# Patient Record
Sex: MALE | Race: WHITE | NOT HISPANIC OR LATINO | Employment: OTHER | URBAN - METROPOLITAN AREA
[De-identification: names, ages, dates, MRNs, and addresses within clinical notes are randomized per-mention and may not be internally consistent; named-entity substitution may affect disease eponyms.]

---

## 2017-03-12 ENCOUNTER — TELEPHONE (OUTPATIENT)
Dept: NURSING | Facility: CLINIC | Age: 47
End: 2017-03-12

## 2017-03-12 ENCOUNTER — OFFICE VISIT (OUTPATIENT)
Dept: URGENT CARE | Facility: URGENT CARE | Age: 47
End: 2017-03-12
Payer: COMMERCIAL

## 2017-03-12 VITALS
HEART RATE: 85 BPM | OXYGEN SATURATION: 95 % | WEIGHT: 230 LBS | SYSTOLIC BLOOD PRESSURE: 130 MMHG | RESPIRATION RATE: 16 BRPM | TEMPERATURE: 96.8 F | DIASTOLIC BLOOD PRESSURE: 80 MMHG

## 2017-03-12 DIAGNOSIS — R03.0 ELEVATED BLOOD PRESSURE READING WITHOUT DIAGNOSIS OF HYPERTENSION: ICD-10-CM

## 2017-03-12 DIAGNOSIS — R09.82 POSTNASAL DRIP: Primary | ICD-10-CM

## 2017-03-12 LAB
DEPRECATED S PYO AG THROAT QL EIA: NORMAL
MICRO REPORT STATUS: NORMAL
SPECIMEN SOURCE: NORMAL

## 2017-03-12 PROCEDURE — 99203 OFFICE O/P NEW LOW 30 MIN: CPT | Performed by: PHYSICIAN ASSISTANT

## 2017-03-12 PROCEDURE — 87081 CULTURE SCREEN ONLY: CPT | Performed by: PHYSICIAN ASSISTANT

## 2017-03-12 PROCEDURE — 87880 STREP A ASSAY W/OPTIC: CPT | Performed by: PHYSICIAN ASSISTANT

## 2017-03-12 RX ORDER — FLUTICASONE PROPIONATE 50 MCG
1-2 SPRAY, SUSPENSION (ML) NASAL DAILY
Qty: 1 BOTTLE | Refills: 0 | Status: SHIPPED | OUTPATIENT
Start: 2017-03-12 | End: 2018-12-06

## 2017-03-12 ASSESSMENT — ENCOUNTER SYMPTOMS
CHILLS: 0
FEVER: 0
HEADACHES: 0
DIARRHEA: 0
FOCAL WEAKNESS: 0
SHORTNESS OF BREATH: 0
ABDOMINAL PAIN: 0
COUGH: 0
SORE THROAT: 1
NAUSEA: 0
VOMITING: 0

## 2017-03-12 NOTE — PATIENT INSTRUCTIONS
Follow up with primary care in 3-4 days if symptoms have not improved. Return to clinic or go to ER if symptoms worsen.

## 2017-03-12 NOTE — PROGRESS NOTES
HPI    March 12, 2017    HPI: Andrew Atkinson is a 46 year old male who complains of moderate sore throat, rhinorrhea, sneezing, & congestion onset 1 month ago. Symptoms are constant in duration. He has tried throat lozenges & hot tea. Denies fever/chills, cough, dysphagia, HA, CP, SOB, abd pain, N/V/D, rash, or any other symptoms. Patient denies sick contacts.    No past medical history on file.  No past surgical history on file.  Social History   Substance Use Topics     Smoking status: Never Smoker     Smokeless tobacco: Not on file     Alcohol use Not on file       Problem list, Medication list, Allergies, and Medical/Social/Surgical histories reviewed in [facility] and updated as appropriate.    Review of Systems   Constitutional: Negative for chills and fever.   HENT: Positive for congestion and sore throat.         Rhinorrhea, sneezing   Respiratory: Negative for cough and shortness of breath.    Cardiovascular: Negative for chest pain.   Gastrointestinal: Negative for abdominal pain, diarrhea, nausea and vomiting.   Skin: Negative for rash.   Neurological: Negative for focal weakness and headaches.   All other systems reviewed and are negative.        Physical Exam   Constitutional: He is oriented to person, place, and time and well-developed, well-nourished, and in no distress.   HENT:   Head: Normocephalic and atraumatic.   Right Ear: Tympanic membrane, external ear and ear canal normal.   Left Ear: Tympanic membrane, external ear and ear canal normal.   Nose: Nose normal.   Mouth/Throat: Uvula is midline and mucous membranes are normal. Posterior oropharyngeal erythema present. No oropharyngeal exudate, posterior oropharyngeal edema or tonsillar abscesses.   Postnasal drip   Cardiovascular: Normal rate, regular rhythm and normal heart sounds.    Pulmonary/Chest: Effort normal and breath sounds normal.   Musculoskeletal: Normal range of motion.   Neurological: He is alert and oriented to person, place, and time.  Gait normal.   Skin: Skin is warm and dry.   Nursing note and vitals reviewed.      Vital Signs  /80 (BP Location: Right arm, Patient Position: Chair, Cuff Size: Adult Large)  Pulse 85  Temp 96.8  F (36  C) (Oral)  Resp 16  Wt 230 lb (104.3 kg)  SpO2 95%     Diagnostic Test Results:  Results for orders placed or performed in visit on 03/12/17 (from the past 24 hour(s))   Strep, Rapid Screen   Result Value Ref Range    Specimen Description Throat     Rapid Strep A Screen       NEGATIVE: No Group A streptococcal antigen detected by immunoassay, await   culture report.      Micro Report Status FINAL 03/12/2017        ASSESSMENT/PLAN      ICD-10-CM    1. Postnasal drip R09.82 Strep, Rapid Screen     Beta strep group A culture     fluticasone (FLONASE) 50 MCG/ACT spray   2. Elevated blood pressure reading without diagnosis of hypertension R03.0       Strep negative, pt afebrile. Postnasal drip likely secondary to allergies. Rx Flonase.    /95 initially, 130/80 on recheck. Advised pt keep BP log for a couple weeks and f/u with PCP.      I have discussed any lab or imaging results, the patient's diagnosis, and my plan of treatment with the patient and/or family. Patient is aware to come back in if with worsening symptoms or if no relief despite treatment plan.  Patient voiced understanding and had no further questions.       Follow Up: Return if symptoms worsen or fail to improve.    TEODORO Connor, PACarrieC  [facility]

## 2017-03-12 NOTE — TELEPHONE ENCOUNTER
"Call Type: Triage Call    Presenting Problem: Pharmacy calling regarding RX not at pharmacy\"  fluticasone (FLONASE) 50 MCG/ACT spray 1 Bottle 0 3/12/2017  --  Sig: Spray 1-2 sprays into both nostrils daily    Gave verbal per  epic.  Triage Note:  Guideline Title: Medication Questions - Adult  Recommended Disposition: Call Provider Immediately  Original Inclination: Wanted to speak with a nurse  Override Disposition:  Intended Action: Follow advice given  Physician Contacted: No  Pharmacy calling to clarify prescription order. ?  YES  Sign(s) or symptom(s) associated with a diagnosed condition or with a new illness  ? NO  Prescription ordered today and not available at pharmacy putting patient at  clinical risk ? NO  Unable to obtain prescribed medication related to available resources AND  situation poses immediate clinical risk ? NO  Physician Instructions:  Care Advice:  "

## 2017-03-12 NOTE — NURSING NOTE
There is no height or weight on file to calculate BMI.  BP Readings from Last 1 Encounters:   03/12/17 (!) 139/95   ]  BP cuff size:  large  Do you feel safe in your environment?  Yes  Does the patient need any medication refills today? No  Antonia Ahumada CMA

## 2017-03-12 NOTE — MR AVS SNAPSHOT
"              After Visit Summary   3/12/2017    Andrew Atkinson    MRN: [id number]           Patient Information     Date Of Birth          1970        Visit Information        Provider Department      3/12/2017 9:55 AM Mindy Bowie PA-C [facility]        Today's Diagnoses     Postnasal drip    -  1    Elevated blood pressure reading without diagnosis of hypertension          Care Instructions    Follow up with primary care in 3-4 days if symptoms have not improved. Return to clinic or go to ER if symptoms worsen.            Follow-ups after your visit        Follow-up notes from your care team     Return if symptoms worsen or fail to improve.      Who to contact     If you have questions or need follow up information about today's clinic visit or your schedule please contact [facility] directly at [phone].  Normal or non-critical lab and imaging results will be communicated to you by MyChart, letter or phone within 4 business days after the clinic has received the results. If you do not hear from us within 7 days, please contact the clinic through MyChart or phone. If you have a critical or abnormal lab result, we will notify you by phone as soon as possible.  Submit refill requests through iFit or call your pharmacy and they will forward the refill request to us. Please allow 3 business days for your refill to be completed.          Additional Information About Your Visit        MyChart Information     iFit lets you send messages to your doctor, view your test results, renew your prescriptions, schedule appointments and more. To sign up, go to [email or web address] . Click on \"Log in\" on the left side of the screen, which will take you to the Welcome page. Then click on \"Sign up Now\" on the right side of the page.     You will be asked to enter the access code listed below, as well as some personal information. Please follow the directions to create your " username and password.     Your access code is: [email or web address]  Expires: 6/10/2017 11:26 AM     Your access code will  in 90 days. If you need help or a new code, please call your [facility] clinic or [phone].        Care EveryWhere ID     This is your Care EveryWhere ID. This could be used by other organizations to access your [facility] medical records  [id number]        Your Vitals Were     Pulse Temperature Respirations Pulse Oximetry          85 96.8  F (36  C) (Oral) 16 95%         Blood Pressure from Last 3 Encounters:   17 130/80    Weight from Last 3 Encounters:   17 230 lb (104.3 kg)              We Performed the Following     Beta strep group A culture     Strep, Rapid Screen          Today's Medication Changes          These changes are accurate as of: 3/12/17 11:26 AM.  If you have any questions, ask your nurse or doctor.               Start taking these medicines.        Dose/Directions    fluticasone 50 MCG/ACT spray   Commonly known as:  FLONASE   Used for:  Postnasal drip   Started by:  Mindy Bowie PA-C        Dose:  1-2 spray   Spray 1-2 sprays into both nostrils daily   Quantity:  1 Bottle   Refills:  0            Where to get your medicines      These medications were sent to [id number] [place] - [address]  [address] [place] [address]     Phone:  [phone]     fluticasone 50 MCG/ACT spray                Primary Care Provider    None Specified       No primary provider on file.        Thank you!     [facility] you for [facility]  [facility]. Our goal is always to provide you with excellent care. Hearing back from our patients is one way we can continue to improve our services. Please take a few minutes to complete the written survey that you may receive in the mail after your visit with us. Thank you!             Your Updated Medication List - Protect others around you: Learn how to safely use, store and throw away  your medicines at www.disposemymeds.org.          This list is accurate as of: 3/12/17 11:26 AM.  Always use your most recent med list.                   Brand Name Dispense Instructions for use    fluticasone 50 MCG/ACT spray    FLONASE    1 Bottle    Spray 1-2 sprays into both nostrils daily

## 2017-03-14 LAB
BACTERIA SPEC CULT: NORMAL
MICRO REPORT STATUS: NORMAL
SPECIMEN SOURCE: NORMAL

## 2017-04-17 ENCOUNTER — OFFICE VISIT (OUTPATIENT)
Dept: FAMILY MEDICINE | Facility: CLINIC | Age: 47
End: 2017-04-17
Payer: COMMERCIAL

## 2017-04-17 VITALS
TEMPERATURE: 96.6 F | HEART RATE: 80 BPM | HEIGHT: 71 IN | SYSTOLIC BLOOD PRESSURE: 110 MMHG | BODY MASS INDEX: 33.49 KG/M2 | OXYGEN SATURATION: 96 % | DIASTOLIC BLOOD PRESSURE: 70 MMHG | WEIGHT: 239.2 LBS

## 2017-04-17 DIAGNOSIS — M25.662 STIFFNESS OF LEFT KNEE: ICD-10-CM

## 2017-04-17 DIAGNOSIS — M25.562 ACUTE PAIN OF LEFT KNEE: Primary | ICD-10-CM

## 2017-04-17 DIAGNOSIS — S83.242A TEAR OF MEDIAL MENISCUS OF LEFT KNEE, CURRENT, UNSPECIFIED TEAR TYPE, INITIAL ENCOUNTER: ICD-10-CM

## 2017-04-17 PROCEDURE — 99213 OFFICE O/P EST LOW 20 MIN: CPT | Performed by: FAMILY MEDICINE

## 2017-04-17 RX ORDER — LEVOTHYROXINE SODIUM 125 UG/1
125 TABLET ORAL DAILY
COMMUNITY
End: 2017-10-19 | Stop reason: DRUGHIGH

## 2017-04-17 RX ORDER — MULTIPLE VITAMINS W/ MINERALS TAB 9MG-400MCG
1 TAB ORAL EVERY MORNING
COMMUNITY

## 2017-04-17 ASSESSMENT — PAIN SCALES - GENERAL: PAINLEVEL: WORST PAIN (10)

## 2017-04-17 NOTE — PROGRESS NOTES
"  SUBJECTIVE:                                                    Andrew Atkinson is a 46 year old male who presents to clinic today for the following health issues:    Musculoskeletal problem/pain      Duration: 1 week    Description  Location: Left knee     Intensity:  //10    Accompanying signs and symptoms: Stiffness and swelling     History  Previous similar problem: no   Previous evaluation:  none    Precipitating or alleviating factors:  Trauma or overuse: no   Aggravating factors include: walking    Therapies tried and outcome: ice, immobilization and NSAID - ibuprofen        HCM:    Problem list and histories reviewed & adjusted, as indicated.  Additional history: as documented    There is no problem list on file for this patient.    History reviewed. No pertinent surgical history.    Social History   Substance Use Topics     Smoking status: Never Smoker     Smokeless tobacco: Not on file     Alcohol use Yes      Comment: Occ      Family History   Problem Relation Age of Onset     Unknown/Adopted No family hx of            Reviewed and updated as needed this visit by clinical staff  Tobacco  Allergies  Meds  Med Hx  Surg Hx  Fam Hx  Soc Hx      ROS:  Constitutional, HEENT, cardiovascular, pulmonary, gi and gu systems are negative, except as otherwise noted.    OBJECTIVE:                                                    /70  Pulse 80  Temp 96.6  F (35.9  C) (Oral)  Ht 5' 11\" (1.803 m)  Wt 239 lb 3.2 oz (108.5 kg)  SpO2 96%  BMI 33.36 kg/m2  Body mass index is 33.36 kg/(m^2).  GENERAL: walking with a limp, alert and no distress  NECK: no adenopathy and thyroid normal to palpation  RESP: lungs clear to auscultation - no rales, rhonchi or wheezes  CV: regular rate and rhythm, no murmur, click or rub, no peripheral edema   MS: no gross musculoskeletal defects noted, no edema  Lt KNEE:   Tenderness along medial joint line     ASSESSMENT/PLAN:                                                  "   (M25.562) Acute pain of left knee  (primary encounter diagnosis)  Comment: Differentials: Meniscal tear, MCL tear, OA medial compartment. Given severity of symptoms and disabling nature will evaluate with an MRI  Plan: MR Knee Left w/o Contrast    (M25.662) Stiffness of left knee  Plan: MR Knee Left w/o Contrast    (S83.242A) ? Tear of medial meniscus of left knee, Active  Plan: MR Knee Left w/o Contrast, CANCELED: MR Ankle         Left w/o Contrast    Follow up with MRI.    Odell Urbina MD  [facility]

## 2017-04-17 NOTE — NURSING NOTE
"Chief Complaint   Patient presents with     Knee Pain     Left knee pain x 1 week       Initial /70  Pulse 80  Temp 96.6  F (35.9  C) (Oral)  Ht 5' 11\" (1.803 m)  Wt 239 lb 3.2 oz (108.5 kg)  SpO2 96%  BMI 33.36 kg/m2 Estimated body mass index is 33.36 kg/(m^2) as calculated from the following:    Height as of this encounter: 5' 11\" (1.803 m).    Weight as of this encounter: 239 lb 3.2 oz (108.5 kg).  Medication Reconciliation: complete     An ROSA Pro    "

## 2017-04-17 NOTE — MR AVS SNAPSHOT
After Visit Summary   4/17/2017    Andrew Atkinson    MRN: [id number]           Patient Information     Date Of Birth          1970        Visit Information        Provider Department      4/17/2017 7:40 AM Odell Urbina MD [facility]        Today's Diagnoses     Acute pain of left knee    -  1    Stiffness of left knee        ? Tear of medial meniscus of left knee          Care Instructions    [facility]    If you have any questions regarding to your visit please contact your care team:       Team Purple:   Clinic Hours Telephone Number   [address]   7am-7pm  Monday - Thursday   7am-5pm  Fridays  [phone]  (Appointment scheduling available 24/7)    Questions about your Visit?   Team Line:  [phone]   Urgent Care - [place] and [facility] - 11am-9pm Monday-Friday Saturday-Sunday- 9am-5pm   [place] - 5pm-9pm Monday-Friday Saturday-Sunday- 9am-5pm  [phone] - [facility]  [phone] - [place]       What options do I have for visits at the clinic other than the traditional office visit?  To expand how we care for you, many of our providers are utilizing electronic visits (e-visits) and telephone visits, when medically appropriate, for interactions with their patients rather than a visit in the clinic.   We also offer nurse visits for many medical concerns. Just like any other service, we will bill your insurance company for this type of visit based on time spent on the phone with your provider. Not all insurance companies cover these visits. Please check with your medical insurance if this type of visit is covered. You will be responsible for any charges that are not paid by your insurance.      E-visits via [employer]:  generally incur a $35.00 fee.  Telephone visits:  Time spent on the phone: *charged based on time that is spent on the phone in increments of 10  "minutes. Estimated cost:   5-10 mins $30.00   11-20 mins. $59.00   21-30 mins. $85.00     Use [employer] Interactive[employer] (secure email communication and access to your chart) to send your primary care provider a message or make an appointment. Ask someone on your Team how to sign up for [employer]at.  For a Price Quote for your services, please call our [place] Line at [phone].  As always, Thank you for trusting us with your health care needs!    Discharged By: An          Follow-ups after your visit        Future tests that were ordered for you today     Open Future Orders        Priority Expected Expires Ordered    MR Ankle Left w/o Contrast Routine  4/17/2018 4/17/2017            Who to contact     If you have questions or need follow up information about today's clinic visit or your schedule please contact [facility] directly at [phone].  Normal or non-critical lab and imaging results will be communicated to you by Makel[employer] Interactivehart, letter or phone within 4 business days after the clinic has received the results. If you do not hear from us within 7 days, please contact the clinic through [employer] or phone. If you have a critical or abnormal lab result, we will notify you by phone as soon as possible.  Submit refill requests through [employer] or call your pharmacy and they will forward the refill request to us. Please allow 3 business days for your refill to be completed.          Additional Information About Your Visit        Natural Dentist Information     [employer]ist lets you send messages to your doctor, view your test results, renew your prescriptions, schedule appointments and more. To sign up, go to [email or web address] . Click on \"Log in\" on the left side of the screen, which will take you to the Welcome page. Then click on \"Sign up Now\" on the right side of the page.     You will be asked to enter the access code listed below, as well as some personal information. Please follow the directions to create your username " "and password.     Your access code is: [email or web address]  Expires: 6/10/2017 11:26 AM     Your access code will  in 90 days. If you need help or a new code, please call your [place] clinic or [phone].        Care EveryWhere ID     This is your Care EveryWhere ID. This could be used by other organizations to access your [place] medical records  [id number]        Your Vitals Were     Pulse Temperature Height Pulse Oximetry BMI (Body Mass Index)       80 96.6  F (35.9  C) (Oral) 5' 11\" (1.803 m) 96% 33.36 kg/m2        Blood Pressure from Last 3 Encounters:   17 110/70   17 130/80    Weight from Last 3 Encounters:   17 239 lb 3.2 oz (108.5 kg)   17 230 lb (104.3 kg)               Primary Care Provider Office Phone #    [facility] [phone]       No address on file        Thank you!     Thank you for choosing [facility]  for your care. Our goal is always to provide you with excellent care. Hearing back from our patients is one way we can continue to improve our services. Please take a few minutes to complete the written survey that you may receive in the mail after your visit with us. Thank you!             Your Updated Medication List - Protect others around you: Learn how to safely use, store and throw away your medicines at www.disposemymeds.org.          This list is accurate as of: 17  8:11 AM.  Always use your most recent med list.                   Brand Name Dispense Instructions for use    fluticasone 50 MCG/ACT spray    FLONASE    1 Bottle    Spray 1-2 sprays into both nostrils daily       levothyroxine 125 MCG tablet    SYNTHROID/LEVOTHROID     Take 125 mcg by mouth daily       Multi-vitamin Tabs tablet      Take 1 tablet by mouth daily       TURMERIC PO      Take 1,000 mg by mouth daily         "

## 2017-04-17 NOTE — PATIENT INSTRUCTIONS
[facility]    If you have any questions regarding to your visit please contact your care team:       Team Purple:   Clinic Hours Telephone Number   [address] Dr[address]   7am-7pm  Monday - Thursday   7am-5pm  Fridays  [phone]  (Appointment scheduling available 24/7)    Questions about your Visit?   Team Line:  [phone]   Urgent Care - [place] and [facility] - 11am-9pm Monday-Friday Saturday-Sunday- 9am-5pm   [place] - 5pm-9pm Monday-Friday Saturday-Sunday- 9am-5pm  [phone] - [facility]  [phone] - [place]       What options do I have for visits at the clinic other than the traditional office visit?  To expand how we care for you, many of our providers are utilizing electronic visits (e-visits) and telephone visits, when medically appropriate, for interactions with their patients rather than a visit in the clinic.   We also offer nurse visits for many medical concerns. Just like any other service, we will bill your insurance company for this type of visit based on time spent on the phone with your provider. Not all insurance companies cover these visits. Please check with your medical insurance if this type of visit is covered. You will be responsible for any charges that are not paid by your insurance.      E-visits via [employer]:  generally incur a $35.00 fee.  Telephone visits:  Time spent on the phone: *charged based on time that is spent on the phone in increments of 10 minutes. Estimated cost:   5-10 mins $30.00   11-20 mins. $59.00   21-30 mins. $85.00     Use [employer] (secure email communication and access to your chart) to send your primary care provider a message or make an appointment. Ask someone on your Team how to sign up for [employer].  For a Price Quote for your services, please call our Consumer Price Line at [phone].  As always, Thank you for trusting us with your health care  needs!    Discharged By: An

## 2017-04-18 ENCOUNTER — RADIANT APPOINTMENT (OUTPATIENT)
Dept: MRI IMAGING | Facility: CLINIC | Age: 47
End: 2017-04-18
Attending: FAMILY MEDICINE
Payer: COMMERCIAL

## 2017-04-18 DIAGNOSIS — M25.562 ACUTE PAIN OF LEFT KNEE: ICD-10-CM

## 2017-04-18 DIAGNOSIS — S83.242A TEAR OF MEDIAL MENISCUS OF LEFT KNEE, CURRENT, UNSPECIFIED TEAR TYPE, INITIAL ENCOUNTER: ICD-10-CM

## 2017-04-18 DIAGNOSIS — M25.662 STIFFNESS OF LEFT KNEE: ICD-10-CM

## 2017-04-18 PROBLEM — E03.9 ACQUIRED HYPOTHYROIDISM: Status: ACTIVE | Noted: 2017-04-18

## 2017-04-18 PROCEDURE — 73721 MRI JNT OF LWR EXTRE W/O DYE: CPT | Mod: TC

## 2017-04-19 ENCOUNTER — TELEPHONE (OUTPATIENT)
Dept: FAMILY MEDICINE | Facility: CLINIC | Age: 47
End: 2017-04-19

## 2017-04-19 DIAGNOSIS — S83.242A ACUTE TEAR MEDIAL MENISCUS, LEFT, INITIAL ENCOUNTER: Primary | ICD-10-CM

## 2017-04-19 DIAGNOSIS — E03.9 ACQUIRED HYPOTHYROIDISM: ICD-10-CM

## 2017-04-19 NOTE — TELEPHONE ENCOUNTER
Patient called team line. He has MRI of knee completed yesterday 4/18/2017. He is wondering if Dr. Urbina had time to review the results and if he had any recommendations regarding treatment. He states he would like to know soon because he needs to know about getting back to work. Please advise.  Lois Dominguez CMA

## 2017-04-19 NOTE — TELEPHONE ENCOUNTER
Left vm:   MRI showed a meniscal tear. Refer to orthopedics for arthroscopy.  Referral order placed      Your provider has referred you to: [facility]: [facility] [place] [phone]   [email or web address]    Please be aware that coverage of these services is subject to the terms and limitations of your health insurance plan.  Call member services at your health plan with any benefit or coverage questions.      Please bring the following to your appointment:    >>   Any x-rays, CTs or MRIs which have been performed.  Contact the facility where they were done to arrange for  prior to your scheduled appointment.    >>   List of current medications   >>   This referral request   >>   Any documents/labs given to you for this referral

## 2017-04-21 NOTE — PROGRESS NOTES
"HISTORY OF PRESENT ILLNESS    Andrew Atkinson is a 46 year old male seen at the request of Odell Urbina MD for evaluation of a left knee pain. He felt a pop when getting up from a chair approximately 3 weeks ago.    Patient presents to the clinic with a cane.     Symptoms have been worsening since that time.  Present symptoms: shooting pain. No catching, locking, or giving way. Symptoms occur walking, pivoting, lifting the leg up and flexing (such as taking off his shoe) and at night. The symptoms occur frequently. Pain location: medial     Treatments: Ice    Orthopedic PMH: none    PAST MEDICAL HISTORY: No past medical history on file.    PAST SURGICAL HISTORY: No past surgical history on file.    FAMILY HISTORY:   Family History   Problem Relation Age of Onset     Unknown/Adopted No family hx of      SOCIAL HISTORY:   Social History   Substance Use Topics     Smoking status: Never Smoker     Smokeless tobacco: Not on file     Alcohol use Yes      Comment: Occ      REVIEW OF SYSTEMS:    CONSTITUTIONAL:  NEGATIVE for fever, chills, change in weight  INTEGUMENTARY/SKIN:  NEGATIVE for worrisome rashes, moles or lesions  EYES:  NEGATIVE for vision changes or irritation  ENT/MOUTH:  NEGATIVE for ear, mouth and throat problems  RESP:  NEGATIVE for significant cough or SOB  BREAST:  NEGATIVE for masses, tenderness or discharge  CV:  NEGATIVE for chest pain, palpitations or peripheral edema  GI:  NEGATIVE for nausea, abdominal pain, heartburn, or change in bowel habits  :  Negative   MUSCULOSKELETAL:  See HPI above  NEURO:  NEGATIVE for weakness, dizziness or paresthesias  ENDOCRINE:  NEGATIVE for temperature intolerance, skin/hair changes  HEME/ALLERGY/IMMUNE:  NEGATIVE for bleeding problems  PSYCHIATRIC:  NEGATIVE for changes in mood or affect    PHYSICAL EXAM:  Resp 10  Ht 1.829 m (6' 0.01\")  Wt 106.8 kg (235 lb 6.4 oz)  BMI 31.92 kg/m2  GENERAL APPEARANCE: healthy, alert and no distress   SKIN: no suspicious " lesions or rashes  NEURO: Normal strength and tone, mentation intact and speech normal  PSYCH:  mentation appears normal and affect normal/bright    KNEE EXAM:   Gait: walks with significant antalgic gait   Alignment: mild varus   Minimal crepitations in the patellofemoral joint.  ROM: 0*-full flexion   Effusion: mild to moderate   McMurrays: pain with Valgus McMurrays   Ligaments:    Lachman's Stable, Anterior and posterior drawer stable, stable to varus and valgus stress. Pain with Valgus stress testing.      MRI: From 4/18/17 of the left knee was reviewed personally by myself:   1. Medial meniscal tearing with possible displaced flap. Minimal reactive edema within the adjacent medial tibial plateau.  2. Patellofemoral chondromalacia.  3. Mild-moderate effusion.    Impression:   1. Medial meniscus tear, left knee     Plan:   Knee Arthroscopy: Discussed findings with patient. We talked about treatment options.  I feel the best treatment would be knee arthroscopy. He has mechanical symptoms, in that he has pain with pivoting and flexing the knee.  I have explained the nature of the procedure, the risks and recovery time with the patient. All questions were answered. The patient understands and has elected to proceed. He says that if the tear is repairable, which is unlikely, he would like it to be repaired. He understands the period of non-weightbearing required in that scenario..     We will likely use Percocet for post op pain control, due to his history of narcotic medication use in the past for his back (not presently on narcotics).     DWAIN Muñoz MD  Dept. Orthopedic Surgery  [facility]    This document serves as a record of the services and decisions personally performed and made by Dr. DWAIN Muñoz MD. It was created on his behalf by Radha Leblanc, a trained medical scribe. The creation of this record is based on the provider's personal observations and the statements of the patient. This  document has been checked and approved by the attending provider.   Radha Leblanc April 25, 2017 9:30 AM

## 2017-04-25 ENCOUNTER — OFFICE VISIT (OUTPATIENT)
Dept: ORTHOPEDICS | Facility: CLINIC | Age: 47
End: 2017-04-25
Payer: COMMERCIAL

## 2017-04-25 VITALS — BODY MASS INDEX: 31.89 KG/M2 | HEIGHT: 72 IN | RESPIRATION RATE: 10 BRPM | WEIGHT: 235.4 LBS

## 2017-04-25 DIAGNOSIS — S83.242A TEAR OF MEDIAL MENISCUS OF LEFT KNEE, CURRENT, UNSPECIFIED TEAR TYPE, INITIAL ENCOUNTER: Primary | ICD-10-CM

## 2017-04-25 PROCEDURE — 99203 OFFICE O/P NEW LOW 30 MIN: CPT | Performed by: ORTHOPAEDIC SURGERY

## 2017-04-25 ASSESSMENT — PAIN SCALES - GENERAL: PAINLEVEL: MODERATE PAIN (5)

## 2017-04-25 NOTE — MR AVS SNAPSHOT
"              After Visit Summary   2017    Andrew Atkinson    MRN: [id number]           Patient Information     Date Of Birth          1970        Visit Information        Provider Department      2017 8:45 AM Grant Muñoz MD [facility]         Follow-ups after your visit        Who to contact     If you have questions or need follow up information about today's clinic visit or your schedule please contact [facility] directly at [phone].  Normal or non-critical lab and imaging results will be communicated to you by MyChart, letter or phone within 4 business days after the clinic has received the results. If you do not hear from us within 7 days, please contact the clinic through TabSyshart or phone. If you have a critical or abnormal lab result, we will notify you by phone as soon as possible.  Submit refill requests through Billetto or call your pharmacy and they will forward the refill request to us. Please allow 3 business days for your refill to be completed.          Additional Information About Your Visit        [facility] Information     Billetto lets you send messages to your doctor, view your test results, renew your prescriptions, schedule appointments and more. To sign up, go to [email or web address] . Click on \"Log in\" on the left side of the screen, which will take you to the Welcome page. Then click on \"Sign up Now\" on the right side of the page.     You will be asked to enter the access code listed below, as well as some personal information. Please follow the directions to create your username and password.     Your access code is: [email or web address]  Expires: 6/10/2017 11:26 AM     Your access code will  in 90 days. If you need help or a new code, please call your [facility] or [phone].        Care EveryWhere ID     This is your Care EveryWhere ID. This could be used by other organizations to access your [facility] medical " "records  [id number]        Your Vitals Were     Respirations Height BMI (Body Mass Index)             10 1.829 m (6' 0.01\") 31.92 kg/m2          Blood Pressure from Last 3 Encounters:   04/17/17 110/70   03/12/17 130/80    Weight from Last 3 Encounters:   04/25/17 106.8 kg (235 lb 6.4 oz)   04/17/17 108.5 kg (239 lb 3.2 oz)   03/12/17 104.3 kg (230 lb)              Today, you had the following     No orders found for display       [facility] Provider Office Phone #    [facility] [phone]       No address on file        Thank you!     Thank you for choosing [facility]  for your care. Our goal is always to provide you with excellent care. Hearing back from our patients is one way we can continue to improve our services. Please take a few minutes to complete the written survey that you may receive in the mail after your visit with us. Thank you!             Your Updated Medication List - Protect others around you: Learn how to safely use, store and throw away your medicines at www.disposemymeds.org.          This list is accurate as of: 4/25/17  8:59 AM.  Always use your most recent med list.                   Brand Name Dispense Instructions for use    fluticasone 50 MCG/ACT spray    FLONASE    1 Bottle    Spray 1-2 sprays into both nostrils daily       levothyroxine 125 MCG tablet    SYNTHROID/LEVOTHROID     Take 125 mcg by mouth daily       Multi-vitamin Tabs tablet      Take 1 tablet by mouth daily       TURMERIC PO      Take 1,000 mg by mouth daily         "

## 2017-04-25 NOTE — LETTER
4/25/2017       RE: Andrew Atkinson  [address] [address]           Dear Colleague,    Thank you for referring your patient, Andrew Atkinsno, to the [facility]. Please see a copy of my visit note below.    HISTORY OF PRESENT ILLNESS    Andrew Atkinson is a 46 year old male seen at the request of Odell Urbina MD for evaluation of a left knee pain. He felt a pop when getting up from a chair approximately 3 weeks ago.    Patient presents to the clinic with a cane.     Symptoms have been worsening since that time.  Present symptoms: shooting pain. No catching, locking, or giving way. Symptoms occur walking, pivoting, lifting the leg up and flexing (such as taking off his shoe) and at night. The symptoms occur frequently. Pain location: medial     Treatments: Ice    Orthopedic PMH: none    PAST MEDICAL HISTORY: No past medical history on file.    PAST SURGICAL HISTORY: No past surgical history on file.    FAMILY HISTORY:   Family History   Problem Relation Age of Onset     Unknown/Adopted No family hx of      SOCIAL HISTORY:   Social History   Substance Use Topics     Smoking status: Never Smoker     Smokeless tobacco: Not on file     Alcohol use Yes      Comment: Occ      REVIEW OF SYSTEMS:    CONSTITUTIONAL:  NEGATIVE for fever, chills, change in weight  INTEGUMENTARY/SKIN:  NEGATIVE for worrisome rashes, moles or lesions  EYES:  NEGATIVE for vision changes or irritation  ENT/MOUTH:  NEGATIVE for ear, mouth and throat problems  RESP:  NEGATIVE for significant cough or SOB  BREAST:  NEGATIVE for masses, tenderness or discharge  CV:  NEGATIVE for chest pain, palpitations or peripheral edema  GI:  NEGATIVE for nausea, abdominal pain, heartburn, or change in bowel habits  :  Negative   MUSCULOSKELETAL:  See HPI above  NEURO:  NEGATIVE for weakness, dizziness or paresthesias  ENDOCRINE:  NEGATIVE for temperature intolerance, skin/hair changes  HEME/ALLERGY/IMMUNE:  NEGATIVE for bleeding  "problems  PSYCHIATRIC:  NEGATIVE for changes in mood or affect    PHYSICAL EXAM:  Resp 10  Ht 1.829 m (6' 0.01\")  Wt 106.8 kg (235 lb 6.4 oz)  BMI 31.92 kg/m2  GENERAL APPEARANCE: healthy, alert and no distress   SKIN: no suspicious lesions or rashes  NEURO: Normal strength and tone, mentation intact and speech normal  PSYCH:  mentation appears normal and affect normal/bright    KNEE EXAM:   Gait: walks with significant antalgic gait   Alignment: mild varus   Minimal crepitations in the patellofemoral joint.  ROM: 0*-full flexion   Effusion: mild to moderate   McMurrays: pain with Valgus McMurrays   Ligaments:    Lachman's Stable, Anterior and posterior drawer stable, stable to varus and valgus stress. Pain with Valgus stress testing.      MRI: From 4/18/17 of the left knee was reviewed personally by myself:   1. Medial meniscal tearing with possible displaced flap. Minimal reactive edema within the adjacent medial tibial plateau.  2. Patellofemoral chondromalacia.  3. Mild-moderate effusion.    Impression:   1. Medial meniscus tear, left knee     Plan:   Knee Arthroscopy: Discussed findings with patient. We talked about treatment options.  I feel the best treatment would be knee arthroscopy. He has mechanical symptoms, in that he has pain with pivoting and flexing the knee.  I have explained the nature of the procedure, the risks and recovery time with the patient. All questions were answered. The patient understands and has elected to proceed. He says that if the tear is repairable, which is unlikely, he would like it to be repaired. He understands the period of non-weightbearing required in that scenario..     We will likely use Percocet for post op pain control, due to his history of narcotic medication use in the past for his back (not presently on narcotics).     DWAIN Muñoz MD  Dept. Orthopedic Surgery  [facility]    This document serves as a record of the services and decisions " personally performed and made by Dr. DWAIN Muñoz MD. It was created on his behalf by Radha Leblanc, a trained medical scribe. The creation of this record is based on the provider's personal observations and the statements of the patient. This document has been checked and approved by the attending provider.   Radha Leblanc April 25, 2017 9:30 AM    Again, thank you for allowing me to participate in the care of your patient.        Sincerely,              Grant Muñoz MD

## 2017-04-27 NOTE — PATIENT INSTRUCTIONS
Before Your Surgery      Call your surgeon if there is any change in your health. This includes signs of a cold or flu (such as a sore throat, runny nose, cough, rash or fever).    Do not smoke, drink alcohol or take over the counter medicine (unless your surgeon or primary care doctor tells you to) for the 24 hours before and after surgery.    If you take prescribed drugs: Follow your doctor s orders about which medicines to take and which to stop until after surgery.    Eating and drinking prior to surgery: follow the instructions from your surgeon    Take a shower or bath the night before surgery. Use the soap your surgeon gave you to gently clean your skin. If you do not have soap from your surgeon, use your regular soap. Do not shave or scrub the surgery site.  Wear clean pajamas and have clean sheets on your bed.   [facility]    If you have any questions regarding to your visit please contact your care team:       Team Purple:   Clinic Hours Telephone Number   [address]   7am-7pm  Monday - Thursday   7am-5pm  Fridays  [phone]  (Appointment scheduling available 24/7)    Questions about your Visit?   Team Line:  [phone]   Urgent [facility] - [place] [facility] - 11am-9pm Monday-Friday Saturday-Sunday- 9am-5pm   [place] - 5pm-9pm Monday-Friday Saturday-Sunday- 9am-5pm  [phone] - [place]   [phone] - [place]       What options do I have for visits at the clinic other than the traditional office visit?  To expand how we care for you, many of our providers are utilizing electronic visits (e-visits) and telephone visits, when medically appropriate, for interactions with their patients rather than a visit in the clinic.   We also offer nurse visits for many medical concerns. Just like any other service, we will bill your insurance company for this type of visit based on time spent on the phone  with your provider. Not all insurance companies cover these visits. Please check with your medical insurance if this type of visit is covered. You will be responsible for any charges that are not paid by your insurance.      E-visits via [employer]:  generally incur a $35.00 fee.  Telephone visits:  Time spent on the phone: *charged based on time that is spent on the phone in increments of 10 minutes. Estimated cost:   5-10 mins $30.00   11-20 mins. $59.00   21-30 mins. $85.00     Use Animated Speech (secure email communication and access to your chart) to send your primary care provider a message or make an appointment. Ask someone on your Team how to sign up for Animated Speech.  For a Price Quote for your services, please call our Consumer Price Line at [phone].  As always, Thank you for trusting us with your health care needs!

## 2017-04-27 NOTE — PROGRESS NOTES
[facility]  [address] [place] [id number]  [phone]  Dept: [phone]    PRE-OP EVALUATION:  Today's date: 2017    Andrew Atkinson (: 1970) presents for pre-operative evaluation assessment as requested by Dr. Grant Muñoz.  He requires evaluation and anesthesia risk assessment prior to undergoing surgery/procedure for treatment of the left Knee  .  Proposed procedure: Arthroscopic Medical Menisectomy Left Knee    Date of Surgery/ Procedure: 2017  Time of Surgery/ Procedure: 10:00 AM  Hospital/Surgical Facility: [facility]  Fax number for surgical facility:   Primary Physician: Odell Urbina  Type of Anesthesia Anticipated: General    Patient has a Health Care Directive or Living Will:  YES -Living Will    1. NO - Do you have a history of heart attack, stroke, stent, bypass or surgery on an artery in the head, neck, heart or legs?  2. NO - Do you ever have any pain or discomfort in your chest?  3. NO - Do you have a history of  Heart Failure?  4. NO - Are you troubled by shortness of breath when: walking on the level, up a slight hill or at night?  5. NO - Do you currently have a cold, bronchitis or other respiratory infection?  6. NO - Do you have a cough, shortness of breath or wheezing?  7. NO - Do you sometimes get pains in the calves of your legs when you walk?  8. NO - Do you or anyone in your family have previous history of blood clots?  9. NO - Do you or does anyone in your family have a serious bleeding problem such as prolonged bleeding following surgeries or cuts?  10. NO - Have you ever had problems with anemia or been told to take iron pills?  11. YES - HAVE YOU HAD ANY ABNORMAL BLOOD LOSS SUCH AS BLACK, TARRY OR BLOODY STOOLS, OR ABNORMAL VAGINAL BLEEDING? Periodically some bleeding with BM (Hemorrhoids)  12. NO - Have you ever had a blood transfusion?  13. NO - Have you or any of your relatives ever had problems  with anesthesia?  14. YES - DO YOU HAVE SLEEP APNEA, EXCESSIVE SNORING OR DAYTIME DROWSINESS? He snores  15. NO - Do you have any prosthetic heart valves?  16. NO - Do you have prosthetic joints?  17. NO - Is there any chance that you may be pregnant?      HPI:                                                      Brief HPI related to upcoming procedure: Arthroscopy      HYPOTHYROIDISM - Patient has a longstanding history of chronic Hypothyroidism. Patient has been doing well, noting no tremor, insomnia, hair loss or changes in skin texture. Last TSH value of None. Continues to take medications as directed, without adverse reactions or side effects.                                                                                                                                                                                                                        .    MEDICAL HISTORY:                                                      Patient Active Problem List    Diagnosis Date Noted     Acquired hypothyroidism 04/18/2017     Priority: Medium      Past Medical History:   Diagnosis Date     Medial meniscus tear      History reviewed. No pertinent surgical history.  Current Outpatient Prescriptions   Medication Sig Dispense Refill     levothyroxine (SYNTHROID/LEVOTHROID) 125 MCG tablet Take 125 mcg by mouth daily       multivitamin, therapeutic with minerals (MULTI-VITAMIN) TABS tablet Take 1 tablet by mouth daily       TURMERIC PO Take 1,000 mg by mouth daily       fluticasone (FLONASE) 50 MCG/ACT spray Spray 1-2 sprays into both nostrils daily (Patient not taking: Reported on 5/1/2017) 1 Bottle 0     OTC products: None, except as noted above    Allergies   Allergen Reactions     Morphine Hives      Latex Allergy: NO    Social History   Substance Use Topics     Smoking status: Never Smoker     Smokeless tobacco: Not on file     Alcohol use Yes      Comment: Occ      History   Drug Use No       REVIEW OF SYSTEMS:       "                                              Constitutional, HEENT, cardiovascular, pulmonary, gi and gu systems are negative, except as otherwise noted.    EXAM:                                                    /80  Pulse 56  Temp 97.1  F (36.2  C) (Oral)  Ht 6' 0.01\" (1.829 m)  Wt 236 lb 8 oz (107.3 kg)  SpO2 95%  BMI 32.07 kg/m2    GENERAL APPEARANCE: healthy, alert and no distress     EYES: EOMI,  PERRL     HENT: ear canals and TM's normal and nose and mouth without ulcers or lesions     NECK: no adenopathy, no asymmetry, masses, or scars and thyroid normal to palpation     RESP: lungs clear to auscultation - no rales, rhonchi or wheezes     CV: regular rates and rhythm, normal S1 S2, no S3 or S4 and no murmur, click or rub     ABDOMEN:  soft, nontender, no HSM or masses and bowel sounds normal     SKIN: no suspicious lesions or rashes     NEURO: Normal strength and tone, sensory exam grossly normal, mentation intact and speech normal     PSYCH: mentation appears normal. and affect normal/bright     LYMPHATICS: No axillary, cervical, or supraclavicular nodes    DIAGNOSTICS:                                                      EKG: Not indicated due to non-vascular surgery and low risk of event (age <65 and without cardiac risk factors)  Hemoglobin (indicated for history of anemia or procedure with significant blood loss such as tonsillectomy, major intraperitoneal surgery, vascular surgery, major spine surgery, total joint replacement)  Labs Resulted Today:   Results for orders placed or performed in visit on 04/18/17   MR Knee Left w/o Contrast    Narrative    MR KNEE LEFT WITHOUT CONTRAST April 18, 2017 12:46 PM    HISTORY: Tear of medial meniscus, current injury, left knee, initial  encounter. Stiffness of left knee, not elsewhere classified. Pain in  left knee.     TECHNIQUE: Axial and coronal T2 with fat suppression. Coronal T1.  Sagittal dual echo T2.    FINDINGS:   Medial Meniscus: " Horizontal tear of the posterior horn and body  segment, with inferior extension. There is blunting at the free edge  of the body segment, with a possible subtle displaced flap of meniscal  tissue inferomedially. If arthroscopy is performed, close attention  should be directed to this region.    Lateral Meniscus: No tear, displaced fragment, or extrusion.        Anterior Cruciate Ligament: Unremarkable. No sprain or tear  identified.     Posterior Cruciate Ligament: Unremarkable. No sprain or tear  identified.     Medial Collateral Ligament: No sprain or tear identified.    Lateral Collateral Ligament Complex, Popliteus Tendon: The iliotibial  band, fibular collateral ligament, biceps femoris tendon, and  popliteus tendon are intact.    Osseous and Cartilaginous Structures: Mild/moderate degenerative  change at the proximal tibiofibular joint. There is minimal reactive  subchondral edema of the medial tibial plateau. Grade 1 patellar  chondromalacia. Grade 2 trochlear chondromalacia.    Extensor Mechanism: The quadriceps and patellar tendons are intact.  The medial and lateral patellar retinacula appear unremarkable.    Joint Space: Mild/moderate joint effusion. No definite loose bodies  appreciated.    Additional Findings: No Baker's cyst. No semimembranosus-tibial  collateral ligament or pes anserine bursitis. There is nonspecific  soft tissue edema (greatest medially).         Impression    IMPRESSION:  1. Medial meniscal tearing with possible displaced flap. Minimal  reactive edema within the adjacent medial tibial plateau.  2. Patellofemoral chondromalacia.  3. Mild-moderate effusion.    JEROME TINOCO MD       No results for input(s): HGB, PLT, INR, NA, POTASSIUM, CR, A1C in the last 75972 hours.     IMPRESSION:                                                    Reason for surgery/procedure: Meniscal Tear Lt/Arthroscopy  Diagnosis/reason for consult: Knee Pain/Pre OP    The proposed surgical procedure is  considered LOW risk.    REVISED CARDIAC RISK INDEX  The patient has the following serious cardiovascular risks for perioperative complications such as (MI, PE, VFib and 3  AV Block):  No serious cardiac risks  INTERPRETATION: 0 risks: Class I (very low risk - 0.4% complication rate)    The patient has the following additional risks for perioperative complications:  No identified additional risks      ICD-10-CM    1. Preop general physical exam Z01.818    2. Other tear of meniscus of left knee as current injury, unspecified meniscus, initial encounter S83.204A    3. Acquired hypothyroidism E03.9        RECOMMENDATIONS:                                                        --Patient is to take all scheduled medications on the day of surgery EXCEPT for modifications listed below.    APPROVAL GIVEN to proceed with proposed procedure, without further diagnostic evaluation       Signed Electronically by: Odell Urbina MD    Copy of this evaluation report is provided to requesting physician.    [facility] Preop Guidelines

## 2017-05-01 ENCOUNTER — OFFICE VISIT (OUTPATIENT)
Dept: FAMILY MEDICINE | Facility: CLINIC | Age: 47
End: 2017-05-01
Payer: COMMERCIAL

## 2017-05-01 VITALS
OXYGEN SATURATION: 95 % | WEIGHT: 236.5 LBS | DIASTOLIC BLOOD PRESSURE: 80 MMHG | HEART RATE: 56 BPM | BODY MASS INDEX: 32.03 KG/M2 | SYSTOLIC BLOOD PRESSURE: 132 MMHG | TEMPERATURE: 97.1 F | HEIGHT: 72 IN

## 2017-05-01 DIAGNOSIS — Z01.818 PREOP GENERAL PHYSICAL EXAM: Primary | ICD-10-CM

## 2017-05-01 DIAGNOSIS — S83.204A OTHER TEAR OF MENISCUS OF LEFT KNEE AS CURRENT INJURY, UNSPECIFIED MENISCUS, INITIAL ENCOUNTER: ICD-10-CM

## 2017-05-01 DIAGNOSIS — E03.9 ACQUIRED HYPOTHYROIDISM: ICD-10-CM

## 2017-05-01 PROCEDURE — 99214 OFFICE O/P EST MOD 30 MIN: CPT | Performed by: FAMILY MEDICINE

## 2017-05-01 NOTE — NURSING NOTE
"Chief Complaint   Patient presents with     Pre-Op Exam     DOS: 05/12/2017 with Dr. Burns. ASHWIN Wilson @ [facility]       Initial /80  Pulse 56  Temp 97.1  F (36.2  C) (Oral)  Ht 6' 0.01\" (1.829 m)  Wt 236 lb 8 oz (107.3 kg)  SpO2 95%  BMI 32.07 kg/m2 Estimated body mass index is 32.07 kg/(m^2) as calculated from the following:    Height as of this encounter: 6' 0.01\" (1.829 m).    Weight as of this encounter: 236 lb 8 oz (107.3 kg).  Medication Reconciliation: complete     An ROSA Pro    "

## 2017-05-01 NOTE — MR AVS SNAPSHOT
After Visit Summary   5/1/2017    Andrew Atkinson    MRN: [id number]           Patient Information     Date Of Birth          1970        Visit Information        Provider Department      5/1/2017 7:20 AM Odell Urbina MD [facility]        Today's Diagnoses     Preop general physical exam    -  1    Other tear of meniscus of left knee as current injury, unspecified meniscus, initial encounter        Acquired hypothyroidism          Care Instructions      Before Your Surgery      Call your surgeon if there is any change in your health. This includes signs of a cold or flu (such as a sore throat, runny nose, cough, rash or fever).    Do not smoke, drink alcohol or take over the counter medicine (unless your surgeon or primary care doctor tells you to) for the 24 hours before and after surgery.    If you take prescribed drugs: Follow your doctor s orders about which medicines to take and which to stop until after surgery.    Eating and drinking prior to surgery: follow the instructions from your surgeon    Take a shower or bath the night before surgery. Use the soap your surgeon gave you to gently clean your skin. If you do not have soap from your surgeon, use your regular soap. Do not shave or scrub the surgery site.  Wear clean pajamas and have clean sheets on your bed.   [facility]    If you have any questions regarding to your visit please contact your care team:       Team Purple:   Clinic Hours Telephone Number   [address] Dr[address]   7am-7pm  Monday - Thursday   7am-5pm  Fridays  [phone]  (Appointment scheduling available 24/7)    Questions about your Visit?   Team Line:  [phone]   Urgent Care - Alicia Bean and Rosario Bean - 11am-9pm Monday-Friday Saturday-Sunday- 9am-5pm   [place] - 5pm-9pm Monday-Friday Saturday-Sunday- 9am-5pm  [phone] - Alicia Almazan  [phone] -  [place]       What options do I have for visits at the clinic other than the traditional office visit?  To expand how we care for you, many of our providers are utilizing electronic visits (e-visits) and telephone visits, when medically appropriate, for interactions with their patients rather than a visit in the clinic.   We also offer nurse visits for many medical concerns. Just like any other service, we will bill your insurance company for this type of visit based on time spent on the phone with your provider. Not all insurance companies cover these visits. Please check with your medical insurance if this type of visit is covered. You will be responsible for any charges that are not paid by your insurance.      E-visits via [employer]:  generally incur a $35.00 fee.  Telephone visits:  Time spent on the phone: *charged based on time that is spent on the phone in increments of 10 minutes. Estimated cost:   5-10 mins $30.00   11-20 mins. $59.00   21-30 mins. $85.00     Use Polar (secure email communication and access to your chart) to send your primary care provider a message or make an appointment. Ask someone on your Team how to sign up for Polar.  For a Price Quote for your services, please call our [employer] Price Line at [phone].  As always, Thank you for trusting us with your health care needs!                Follow-ups after your visit        Your next 10 appointments already scheduled     May 12, 2017   Procedure with MD Edel [facility] [place] (--)    [address] [address]   [phone]            May 24, 2017  3:45 PM CDT   Return Visit with MD Edel [facility] ([facility])    [address] [facility]  [place] [address] [phone]              Who to contact     If you have questions or need follow up information about today's clinic visit or your schedule please contact JUSTIN NEGRO  "directly at [phone].  Normal or non-critical lab and imaging results will be communicated to you by Flypeepshart, letter or phone within 4 business days after the clinic has received the results. If you do not hear from us within 7 days, please contact the clinic through Flypeepshart or phone. If you have a critical or abnormal lab result, we will notify you by phone as soon as possible.  Submit refill requests through [email or web address] or call your pharmacy and they will forward the refill request to us. Please allow 3 business days for your refill to be completed.          Additional Information About Your Visit        [employer] Information     [email or web address] lets you send messages to your doctor, view your test results, renew your prescriptions, schedule appointments and more. To sign up, go to [email or web address] . Click on \"Log in\" on the left side of the screen, which will take you to the Welcome page. Then click on \"Sign up Now\" on the right side of the page.     You will be asked to enter the access code listed below, as well as some personal information. Please follow the directions to create your username and password.     Your access code is: [email or web address]  Expires: 6/10/2017 11:26 AM     Your access code will  in 90 days. If you need help or a new code, please call your [place] clinic or [phone].        Care EveryWhere ID     This is your Care EveryWhere ID. This could be used by other organizations to access your [place] medical records  [id number]        Your Vitals Were     Pulse Temperature Height Pulse Oximetry BMI (Body Mass Index)       56 97.1  F (36.2  C) (Oral) 6' 0.01\" (1.829 m) 95% 32.07 kg/m2        Blood Pressure from Last 3 Encounters:   17 132/80   17 110/70   17 130/80    Weight from Last 3 Encounters:   17 236 lb 8 oz (107.3 kg)   17 235 lb 6.4 oz (106.8 kg)   17 239 lb 3.2 oz (108.5 kg)              Today, you had the following     No orders found for " display       Primary Care Provider Office Phone # Fax #    Odell Urbina [phone]       [facility] [address] [place] [address]        Thank you!     Thank you for choosing [facility]  for your care. Our goal is always to provide you with excellent care. Hearing back from our patients is one way we can continue to improve our services. Please take a few minutes to complete the written survey that you may receive in the mail after your visit with us. Thank you!             Your Updated Medication List - Protect others around you: Learn how to safely use, store and throw away your medicines at www.disposemymeds.org.          This list is accurate as of: 5/1/17  7:45 AM.  Always use your most recent med list.                   Brand Name Dispense Instructions for use    fluticasone 50 MCG/ACT spray    FLONASE    1 Bottle    Spray 1-2 sprays into both nostrils daily       levothyroxine 125 MCG tablet    SYNTHROID/LEVOTHROID     Take 125 mcg by mouth daily       Multi-vitamin Tabs tablet      Take 1 tablet by mouth daily       TURMERIC PO      Take 1,000 mg by mouth daily

## 2017-05-11 ENCOUNTER — ANESTHESIA EVENT (OUTPATIENT)
Dept: SURGERY | Facility: AMBULATORY SURGERY CENTER | Age: 47
End: 2017-05-11

## 2017-05-12 ENCOUNTER — ANESTHESIA (OUTPATIENT)
Dept: SURGERY | Facility: AMBULATORY SURGERY CENTER | Age: 47
End: 2017-05-12

## 2017-05-12 ENCOUNTER — HOSPITAL ENCOUNTER (OUTPATIENT)
Facility: AMBULATORY SURGERY CENTER | Age: 47
Discharge: HOME | End: 2017-05-12
Attending: ORTHOPAEDIC SURGERY | Admitting: ORTHOPAEDIC SURGERY
Payer: COMMERCIAL

## 2017-05-12 VITALS
SYSTOLIC BLOOD PRESSURE: 113 MMHG | RESPIRATION RATE: 16 BRPM | DIASTOLIC BLOOD PRESSURE: 68 MMHG | OXYGEN SATURATION: 98 % | TEMPERATURE: 98.6 F

## 2017-05-12 DIAGNOSIS — S83.242D TEAR OF MEDIAL MENISCUS OF LEFT KNEE, CURRENT, UNSPECIFIED TEAR TYPE, SUBSEQUENT ENCOUNTER: Primary | ICD-10-CM

## 2017-05-12 DIAGNOSIS — Z98.890 S/P ARTHROSCOPIC SURGERY OF LEFT KNEE: ICD-10-CM

## 2017-05-12 PROCEDURE — G8907 PT DOC NO EVENTS ON DISCHARG: HCPCS

## 2017-05-12 PROCEDURE — G8916 PT W IV AB GIVEN ON TIME: HCPCS

## 2017-05-12 PROCEDURE — 29881 ARTHRS KNE SRG MNISECTMY M/L: CPT | Mod: LT | Performed by: ORTHOPAEDIC SURGERY

## 2017-05-12 PROCEDURE — 29881 ARTHRS KNE SRG MNISECTMY M/L: CPT | Mod: LT

## 2017-05-12 RX ORDER — ALBUTEROL SULFATE 90 UG/1
AEROSOL, METERED RESPIRATORY (INHALATION) PRN
Status: DISCONTINUED | OUTPATIENT
Start: 2017-05-12 | End: 2017-05-12

## 2017-05-12 RX ORDER — ACETAMINOPHEN 325 MG/1
975 TABLET ORAL ONCE
Status: COMPLETED | OUTPATIENT
Start: 2017-05-12 | End: 2017-05-12

## 2017-05-12 RX ORDER — OXYCODONE AND ACETAMINOPHEN 5; 325 MG/1; MG/1
1-2 TABLET ORAL
Status: DISCONTINUED | OUTPATIENT
Start: 2017-05-12 | End: 2017-05-13 | Stop reason: HOSPADM

## 2017-05-12 RX ORDER — FENTANYL CITRATE 50 UG/ML
25-50 INJECTION, SOLUTION INTRAMUSCULAR; INTRAVENOUS EVERY 5 MIN PRN
Status: DISCONTINUED | OUTPATIENT
Start: 2017-05-12 | End: 2017-05-13 | Stop reason: HOSPADM

## 2017-05-12 RX ORDER — FENTANYL CITRATE 50 UG/ML
INJECTION, SOLUTION INTRAMUSCULAR; INTRAVENOUS PRN
Status: DISCONTINUED | OUTPATIENT
Start: 2017-05-12 | End: 2017-05-12

## 2017-05-12 RX ORDER — GABAPENTIN 300 MG/1
300 CAPSULE ORAL ONCE
Status: COMPLETED | OUTPATIENT
Start: 2017-05-12 | End: 2017-05-12

## 2017-05-12 RX ORDER — CEFAZOLIN SODIUM 2 G/100ML
2 INJECTION, SOLUTION INTRAVENOUS
Status: COMPLETED | OUTPATIENT
Start: 2017-05-12 | End: 2017-05-12

## 2017-05-12 RX ORDER — DEXAMETHASONE SODIUM PHOSPHATE 4 MG/ML
INJECTION, SOLUTION INTRA-ARTICULAR; INTRALESIONAL; INTRAMUSCULAR; INTRAVENOUS; SOFT TISSUE PRN
Status: DISCONTINUED | OUTPATIENT
Start: 2017-05-12 | End: 2017-05-12

## 2017-05-12 RX ORDER — SODIUM CHLORIDE, SODIUM LACTATE, POTASSIUM CHLORIDE, CALCIUM CHLORIDE 600; 310; 30; 20 MG/100ML; MG/100ML; MG/100ML; MG/100ML
INJECTION, SOLUTION INTRAVENOUS CONTINUOUS
Status: DISCONTINUED | OUTPATIENT
Start: 2017-05-12 | End: 2017-05-13 | Stop reason: HOSPADM

## 2017-05-12 RX ORDER — NALOXONE HYDROCHLORIDE 0.4 MG/ML
.1-.4 INJECTION, SOLUTION INTRAMUSCULAR; INTRAVENOUS; SUBCUTANEOUS
Status: DISCONTINUED | OUTPATIENT
Start: 2017-05-12 | End: 2017-05-13 | Stop reason: HOSPADM

## 2017-05-12 RX ORDER — SODIUM CHLORIDE, SODIUM LACTATE, POTASSIUM CHLORIDE, CALCIUM CHLORIDE 600; 310; 30; 20 MG/100ML; MG/100ML; MG/100ML; MG/100ML
INJECTION, SOLUTION INTRAVENOUS CONTINUOUS PRN
Status: DISCONTINUED | OUTPATIENT
Start: 2017-05-12 | End: 2017-05-12

## 2017-05-12 RX ORDER — ONDANSETRON 4 MG/1
4 TABLET, ORALLY DISINTEGRATING ORAL EVERY 30 MIN PRN
Status: DISCONTINUED | OUTPATIENT
Start: 2017-05-12 | End: 2017-05-13 | Stop reason: HOSPADM

## 2017-05-12 RX ORDER — LIDOCAINE 40 MG/G
CREAM TOPICAL
Status: DISCONTINUED | OUTPATIENT
Start: 2017-05-12 | End: 2017-05-13 | Stop reason: HOSPADM

## 2017-05-12 RX ORDER — LIDOCAINE HYDROCHLORIDE 20 MG/ML
INJECTION, SOLUTION INFILTRATION; PERINEURAL PRN
Status: DISCONTINUED | OUTPATIENT
Start: 2017-05-12 | End: 2017-05-12

## 2017-05-12 RX ORDER — OXYCODONE HYDROCHLORIDE 5 MG/1
5-10 TABLET ORAL ONCE
Status: COMPLETED | OUTPATIENT
Start: 2017-05-12 | End: 2017-05-12

## 2017-05-12 RX ORDER — BUPIVACAINE HYDROCHLORIDE AND EPINEPHRINE 2.5; 5 MG/ML; UG/ML
INJECTION, SOLUTION INFILTRATION; PERINEURAL PRN
Status: DISCONTINUED | OUTPATIENT
Start: 2017-05-12 | End: 2017-05-12 | Stop reason: HOSPADM

## 2017-05-12 RX ORDER — MEPERIDINE HYDROCHLORIDE 25 MG/ML
12.5 INJECTION INTRAMUSCULAR; INTRAVENOUS; SUBCUTANEOUS
Status: DISCONTINUED | OUTPATIENT
Start: 2017-05-12 | End: 2017-05-13 | Stop reason: HOSPADM

## 2017-05-12 RX ORDER — PROPOFOL 10 MG/ML
INJECTION, EMULSION INTRAVENOUS CONTINUOUS PRN
Status: DISCONTINUED | OUTPATIENT
Start: 2017-05-12 | End: 2017-05-12

## 2017-05-12 RX ORDER — ONDANSETRON 2 MG/ML
4 INJECTION INTRAMUSCULAR; INTRAVENOUS EVERY 30 MIN PRN
Status: DISCONTINUED | OUTPATIENT
Start: 2017-05-12 | End: 2017-05-13 | Stop reason: HOSPADM

## 2017-05-12 RX ORDER — ONDANSETRON 2 MG/ML
INJECTION INTRAMUSCULAR; INTRAVENOUS PRN
Status: DISCONTINUED | OUTPATIENT
Start: 2017-05-12 | End: 2017-05-12

## 2017-05-12 RX ORDER — OXYCODONE AND ACETAMINOPHEN 5; 325 MG/1; MG/1
1-2 TABLET ORAL EVERY 4 HOURS PRN
Qty: 40 TABLET | Refills: 0 | Status: SHIPPED | OUTPATIENT
Start: 2017-05-12 | End: 2017-05-24

## 2017-05-12 RX ORDER — PROPOFOL 10 MG/ML
INJECTION, EMULSION INTRAVENOUS PRN
Status: DISCONTINUED | OUTPATIENT
Start: 2017-05-12 | End: 2017-05-12

## 2017-05-12 RX ORDER — HYDROMORPHONE HYDROCHLORIDE 1 MG/ML
.3-.5 INJECTION, SOLUTION INTRAMUSCULAR; INTRAVENOUS; SUBCUTANEOUS EVERY 10 MIN PRN
Status: DISCONTINUED | OUTPATIENT
Start: 2017-05-12 | End: 2017-05-13 | Stop reason: HOSPADM

## 2017-05-12 RX ADMIN — DEXAMETHASONE SODIUM PHOSPHATE 4 MG: 4 INJECTION, SOLUTION INTRA-ARTICULAR; INTRALESIONAL; INTRAMUSCULAR; INTRAVENOUS; SOFT TISSUE at 09:10

## 2017-05-12 RX ADMIN — PROPOFOL 200 MG: 10 INJECTION, EMULSION INTRAVENOUS at 08:52

## 2017-05-12 RX ADMIN — ONDANSETRON 4 MG: 2 INJECTION INTRAMUSCULAR; INTRAVENOUS at 09:15

## 2017-05-12 RX ADMIN — GABAPENTIN 300 MG: 300 CAPSULE ORAL at 08:00

## 2017-05-12 RX ADMIN — PROPOFOL 100 MG: 10 INJECTION, EMULSION INTRAVENOUS at 08:59

## 2017-05-12 RX ADMIN — SODIUM CHLORIDE, SODIUM LACTATE, POTASSIUM CHLORIDE, CALCIUM CHLORIDE: 600; 310; 30; 20 INJECTION, SOLUTION INTRAVENOUS at 08:00

## 2017-05-12 RX ADMIN — OXYCODONE HYDROCHLORIDE 5 MG: 5 TABLET ORAL at 09:59

## 2017-05-12 RX ADMIN — SODIUM CHLORIDE, SODIUM LACTATE, POTASSIUM CHLORIDE, CALCIUM CHLORIDE: 600; 310; 30; 20 INJECTION, SOLUTION INTRAVENOUS at 08:15

## 2017-05-12 RX ADMIN — FENTANYL CITRATE 50 MCG: 50 INJECTION, SOLUTION INTRAMUSCULAR; INTRAVENOUS at 10:02

## 2017-05-12 RX ADMIN — ALBUTEROL SULFATE 6 PUFF: 90 INHALANT RESPIRATORY (INHALATION) at 09:00

## 2017-05-12 RX ADMIN — FENTANYL CITRATE 50 MCG: 50 INJECTION, SOLUTION INTRAMUSCULAR; INTRAVENOUS at 08:40

## 2017-05-12 RX ADMIN — PROPOFOL 200 MCG/KG/MIN: 10 INJECTION, EMULSION INTRAVENOUS at 08:59

## 2017-05-12 RX ADMIN — FENTANYL CITRATE 50 MCG: 50 INJECTION, SOLUTION INTRAMUSCULAR; INTRAVENOUS at 09:09

## 2017-05-12 RX ADMIN — SODIUM CHLORIDE, SODIUM LACTATE, POTASSIUM CHLORIDE, CALCIUM CHLORIDE: 600; 310; 30; 20 INJECTION, SOLUTION INTRAVENOUS at 09:25

## 2017-05-12 RX ADMIN — ACETAMINOPHEN 975 MG: 325 TABLET ORAL at 08:00

## 2017-05-12 RX ADMIN — ALBUTEROL SULFATE 6 PUFF: 90 INHALANT RESPIRATORY (INHALATION) at 08:57

## 2017-05-12 RX ADMIN — LIDOCAINE HYDROCHLORIDE 100 MG: 20 INJECTION, SOLUTION INFILTRATION; PERINEURAL at 08:50

## 2017-05-12 RX ADMIN — CEFAZOLIN SODIUM 2 G: 2 INJECTION, SOLUTION INTRAVENOUS at 08:49

## 2017-05-12 RX ADMIN — FENTANYL CITRATE 50 MCG: 50 INJECTION, SOLUTION INTRAMUSCULAR; INTRAVENOUS at 09:57

## 2017-05-12 ASSESSMENT — COPD QUESTIONNAIRES: COPD: 0

## 2017-05-12 ASSESSMENT — LIFESTYLE VARIABLES: TOBACCO_USE: 0

## 2017-05-12 NOTE — ANESTHESIA CARE TRANSFER NOTE
Patient: Andrew Atkinson    Procedure(s):  Arthroscopic partial medial menisectomy,left knee - Wound Class: I-Clean    Diagnosis: left knee medial meniscal tear  Diagnosis Additional Information: No value filed.    Anesthesia Type:   General, LMA     Note:  Airway :Nasal Cannula  Patient transferred to:PACU  Comments: Pt alert and vss.      Vitals: (Last set prior to Anesthesia Care Transfer)    CRNA VITALS  5/12/2017 0908 - 5/12/2017 0945      5/12/2017             NIBP: 117/83    Pulse: 88    NIBP Mean: 100    SpO2: 93 %                Electronically Signed By: CHUY Alcantar CRNA  May 12, 2017  9:45 AM

## 2017-05-12 NOTE — ANESTHESIA POSTPROCEDURE EVALUATION
Patient: Andrew Atkinson    Procedure(s):  Arthroscopic partial medial menisectomy,left knee - Wound Class: I-Clean    Diagnosis:left knee medial meniscal tear  Diagnosis Additional Information: No value filed.    Anesthesia Type:  General, ETT    Note:  Anesthesia Post Evaluation    Patient location during evaluation: PACU  Patient participation: Able to fully participate in evaluation  Level of consciousness: awake and alert  Pain management: adequate  Airway patency: patent  Cardiovascular status: acceptable  Respiratory status: acceptable  Hydration status: acceptable  PONV: none     Anesthetic complications: None          Last vitals:  Vitals:    05/12/17 1000 05/12/17 1015 05/12/17 1030   BP: 98/70 104/71 113/68   Resp: 14 12 16   Temp: 97.1  F (36.2  C)  98.6  F (37  C)   SpO2:   98%         Electronically Signed By: Eros Moser MD  May 12, 2017  12:34 PM

## 2017-05-12 NOTE — IP AVS SNAPSHOT
MRN:[id number]                      After Visit Summary   5/12/2017    Andrew Atkinson    MRN: [id number]           Thank you!     Thank you for choosing [facility] for your care. Our goal is always to provide you with excellent care. Hearing back from our patients is one way we can continue to improve our services. Please take a few minutes to complete the written survey that you may receive in the mail after you visit with us. Thank you!        Patient Information     Date Of Birth          1970        About your hospital stay     You were admitted on:  May 12, 2017 You last received care in the:  [facility]    You were discharged on:  May 12, 2017       Who to Call     For medical emergencies, please call 911.  For non-urgent questions about your medical care, please call your primary care provider or clinic, [phone]  For questions related to your surgery, please call your surgery clinic        Attending Provider     Provider Specialty    Grant Muñoz MD Orthopedics       Primary Care Provider Office Phone # Fax #    Odell Urbina [phone]       [address] [address]        After Care Instructions     Discharge Instructions       Review outpatient procedure discharge instructions with patient as directed by Provider  Post Knee Arthroscopy Discharge Instructions:    -Elevate and Ice affected knee continuously x 48hrs, then 4 times daily.    -Remove dressings on post operative day #2, place light dressings. Don't remove the steri strips. Re-wrap knee with one of the ACE wraps.  No need to wrap all the way up and down the leg.  Place clean dressings on wounds daily until the wounds are completely dry x 24 hours, then they can be left uncovered.    -Keep wounds clean and dry.  May get wounds wet in shower-only starting post operative day #4.  Do not immerse the knee in water for 3 weeks.  Can  immerse the knee sooner if the wounds are completely healed over and sealed.    -Remove steri strips in 10 days, if they haven't fallen-off all ready.    -Avoid unnecessary walking over the next 48hrs    -Exercises to be done 3-4 times/day, as taught by physical therapy, nurse or surgeon pre or postoperatively.     -Weight Bearing as tolerated, unless specifically instructed otherwise.   Crutches are for comfort only, and are optional to use.    -Return to clinic in 10-14 days--call for appt with Dr. Muñoz [phone]                  Your next 10 appointments already scheduled     May 24, 2017  3:45 PM CDT   Return Visit with Grant Muñoz MD   [facility] ([facility]    [address] [facility] [address] [phone]              Further instructions from your care team       [facility]  Same-Day Surgery   Adult Discharge Orders & Instructions   For 24 hours after surgery  1. Get plenty of rest.  A responsible adult must stay with you for at least 24 hours after you leave the hospital.   2. Do not drive or use heavy equipment.  If you have weakness or tingling, don't drive or use heavy equipment until this feeling goes away.  3. Do not drink alcohol.  4. Avoid strenuous or risky activities.  Ask for help when climbing stairs.   5. You may feel lightheaded.  IF so, sit for a few minutes before standing.  Have someone help you get up.   6. If you have nausea (feel sick to your stomach): Drink only clear liquids such as apple juice, ginger ale, broth or 7-Up.  Rest may also help.  Be sure to drink enough fluids.  Move to a regular diet as you feel able.  7. You may have a slight fever. Call the doctor if your fever is over 100 F (37.7 C) (taken under the tongue) or lasts longer than 24 hours.  8. You may have a dry mouth, a sore throat, muscle aches or trouble sleeping.  These should go away after 24 hours.  9. Do not make important or legal  "decisions.   Call your doctor for any of the followin.  Signs of infection (fever, growing tenderness at the surgery site, a large amount of drainage or bleeding, severe pain, foul-smelling drainage, redness, swelling).  2. It has been over 8 to 10 hours since surgery and you are still not able to urinate (pass water).  3.  Headache for over 24 hours.  4.  Numbness, tingling or weakness the day after surgery (if you had spinal anesthesia).  To contact a doctor, call ___________________________       Pending Results     No orders found from 5/10/2017 to 2017.            Admission Information     Date & Time Provider Department Dept. Phone    2017 Grant Muñoz MD [facility] [phone]      Your Vitals Were     Blood Pressure Temperature Respirations Pulse Oximetry          98/70 97.1  F (36.2  C) (Temporal) 14 96%        MyChart Information     M[employer]bat lets you send messages to your doctor, view your test results, renew your prescriptions, schedule appointments and more. To sign up, go to [email or web address] . Click on \"Log in\" on the left side of the screen, which will take you to the Welcome page. Then click on \"Sign up Now\" on the right side of the page.     You will be asked to enter the access code listed below, as well as some personal information. Please follow the directions to create your username and password.     Your access code is: [email or web address]  Expires: 6/10/2017 11:26 AM     Your access code will  in 90 days. If you need help or a new code, please call your [place] clinic or [phone].        Care EveryWhere ID     This is your Care EveryWhere ID. This could be used by other organizations to access your [place] medical records  [id number]           Review of your medicines      START taking        Dose / Directions    oxyCODONE-acetaminophen 5-325 MG per tablet   Commonly known as:  PERCOCET   Used for:  Tear of medial meniscus of left knee, " current, unspecified tear type, subsequent encounter, S/P arthroscopic surgery of left knee        Dose:  1-2 tablet   Take 1-2 tablets by mouth every 4 hours as needed for pain (moderate to severe)   Quantity:  40 tablet   Refills:  0         CONTINUE these medicines which have NOT CHANGED        Dose / Directions    fluticasone 50 MCG/ACT spray   Commonly known as:  FLONASE   Used for:  Postnasal drip        Dose:  1-2 spray   Spray 1-2 sprays into both nostrils daily   Quantity:  1 Bottle   Refills:  0       levothyroxine 125 MCG tablet   Commonly known as:  SYNTHROID/LEVOTHROID        Dose:  125 mcg   Take 125 mcg by mouth daily   Refills:  0       Multi-vitamin Tabs tablet        Dose:  1 tablet   Take 1 tablet by mouth daily   Refills:  0       TURMERIC PO        Dose:  1000 mg   Take 1,000 mg by mouth daily   Refills:  0            Where to get your medicines      Some of these will need a paper prescription and others can be bought over the counter. Ask your nurse if you have questions.     Bring a paper prescription for each of these medications     oxyCODONE-acetaminophen 5-325 MG per tablet                Protect others around you: Learn how to safely use, store and throw away your medicines at www.disposemymeds.org.             Medication List: This is a list of all your medications and when to take them. Check marks below indicate your daily home schedule. Keep this list as a reference.      Medications           Morning Afternoon Evening Bedtime As Needed    fluticasone 50 MCG/ACT spray   Commonly known as:  FLONASE   Spray 1-2 sprays into both nostrils daily                                levothyroxine 125 MCG tablet   Commonly known as:  SYNTHROID/LEVOTHROID   Take 125 mcg by mouth daily                                Multi-vitamin Tabs tablet   Take 1 tablet by mouth daily                                oxyCODONE-acetaminophen 5-325 MG per tablet   Commonly known as:  PERCOCET   Take 1-2 tablets by  mouth every 4 hours as needed for pain (moderate to severe)                                TURMERIC PO   Take 1,000 mg by mouth daily

## 2017-05-12 NOTE — DISCHARGE INSTRUCTIONS
[facility]  Same-Day Surgery   Adult Discharge Orders & Instructions   For 24 hours after surgery  1. Get plenty of rest.  A responsible adult must stay with you for at least 24 hours after you leave the hospital.   2. Do not drive or use heavy equipment.  If you have weakness or tingling, don't drive or use heavy equipment until this feeling goes away.  3. Do not drink alcohol.  4. Avoid strenuous or risky activities.  Ask for help when climbing stairs.   5. You may feel lightheaded.  IF so, sit for a few minutes before standing.  Have someone help you get up.   6. If you have nausea (feel sick to your stomach): Drink only clear liquids such as apple juice, ginger ale, broth or 7-Up.  Rest may also help.  Be sure to drink enough fluids.  Move to a regular diet as you feel able.  7. You may have a slight fever. Call the doctor if your fever is over 100 F (37.7 C) (taken under the tongue) or lasts longer than 24 hours.  8. You may have a dry mouth, a sore throat, muscle aches or trouble sleeping.  These should go away after 24 hours.  9. Do not make important or legal decisions.   Call your doctor for any of the followin.  Signs of infection (fever, growing tenderness at the surgery site, a large amount of drainage or bleeding, severe pain, foul-smelling drainage, redness, swelling).  2. It has been over 8 to 10 hours since surgery and you are still not able to urinate (pass water).  3.  Headache for over 24 hours.  4.  Numbness, tingling or weakness the day after surgery (if you had spinal anesthesia).  To contact a doctor, call ___________________________

## 2017-05-12 NOTE — OP NOTE
OPERATIVE NOTE    Date of Procedure: May 12, 2017    PRE-OP DIAGNOSIS:  Medial meniscus tear left knee     POST-OP DIAGNOSIS:  Same     PROCEDURE: Arthroscopic partial medial meniscectomy, left knee.    SURGEON: Toni    ASSISTANT(S):  ILYA Lozoya    ANESTHESIA:  General    INDICATIONS:  Patient is a 46 year old male with left knee symptoms including catching, locking, and giving-way.   Exam and MRI consistent with a medial meniscus tear.  Has failed conservative treatment.  Informed consent was obtained for the procedure, This procedure has been fully reviewed with the patient and written informed consent has been obtained.  Surgical site was marked.    PROCEDURE IN DETAIL  The patient was taken to the operating room and placed on the  operating table in the supine position. General anesthesia was successfully achieved. Pause for site confirmation was done.  Then, a tourniquet was placed around the proximal thigh, the leg placed in the leg lu, and the limb was prepped and draped in the usual sterile fashion. The anticipated portal sites were injected with 1/4% Marcaine with epinephrine, and was also injected intra-articularly.  Standard superomedial inflow portal was established and inflow started.  Standard inferolateral scope portal was established, and the scope was introduced. Medial work portal was localized using a spinal needle.  The portal was made and probe introduced.  The tourniquet was not inflated during the case.     The following findings were noted at arthroscopy :  Medial compartment: very small area of Gr 2  articular cartilage changes on the medial femoral condyle, otherwise 0-1 changes.   A large flap tear of the medial meniscus at the midbody, near the posterior junction. // Lateral Compartment: Gr 0  articular cartilage changes.   Normal lateral meniscus.  // Patellofemoral joint: Gr 1 articular cartilage changes  on the patella, Gr 0-1  articular cartilage changes on the trochlea.   ACL intact    Using a combination of arthroscopic rongeurs and the motorized shaver, the unstable portions of the medial meniscus were removed, and the remaining meniscal tissue was tapered.  Minor chondroplasty was performed in the medial compartment(s).  The Gutters were checked for any loose bodies.      At this point, the excess fluid was suctioned from the knee, instruments were removed from the knee.  Steri strips were applied to the portal sites.  Additional 1/4% Marcaine was injected into the knee.  Sterile, compressive dressings were applied.  Estimated blood loss was negligible.  Patient was awakened, and transferred to the recovery room in stable condition.    DWAIN Muñoz MD  Dept. Orthopedic Surgery  [facility]

## 2017-05-12 NOTE — ANESTHESIA PREPROCEDURE EVALUATION
Andrew Atkinson is a 46 year old male with a PMH of  left knee medial meniscal tear who is scheduled for Procedure(s):  Arthroscopic medial menisectomy left knee - Wound Class: I-Clean    NPO Status: Adequate.  > 6 hours solids, > 2 hours clear liquids.       History reviewed. No pertinent surgical history.      Anesthesia Evaluation     . Pt has had prior anesthetic. Type: General    No history of anesthetic complications          ROS/MED HX    ENT/Pulmonary:      (-) tobacco use, asthma and COPD   Neurologic:      (-) CVA, TIA and Neuropathy   Cardiovascular:        (-) hypertension, CAD, irregular heartbeat/palpitations and stent   METS/Exercise Tolerance:     Hematologic:        (-) anemia   Musculoskeletal:         GI/Hepatic:     (+) GERD (only symptoms with certain foods) Asymptomatic on medication,      (-) liver disease   Renal/Genitourinary:      (-) renal disease   Endo:     (+) thyroid problem hypothyroidism, .   (-) Type I DM and Type II DM   Psychiatric:         Infectious Disease:  - neg infectious disease ROS       Malignancy:         Other:                     Physical Exam  Normal systems: cardiovascular, pulmonary and dental    Airway   Mallampati: I  TM distance: >3 FB  Neck ROM: full    Dental     Cardiovascular   Rhythm and rate: regular and normal      Pulmonary    breath sounds clear to auscultation                    Anesthesia Plan      History & Physical Review  History and physical reviewed and following examination; no interval change.    ASA Status:  2 .        Plan for General and LMA with Intravenous induction. Maintenance will be Balanced.    PONV prophylaxis:  Ondansetron (or other 5HT-3) and Dexamethasone or Solumedrol       Postoperative Care  Postoperative pain management:  IV analgesics, Multi-modal analgesia and Oral pain medications.      Consents  Anesthetic plan, risks, benefits and alternatives discussed with:  Patient..        Eros Moser MD  8:12 AM May 12, 2017                        .

## 2017-05-12 NOTE — IP AVS SNAPSHOT
[facility]    [address] [place] [address]    Phone:  [phone]                                       After Visit Summary   5/12/2017    Andrew Atkinson    MRN: [id number]           After Visit Summary Signature Page     I have received my discharge instructions, and my questions have been answered. I have discussed any challenges I see with this plan with the nurse or doctor.    ..........................................................................................................................................  Patient/Patient Representative Signature      ..........................................................................................................................................  Patient Representative Print Name and Relationship to Patient    ..................................................               ................................................  Date                                            Time    ..........................................................................................................................................  Reviewed by Signature/Title    ...................................................              ..............................................  Date                                                            Time

## 2017-05-18 ENCOUNTER — TELEPHONE (OUTPATIENT)
Dept: ORTHOPEDICS | Facility: CLINIC | Age: 47
End: 2017-05-18

## 2017-05-18 DIAGNOSIS — Z98.890 S/P ARTHROSCOPIC KNEE SURGERY: Primary | ICD-10-CM

## 2017-05-18 RX ORDER — OXYCODONE AND ACETAMINOPHEN 5; 325 MG/1; MG/1
1 TABLET ORAL EVERY 6 HOURS PRN
Qty: 30 TABLET | Refills: 0 | Status: SHIPPED | OUTPATIENT
Start: 2017-05-18 | End: 2018-12-06

## 2017-05-18 NOTE — TELEPHONE ENCOUNTER
Reason for Call:  Other prescription    Detailed comments: Patient is requesting refill of pain medication percocet. Please call.    Phone Number Patient can be reached at: Home number on file [phone] (home)    Best Time: any    Can we leave a detailed message on this number? YES    Call taken on 5/18/2017 at 10:09 AM by Adele Rivas

## 2017-05-18 NOTE — TELEPHONE ENCOUNTER
Advised patient Dr Muñoz approves refill of narcotic Percocet. PL advised he can  at [facility]  at his convenience. Address provided. [address] [place]

## 2017-05-23 NOTE — PROGRESS NOTES
Follow-up left knee arthroscopic partial medial menisectomy on 5/12/17.   Findings:  Medial compartment: very small area of Gr 2 articular cartilage changes on the medial femoral condyle, otherwise 0-1 changes. A large flap tear of the medial meniscus at the midbody, near the posterior junction. // Lateral Compartment: Gr 0 articular cartilage changes. Normal lateral meniscus. // Patellofemoral joint: Gr 1 articular cartilage changes on the patella, Gr 0-1 articular cartilage changes on the trochlea. ACL intact    He reports that the pain is better compared to preop.      /80 (BP Location: Left arm, Patient Position: Chair, Cuff Size: Adult Regular)  Pulse 106  Wt 104.6 kg (230 lb 9.6 oz)  SpO2 96%  BMI 31.27 kg/m2    Exam:  Wounds are healed. He has no effusion.    ROM: good   No evidence of infection.  Some tenderness over the incision    Impression:  1. Doing well status post left knee arthroscopic partial medial menisectomy     Plan:  I reviewed the operative findings with him.     Will continue range of motion and strengthening. Scar massage.  Advance activity as tolerated.    RTC 3-4 weeks if still painful.    DWAIN Muñoz MD  Dept. Orthopedic Surgery  [facility]    This document serves as a record of the services and decisions personally performed and made by Dr. DWAIN Muñoz MD. It was created on his behalf by Radha Leblanc, a trained medical scribe. The creation of this record is based on the provider's personal observations and the statements of the patient. This document has been checked and approved by the attending provider.   Radha Leblanc May 24, 2017 4:17 PM

## 2017-05-24 ENCOUNTER — OFFICE VISIT (OUTPATIENT)
Dept: ORTHOPEDICS | Facility: CLINIC | Age: 47
End: 2017-05-24
Payer: COMMERCIAL

## 2017-05-24 VITALS
OXYGEN SATURATION: 96 % | SYSTOLIC BLOOD PRESSURE: 128 MMHG | WEIGHT: 230.6 LBS | HEART RATE: 106 BPM | DIASTOLIC BLOOD PRESSURE: 80 MMHG | BODY MASS INDEX: 31.27 KG/M2

## 2017-05-24 DIAGNOSIS — Z98.890 S/P ARTHROSCOPIC KNEE SURGERY: Primary | ICD-10-CM

## 2017-05-24 PROCEDURE — 99024 POSTOP FOLLOW-UP VISIT: CPT | Performed by: ORTHOPAEDIC SURGERY

## 2017-05-24 ASSESSMENT — PAIN SCALES - GENERAL: PAINLEVEL: MODERATE PAIN (5)

## 2017-05-24 NOTE — PATIENT INSTRUCTIONS
Please remember to call and schedule a follow up appointment if one was recommended at your earliest convenience.   Orthopedics CLINIC HOURS TELEPHONE NUMBER   Grant Muñoz M.D.      Ashley Velázquez,  LPN Tuesday 8 am -5 pm    1st & 3rd Wednesday  1-4pm [place] 2nd & 4th Wednesday  8 am -11 pm / [place]  1-4pm / [place] Thursday 8 am -5 pm   Specialty schedulers:   [phone] to make an appointment with any Specialty Provider.   Main Clinic:   [phone] to make an appointment with your primary provider   Urgent Care locations:    [facility] Monday-Friday 5 pm - 9 pm  Saturday-Sunday 9 am -5pm      Monday-Friday 11 am - 9 pm  Saturday 9 am - 5 pm [phone]     If SURGERY has been recommended, please call our Specialty Schedulers at [phone] to schedule your procedure.    If you need a medication refill, please contact your pharmacy. Please allow 3 business days for your refill to be completed.  Use [email or web address] (secure e-mail communication and access to your chart) to send a message or to make an appointment. Please ask how you can sign up for [email or web address].

## 2017-05-24 NOTE — LETTER
5/24/2017       RE: Andrew Atkinson  [address] [place] [address]           Dear Colleague,    Thank you for referring your patient, Andrew Atkinson, to the [facility]. Please see a copy of my visit note below.    Follow-up left knee arthroscopic partial medial menisectomy on 5/12/17.   Findings:  Medial compartment: very small area of Gr 2 articular cartilage changes on the medial femoral condyle, otherwise 0-1 changes. A large flap tear of the medial meniscus at the midbody, near the posterior junction. // Lateral Compartment: Gr 0 articular cartilage changes. Normal lateral meniscus. // Patellofemoral joint: Gr 1 articular cartilage changes on the patella, Gr 0-1 articular cartilage changes on the trochlea. ACL intact    He reports that the pain is better compared to preop.      /80 (BP Location: Left arm, Patient Position: Chair, Cuff Size: Adult Regular)  Pulse 106  Wt 104.6 kg (230 lb 9.6 oz)  SpO2 96%  BMI 31.27 kg/m2    Exam:  Wounds are healed. He has no effusion.    ROM: good   No evidence of infection.  Some tenderness over the incision    Impression:  1. Doing well status post left knee arthroscopic partial medial menisectomy     Plan:  I reviewed the operative findings with him.     Will continue range of motion and strengthening. Scar massage.  Advance activity as tolerated.    RTC 3-4 weeks if still painful.    DWAIN Muñoz MD  Dept. Orthopedic Surgery  [facility]    This document serves as a record of the services and decisions personally performed and made by Dr. DWAIN Muñoz MD. It was created on his behalf by Radha Leblanc, a trained medical scribe. The creation of this record is based on the provider's personal observations and the statements of the patient. This document has been checked and approved by the attending provider.   Radha Leblanc May 24, 2017 4:17 PM    Again, thank you for allowing me to participate in the care of your patient.         Sincerely,              Grant Muñoz MD

## 2017-05-24 NOTE — NURSING NOTE
"Chief Complaint   Patient presents with     Surgical Followup     1st PO. Arthroscopic partial medial meniscectomy, left knee.       Initial /80 (BP Location: Left arm, Patient Position: Chair, Cuff Size: Adult Regular)  Pulse 106  Wt 104.6 kg (230 lb 9.6 oz)  SpO2 96%  BMI 31.27 kg/m2 Estimated body mass index is 31.27 kg/(m^2) as calculated from the following:    Height as of 5/1/17: 1.829 m (6' 0.01\").    Weight as of this encounter: 104.6 kg (230 lb 9.6 oz).  Medication Reconciliation: complete   Ashley Velázquez LPN      "

## 2017-05-24 NOTE — MR AVS SNAPSHOT
After Visit Summary   5/24/2017    Andrew Atkinson    MRN: [id number]           Patient Information     Date Of Birth          1970        Visit Information        Provider Department      5/24/2017 3:45 PM Grant Muñoz MD [facility]        Today's Diagnoses     S/P arthroscopic knee surgery    -  1      Care Instructions    Please remember to call and schedule a follow up appointment if one was recommended at your earliest convenience.   Orthopedics CLINIC HOURS TELEPHONE NUMBER   Grant Muñoz M.D.      Ashley Velázquez,  LPN Tuesday 8 am -5 pm    1st & 3rd Wednesday  1-4pm [place] 2nd & 4th Wednesday  8 am -11 pm / [place]  1-4pm / [place] Thursday 8 am -5 pm   Specialty schedulers:   [phone] to make an appointment with any Specialty Provider.   Main Clinic:   [phone] to make an appointment with your primary provider   Urgent Care locations:    [facility] Monday-Friday 5 pm - 9 pm  Saturday-Sunday 9 am -5pm      Monday-Friday 11 am - 9 pm  Saturday 9 am - 5 pm [phone]     If SURGERY has been recommended, please call our Specialty Schedulers at [phone] to schedule your procedure.    If you need a medication refill, please contact your pharmacy. Please allow 3 business days for your refill to be completed.  Use [employer] (secure e-mail communication and access to your chart) to send a message or to make an appointment. Please ask how you can sign up for [employer].            Follow-ups after your visit        Who to contact     If you have questions or need follow up information about today's clinic visit or your schedule please contact [facility] directly at [phone].  Normal or non-critical lab and imaging results will be communicated to you by MyChart, letter or phone within 4 business days after the clinic has received the results. If you do not hear from us within 7 days, please  "contact the clinic through [email or web address] or phone. If you have a critical or abnormal lab result, we will notify you by phone as soon as possible.  Submit refill requests through [email or web address].nr or call your pharmacy and they will forward the refill request to us. Please allow 3 business days for your refill to be completed.          Additional Information About Your Visit        [email or web address] Information     [email or web address] lets you send messages to your doctor, view your test results, renew your prescriptions, schedule appointments and more. To sign up, go to [email or web address] . Click on \"Log in\" on the left side of the screen, which will take you to the Welcome page. Then click on \"Sign up Now\" on the right side of the page.     You will be asked to enter the access code listed below, as well as some personal information. Please follow the directions to create your username and password.     Your access code is: [email or web address]  Expires: 6/10/2017 11:26 AM     Your access code will  in 90 days. If you need help or a new code, please call your [facility] clinic or [phone].        Care EveryWhere ID     This is your Care EveryWhere ID. This could be used by other organizations to access your [facility] medical records  [id number]        Your Vitals Were     Pulse Pulse Oximetry BMI (Body Mass Index)             106 96% 31.27 kg/m2          Blood Pressure from Last 3 Encounters:   17 128/80   17 113/68   17 132/80    Weight from Last 3 Encounters:   17 104.6 kg (230 lb 9.6 oz)   17 107.3 kg (236 lb 8 oz)   17 106.8 kg (235 lb 6.4 oz)              Today, you had the following     No orders found for display         Today's Medication Changes          These changes are accurate as of: 17  5:24 PM.  If you have any questions, ask your nurse or doctor.               These medicines have changed or have updated prescriptions.        Dose/Directions    oxyCODONE-acetaminophen 5-325 MG per tablet "   Commonly known as:  PERCOCET   This may have changed:  Another medication with the same name was removed. Continue taking this medication, and follow the directions you see here.   Used for:  S/P arthroscopic knee surgery   Changed by:  Grant Muñoz MD        Dose:  1 tablet   Take 1 tablet by mouth every 6 hours as needed for pain maximum 4 tablet(s) per day   Quantity:  30 tablet   Refills:  0                Primary Care Provider Office Phone # Fax #    Odell Urbina [phone]       [address] [address]        Thank you!     Thank you for choosing [facility]  for your care. Our goal is always to provide you with excellent care. Hearing back from our patients is one way we can continue to improve our services. Please take a few minutes to complete the written survey that you may receive in the mail after your visit with us. Thank you!             Your Updated Medication List - Protect others around you: Learn how to safely use, store and throw away your medicines at www.disposemymeds.org.          This list is accurate as of: 5/24/17  5:24 PM.  Always use your most recent med list.                   Brand Name Dispense Instructions for use    fluticasone 50 MCG/ACT spray    FLONASE    1 Bottle    Spray 1-2 sprays into both nostrils daily       levothyroxine 125 MCG tablet    SYNTHROID/LEVOTHROID     Take 125 mcg by mouth daily       Multi-vitamin Tabs tablet      Take 1 tablet by mouth daily       oxyCODONE-acetaminophen 5-325 MG per tablet    PERCOCET    30 tablet    Take 1 tablet by mouth every 6 hours as needed for pain maximum 4 tablet(s) per day       TURMERIC PO      Take 1,000 mg by mouth daily

## 2017-10-12 ENCOUNTER — TELEPHONE (OUTPATIENT)
Dept: FAMILY MEDICINE | Facility: CLINIC | Age: 47
End: 2017-10-12

## 2017-10-12 ENCOUNTER — OFFICE VISIT (OUTPATIENT)
Dept: FAMILY MEDICINE | Facility: CLINIC | Age: 47
End: 2017-10-12
Payer: COMMERCIAL

## 2017-10-12 ENCOUNTER — RADIANT APPOINTMENT (OUTPATIENT)
Dept: GENERAL RADIOLOGY | Facility: CLINIC | Age: 47
End: 2017-10-12
Attending: FAMILY MEDICINE
Payer: COMMERCIAL

## 2017-10-12 ENCOUNTER — DOCUMENTATION ONLY (OUTPATIENT)
Dept: LAB | Facility: CLINIC | Age: 47
End: 2017-10-12

## 2017-10-12 VITALS
TEMPERATURE: 97 F | WEIGHT: 228 LBS | SYSTOLIC BLOOD PRESSURE: 118 MMHG | HEART RATE: 77 BPM | DIASTOLIC BLOOD PRESSURE: 78 MMHG | BODY MASS INDEX: 31.92 KG/M2 | HEIGHT: 71 IN | OXYGEN SATURATION: 97 %

## 2017-10-12 DIAGNOSIS — E89.0 POSTABLATIVE HYPOTHYROIDISM: Primary | ICD-10-CM

## 2017-10-12 DIAGNOSIS — R07.81 RIB PAIN ON RIGHT SIDE: ICD-10-CM

## 2017-10-12 DIAGNOSIS — S29.012A STRAIN OF RHOMBOID MUSCLE, INITIAL ENCOUNTER: Primary | ICD-10-CM

## 2017-10-12 PROCEDURE — 71101 X-RAY EXAM UNILAT RIBS/CHEST: CPT | Mod: RT

## 2017-10-12 PROCEDURE — 99213 OFFICE O/P EST LOW 20 MIN: CPT | Performed by: FAMILY MEDICINE

## 2017-10-12 RX ORDER — LEVOTHYROXINE SODIUM 125 UG/1
125 TABLET ORAL DAILY
Qty: 30 TABLET | Status: CANCELLED | OUTPATIENT
Start: 2017-10-12

## 2017-10-12 ASSESSMENT — PAIN SCALES - GENERAL: PAINLEVEL: EXTREME PAIN (8)

## 2017-10-12 NOTE — PATIENT INSTRUCTIONS
Patient Education    Tizanidine Hydrochloride Oral capsule    Tizanidine Hydrochloride Oral tablet  Tizanidine Hydrochloride Oral capsule  What is this medicine?  TIZANIDINE (ed anderson) helps to relieve muscle spasms. It may be used to help in the treatment of multiple sclerosis and spinal cord injury.  This medicine may be used for other purposes; ask your health care provider or pharmacist if you have questions.  What should I tell my health care provider before I take this medicine?  They need to know if you have any of these conditions:    kidney disease    liver disease    low blood pressure    mental disorder    an unusual or allergic reaction to tizanidine, other medicines, lactose (tablets only), foods, dyes, or preservatives    pregnant or trying to get pregnant    breast-feeding  How should I use this medicine?  Take this medicine by mouth with a full glass of water. Take this medicine on an empty stomach, at least 30 minutes before or 2 hours after food. Do not take with food unless you talk with your doctor. Follow the directions on the prescription label. Take your medicine at regular intervals. Do not take your medicine more often than directed. Do not stop taking except on your doctor's advice. Suddenly stopping the medicine can be very dangerous.  Talk to your pediatrician regarding the use of this medicine in children.  Patients over 65 years old may have a stronger reaction and need a smaller dose.  Overdosage: If you think you have taken too much of this medicine contact a poison control center or emergency room at once.  NOTE: This medicine is only for you. Do not share this medicine with others.  What if I miss a dose?  If you miss a dose, take it as soon as you can. If it is almost time for your next dose, take only that dose. Do not take double or extra doses.  What may interact with this medicine?  Do not take this medicine with any of the following  medications:    ciprofloxacin    clonidine    fluvoxamine    guanabenz    guanfacine    methyldopa  This medicine may also interact with the following medications:    acyclovir    alcohol    antihistamines    baclofen    barbiturates like phenobarbital    benzodiazepines    cimetidine    famotidine    female hormones, like estrogens or progestins and birth control pills    medicines for high blood pressure    medicines for irregular heartbeat    medicines for pain like codeine, morphine, and hydrocodone    medicines for sleep    rofecoxib    some antibiotics like levofloxacin, ofloxacin    ticlopidine    zileuton  This list may not describe all possible interactions. Give your health care provider a list of all the medicines, herbs, non-prescription drugs, or dietary supplements you use. Also tell them if you smoke, drink alcohol, or use illegal drugs. Some items may interact with your medicine.  What should I watch for while using this medicine?  You may get drowsy or dizzy. Do not drive, use machinery, or do anything that needs mental alertness until you know how this medicine affects you. Do not stand or sit up quickly, especially if you are an older patient. This reduces the risk of dizzy or fainting spells. Alcohol may interfere with the effect of this medicine. Avoid alcoholic drinks.  Your mouth may get dry. Chewing sugarless gum or sucking hard candy, and drinking plenty of water may help. Contact your doctor if the problem does not go away or is severe.  What side effects may I notice from receiving this medicine?  Side effects that you should report to your doctor or health care professional as soon as possible:    allergic reactions like skin rash, itching or hives, swelling of the face, lips, or tongue    blurred vision    fainting spells    hallucinations    nausea or vomiting    nervousness    redness, blistering, peeling or loosening of the skin, including inside the mouth    slow or irregular  heartbeat, palpitations, or chest pain    yellowing of the skin or eyes  Side effects that usually do not require medical attention (report to your doctor or health care professional if they continue or are bothersome):    dizziness    drowsiness    dry mouth    tiredness or weakness  This list may not describe all possible side effects. Call your doctor for medical advice about side effects. You may report side effects to FDA at 6-830-TNV-7070.  Where should I keep my medicine?  Keep out of the reach of children.  Store at room temperature between 15 and 30 degrees C (59 and 86 degrees F). Throw away any unused medicine after the expiration date.  NOTE:This sheet is a summary. It may not cover all possible information. If you have questions about this medicine, talk to your doctor, pharmacist, or health care provider. Copyright  2016 Gold Standard      [facility]    If you have any questions regarding to your visit please contact your care team:       Team Purple:   Clinic Hours Telephone Number   Dr. Estee Lopez     7am-7pm  Monday - Thursday   7am-5pm  Fridays  [phone]  (Appointment scheduling available 24/7)    Questions about your Visit?   Team Line:  [phone]   Urgent Care - [place] [facility] and [place] [facility] - 11am-9pm Monday-Friday Saturday-Sunday- 9am-5pm   [place] - 5pm-9pm Monday-Friday Saturday-Sunday- 9am-5pm  [phone] - [place]   [phone] - [place]       What options do I have for visits at the clinic other than the traditional office visit?  To expand how we care for you, many of our providers are utilizing electronic visits (e-visits) and telephone visits, when medically appropriate, for interactions with their patients rather than a visit in the clinic.   We also offer nurse visits for many medical concerns. Just like any other service, we will bill your insurance company for this type of visit based on time spent  on the phone with your provider. Not all insurance companies cover these visits. Please check with your medical insurance if this type of visit is covered. You will be responsible for any charges that are not paid by your insurance.      E-visits via [employer]:  generally incur a $35.00 fee.  Telephone visits:  Time spent on the phone: *charged based on time that is spent on the phone in increments of 10 minutes. Estimated cost:   5-10 mins $30.00   11-20 mins. $59.00   21-30 mins. $85.00     Use [employer] (secure email communication and access to your chart) to send your primary care provider a message or make an appointment. Ask someone on your Team how to sign up for [employer].  For a Price Quote for your services, please call our Consumer Price Line at [phone].  As always, Thank you for trusting us with your health care needs!    Discharge by ROSA SIMEON
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
## 2017-10-12 NOTE — MR AVS SNAPSHOT
After Visit Summary   10/12/2017    Andrew Atkinson    MRN: [id number]           Patient Information     Date Of Birth          1970        Visit Information        Provider Department      10/12/2017 10:00 AM Wilfredo Dela Cruz MD [facility]        Today's Diagnoses     Rib pain on right side    -  1    Strain of rhomboid muscle, initial encounter          Care Instructions      Patient Education    Tizanidine Hydrochloride Oral capsule    Tizanidine Hydrochloride Oral tablet  Tizanidine Hydrochloride Oral capsule  What is this medicine?  TIZANIDINE (ed RENAN i justin) helps to relieve muscle spasms. It may be used to help in the treatment of multiple sclerosis and spinal cord injury.  This medicine may be used for other purposes; ask your health care provider or pharmacist if you have questions.  What should I tell my health care provider before I take this medicine?  They need to know if you have any of these conditions:    kidney disease    liver disease    low blood pressure    mental disorder    an unusual or allergic reaction to tizanidine, other medicines, lactose (tablets only), foods, dyes, or preservatives    pregnant or trying to get pregnant    breast-feeding  How should I use this medicine?  Take this medicine by mouth with a full glass of water. Take this medicine on an empty stomach, at least 30 minutes before or 2 hours after food. Do not take with food unless you talk with your doctor. Follow the directions on the prescription label. Take your medicine at regular intervals. Do not take your medicine more often than directed. Do not stop taking except on your doctor's advice. Suddenly stopping the medicine can be very dangerous.  Talk to your pediatrician regarding the use of this medicine in children.  Patients over 65 years old may have a stronger reaction and need a smaller dose.  Overdosage: If you think you have taken too much of this medicine  contact a poison control center or emergency room at once.  NOTE: This medicine is only for you. Do not share this medicine with others.  What if I miss a dose?  If you miss a dose, take it as soon as you can. If it is almost time for your next dose, take only that dose. Do not take double or extra doses.  What may interact with this medicine?  Do not take this medicine with any of the following medications:    ciprofloxacin    clonidine    fluvoxamine    guanabenz    guanfacine    methyldopa  This medicine may also interact with the following medications:    acyclovir    alcohol    antihistamines    baclofen    barbiturates like phenobarbital    benzodiazepines    cimetidine    famotidine    female hormones, like estrogens or progestins and birth control pills    medicines for high blood pressure    medicines for irregular heartbeat    medicines for pain like codeine, morphine, and hydrocodone    medicines for sleep    rofecoxib    some antibiotics like levofloxacin, ofloxacin    ticlopidine    zileuton  This list may not describe all possible interactions. Give your health care provider a list of all the medicines, herbs, non-prescription drugs, or dietary supplements you use. Also tell them if you smoke, drink alcohol, or use illegal drugs. Some items may interact with your medicine.  What should I watch for while using this medicine?  You may get drowsy or dizzy. Do not drive, use machinery, or do anything that needs mental alertness until you know how this medicine affects you. Do not stand or sit up quickly, especially if you are an older patient. This reduces the risk of dizzy or fainting spells. Alcohol may interfere with the effect of this medicine. Avoid alcoholic drinks.  Your mouth may get dry. Chewing sugarless gum or sucking hard candy, and drinking plenty of water may help. Contact your doctor if the problem does not go away or is severe.  What side effects may I notice from receiving this  medicine?  Side effects that you should report to your doctor or health care professional as soon as possible:    allergic reactions like skin rash, itching or hives, swelling of the face, lips, or tongue    blurred vision    fainting spells    hallucinations    nausea or vomiting    nervousness    redness, blistering, peeling or loosening of the skin, including inside the mouth    slow or irregular heartbeat, palpitations, or chest pain    yellowing of the skin or eyes  Side effects that usually do not require medical attention (report to your doctor or health care professional if they continue or are bothersome):    dizziness    drowsiness    dry mouth    tiredness or weakness  This list may not describe all possible side effects. Call your doctor for medical advice about side effects. You may report side effects to FDA at 7-202-GGJ-8765.  Where should I keep my medicine?  Keep out of the reach of children.  Store at room temperature between 15 and 30 degrees C (59 and 86 degrees F). Throw away any unused medicine after the expiration date.  NOTE:This sheet is a summary. It may not cover all possible information. If you have questions about this medicine, talk to your doctor, pharmacist, or health care provider. Copyright  2016 Gold Standard      [facility]    If you have any questions regarding to your visit please contact your care team:       Team Purple:   Clinic Hours Telephone Number   Dr. Estee Lopez     7am-7pm  Monday - Thursday   7am-5pm  Fridays  [phone]  (Appointment scheduling available 24/7)    Questions about your Visit?   Team Line:  [phone]   Urgent Care - [place] - 11am-9pm Monday-Friday Saturday-Sunday- 9am-5pm   [place] - 5pm-9pm Monday-Friday Saturday-Sunday- 9am-5pm  [phone] - Alicia Almazan  [phone] - [place]       What options do I have for visits at the clinic other than the  traditional office visit?  To expand how we care for you, many of our providers are utilizing electronic visits (e-visits) and telephone visits, when medically appropriate, for interactions with their patients rather than a visit in the clinic.   We also offer nurse visits for many medical concerns. Just like any other service, we will bill your insurance company for this type of visit based on time spent on the phone with your provider. Not all insurance companies cover these visits. Please check with your medical insurance if this type of visit is covered. You will be responsible for any charges that are not paid by your insurance.      E-visits via [employer]:  generally incur a $35.00 fee.  Telephone visits:  Time spent on the phone: *charged based on time that is spent on the phone in increments of 10 minutes. Estimated cost:   5-10 mins $30.00   11-20 mins. $59.00   21-30 mins. $85.00     Use i[employer] (secure email communication and access to your chart) to send your primary care provider a message or make an appointment. Ask someone on your Team how to sign up for iOpen[employer].  For a Price Quote for your services, please call our [employer] website Line at [phone].  As always, Thank you for trusting us with your health care needs!    Discharge by ROSA SIMEON             Follow-ups after your visit        Follow-up notes from your care team     Return in about 1 week (around 10/19/2017) for for TSH blood draw.      Who to contact     If you have questions or need follow up information about today's clinic visit or your schedule please contact [facility] directly at [phone].  Normal or non-critical lab and imaging results will be communicated to you by MyChart, letter or phone within 4 business days after the clinic has received the results. If you do not hear from us within 7 days, please contact the clinic through MyChart or phone. If you have a critical or abnormal lab result, we will notify you by  "phone as soon as possible.  Submit refill requests through [employer] or call your pharmacy and they will forward the refill request to us. Please allow 3 business days for your refill to be completed.          Additional Information About Your Visit        [employer] Information     [employer] lets you send messages to your doctor, view your test results, renew your prescriptions, schedule appointments and more. To sign up, go to [email or web address] [email or web address] . Click on \"Log in\" on the left side of the screen, which will take you to the Welcome page. Then click on \"Sign up Now\" on the right side of the page.     You will be asked to enter the access code listed below, as well as some personal information. Please follow the directions to create your username and password.     Your access code is: [email or web address]  Expires: 1/10/2018 10:55 AM     Your access code will  in 90 days. If you need help or a new code, please call your [place] clinic or [phone].        Care EveryWhere ID     This is your Care EveryWhere ID. This could be used by other organizations to access your [place] medical records  [id number]        Your Vitals Were     Pulse Temperature Height Pulse Oximetry BMI (Body Mass Index)       77 97  F (36.1  C) (Oral) 5' 11\" (1.803 m) 97% 31.8 kg/m2        Blood Pressure from Last 3 Encounters:   10/12/17 118/78   17 128/80   17 113/68    Weight from Last 3 Encounters:   10/12/17 228 lb (103.4 kg)   17 230 lb 9.6 oz (104.6 kg)   17 236 lb 8 oz (107.3 kg)              We Performed the Following     XR Ribs & Chest Right G/E 3 Views          Today's Medication Changes          These changes are accurate as of: 10/12/17 10:55 AM.  If you have any questions, ask your nurse or doctor.               Start taking these medicines.        Dose/Directions    tiZANidine 4 MG tablet   Commonly known as:  ZANAFLEX   Used for:  Strain of rhomboid muscle, initial encounter   Started by:  Jose De Jesus" Wilfredo Nicole MD        Dose:  4 mg   Take 1 tablet (4 mg) by mouth 3 times daily   Quantity:  90 tablet   Refills:  0            Where to get your medicines      These medications were sent to [id number] - [place], [place] - [address]  [address], [place] [address]     Phone:  [phone]     tiZANidine 4 MG tablet                Primary Care Provider Office Phone # Fax #    Odell Urbina [phone] [address]  [place] [address]        Equal Access to Services     [facility]: Hadii aad ku hadasho Soomaali, waaxda luqadaha, qaybta kaalmada adeegyada, lisa manjarrez . So [facility] [phone].    ATENCIÓN: Si habla español, tiene a palumbo disposición servicios gratuitos de asistencia lingüística. [facility] [phone].    We comply with applicable federal civil rights laws and [place] laws. We do not discriminate on the basis of race, color, national origin, age, disability, sex, sexual orientation, or gender identity.            Thank you!     Thank you for choosing [facility]  for your care. Our goal is always to provide you with excellent care. Hearing back from our patients is one way we can continue to improve our services. Please take a few minutes to complete the written survey that you may receive in the mail after your visit with us. Thank you!             Your Updated Medication List - Protect others around you: Learn how to safely use, store and throw away your medicines at www.disposemymeds.org.          This list is accurate as of: 10/12/17 10:55 AM.  Always use your most recent med list.                   Brand Name Dispense Instructions for use Diagnosis    fluticasone 50 MCG/ACT spray    FLONASE    1 Bottle    Spray 1-2 sprays into both nostrils daily    Postnasal drip       levothyroxine 125 MCG tablet    SYNTHROID/LEVOTHROID     Take 125 mcg by mouth daily         Multi-vitamin Tabs tablet      Take 1 tablet by mouth daily        oxyCODONE-acetaminophen 5-325 MG per tablet    PERCOCET    30 tablet    Take 1 tablet by mouth every 6 hours as needed for pain maximum 4 tablet(s) per day    S/P arthroscopic knee surgery       tiZANidine 4 MG tablet    ZANAFLEX    90 tablet    Take 1 tablet (4 mg) by mouth 3 times daily    Strain of rhomboid muscle, initial encounter       TURMERIC PO      Take 1,000 mg by mouth daily
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
## 2017-10-16 DIAGNOSIS — E89.0 POSTABLATIVE HYPOTHYROIDISM: ICD-10-CM

## 2017-10-16 PROCEDURE — 84439 ASSAY OF FREE THYROXINE: CPT | Performed by: FAMILY MEDICINE

## 2017-10-16 PROCEDURE — 36415 COLL VENOUS BLD VENIPUNCTURE: CPT | Performed by: FAMILY MEDICINE

## 2017-10-16 PROCEDURE — 84443 ASSAY THYROID STIM HORMONE: CPT | Performed by: FAMILY MEDICINE

## 2017-10-17 LAB
T4 FREE SERPL-MCNC: 1.09 NG/DL (ref 0.76–1.46)
TSH SERPL DL<=0.005 MIU/L-ACNC: 6.57 MU/L (ref 0.4–4)

## 2017-10-19 RX ORDER — LEVOTHYROXINE SODIUM 150 UG/1
150 TABLET ORAL DAILY
Qty: 30 TABLET | Refills: 2 | Status: SHIPPED | OUTPATIENT
Start: 2017-10-19 | End: 2018-01-24

## 2018-01-23 ENCOUNTER — TELEPHONE (OUTPATIENT)
Dept: FAMILY MEDICINE | Facility: CLINIC | Age: 48
End: 2018-01-23

## 2018-01-23 NOTE — TELEPHONE ENCOUNTER
Reason for Call:  Other prescription    Detailed comments: Patient is requesting refill of levothyroxine. States he is out of medication. Please call soon.    Phone Number Patient can be reached at: Home number on file [phone] (home)    Best Time: any    Can we leave a detailed message on this number? YES    Call taken on 1/23/2018 at 4:06 PM by Adele Rivas

## 2018-01-24 DIAGNOSIS — E89.0 POSTABLATIVE HYPOTHYROIDISM: ICD-10-CM

## 2018-01-24 RX ORDER — LEVOTHYROXINE SODIUM 150 UG/1
150 TABLET ORAL DAILY
Qty: 30 TABLET | Refills: 0 | Status: SHIPPED | OUTPATIENT
Start: 2018-01-24 | End: 2018-02-26

## 2018-01-24 NOTE — TELEPHONE ENCOUNTER
Failed [facility] refill protocol, see below:    levothyroxine (SYNTHROID/LEVOTHROID) 150 MCG tablet  Take 1 tablet (150 mcg) by mouth daily       Disp: 30 tablet Refills: 2    Class: E-Prescribe Start: 1/24/2018   For: Postablative hypothyroidism  Originally ordered: 3 months ago by Wilfredo Dela Cruz MD  Thyroid Protocol Failed1/24 12:04 PM   Normal TSH on file in past 12 months    Patient is 12 years or older    Recent or future visit with authorizing provider's specialty     Medication is being filled for 1 time refill only due to:  Patient needs labs TSH failed protocol .  Patient states he is out of medication.    Routing to PCP for advise on further refills.  Anjali Mcleod RN

## 2018-01-24 NOTE — TELEPHONE ENCOUNTER
Spoke with patient and informed that prescription has been sent to pharmacy.  Anjali Mcleod RN     PCP please advise on further refills? TSH lab?

## 2018-03-12 DIAGNOSIS — E89.0 POSTABLATIVE HYPOTHYROIDISM: ICD-10-CM

## 2018-03-12 LAB — TSH SERPL DL<=0.005 MIU/L-ACNC: 0.76 MU/L (ref 0.4–4)

## 2018-03-12 PROCEDURE — 36415 COLL VENOUS BLD VENIPUNCTURE: CPT | Performed by: FAMILY MEDICINE

## 2018-03-12 PROCEDURE — 84443 ASSAY THYROID STIM HORMONE: CPT | Performed by: FAMILY MEDICINE

## 2018-08-25 ENCOUNTER — OFFICE VISIT (OUTPATIENT)
Dept: URGENT CARE | Facility: URGENT CARE | Age: 48
End: 2018-08-25
Payer: COMMERCIAL

## 2018-08-25 VITALS
SYSTOLIC BLOOD PRESSURE: 124 MMHG | RESPIRATION RATE: 13 BRPM | OXYGEN SATURATION: 94 % | WEIGHT: 228 LBS | TEMPERATURE: 97 F | HEART RATE: 55 BPM | DIASTOLIC BLOOD PRESSURE: 79 MMHG | BODY MASS INDEX: 31.8 KG/M2

## 2018-08-25 DIAGNOSIS — H57.12 EYE DISCOMFORT, LEFT: Primary | ICD-10-CM

## 2018-08-25 PROCEDURE — 99214 OFFICE O/P EST MOD 30 MIN: CPT | Performed by: FAMILY MEDICINE

## 2018-08-25 RX ORDER — POLYMYXIN B SULFATE AND TRIMETHOPRIM 1; 10000 MG/ML; [USP'U]/ML
1 SOLUTION OPHTHALMIC 4 TIMES DAILY
Qty: 1 BOTTLE | Refills: 0 | Status: SHIPPED | OUTPATIENT
Start: 2018-08-25 | End: 2018-09-01
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
## 2018-08-25 NOTE — PROGRESS NOTES
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    ROS:  General: negative for fever  EYE: as above  No fevers or chills chest pain or shortness of breath     OBJECTIVE:  /79  Pulse 55  Temp 97  F (36.1  C)  Resp 13  Wt 228 lb (103.4 kg)  SpO2 94%  BMI 31.8 kg/m2   General : Awake Alert not in any acute cardiorespiratory distress  Head:       Normocephalic Atraumatic  Eyes:    Pupils equally reactive to light and accomodation. Sclera not icteric. Extra occular muscles intact full and equal. No hyphema, no hypopyon, no ciliary flush. No eyelid swelling or periorbital cellulitis. Mild erythema of  left  conjunctiva.   No foreign body seen with eversion of eyelid  No abrasion or corneal lesion on fluorosceine dye staining   Neurologic: No cranial nerve deficits.   Psych: Appropriate mood and affect. Pleasant  Skin: patient undressed to level of his/her comfort. No visible concerning lesions.      ASSESSMENT:    ICD-10-CM    1. Eye discomfort, left H57.12 trimethoprim-polymyxin b (POLYTRIM) ophthalmic solution           PLAN:   Aware that we do not have a slit lamp and given his concern I recommend slit lamp examination either in eye clinic today or ER today- he declines.  We will empirically cover with polytrim - he thinks he might have had metallic foreign body on eye- nothing I could see on direct light examination and magnifying exam  Alarm signs or symptoms discussed, if present recommend go to ER   He plans to follow up  with eye doctor in 2 days (monday)   Advised about symptoms which might herald more serious problems.    adverse reactions of medication discussed  advised to come back in right away if with any worsening symptoms or if with no relief   aware to come in right away especially if with any blurring of vision, photophobia, pain, feeling of foreign body.   despite treatment plan  patient voiced understanding and had no further questions at this time.        Abbie Trujillo MD

## 2018-12-06 ENCOUNTER — RADIANT APPOINTMENT (OUTPATIENT)
Dept: GENERAL RADIOLOGY | Facility: CLINIC | Age: 48
End: 2018-12-06
Attending: INTERNAL MEDICINE
Payer: COMMERCIAL

## 2018-12-06 ENCOUNTER — OFFICE VISIT (OUTPATIENT)
Dept: FAMILY MEDICINE | Facility: CLINIC | Age: 48
End: 2018-12-06
Payer: COMMERCIAL

## 2018-12-06 VITALS
WEIGHT: 232 LBS | BODY MASS INDEX: 32.48 KG/M2 | TEMPERATURE: 98.2 F | RESPIRATION RATE: 16 BRPM | OXYGEN SATURATION: 95 % | HEIGHT: 71 IN | SYSTOLIC BLOOD PRESSURE: 120 MMHG | DIASTOLIC BLOOD PRESSURE: 86 MMHG | HEART RATE: 72 BPM

## 2018-12-06 DIAGNOSIS — S16.1XXA STRAIN OF NECK MUSCLE, INITIAL ENCOUNTER: ICD-10-CM
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
## 2018-12-06 DIAGNOSIS — S69.91XA THUMB INJURY, RIGHT, INITIAL ENCOUNTER: ICD-10-CM

## 2018-12-06 PROCEDURE — 72072 X-RAY EXAM THORAC SPINE 3VWS: CPT | Mod: FY

## 2018-12-06 PROCEDURE — 72040 X-RAY EXAM NECK SPINE 2-3 VW: CPT | Mod: FY

## 2018-12-06 PROCEDURE — 72100 X-RAY EXAM L-S SPINE 2/3 VWS: CPT | Mod: FY

## 2018-12-06 PROCEDURE — 99214 OFFICE O/P EST MOD 30 MIN: CPT | Performed by: INTERNAL MEDICINE

## 2018-12-06 PROCEDURE — 73140 X-RAY EXAM OF FINGER(S): CPT | Mod: RT

## 2018-12-06 RX ORDER — TIZANIDINE 2 MG/1
2 TABLET ORAL 3 TIMES DAILY PRN
Qty: 30 TABLET | Refills: 11 | Status: SHIPPED | OUTPATIENT
Start: 2018-12-06 | End: 2020-02-14

## 2018-12-06 ASSESSMENT — PAIN SCALES - GENERAL: PAINLEVEL: SEVERE PAIN (6)
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
## 2018-12-06 NOTE — PATIENT INSTRUCTIONS
PATIENT INSTRUCTIONS:    1) Take Tizanidine (muscle relaxant) every evening, with supper, along with 1 - 2 tablets of Aleve.  2) If you are not working, you may still use Tizanidine by taking 1/2 tablet, along with Ibuprofen.  3) If your back pain is worse, then consider the following:      -physical therapy      -Orthopedic spine consultation.  4) If your right thumb pain is worse, consider consultation with Orthopedics (hand surgeon).
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
## 2019-02-15 DIAGNOSIS — E89.0 POSTABLATIVE HYPOTHYROIDISM: ICD-10-CM

## 2019-02-15 RX ORDER — LEVOTHYROXINE SODIUM 150 UG/1
150 TABLET ORAL DAILY
Qty: 30 TABLET | Refills: 0 | Status: SHIPPED | OUTPATIENT
Start: 2019-02-15 | End: 2019-03-12

## 2019-03-12 ENCOUNTER — OFFICE VISIT (OUTPATIENT)
Dept: FAMILY MEDICINE | Facility: CLINIC | Age: 49
End: 2019-03-12
Payer: COMMERCIAL

## 2019-03-12 VITALS
HEART RATE: 75 BPM | WEIGHT: 232.4 LBS | OXYGEN SATURATION: 97 % | BODY MASS INDEX: 33.27 KG/M2 | DIASTOLIC BLOOD PRESSURE: 78 MMHG | HEIGHT: 70 IN | RESPIRATION RATE: 22 BRPM | TEMPERATURE: 97.2 F | SYSTOLIC BLOOD PRESSURE: 122 MMHG

## 2019-03-12 DIAGNOSIS — Z13.220 ENCOUNTER FOR LIPID SCREENING FOR CARDIOVASCULAR DISEASE: ICD-10-CM

## 2019-03-12 DIAGNOSIS — Z13.6 ENCOUNTER FOR LIPID SCREENING FOR CARDIOVASCULAR DISEASE: ICD-10-CM

## 2019-03-12 DIAGNOSIS — Z00.00 WELL ADULT EXAM: Primary | ICD-10-CM

## 2019-03-12 DIAGNOSIS — Z12.5 SCREENING FOR PROSTATE CANCER: ICD-10-CM

## 2019-03-12 DIAGNOSIS — E89.0 POSTABLATIVE HYPOTHYROIDISM: ICD-10-CM

## 2019-03-12 LAB
ANION GAP SERPL CALCULATED.3IONS-SCNC: 6 MMOL/L (ref 3–14)
BUN SERPL-MCNC: 20 MG/DL (ref 7–30)
CALCIUM SERPL-MCNC: 9.6 MG/DL (ref 8.5–10.1)
CHLORIDE SERPL-SCNC: 104 MMOL/L (ref 94–109)
CHOLEST SERPL-MCNC: 242 MG/DL
CO2 SERPL-SCNC: 27 MMOL/L (ref 20–32)
CREAT SERPL-MCNC: 1.06 MG/DL (ref 0.66–1.25)
GFR SERPL CREATININE-BSD FRML MDRD: 82 ML/MIN/{1.73_M2}
GLUCOSE SERPL-MCNC: 127 MG/DL (ref 70–99)
HDLC SERPL-MCNC: 33 MG/DL
LDLC SERPL CALC-MCNC: 164 MG/DL
NONHDLC SERPL-MCNC: 209 MG/DL
POTASSIUM SERPL-SCNC: 4.3 MMOL/L (ref 3.4–5.3)
PSA SERPL-ACNC: 1.12 UG/L (ref 0–4)
SODIUM SERPL-SCNC: 137 MMOL/L (ref 133–144)
T4 FREE SERPL-MCNC: 1.24 NG/DL (ref 0.76–1.46)
TRIGL SERPL-MCNC: 225 MG/DL
TSH SERPL DL<=0.005 MIU/L-ACNC: 4.46 MU/L (ref 0.4–4)

## 2019-03-12 PROCEDURE — 36415 COLL VENOUS BLD VENIPUNCTURE: CPT | Performed by: FAMILY MEDICINE

## 2019-03-12 PROCEDURE — 84439 ASSAY OF FREE THYROXINE: CPT | Performed by: FAMILY MEDICINE

## 2019-03-12 PROCEDURE — 84443 ASSAY THYROID STIM HORMONE: CPT | Performed by: FAMILY MEDICINE

## 2019-03-12 PROCEDURE — 99396 PREV VISIT EST AGE 40-64: CPT | Performed by: FAMILY MEDICINE

## 2019-03-12 PROCEDURE — 80061 LIPID PANEL: CPT | Performed by: FAMILY MEDICINE

## 2019-03-12 PROCEDURE — G0103 PSA SCREENING: HCPCS | Performed by: FAMILY MEDICINE

## 2019-03-12 PROCEDURE — 80048 BASIC METABOLIC PNL TOTAL CA: CPT | Performed by: FAMILY MEDICINE

## 2019-03-12 RX ORDER — LEVOTHYROXINE SODIUM 150 UG/1
150 TABLET ORAL DAILY
Qty: 30 TABLET | Refills: 0 | Status: SHIPPED | OUTPATIENT
Start: 2019-03-12 | End: 2019-04-22

## 2019-03-12 ASSESSMENT — MIFFLIN-ST. JEOR: SCORE: 1932.9

## 2019-03-12 NOTE — PROGRESS NOTES
SUBJECTIVE:   CC: Andrew Atkinson is an 48 year old male who presents for preventive health visit.     Healthy Habits:    Do you get at least three servings of calcium containing foods daily (dairy, green leafy vegetables, etc.)? 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 yes    Do you see a dentist twice per year? yes    Do you have sleep apnea, excessive snoring or daytime drowsiness?yes    Andrew presents for yearly physical    Concerns:  None, thyroid  Tobacco use none  Alcohol use some  Exercise - some    Today's PHQ-2 Score:   PHQ-2 ( 1999 [employer]) 3/12/2019 10/12/2017   Q1: Little interest or pleasure in doing things 0 0   Q2: Feeling down, depressed or hopeless 0 0   PHQ-2 Score 0 0   PHQ-2 Score Incomplete -     Abuse: Current or Past(Physical, Sexual or Emotional)- No  Do you feel safe in your environment? Yes    Social History     Tobacco Use     Smoking status: Never Smoker     Smokeless tobacco: Never Used   Substance Use Topics     Alcohol use: Yes     Comment: Occ      If you drink alcohol do you typically have >3 drinks per day or >7 drinks per week? No                      Last PSA:   PSA   Date Value Ref Range Status   03/12/2019 1.12 0 - 4 ug/L Final     Comment:     Assay Method:  Chemiluminescence using Siemens Vista analyzer       Reviewed orders with patient. Reviewed health maintenance and updated orders accordingly - Yes  BP Readings from Last 3 Encounters:   03/12/19 122/78   12/06/18 120/86   08/25/18 124/79    Wt Readings from Last 3 Encounters:   03/12/19 105.4 kg (232 lb 6.4 oz)   12/06/18 105.2 kg (232 lb)   08/25/18 103.4 kg (228 lb)        Reviewed and updated as needed this visit by clinical staff  Tobacco  Allergies  Meds  Problems  Med Hx  Surg Hx  Fam Hx  Soc Hx          Reviewed and updated as needed this  "visit by Provider  Tobacco  Allergies  Meds  Problems  Med Hx  Surg Hx  Fam Hx          ROS:  CONSTITUTIONAL: NEGATIVE for fever, chills, change in weight  INTEGUMENTARY/SKIN: NEGATIVE for worrisome rashes, moles or lesions  EYES: NEGATIVE for vision changes or irritation  ENT: NEGATIVE for ear, mouth and throat problems  RESP: NEGATIVE for significant cough or SOB  CV: NEGATIVE for chest pain, palpitations or peripheral edema  GI: NEGATIVE for nausea, abdominal pain, heartburn, or change in bowel habits   male: negative for dysuria, hematuria, decreased urinary stream, erectile dysfunction, urethral discharge  MUSCULOSKELETAL: NEGATIVE for significant arthralgias or myalgia  NEURO: NEGATIVE for weakness, dizziness or paresthesias  PSYCHIATRIC: NEGATIVE for changes in mood or affect    OBJECTIVE:   /78   Pulse 75   Temp 97.2  F (36.2  C) (Oral)   Resp 22   Ht 1.79 m (5' 10.47\")   Wt 105.4 kg (232 lb 6.4 oz)   SpO2 97%   BMI 32.90 kg/m    EXAM:  GENERAL: healthy, alert and no distress  EYES: Eyes grossly normal to inspection, PERRL and conjunctivae and sclerae normal  HENT: ear canals and TM's normal, nose and mouth without ulcers or lesions  NECK: no adenopathy, no asymmetry, masses, or scars and thyroid normal to palpation  RESP: lungs clear to auscultation - no rales, rhonchi or wheezes  CV: regular rate and rhythm, normal S1 S2, no S3 or S4, no murmur, click or rub, no peripheral edema and peripheral pulses strong  ABDOMEN: soft, nontender, no hepatosplenomegaly, no masses and bowel sounds normal  MS: no gross musculoskeletal defects noted, no edema  SKIN: no suspicious lesions or rashes  NEURO: Normal strength and tone, mentation intact and speech normal  PSYCH: mentation appears normal, affect normal/bright    ASSESSMENT/PLAN:   1. Well adult exam  Health maintenance updated.   - Lipid panel reflex to direct LDL Fasting  - levothyroxine (SYNTHROID/LEVOTHROID) 150 MCG tablet; Take 1 tablet " "(150 mcg) by mouth daily Need to see MD for further refills  Dispense: 30 tablet; Refill: 0  - TSH with free T4 reflex  - Basic metabolic panel  (Ca, Cl, CO2, Creat, Gluc, K, Na, BUN)  - PSA, screen  - T4 free  - T4 free    2. Postablative hypothyroidism  Will check status  - levothyroxine (SYNTHROID/LEVOTHROID) 150 MCG tablet; Take 1 tablet (150 mcg) by mouth daily Need to see MD for further refills  Dispense: 30 tablet; Refill: 0  - TSH with free T4 reflex    3. Encounter for lipid screening for cardiovascular disease      4. Screening for prostate cancer    - PSA, screen    5. BMI 32.0-32.9,adult    COUNSELING:  Reviewed preventive health counseling, as reflected in patient instructions       Regular exercise       Healthy diet/nutrition       Colon cancer screening       Prostate cancer screening    BP Readings from Last 1 Encounters:   03/12/19 122/78     Estimated body mass index is 32.9 kg/m  as calculated from the following:    Height as of this encounter: 1.79 m (5' 10.47\").    Weight as of this encounter: 105.4 kg (232 lb 6.4 oz).      Weight management plan: Discussed healthy diet and exercise guidelines     reports that  has never smoked. he has never used smokeless tobacco.      Counseling Resources:  ATP IV Guidelines  Pooled Cohorts Equation Calculator  FRAX Risk Assessment  ICSI Preventive Guidelines  Dietary Guidelines for Americans, 2010  USDA's MyPlate  ASA Prophylaxis  Lung CA Screening    Wilfredo Hemphill MD  [facility]  "

## 2019-03-12 NOTE — LETTER
[facility]  [address] [place] [address]    March 15, 2019    Andrew Atkinson  [address]  [place] [address]          Dear Andrew,    Your thyroid function, electrolytes, kidney function, liver function, and prostate cancer screen (PSA) are all normal.  Your cholesterol could use some improvement as your HDL (good cholesterol) is low and your LDL (bad cholesterol) is elevated.  Increase HDL by getting more fiber, good fats and cardio exercise.  Decrease your LDL with weight loss.     Enclosed is a copy of your results.     Results for orders placed or performed in visit on 03/12/19   Lipid panel reflex to direct LDL Fasting   Result Value Ref Range    Cholesterol 242 (H) <200 mg/dL    Triglycerides 225 (H) <150 mg/dL    HDL Cholesterol 33 (L) >39 mg/dL    LDL Cholesterol Calculated 164 (H) <100 mg/dL    Non HDL Cholesterol 209 (H) <130 mg/dL   TSH with free T4 reflex   Result Value Ref Range    TSH 4.46 (H) 0.40 - 4.00 mU/L   Basic metabolic panel  (Ca, Cl, CO2, Creat, Gluc, K, Na, BUN)   Result Value Ref Range    Sodium 137 133 - 144 mmol/L    Potassium 4.3 3.4 - 5.3 mmol/L    Chloride 104 94 - 109 mmol/L    Carbon Dioxide 27 20 - 32 mmol/L    Anion Gap 6 3 - 14 mmol/L    Glucose 127 (H) 70 - 99 mg/dL    Urea Nitrogen 20 7 - 30 mg/dL    Creatinine 1.06 0.66 - 1.25 mg/dL    GFR Estimate 82 >60 mL/min/[1.73_m2]    GFR Estimate If Black >90 >60 mL/min/[1.73_m2]    Calcium 9.6 8.5 - 10.1 mg/dL   PSA, screen   Result Value Ref Range    PSA 1.12 0 - 4 ug/L   T4 free   Result Value Ref Range    T4 Free 1.24 0.76 - 1.46 ng/dL       If you have any questions or concerns, please call myself or my nurse at [phone].      Sincerely,        Wilfredo Dela Cruz MD/jennifer

## 2019-04-22 DIAGNOSIS — E89.0 POSTABLATIVE HYPOTHYROIDISM: ICD-10-CM

## 2019-04-22 DIAGNOSIS — Z00.00 WELL ADULT EXAM: ICD-10-CM

## 2019-04-22 RX ORDER — LEVOTHYROXINE SODIUM 150 UG/1
150 TABLET ORAL DAILY
Qty: 90 TABLET | Refills: 3 | Status: SHIPPED | OUTPATIENT
Start: 2019-04-22 | End: 2020-02-14

## 2019-04-22 NOTE — TELEPHONE ENCOUNTER
"Prescription approved per [facility] [place].    Requested Prescriptions   Signed Prescriptions Disp Refills    levothyroxine (SYNTHROID/LEVOTHROID) 150 MCG tablet 90 tablet 3     Sig: Take 1 tablet (150 mcg) by mouth daily       Thyroid Protocol Failed - 4/22/2019  8:00 AM        Failed - Normal TSH on file in past 12 months     Recent Labs   Lab Test 03/12/19  0855   TSH 4.46*              Passed - Patient is 12 years or older        Passed - Recent (12 mo) or future (30 days) visit within the authorizing provider's specialty     Patient had office visit in the last 12 months or has a visit in the next 30 days with authorizing provider or within the authorizing provider's specialty.  See \"Patient Info\" tab in inbasket, or \"Choose Columns\" in Meds & Orders section of the refill encounter.              Passed - Medication is active on med list        Kristie Ellsworth RN  [facility]    "

## 2019-08-01 ENCOUNTER — OFFICE VISIT (OUTPATIENT)
Dept: FAMILY MEDICINE | Facility: CLINIC | Age: 49
End: 2019-08-01
Payer: COMMERCIAL

## 2019-08-01 VITALS
BODY MASS INDEX: 31.85 KG/M2 | DIASTOLIC BLOOD PRESSURE: 76 MMHG | RESPIRATION RATE: 18 BRPM | WEIGHT: 225 LBS | HEART RATE: 67 BPM | OXYGEN SATURATION: 98 % | SYSTOLIC BLOOD PRESSURE: 130 MMHG

## 2019-08-01 DIAGNOSIS — K21.9 GASTROESOPHAGEAL REFLUX DISEASE WITHOUT ESOPHAGITIS: ICD-10-CM

## 2019-08-01 DIAGNOSIS — K64.2 GRADE III HEMORRHOIDS: ICD-10-CM

## 2019-08-01 DIAGNOSIS — K42.9 UMBILICAL HERNIA WITHOUT OBSTRUCTION AND WITHOUT GANGRENE: Primary | ICD-10-CM

## 2019-08-01 DIAGNOSIS — E03.9 HYPOTHYROIDISM, UNSPECIFIED TYPE: ICD-10-CM

## 2019-08-01 DIAGNOSIS — R09.82 POST-NASAL DRIP: ICD-10-CM

## 2019-08-01 LAB
T4 FREE SERPL-MCNC: 1.33 NG/DL (ref 0.76–1.46)
TSH SERPL DL<=0.005 MIU/L-ACNC: 1.54 MU/L (ref 0.4–4)

## 2019-08-01 PROCEDURE — 99214 OFFICE O/P EST MOD 30 MIN: CPT | Performed by: FAMILY MEDICINE

## 2019-08-01 PROCEDURE — 84443 ASSAY THYROID STIM HORMONE: CPT | Performed by: FAMILY MEDICINE

## 2019-08-01 PROCEDURE — 36415 COLL VENOUS BLD VENIPUNCTURE: CPT | Performed by: FAMILY MEDICINE

## 2019-08-01 PROCEDURE — 84439 ASSAY OF FREE THYROXINE: CPT | Performed by: FAMILY MEDICINE

## 2019-08-01 RX ORDER — AZELASTINE 1 MG/ML
1 SPRAY, METERED NASAL 2 TIMES DAILY
Qty: 1 BOTTLE | Refills: 0 | Status: SHIPPED | OUTPATIENT
Start: 2019-08-01 | End: 2020-11-30

## 2019-08-01 NOTE — LETTER
[address] [place] [address]    August 2, 2019    Andrew Atkinson  [address]  [place] [id number]          Dear Andrew,    Your thyroid function is completely normal.    Enclosed is a copy of your results.     Results for orders placed or performed in visit on 08/01/19   TSH   Result Value Ref Range    TSH 1.54 0.40 - 4.00 mU/L   T4, free   Result Value Ref Range    T4 Free 1.33 0.76 - 1.46 ng/dL       If you have any questions or concerns, please call myself or my nurse at [phone].      Sincerely,        Wilfredo Dela Cruz MD/jennifer

## 2019-08-01 NOTE — PATIENT INSTRUCTIONS
Patient Education     Hernia (Adult)    A hernia can happen when there is a weakness or defect in the wall of the abdomen or groin. Intestines or nearby tissues may move from their usual location and push through the weakness in the wall. This can cause a hernia (bulge) you may see or feel.  Causes and risk factors   A hernia may be present at birth. Or it may be caused by the wear and tear of daily living. Certain factors can make a hernia more likely. These can include:    Heavy lifting    Straining, whether from lifting, movement, or constipation    Chronic cough    Injury to the abdominal wall    Excess weight    Pregnancy    Prior surgery    Older age    Family history of hernia  Symptoms  Symptoms of a hernia may come on suddenly. Or they may appear slowly over time. Some common symptoms include:    Bulge in the groin area, around the navel, or in the scrotum (the bulge may get bigger when you stand and go away when you lie down)    Pain or pressure around the bulge    Pain during activities such as lifting, coughing, or sneezing    A feeling of weakness or pressure in the groin    Pain or swelling in the scrotum  Types of hernias  There are different types of hernia. The type you have depends on its location:    Inguinal. This type is in the groin or scrotum. It is more common in men. But, women can get this hernia, too.    Femoral. This type is in the groin, upper thigh (where the leg bends), or labia. It is more common in women.    Ventral. This type is in the abdominal wall.    Umbilical. This type occurs around the navel (belly button).    Incisional. This type occurs at the site of a previous surgery.  The condition of the hernia can help determine how urgently it needs to be treated.    Reducible. It goes back in by itself, or it can be pushed back in.    Irreducible. It can t be pushed back in.    Incarcerated/strangulated. The intestine is trapped (incarcerated). If this happens, you won t be able  to push the bulge back in. If the incarcerated hernia isn t treated, it may become strangulated. This means the area loses blood supply and the tissue may die. This requires emergency surgery. You need treatment right away.  In most cases, a hernia will not heal on its own.You may need surgery to repair the defect in the abdominal wall or groin. You ll be told more about surgery, if needed.  If your symptoms are not severe, treatment may sometimes be delayed. In such cases, you will need regular follow-up visits with the provider. You ll be asked to keep track of your symptoms and to watch for signs of more serious problems. You may also be given guidelines similar to the home care instructions below.  Home care  To help keep a hernia from getting worse, you may be advised to:    Avoid heavy lifting and straining as directed.    Take steps to prevent constipation, such as eating more fiber and drinking more water. This may help reduce straining that can occur when having a bowel movement. Reducing straining may help keep your symptoms from getting worse.    Maintain a healthy weight or lose excess weight. This can help reduce strain on abdominal muscles and tissues.    Stop smoking. This can help prevent coughing that may also strain abdominal muscles and tissues.  Follow-up care  Follow up with your healthcare provider, or as directed. If imaging tests were done, they will be reviewed a doctor. You will be told the results and any new findings that may affect your care.  When to seek medical advice  Call your healthcare provider right away if any of these occur:    Hernia hardens, swells, or grows larger    Hernia can no longer be pushed back in    Pain moves to the lower right abdomen (just below the waistline), or spreads to the back  Call 911  Call 911 if any of these occur:    Severe pain, redness, or tenderness in the area near the hernia    Pain worsens quickly and doesn t get better    Inability to have a  bowel movement or pass gas    Fever of 100.4 F (38 C) or higher, or as directed by your healthcare provider  Date Last Reviewed: 3/1/2018    [id number] [employer]. [address] [place] [address]. All rights reserved. This information is not intended as a substitute for professional medical care. Always follow your healthcare professional's instructions.           Patient Education     Tips to Control Acid Reflux    To control acid reflux, you ll need to make some basic diet and lifestyle changes. The simple steps outlined below may be all you ll need to ease discomfort.  Watch what you eat    Avoid fatty foods and spicy foods.    Eat fewer acidic foods, such as citrus and tomato-based foods. These can increase symptoms.    Limit drinking alcohol, caffeine, and fizzy beverages. All increase acid reflux.    Try limiting chocolate, peppermint, and spearmint. These can worsen acid reflux in some people.  Watch when you eat    Avoid lying down for 3 hours after eating.    Do not snack before going to bed.  Raise your head  Raising your head and upper body by 4 to 6 inches helps limit reflux when you re lying down. Put blocks under the head of your bed frame to raise it.  Other changes    Lose weight, if you need to    Don t exercise near bedtime    Avoid tight-fitting clothes    Limit aspirin and ibuprofen    Stop smoking   Date Last Reviewed: 7/1/2016 2000-2018 [employer]. [address] [place] [address]. All rights reserved. This information is not intended as a substitute for professional medical care. Always follow your healthcare professional's instructions.           Patient Education     How Acid Reflux Affects Your Throat    Do you have to clear your throat or cough often? Are you hoarse? Do you have trouble swallowing? If you have these or other throat symptoms, you may have acid reflux. This occurs when stomach acid flows back up and irritates your  throat.  Why you have throat symptoms  There are muscles (esophageal sphincters) at both ends of the tube that carries food to your stomach (the esophagus). These muscles relax to let food pass. Then they tighten to keep stomach acid down. When the lower esophageal sphincter (LES) doesn t tighten enough, acid can flow back (reflux) from your stomach into your esophagus. This may cause heartburn. In some cases the upper esophageal sphincter (UES) also doesn t work well. Then acid can travel higher and enter your throat (pharynx). In many cases, this causes throat symptoms.  Common throat symptoms    Need to clear your throat often    Feeling like you re choking    Long-term (chronic) cough    Hoarseness    Trouble swallowing    Feel like you have a lump in your throat    Sour or acid taste    Sore throat that keeps coming back   Date Last Reviewed: 7/1/2016 2000-2018 [employer]. [address] [place] [address], [place] [address]. All rights reserved. This information is not intended as a substitute for professional medical care. Always follow your healthcare professional's instructions.

## 2020-02-14 ENCOUNTER — APPOINTMENT (OUTPATIENT)
Dept: GENERAL RADIOLOGY | Facility: CLINIC | Age: 50
DRG: 287 | End: 2020-02-14
Attending: EMERGENCY MEDICINE
Payer: COMMERCIAL

## 2020-02-14 ENCOUNTER — OFFICE VISIT (OUTPATIENT)
Dept: FAMILY MEDICINE | Facility: CLINIC | Age: 50
End: 2020-02-14
Payer: COMMERCIAL

## 2020-02-14 ENCOUNTER — HOSPITAL ENCOUNTER (INPATIENT)
Facility: CLINIC | Age: 50
LOS: 2 days | Discharge: HOME | DRG: 287 | End: 2020-02-16
Attending: INTERNAL MEDICINE | Admitting: INTERNAL MEDICINE
Payer: COMMERCIAL

## 2020-02-14 VITALS
HEIGHT: 70 IN | OXYGEN SATURATION: 96 % | HEART RATE: 85 BPM | BODY MASS INDEX: 33.53 KG/M2 | TEMPERATURE: 97.5 F | DIASTOLIC BLOOD PRESSURE: 80 MMHG | WEIGHT: 234.2 LBS | SYSTOLIC BLOOD PRESSURE: 112 MMHG | RESPIRATION RATE: 16 BRPM

## 2020-02-14 DIAGNOSIS — R94.31 ST SEGMENT DEPRESSION: ICD-10-CM

## 2020-02-14 DIAGNOSIS — E78.5 HYPERLIPIDEMIA LDL GOAL <100: ICD-10-CM

## 2020-02-14 DIAGNOSIS — Z12.5 SCREENING FOR PROSTATE CANCER: ICD-10-CM

## 2020-02-14 DIAGNOSIS — E03.9 HYPOTHYROIDISM, UNSPECIFIED TYPE: ICD-10-CM

## 2020-02-14 DIAGNOSIS — M65.30 TRIGGER FINGER, ACQUIRED: ICD-10-CM

## 2020-02-14 DIAGNOSIS — I21.4 NON-STEMI (NON-ST ELEVATED MYOCARDIAL INFARCTION) (H): ICD-10-CM

## 2020-02-14 DIAGNOSIS — I24.9 ACS (ACUTE CORONARY SYNDROME) (H): Primary | ICD-10-CM

## 2020-02-14 DIAGNOSIS — R07.89 ATYPICAL CHEST PAIN: Primary | ICD-10-CM

## 2020-02-14 LAB
ALBUMIN SERPL-MCNC: 3.9 G/DL (ref 3.4–5)
ALBUMIN SERPL-MCNC: 4.2 G/DL (ref 3.4–5)
ALP SERPL-CCNC: 75 U/L (ref 40–150)
ALP SERPL-CCNC: 76 U/L (ref 40–150)
ALT SERPL W P-5'-P-CCNC: 83 U/L (ref 0–70)
ALT SERPL W P-5'-P-CCNC: 85 U/L (ref 0–70)
ANION GAP SERPL CALCULATED.3IONS-SCNC: 4 MMOL/L (ref 3–14)
ANION GAP SERPL CALCULATED.3IONS-SCNC: 5 MMOL/L (ref 3–14)
AST SERPL W P-5'-P-CCNC: 40 U/L (ref 0–45)
AST SERPL W P-5'-P-CCNC: 42 U/L (ref 0–45)
BASOPHILS # BLD AUTO: 0 10E9/L (ref 0–0.2)
BASOPHILS NFR BLD AUTO: 0.5 %
BILIRUB SERPL-MCNC: 0.4 MG/DL (ref 0.2–1.3)
BILIRUB SERPL-MCNC: 0.5 MG/DL (ref 0.2–1.3)
BUN SERPL-MCNC: 18 MG/DL (ref 7–30)
BUN SERPL-MCNC: 19 MG/DL (ref 7–30)
CALCIUM SERPL-MCNC: 9.1 MG/DL (ref 8.5–10.1)
CALCIUM SERPL-MCNC: 9.3 MG/DL (ref 8.5–10.1)
CHLORIDE SERPL-SCNC: 107 MMOL/L (ref 94–109)
CHLORIDE SERPL-SCNC: 108 MMOL/L (ref 94–109)
CHOLEST SERPL-MCNC: 172 MG/DL
CO2 SERPL-SCNC: 24 MMOL/L (ref 20–32)
CO2 SERPL-SCNC: 27 MMOL/L (ref 20–32)
CREAT SERPL-MCNC: 0.97 MG/DL (ref 0.66–1.25)
CREAT SERPL-MCNC: 1.06 MG/DL (ref 0.66–1.25)
CRP SERPL-MCNC: 3.1 MG/L (ref 0–8)
D DIMER PPP FEU-MCNC: 0.3 UG/ML FEU (ref 0–0.5)
DIFFERENTIAL METHOD BLD: NORMAL
EOSINOPHIL # BLD AUTO: 0.2 10E9/L (ref 0–0.7)
EOSINOPHIL NFR BLD AUTO: 2.3 %
ERYTHROCYTE [DISTWIDTH] IN BLOOD BY AUTOMATED COUNT: 12.8 % (ref 10–15)
GFR SERPL CREATININE-BSD FRML MDRD: 81 ML/MIN/{1.73_M2}
GFR SERPL CREATININE-BSD FRML MDRD: >90 ML/MIN/{1.73_M2}
GLUCOSE SERPL-MCNC: 106 MG/DL (ref 70–99)
GLUCOSE SERPL-MCNC: 111 MG/DL (ref 70–99)
HCT VFR BLD AUTO: 43.1 % (ref 40–53)
HDLC SERPL-MCNC: 26 MG/DL
HGB BLD-MCNC: 14.9 G/DL (ref 13.3–17.7)
IMM GRANULOCYTES # BLD: 0.1 10E9/L (ref 0–0.4)
IMM GRANULOCYTES NFR BLD: 0.6 %
INR PPP: 1.04 (ref 0.86–1.14)
LDLC SERPL CALC-MCNC: 78 MG/DL
LYMPHOCYTES # BLD AUTO: 2.9 10E9/L (ref 0.8–5.3)
LYMPHOCYTES NFR BLD AUTO: 33.2 %
MCH RBC QN AUTO: 28.3 PG (ref 26.5–33)
MCHC RBC AUTO-ENTMCNC: 34.6 G/DL (ref 31.5–36.5)
MCV RBC AUTO: 82 FL (ref 78–100)
MONOCYTES # BLD AUTO: 0.8 10E9/L (ref 0–1.3)
MONOCYTES NFR BLD AUTO: 9.4 %
NEUTROPHILS # BLD AUTO: 4.7 10E9/L (ref 1.6–8.3)
NEUTROPHILS NFR BLD AUTO: 54 %
NONHDLC SERPL-MCNC: 146 MG/DL
NRBC # BLD AUTO: 0 10*3/UL
NRBC BLD AUTO-RTO: 0 /100
NT-PROBNP SERPL-MCNC: 154 PG/ML (ref 0–900)
PLATELET # BLD AUTO: 339 10E9/L (ref 150–450)
POTASSIUM SERPL-SCNC: 3.6 MMOL/L (ref 3.4–5.3)
POTASSIUM SERPL-SCNC: 3.7 MMOL/L (ref 3.4–5.3)
PROT SERPL-MCNC: 7.7 G/DL (ref 6.8–8.8)
PROT SERPL-MCNC: 7.8 G/DL (ref 6.8–8.8)
PSA SERPL-ACNC: 1.17 UG/L (ref 0–4)
RBC # BLD AUTO: 5.27 10E12/L (ref 4.4–5.9)
SODIUM SERPL-SCNC: 137 MMOL/L (ref 133–144)
SODIUM SERPL-SCNC: 138 MMOL/L (ref 133–144)
T4 FREE SERPL-MCNC: 1.65 NG/DL (ref 0.76–1.46)
TRIGL SERPL-MCNC: 339 MG/DL
TROPONIN I SERPL-MCNC: 0.21 UG/L (ref 0–0.04)
TROPONIN I SERPL-MCNC: 0.23 UG/L (ref 0–0.04)
TSH SERPL DL<=0.005 MIU/L-ACNC: 3.06 MU/L (ref 0.4–4)
WBC # BLD AUTO: 8.6 10E9/L (ref 4–11)

## 2020-02-14 PROCEDURE — 80061 LIPID PANEL: CPT | Performed by: FAMILY MEDICINE

## 2020-02-14 PROCEDURE — 36415 COLL VENOUS BLD VENIPUNCTURE: CPT | Performed by: STUDENT IN AN ORGANIZED HEALTH CARE EDUCATION/TRAINING PROGRAM

## 2020-02-14 PROCEDURE — 99000 SPECIMEN HANDLING OFFICE-LAB: CPT | Performed by: FAMILY MEDICINE

## 2020-02-14 PROCEDURE — 93000 ELECTROCARDIOGRAM COMPLETE: CPT | Performed by: FAMILY MEDICINE

## 2020-02-14 PROCEDURE — 85025 COMPLETE CBC W/AUTO DIFF WBC: CPT | Performed by: EMERGENCY MEDICINE

## 2020-02-14 PROCEDURE — 80050 GENERAL HEALTH PANEL: CPT | Performed by: FAMILY MEDICINE

## 2020-02-14 PROCEDURE — 85379 FIBRIN DEGRADATION QUANT: CPT | Performed by: EMERGENCY MEDICINE

## 2020-02-14 PROCEDURE — 93010 ELECTROCARDIOGRAM REPORT: CPT | Mod: Z6 | Performed by: INTERNAL MEDICINE

## 2020-02-14 PROCEDURE — 25000128 H RX IP 250 OP 636: Performed by: INTERNAL MEDICINE

## 2020-02-14 PROCEDURE — 93005 ELECTROCARDIOGRAM TRACING: CPT | Performed by: INTERNAL MEDICINE

## 2020-02-14 PROCEDURE — 84484 ASSAY OF TROPONIN QUANT: CPT | Performed by: FAMILY MEDICINE

## 2020-02-14 PROCEDURE — 86140 C-REACTIVE PROTEIN: CPT | Performed by: EMERGENCY MEDICINE

## 2020-02-14 PROCEDURE — 96366 THER/PROPH/DIAG IV INF ADDON: CPT | Performed by: INTERNAL MEDICINE

## 2020-02-14 PROCEDURE — 99285 EMERGENCY DEPT VISIT HI MDM: CPT | Mod: 25 | Performed by: INTERNAL MEDICINE

## 2020-02-14 PROCEDURE — 36415 COLL VENOUS BLD VENIPUNCTURE: CPT | Performed by: FAMILY MEDICINE

## 2020-02-14 PROCEDURE — 71046 X-RAY EXAM CHEST 2 VIEWS: CPT

## 2020-02-14 PROCEDURE — 84484 ASSAY OF TROPONIN QUANT: CPT | Performed by: STUDENT IN AN ORGANIZED HEALTH CARE EDUCATION/TRAINING PROGRAM

## 2020-02-14 PROCEDURE — 80053 COMPREHEN METABOLIC PANEL: CPT | Performed by: EMERGENCY MEDICINE

## 2020-02-14 PROCEDURE — 83880 ASSAY OF NATRIURETIC PEPTIDE: CPT | Performed by: EMERGENCY MEDICINE

## 2020-02-14 PROCEDURE — 84439 ASSAY OF FREE THYROXINE: CPT | Performed by: FAMILY MEDICINE

## 2020-02-14 PROCEDURE — 25000132 ZZH RX MED GY IP 250 OP 250 PS 637: Performed by: INTERNAL MEDICINE

## 2020-02-14 PROCEDURE — 85027 COMPLETE CBC AUTOMATED: CPT | Performed by: STUDENT IN AN ORGANIZED HEALTH CARE EDUCATION/TRAINING PROGRAM

## 2020-02-14 PROCEDURE — 84484 ASSAY OF TROPONIN QUANT: CPT | Performed by: EMERGENCY MEDICINE

## 2020-02-14 PROCEDURE — 83036 HEMOGLOBIN GLYCOSYLATED A1C: CPT | Performed by: STUDENT IN AN ORGANIZED HEALTH CARE EDUCATION/TRAINING PROGRAM

## 2020-02-14 PROCEDURE — 82552 ASSAY OF CPK IN BLOOD: CPT | Mod: 90 | Performed by: FAMILY MEDICINE

## 2020-02-14 PROCEDURE — 82550 ASSAY OF CK (CPK): CPT | Mod: 90 | Performed by: FAMILY MEDICINE

## 2020-02-14 PROCEDURE — 99207 ZZC OFFICE-HOSPITAL ADMIT: CPT | Performed by: FAMILY MEDICINE

## 2020-02-14 PROCEDURE — 96365 THER/PROPH/DIAG IV INF INIT: CPT | Performed by: INTERNAL MEDICINE

## 2020-02-14 PROCEDURE — 21400000 ZZH R&B CCU UMMC

## 2020-02-14 PROCEDURE — 85610 PROTHROMBIN TIME: CPT | Performed by: EMERGENCY MEDICINE

## 2020-02-14 PROCEDURE — 96368 THER/DIAG CONCURRENT INF: CPT | Performed by: INTERNAL MEDICINE

## 2020-02-14 PROCEDURE — G0103 PSA SCREENING: HCPCS | Performed by: FAMILY MEDICINE

## 2020-02-14 RX ORDER — MULTIPLE VITAMINS W/ MINERALS TAB 9MG-400MCG
1 TAB ORAL DAILY
Status: DISCONTINUED | OUTPATIENT
Start: 2020-02-15 | End: 2020-02-16 | Stop reason: HOSPADM

## 2020-02-14 RX ORDER — ACETAMINOPHEN 325 MG/1
650 TABLET ORAL EVERY 4 HOURS PRN
Status: DISCONTINUED | OUTPATIENT
Start: 2020-02-14 | End: 2020-02-16 | Stop reason: HOSPADM

## 2020-02-14 RX ORDER — LIDOCAINE 40 MG/G
CREAM TOPICAL
Status: DISCONTINUED | OUTPATIENT
Start: 2020-02-14 | End: 2020-02-16 | Stop reason: HOSPADM

## 2020-02-14 RX ORDER — ACETAMINOPHEN 650 MG/1
650 SUPPOSITORY RECTAL EVERY 4 HOURS PRN
Status: DISCONTINUED | OUTPATIENT
Start: 2020-02-14 | End: 2020-02-16 | Stop reason: HOSPADM

## 2020-02-14 RX ORDER — AZELASTINE 1 MG/ML
1 SPRAY, METERED NASAL 2 TIMES DAILY
Status: DISCONTINUED | OUTPATIENT
Start: 2020-02-14 | End: 2020-02-16 | Stop reason: HOSPADM

## 2020-02-14 RX ORDER — BISACODYL 5 MG
5 TABLET, DELAYED RELEASE (ENTERIC COATED) ORAL DAILY PRN
Status: DISCONTINUED | OUTPATIENT
Start: 2020-02-14 | End: 2020-02-16 | Stop reason: HOSPADM

## 2020-02-14 RX ORDER — NITROGLYCERIN 0.4 MG/1
0.4 TABLET SUBLINGUAL EVERY 5 MIN PRN
Status: DISCONTINUED | OUTPATIENT
Start: 2020-02-14 | End: 2020-02-16 | Stop reason: HOSPADM

## 2020-02-14 RX ORDER — HEPARIN SODIUM 10000 [USP'U]/100ML
0-3500 INJECTION, SOLUTION INTRAVENOUS CONTINUOUS
Status: DISCONTINUED | OUTPATIENT
Start: 2020-02-14 | End: 2020-02-15

## 2020-02-14 RX ORDER — POLYETHYLENE GLYCOL 3350 17 G/17G
17 POWDER, FOR SOLUTION ORAL DAILY PRN
Status: DISCONTINUED | OUTPATIENT
Start: 2020-02-14 | End: 2020-02-16 | Stop reason: HOSPADM

## 2020-02-14 RX ORDER — BISACODYL 5 MG
15 TABLET, DELAYED RELEASE (ENTERIC COATED) ORAL DAILY PRN
Status: DISCONTINUED | OUTPATIENT
Start: 2020-02-14 | End: 2020-02-16 | Stop reason: HOSPADM

## 2020-02-14 RX ORDER — ASPIRIN 81 MG/1
162 TABLET, CHEWABLE ORAL ONCE
Status: COMPLETED | OUTPATIENT
Start: 2020-02-14 | End: 2020-02-14

## 2020-02-14 RX ORDER — NALOXONE HYDROCHLORIDE 0.4 MG/ML
.1-.4 INJECTION, SOLUTION INTRAMUSCULAR; INTRAVENOUS; SUBCUTANEOUS
Status: DISCONTINUED | OUTPATIENT
Start: 2020-02-14 | End: 2020-02-16 | Stop reason: HOSPADM

## 2020-02-14 RX ORDER — LEVOTHYROXINE SODIUM 150 UG/1
150 TABLET ORAL DAILY
Qty: 90 TABLET | Refills: 3 | Status: SHIPPED | OUTPATIENT
Start: 2020-02-14 | End: 2021-05-05

## 2020-02-14 RX ORDER — PRAVASTATIN SODIUM 40 MG
40 TABLET ORAL EVERY EVENING
Status: DISCONTINUED | OUTPATIENT
Start: 2020-02-14 | End: 2020-02-16 | Stop reason: HOSPADM

## 2020-02-14 RX ORDER — NITROGLYCERIN 20 MG/100ML
.07-1.89 INJECTION INTRAVENOUS CONTINUOUS
Status: DISCONTINUED | OUTPATIENT
Start: 2020-02-14 | End: 2020-02-15

## 2020-02-14 RX ORDER — ASPIRIN 325 MG
325 TABLET ORAL DAILY
Status: DISCONTINUED | OUTPATIENT
Start: 2020-02-15 | End: 2020-02-15

## 2020-02-14 RX ORDER — BISACODYL 5 MG
10 TABLET, DELAYED RELEASE (ENTERIC COATED) ORAL DAILY PRN
Status: DISCONTINUED | OUTPATIENT
Start: 2020-02-14 | End: 2020-02-16 | Stop reason: HOSPADM

## 2020-02-14 RX ORDER — CALCIUM CARBONATE 500 MG/1
1000 TABLET, CHEWABLE ORAL 4 TIMES DAILY PRN
Status: DISCONTINUED | OUTPATIENT
Start: 2020-02-14 | End: 2020-02-16 | Stop reason: HOSPADM

## 2020-02-14 RX ORDER — HEPARIN SODIUM 10000 [USP'U]/100ML
0-3500 INJECTION, SOLUTION INTRAVENOUS CONTINUOUS
Status: DISCONTINUED | OUTPATIENT
Start: 2020-02-14 | End: 2020-02-14

## 2020-02-14 RX ORDER — LEVOTHYROXINE SODIUM 150 UG/1
150 TABLET ORAL DAILY
Status: DISCONTINUED | OUTPATIENT
Start: 2020-02-15 | End: 2020-02-16 | Stop reason: HOSPADM

## 2020-02-14 RX ADMIN — HEPARIN SODIUM 1200 UNITS/HR: 10000 INJECTION, SOLUTION INTRAVENOUS at 21:14

## 2020-02-14 RX ADMIN — ASPIRIN 81 MG CHEWABLE TABLET 162 MG: 81 TABLET CHEWABLE at 20:08

## 2020-02-14 RX ADMIN — NITROGLYCERIN 0.07 MCG/KG/MIN: 20 INJECTION INTRAVENOUS at 22:28

## 2020-02-14 ASSESSMENT — ENCOUNTER SYMPTOMS
CONFUSION: 0
PALPITATIONS: 0
ADENOPATHY: 0
CHILLS: 0
NAUSEA: 0
VOMITING: 0
CHEST TIGHTNESS: 1
HEADACHES: 0
LIGHT-HEADEDNESS: 0
BACK PAIN: 1
DIFFICULTY URINATING: 0
NECK PAIN: 0
COUGH: 0
FEVER: 0
WEAKNESS: 0
SHORTNESS OF BREATH: 1
WHEEZING: 0
NUMBNESS: 0
ABDOMINAL PAIN: 0

## 2020-02-14 ASSESSMENT — MIFFLIN-ST. JEOR
SCORE: 1914.52
SCORE: 1943.55
SCORE: 1936.06

## 2020-02-14 ASSESSMENT — PAIN DESCRIPTION - DESCRIPTORS: DESCRIPTORS: DISCOMFORT

## 2020-02-14 NOTE — PROGRESS NOTES
"Subjective     Andrew Atkinson is a 49 year old male who presents to clinic today for the following health issues:    HPI     Right arm pain - patient states that may be associated with chest discomfort/sob as well especially with light activity.  Started after URI a few weeks ago.  No nausea/vomiting/light headedness/arm or neck injury to speak of.  Associated with early onset fatigue/sob.    Left thumb - issue with extension of the thumb, feels a catching sensation sometimes, often works with his hands, has become more frequent, left thumb, patient is left handed.        BP Readings from Last 3 Encounters:   02/25/20 122/74   02/16/20 120/74   02/14/20 112/80    Wt Readings from Last 3 Encounters:   02/25/20 102.5 kg (226 lb)   02/16/20 102.8 kg (226 lb 11.2 oz)   02/14/20 106.2 kg (234 lb 3.2 oz)        Reviewed and updated as needed this visit by Provider  Tobacco  Allergies  Meds  Problems  Med Hx  Surg Hx  Fam Hx         Review of Systems    ROS: 10 point ROS neg other than the symptoms noted above in the HPI.        Objective    /80   Pulse 85   Temp 97.5  F (36.4  C) (Oral)   Resp 16   Ht 1.79 m (5' 10.47\")   Wt 106.2 kg (234 lb 3.2 oz)   SpO2 96%   BMI 33.16 kg/m    Body mass index is 33.16 kg/m .  Physical Exam   GENERAL: healthy, alert and no distress  EYES: Eyes grossly normal to inspection, PERRL and conjunctivae and sclerae normal  NECK: no adenopathy, no asymmetry, masses, or scars and thyroid normal to palpation  RESP: lungs clear to auscultation - no rales, rhonchi or wheezes  CV: regular rate and rhythm, normal S1 S2, no S3 or S4, no murmur, click or rub, no peripheral edema and peripheral pulses strong  MS: left thumb trigger finger  SKIN: no suspicious lesions or rashes  PSYCH: mentation appears normal, affect normal/bright          Assessment & Plan       ICD-10-CM    1. Atypical chest pain R07.89 EKG 12-lead complete w/read - Clinics     Comprehensive metabolic panel     " Echo Stress Test with Definity     Troponin I     Creatine Kinase Isoenzymes     CANCELED: CT Coronary Calcium Scan   2. ST segment depression R94.31    3. Trigger finger, acquired M65.30 Orthopedic & Spine  Referral   4. Hypothyroidism, unspecified type E03.9 TSH     T4, free     Comprehensive metabolic panel   5. Hyperlipidemia LDL goal <100 E78.5 Lipid panel reflex to direct LDL Non-fasting   6. Screening for prostate cancer Z12.5 PSA, screen   EKG shows ST depression in inferior leads concerning for NSTEMI, will obtain troponin, recommend patient go to the ED for potential cardiac cath assessment.       Follow-up in ED today  Wilfredo Hemphill MD  [facility]

## 2020-02-14 NOTE — LETTER
[facility]  [address] [place] [address]    February 17, 2020    Andrew Atkinson  [address] [place] [address]          Dear Andrew,    Called patient this afternoon, needs to go to ED for cardiology assessment.  Recommended primo in [facility].     Enclosed is a copy of your results.     Results for orders placed or performed during the hospital encounter of 02/14/20   Chest XR,  PA & LAT     Status: None    Narrative    EXAMINATION: XR chest 2 view, 2/14/2020 7:06 PM    INDICATION: Shortness of breath    COMPARISON: Chest radiograph 2/14/2020    FINDINGS: PA and lateral upright views of the chest.     Trachea is mid line. Cardiomediastinal borders are clear. Cardiac  silhouette is not enlarged. No focal airspace opacity. No pleural  effusion. Likely mild right middle lobe atelectasis. No pleural  effusion. No pneumothorax. Soft tissues are grossly unremarkable. No  acute osseous abnormalities.      Impression    IMPRESSION: No acute cardiac or pulmonary abnormalities.    I have personally reviewed the examination and initial interpretation  and I agree with the findings.    AN FERNANDEZ MD   CBC with platelets differential     Status: None   Result Value Ref Range    WBC 8.6 4.0 - 11.0 10e9/L    RBC Count 5.27 4.4 - 5.9 10e12/L    Hemoglobin 14.9 13.3 - 17.7 g/dL    Hematocrit 43.1 40.0 - 53.0 %    MCV 82 78 - 100 fl    MCH 28.3 26.5 - 33.0 pg    MCHC 34.6 31.5 - 36.5 g/dL    RDW 12.8 10.0 - 15.0 %    Platelet Count 339 150 - 450 10e9/L    Diff Method Automated Method     % Neutrophils 54.0 %    % Lymphocytes 33.2 %    % Monocytes 9.4 %    % Eosinophils 2.3 %    % Basophils 0.5 %    % Immature Granulocytes 0.6 %    Nucleated RBCs 0 0 /100    Absolute Neutrophil 4.7 1.6 - 8.3 10e9/L    Absolute Lymphocytes 2.9 0.8 - 5.3 10e9/L    Absolute Monocytes 0.8 0.0 - 1.3 10e9/L    Absolute Eosinophils 0.2 0.0 - 0.7 10e9/L    Absolute  Basophils 0.0 0.0 - 0.2 10e9/L    Abs Immature Granulocytes 0.1 0 - 0.4 10e9/L    Absolute Nucleated RBC 0.0    Comprehensive metabolic panel     Status: Abnormal   Result Value Ref Range    Sodium 137 133 - 144 mmol/L    Potassium 3.6 3.4 - 5.3 mmol/L    Chloride 108 94 - 109 mmol/L    Carbon Dioxide 24 20 - 32 mmol/L    Anion Gap 5 3 - 14 mmol/L    Glucose 111 (H) 70 - 99 mg/dL    Urea Nitrogen 18 7 - 30 mg/dL    Creatinine 1.06 0.66 - 1.25 mg/dL    GFR Estimate 81 >60 mL/min/[1.73_m2]    GFR Estimate If Black >90 >60 mL/min/[1.73_m2]    Calcium 9.3 8.5 - 10.1 mg/dL    Bilirubin Total 0.4 0.2 - 1.3 mg/dL    Albumin 4.2 3.4 - 5.0 g/dL    Protein Total 7.8 6.8 - 8.8 g/dL    Alkaline Phosphatase 75 40 - 150 U/L    ALT 83 (H) 0 - 70 U/L    AST 42 0 - 45 U/L   INR     Status: None   Result Value Ref Range    INR 1.04 0.86 - 1.14   Troponin I (second draw)     Status: Abnormal   Result Value Ref Range    Troponin I ES 0.211 (HH) 0.000 - 0.045 ug/L   CRP inflammation     Status: None   Result Value Ref Range    CRP Inflammation 3.1 0.0 - 8.0 mg/L   D dimer quantitative     Status: None   Result Value Ref Range    D Dimer 0.3 0.0 - 0.50 ug/ml FEU   Nt probnp inpatient     Status: None   Result Value Ref Range    N-Terminal Pro BNP Inpatient 154 0 - 900 pg/mL   Basic metabolic panel     Status: Abnormal   Result Value Ref Range    Sodium 139 133 - 144 mmol/L    Potassium 3.7 3.4 - 5.3 mmol/L    Chloride 108 94 - 109 mmol/L    Carbon Dioxide 23 20 - 32 mmol/L    Anion Gap 7 3 - 14 mmol/L    Glucose 122 (H) 70 - 99 mg/dL    Urea Nitrogen 16 7 - 30 mg/dL    Creatinine 0.94 0.66 - 1.25 mg/dL    GFR Estimate >90 >60 mL/min/[1.73_m2]    GFR Estimate If Black >90 >60 mL/min/[1.73_m2]    Calcium 8.8 8.5 - 10.1 mg/dL   INR     Status: None   Result Value Ref Range    INR 1.09 0.86 - 1.14   CBC with platelets     Status: None   Result Value Ref Range    WBC 8.8 4.0 - 11.0 10e9/L    RBC Count 5.00 4.4 - 5.9 10e12/L    Hemoglobin 14.0  13.3 - 17.7 g/dL    Hematocrit 41.9 40.0 - 53.0 %    MCV 84 78 - 100 fl    MCH 28.0 26.5 - 33.0 pg    MCHC 33.4 31.5 - 36.5 g/dL    RDW 12.9 10.0 - 15.0 %    Platelet Count 335 150 - 450 10e9/L   Heparin Xa (10a) Level     Status: None   Result Value Ref Range    Heparin 10A Level 0.24 IU/mL   Hemoglobin A1c     Status: Abnormal   Result Value Ref Range    Hemoglobin A1C 6.4 (H) 0 - 5.6 %   Troponin I     Status: Abnormal   Result Value Ref Range    Troponin I ES 0.202 (HH) 0.000 - 0.045 ug/L   Heparin 10a Level     Status: None   Result Value Ref Range    Heparin 10A Level 0.32 IU/mL   Heparin Xa (10a) Level     Status: None   Result Value Ref Range    Heparin 10A Level <0.10 IU/mL   EKG 12 lead     Status: None   Result Value Ref Range    Interpretation ECG Click View Image link to view waveform and result    Echocardiogram Complete     Status: None    Narrative    [id number]  [id number]  [id number]  [id number]^JULIA^JERONIMO^KIERRA           [facility]  Echocardiography Laboratory  [address] [place] [id number]     Name: KIERRA TONG  MRN: [id number]  : 1970  Study Date: 02/15/2020 08:28 AM  Age: 49 yrs  Gender: Male  Patient Location: [facility]  Reason For Study: Chest Pain, Chest Tightness, Chest Pressure  Ordering Physician: JERONIMO DUMONT  Referring Physician: GE CHAN  Performed By: Milena Malin RDCS     BSA: 2.3 m2  Height: 71 in  Weight: 234 lb  HR: 54  BP: 105/63 mmHg  _____________________________________________________________________________  __        Procedure  Complete Portable Echo Adult.  _____________________________________________________________________________  __        Interpretation Summary  No significant valvular abnormalities were noted. Global and regional left  ventricular function is normal with an EF of 60-65%.  Right ventricular function, chamber size, wall motion, and thickness are  normal.  No significant valvular  abnormalities were noted.     Previous study not available for comparison.  _____________________________________________________________________________  __        Left Ventricle  Left ventricular size is normal. Left ventricular wall thickness is normal.  Global and regional left ventricular function is normal with an EF of 60-65%.  Left ventricular diastolic function is normal.     Right Ventricle  Right ventricular function, chamber size, wall motion, and thickness are  normal.     Atria  Both atria appear normal.     Mitral Valve  The mitral valve is normal. Trace mitral insufficiency is present.        Aortic Valve  Aortic valve is normal in structure and function. The aortic valve is  tricuspid. Trace aortic insufficiency is present.     Tricuspid Valve  The tricuspid valve is normal. Trace tricuspid insufficiency is present. The  right ventricular systolic pressure is approximated at 20.8 mmHg plus the  right atrial pressure.     Pulmonic Valve  The pulmonic valve is normal.     Vessels  The aorta root is normal. The thoracic aorta is normal. The inferior vena cava  was normal in size with preserved respiratory variability. IVC diameter <2.1  cm collapsing >50% with sniff suggests a normal RA pressure of 3 mmHg.     Pericardium  No pericardial effusion is present.        Compared to Previous Study  Previous study not available for comparison.  _____________________________________________________________________________  __  MMode/2D Measurements & Calculations  RVDd: 4.5 cm  IVSd: 1.00 cm     LVIDd: 5.4 cm  LVIDs: 3.4 cm  LVPWd: 0.99 cm  FS: 38.3 %  LV mass(C)d: 207.7 grams  LV mass(C)dI: 92.1 grams/m2  Ao root diam: 4.1 cm  asc Aorta Diam: 3.5 cm  LVOT diam: 2.4 cm  LVOT area: 4.5 cm2  LA Volume (BP): 70.9 ml  LA Volume Index (BP): 31.5 ml/m2  RWT: 0.36           Doppler Measurements & Calculations  MV E max venus: 81.0 cm/sec  MV A max venus: 48.4 cm/sec  MV E/A: 1.7  MV dec slope: 469.0 cm/sec2  PA V2 max:  103.0 cm/sec  PA max P.2 mmHg  PA acc time: 0.13 sec  PI end-d venus: 85.7 cm/sec  TR max venus: 228.0 cm/sec  TR max P.8 mmHg  E/E' av.1  Lateral E/e': 10.2  Medial E/e': 16.1     _____________________________________________________________________________  __           Report approved by: Jorge BAEZA 02/15/2020 10:21 AM      Cardiac Catheterization     Status: None    Narrative    Myocarditis post viral infection.   Normal coronaries.   Results for orders placed or performed in visit on 20   TSH     Status: None   Result Value Ref Range    TSH 3.06 0.40 - 4.00 mU/L   T4, free     Status: Abnormal   Result Value Ref Range    T4 Free 1.65 (H) 0.76 - 1.46 ng/dL   Lipid panel reflex to direct LDL Non-fasting     Status: Abnormal   Result Value Ref Range    Cholesterol 172 <200 mg/dL    Triglycerides 339 (H) <150 mg/dL    HDL Cholesterol 26 (L) >39 mg/dL    LDL Cholesterol Calculated 78 <100 mg/dL    Non HDL Cholesterol 146 (H) <130 mg/dL   Comprehensive metabolic panel     Status: Abnormal   Result Value Ref Range    Sodium 138 133 - 144 mmol/L    Potassium 3.7 3.4 - 5.3 mmol/L    Chloride 107 94 - 109 mmol/L    Carbon Dioxide 27 20 - 32 mmol/L    Anion Gap 4 3 - 14 mmol/L    Glucose 106 (H) 70 - 99 mg/dL    Urea Nitrogen 19 7 - 30 mg/dL    Creatinine 0.97 0.66 - 1.25 mg/dL    GFR Estimate >90 >60 mL/min/[1.73_m2]    GFR Estimate If Black >90 >60 mL/min/[1.73_m2]    Calcium 9.1 8.5 - 10.1 mg/dL    Bilirubin Total 0.5 0.2 - 1.3 mg/dL    Albumin 3.9 3.4 - 5.0 g/dL    Protein Total 7.7 6.8 - 8.8 g/dL    Alkaline Phosphatase 76 40 - 150 U/L    ALT 85 (H) 0 - 70 U/L    AST 40 0 - 45 U/L   PSA, screen     Status: None   Result Value Ref Range    PSA 1.17 0 - 4 ug/L   Troponin I     Status: Abnormal   Result Value Ref Range    Troponin I ES 0.227 (HH) 0.000 - 0.045 ug/L   Creatine Kinase Isoenzymes     Status: Abnormal   Result Value Ref Range    CK Total 374 (H) 20 - 200 U/L    CK- 96 - 100 %     CK-MB 0 0 - 4 %    CK-BB 0 0 - 0 %    CK Macro Type I 0 0 - 0 %    CK Macro Type II 0 0 - 0 %       If you have any questions or concerns, please call myself or my nurse at [phone].      Sincerely,        Wilfredo Dela Cruz MD/jennifer

## 2020-02-14 NOTE — PATIENT INSTRUCTIONS
Patient Education     Treating Trigger Finger    Trigger finger occurs when the tissue inside your finger or thumb becomes inflamed. Mild cases can be treated without surgery. If the problem is severe, surgery may be needed. Your healthcare provider will talk with you about your options.  Nonsurgical treatment  For mild symptoms, your healthcare provider may have you rest the finger or thumb. You may also be told to take anti-inflammatory medicines. These include ibuprofen or aspirin. You may be given an injection of medicine in the base of the finger or thumb. This typically is a steroid, such as cortisone.  Surgery  If nonsurgical treatments don t ease your symptoms, you may need surgery. A tendon is a cordlike fiber that attaches muscle to bone and allows joints to bend. The tendon is surrounded by a protective cover called a sheath. During surgery, the sheath in your finger or thumb is opened to enlarge the space and release the swollen tendon. This allows the finger or thumb to bend and straighten normally. Surgery takes about 20 minutes. It can often be done using a local anesthetic. You may also be sedated. You will likely be able to go home the same day. Your hand will be wrapped in a soft bandage. You may need to wear a plaster splint for a short time to keep the finger or thumb still as it heals. The stitches will be removed in about 2 weeks. Your healthcare provider will talk with you about the risks and benefits of surgery.  Date Last Reviewed: 1/1/2018 2000-2019 [employer]. [address] [place] [address]. All rights reserved. This information is not intended as a substitute for professional medical care. Always follow your healthcare professional's instructions.           Patient Education     What is Trigger Finger?  Trigger finger is an inflammation of tissue inside your finger or thumb. It is also called tenosynovitis (ten-oh-sin-oh-VY-tis). Tendons (cordlike fibers  that attach muscle to bone and allow you to bend the joints) become swollen. So does the synovium (a slick membrane that allows the tendons to move easily). This makes it hard to straighten the finger or thumb.    Causes  Repeated use of a tool with strong gripping, such as a drill or wrench, can irritate and inflame the tendons and the synovium. It is also more common in certain medical conditions, such as rheumatoid arthritis, gout, and diabetes. But often the cause of trigger finger is unknown.  Inside your finger  Tendons connect muscles in your forearm to the bones in your fingers. The tendons in each finger are surrounded by a protective tendon sheath. This sheath is lined with synovium, which produces a fluid that allows the tendons to slide easily when you bend and straighten the finger. If a tendon is irritated, it becomes inflamed.  When a tendon is inflamed  When a tendon is inflamed, it causes the lining of the tendon sheath to swell and thicken. Or the tendon itself may thicken. Then the sheath pinches the tendon. The tendon can then no longer slide easily inside the sheath. When you straighten your finger, the tendon sticks or  locks  as it tries to squeeze back through the sheath.    Symptoms  The first sign of trigger finger may be pain where the finger or thumb joins the palm. You may also notice some swelling. As the tendon becomes more inflamed, the finger may start to catch when you try to straighten or bend it. When the locked tendon releases, the finger jumps, as if you were releasing the trigger of a gun. This further irritates the tendon. It may set up a cycle of catching and swelling.   Date Last Reviewed: 1/1/2018 2000-2019 [employer]. [address] [place] [address]. All rights reserved. This information is not intended as a substitute for professional medical care. Always follow your healthcare professional's instructions.

## 2020-02-15 ENCOUNTER — APPOINTMENT (OUTPATIENT)
Dept: CARDIOLOGY | Facility: CLINIC | Age: 50
DRG: 287 | End: 2020-02-15
Payer: COMMERCIAL

## 2020-02-15 PROBLEM — I21.4 NON-STEMI (NON-ST ELEVATED MYOCARDIAL INFARCTION) (H): Status: ACTIVE | Noted: 2020-02-14

## 2020-02-15 LAB
ANION GAP SERPL CALCULATED.3IONS-SCNC: 7 MMOL/L (ref 3–14)
BUN SERPL-MCNC: 16 MG/DL (ref 7–30)
CALCIUM SERPL-MCNC: 8.8 MG/DL (ref 8.5–10.1)
CHLORIDE SERPL-SCNC: 108 MMOL/L (ref 94–109)
CO2 SERPL-SCNC: 23 MMOL/L (ref 20–32)
CREAT SERPL-MCNC: 0.94 MG/DL (ref 0.66–1.25)
ERYTHROCYTE [DISTWIDTH] IN BLOOD BY AUTOMATED COUNT: 12.9 % (ref 10–15)
GFR SERPL CREATININE-BSD FRML MDRD: >90 ML/MIN/{1.73_M2}
GLUCOSE SERPL-MCNC: 122 MG/DL (ref 70–99)
HBA1C MFR BLD: 6.4 % (ref 0–5.6)
HCT VFR BLD AUTO: 41.9 % (ref 40–53)
HGB BLD-MCNC: 14 G/DL (ref 13.3–17.7)
INR PPP: 1.09 (ref 0.86–1.14)
INTERPRETATION ECG - MUSE: NORMAL
LMWH PPP CHRO-ACNC: 0.24 IU/ML
LMWH PPP CHRO-ACNC: 0.32 IU/ML
MCH RBC QN AUTO: 28 PG (ref 26.5–33)
MCHC RBC AUTO-ENTMCNC: 33.4 G/DL (ref 31.5–36.5)
MCV RBC AUTO: 84 FL (ref 78–100)
PLATELET # BLD AUTO: 335 10E9/L (ref 150–450)
POTASSIUM SERPL-SCNC: 3.7 MMOL/L (ref 3.4–5.3)
RBC # BLD AUTO: 5 10E12/L (ref 4.4–5.9)
SODIUM SERPL-SCNC: 139 MMOL/L (ref 133–144)
TROPONIN I SERPL-MCNC: 0.2 UG/L (ref 0–0.04)
WBC # BLD AUTO: 8.8 10E9/L (ref 4–11)

## 2020-02-15 PROCEDURE — 99223 1ST HOSP IP/OBS HIGH 75: CPT | Mod: 25 | Performed by: INTERNAL MEDICINE

## 2020-02-15 PROCEDURE — B2111ZZ FLUOROSCOPY OF MULTIPLE CORONARY ARTERIES USING LOW OSMOLAR CONTRAST: ICD-10-PCS | Performed by: INTERNAL MEDICINE

## 2020-02-15 PROCEDURE — 93454 CORONARY ARTERY ANGIO S&I: CPT | Mod: 26 | Performed by: INTERNAL MEDICINE

## 2020-02-15 PROCEDURE — 25000132 ZZH RX MED GY IP 250 OP 250 PS 637: Performed by: STUDENT IN AN ORGANIZED HEALTH CARE EDUCATION/TRAINING PROGRAM

## 2020-02-15 PROCEDURE — 25000125 ZZHC RX 250: Performed by: INTERNAL MEDICINE

## 2020-02-15 PROCEDURE — C1887 CATHETER, GUIDING: HCPCS | Performed by: INTERNAL MEDICINE

## 2020-02-15 PROCEDURE — 25800030 ZZH RX IP 258 OP 636: Performed by: PHYSICIAN ASSISTANT

## 2020-02-15 PROCEDURE — 93454 CORONARY ARTERY ANGIO S&I: CPT | Performed by: INTERNAL MEDICINE

## 2020-02-15 PROCEDURE — 85520 HEPARIN ASSAY: CPT | Performed by: INTERNAL MEDICINE

## 2020-02-15 PROCEDURE — 93306 TTE W/DOPPLER COMPLETE: CPT

## 2020-02-15 PROCEDURE — 99152 MOD SED SAME PHYS/QHP 5/>YRS: CPT | Performed by: INTERNAL MEDICINE

## 2020-02-15 PROCEDURE — 36415 COLL VENOUS BLD VENIPUNCTURE: CPT | Performed by: INTERNAL MEDICINE

## 2020-02-15 PROCEDURE — 27210794 ZZH OR GENERAL SUPPLY STERILE: Performed by: INTERNAL MEDICINE

## 2020-02-15 PROCEDURE — 25000132 ZZH RX MED GY IP 250 OP 250 PS 637: Performed by: PHYSICIAN ASSISTANT

## 2020-02-15 PROCEDURE — 21400000 ZZH R&B CCU UMMC

## 2020-02-15 PROCEDURE — 85610 PROTHROMBIN TIME: CPT | Performed by: STUDENT IN AN ORGANIZED HEALTH CARE EDUCATION/TRAINING PROGRAM

## 2020-02-15 PROCEDURE — 36415 COLL VENOUS BLD VENIPUNCTURE: CPT | Performed by: STUDENT IN AN ORGANIZED HEALTH CARE EDUCATION/TRAINING PROGRAM

## 2020-02-15 PROCEDURE — 25000128 H RX IP 250 OP 636: Performed by: INTERNAL MEDICINE

## 2020-02-15 PROCEDURE — 80048 BASIC METABOLIC PNL TOTAL CA: CPT | Performed by: STUDENT IN AN ORGANIZED HEALTH CARE EDUCATION/TRAINING PROGRAM

## 2020-02-15 PROCEDURE — C1769 GUIDE WIRE: HCPCS | Performed by: INTERNAL MEDICINE

## 2020-02-15 PROCEDURE — 93306 TTE W/DOPPLER COMPLETE: CPT | Mod: 26 | Performed by: INTERNAL MEDICINE

## 2020-02-15 PROCEDURE — 85520 HEPARIN ASSAY: CPT | Performed by: STUDENT IN AN ORGANIZED HEALTH CARE EDUCATION/TRAINING PROGRAM

## 2020-02-15 PROCEDURE — C1894 INTRO/SHEATH, NON-LASER: HCPCS | Performed by: INTERNAL MEDICINE

## 2020-02-15 RX ORDER — ATROPINE SULFATE 0.1 MG/ML
0.5 INJECTION INTRAVENOUS EVERY 5 MIN PRN
Status: ACTIVE | OUTPATIENT
Start: 2020-02-15 | End: 2020-02-16

## 2020-02-15 RX ORDER — DOBUTAMINE HYDROCHLORIDE 200 MG/100ML
2-20 INJECTION INTRAVENOUS CONTINUOUS PRN
Status: DISCONTINUED | OUTPATIENT
Start: 2020-02-15 | End: 2020-02-15 | Stop reason: HOSPADM

## 2020-02-15 RX ORDER — NITROGLYCERIN 20 MG/100ML
.07-2 INJECTION INTRAVENOUS CONTINUOUS PRN
Status: DISCONTINUED | OUTPATIENT
Start: 2020-02-15 | End: 2020-02-15 | Stop reason: HOSPADM

## 2020-02-15 RX ORDER — ARGATROBAN 1 MG/ML
150 INJECTION, SOLUTION INTRAVENOUS
Status: DISCONTINUED | OUTPATIENT
Start: 2020-02-15 | End: 2020-02-15 | Stop reason: HOSPADM

## 2020-02-15 RX ORDER — FLUMAZENIL 0.1 MG/ML
0.2 INJECTION, SOLUTION INTRAVENOUS
Status: ACTIVE | OUTPATIENT
Start: 2020-02-15 | End: 2020-02-16

## 2020-02-15 RX ORDER — ARGATROBAN 1 MG/ML
350 INJECTION, SOLUTION INTRAVENOUS
Status: DISCONTINUED | OUTPATIENT
Start: 2020-02-15 | End: 2020-02-15 | Stop reason: HOSPADM

## 2020-02-15 RX ORDER — NITROGLYCERIN 5 MG/ML
VIAL (ML) INTRAVENOUS
Status: DISCONTINUED | OUTPATIENT
Start: 2020-02-15 | End: 2020-02-15 | Stop reason: HOSPADM

## 2020-02-15 RX ORDER — SODIUM CHLORIDE 9 MG/ML
INJECTION, SOLUTION INTRAVENOUS CONTINUOUS
Status: DISCONTINUED | OUTPATIENT
Start: 2020-02-15 | End: 2020-02-16

## 2020-02-15 RX ORDER — FUROSEMIDE 20 MG
20 TABLET ORAL ONCE
Status: COMPLETED | OUTPATIENT
Start: 2020-02-15 | End: 2020-02-15

## 2020-02-15 RX ORDER — EPTIFIBATIDE 2 MG/ML
2 INJECTION, SOLUTION INTRAVENOUS CONTINUOUS PRN
Status: DISCONTINUED | OUTPATIENT
Start: 2020-02-15 | End: 2020-02-15 | Stop reason: HOSPADM

## 2020-02-15 RX ORDER — ASPIRIN 325 MG
325 TABLET ORAL ONCE
Status: COMPLETED | OUTPATIENT
Start: 2020-02-15 | End: 2020-02-15

## 2020-02-15 RX ORDER — FENTANYL CITRATE 50 UG/ML
25-50 INJECTION, SOLUTION INTRAMUSCULAR; INTRAVENOUS
Status: ACTIVE | OUTPATIENT
Start: 2020-02-15 | End: 2020-02-16

## 2020-02-15 RX ORDER — EPTIFIBATIDE 2 MG/ML
180 INJECTION, SOLUTION INTRAVENOUS EVERY 10 MIN PRN
Status: DISCONTINUED | OUTPATIENT
Start: 2020-02-15 | End: 2020-02-15 | Stop reason: HOSPADM

## 2020-02-15 RX ORDER — NALOXONE HYDROCHLORIDE 0.4 MG/ML
.2-.4 INJECTION, SOLUTION INTRAMUSCULAR; INTRAVENOUS; SUBCUTANEOUS
Status: DISCONTINUED | OUTPATIENT
Start: 2020-02-15 | End: 2020-02-15

## 2020-02-15 RX ORDER — FENTANYL CITRATE 50 UG/ML
INJECTION, SOLUTION INTRAMUSCULAR; INTRAVENOUS
Status: DISCONTINUED | OUTPATIENT
Start: 2020-02-15 | End: 2020-02-15 | Stop reason: HOSPADM

## 2020-02-15 RX ORDER — IOPAMIDOL 755 MG/ML
INJECTION, SOLUTION INTRAVASCULAR
Status: DISCONTINUED | OUTPATIENT
Start: 2020-02-15 | End: 2020-02-15 | Stop reason: HOSPADM

## 2020-02-15 RX ORDER — LIDOCAINE 4 G/G
1 PATCH TOPICAL
Status: DISCONTINUED | OUTPATIENT
Start: 2020-02-15 | End: 2020-02-16 | Stop reason: HOSPADM

## 2020-02-15 RX ORDER — HEPARIN SODIUM 1000 [USP'U]/ML
INJECTION, SOLUTION INTRAVENOUS; SUBCUTANEOUS
Status: DISCONTINUED | OUTPATIENT
Start: 2020-02-15 | End: 2020-02-15 | Stop reason: HOSPADM

## 2020-02-15 RX ORDER — DOPAMINE HYDROCHLORIDE 160 MG/100ML
2-20 INJECTION, SOLUTION INTRAVENOUS CONTINUOUS PRN
Status: DISCONTINUED | OUTPATIENT
Start: 2020-02-15 | End: 2020-02-15 | Stop reason: HOSPADM

## 2020-02-15 RX ORDER — NICARDIPINE HYDROCHLORIDE 2.5 MG/ML
INJECTION INTRAVENOUS
Status: DISCONTINUED | OUTPATIENT
Start: 2020-02-15 | End: 2020-02-15 | Stop reason: HOSPADM

## 2020-02-15 RX ORDER — HEPARIN SODIUM 10000 [USP'U]/100ML
100-1000 INJECTION, SOLUTION INTRAVENOUS CONTINUOUS PRN
Status: DISCONTINUED | OUTPATIENT
Start: 2020-02-15 | End: 2020-02-15 | Stop reason: HOSPADM

## 2020-02-15 RX ORDER — NALOXONE HYDROCHLORIDE 0.4 MG/ML
.1-.4 INJECTION, SOLUTION INTRAMUSCULAR; INTRAVENOUS; SUBCUTANEOUS
Status: DISCONTINUED | OUTPATIENT
Start: 2020-02-15 | End: 2020-02-15

## 2020-02-15 RX ORDER — ASPIRIN 81 MG/1
81 TABLET, CHEWABLE ORAL DAILY
Status: DISCONTINUED | OUTPATIENT
Start: 2020-02-16 | End: 2020-02-16 | Stop reason: HOSPADM

## 2020-02-15 RX ADMIN — MULTIPLE VITAMINS W/ MINERALS TAB 1 TABLET: TAB at 08:14

## 2020-02-15 RX ADMIN — SODIUM CHLORIDE, PRESERVATIVE FREE: 5 INJECTION INTRAVENOUS at 13:31

## 2020-02-15 RX ADMIN — Medication 12.5 MG: at 00:08

## 2020-02-15 RX ADMIN — LIDOCAINE 1 PATCH: 560 PATCH PERCUTANEOUS; TOPICAL; TRANSDERMAL at 13:29

## 2020-02-15 RX ADMIN — ASPIRIN 325 MG ORAL TABLET 325 MG: 325 PILL ORAL at 08:14

## 2020-02-15 RX ADMIN — LEVOTHYROXINE SODIUM 150 MCG: 150 TABLET ORAL at 08:14

## 2020-02-15 RX ADMIN — CALCIUM CARBONATE (ANTACID) CHEW TAB 500 MG 1000 MG: 500 CHEW TAB at 00:07

## 2020-02-15 RX ADMIN — Medication 12.5 MG: at 08:14

## 2020-02-15 RX ADMIN — FUROSEMIDE 20 MG: 20 TABLET ORAL at 17:27

## 2020-02-15 RX ADMIN — Medication 12.5 MG: at 20:26

## 2020-02-15 RX ADMIN — PRAVASTATIN SODIUM 40 MG: 40 TABLET ORAL at 00:08

## 2020-02-15 RX ADMIN — PRAVASTATIN SODIUM 40 MG: 40 TABLET ORAL at 20:26

## 2020-02-15 ASSESSMENT — ACTIVITIES OF DAILY LIVING (ADL)
ADLS_ACUITY_SCORE: 11

## 2020-02-15 ASSESSMENT — MIFFLIN-ST. JEOR: SCORE: 1902.27

## 2020-02-15 NOTE — PROGRESS NOTES
Brief cardiology progress note. See H&P from earlier today for more information.    -Coronary arteries clean. No appreciable CAD  - Possible troponin and pain secondary to myocarditis. Will consider cMRI as outpatient.  - discontinue heparin, nitro drips  - plan to monitor on tele overnight        Simon Ahumada PA-C  [facility] Cardiology Team

## 2020-02-15 NOTE — ED PROVIDER NOTES
History     Chief Complaint   Patient presents with     Shortness of Breath     Abnormal Labs     HPI  Andrew Atkinson is a 49 year old male who presents on referral from clinic for evaluation of chest and left arm pain and elevated troponin. He states he has been experiencing precordial chest tightness for about 6 months. This is associated with dyspnea on exertion. He also notes pain radiating down the inner aspect of his left arm. He went in to his primary doctor today for evaluation of left thumb pain with movement. When he mentioned the chest and arm symptoms, his primary did an EKG, scheduled him for a stress test and marques labs. The troponin came back elevated at 0.2 after he left clinic. He was called back by his primary and referred in to the ED. He has no fever, chills, URI symptoms, cough, sputum. He has dyspnea with exertion. He has no nausea, vomiting or abdominal pain. He has no leg pain or swelling. He has a history of hypothyroidism. He does not smoke, has no history of diabetes.    I have reviewed the Medications, Allergies, Past Medical and Surgical History, and Social History in the Epic system.    Review of Systems   Constitutional: Negative for chills and fever.   HENT: Negative for congestion.    Eyes: Negative for visual disturbance.   Respiratory: Positive for chest tightness and shortness of breath. Negative for cough and wheezing.    Cardiovascular: Positive for chest pain. Negative for palpitations and leg swelling.   Gastrointestinal: Negative for abdominal pain, nausea and vomiting.   Genitourinary: Negative for difficulty urinating.   Musculoskeletal: Positive for back pain. Negative for neck pain.   Skin: Negative for rash.   Neurological: Negative for weakness, light-headedness, numbness and headaches.   Hematological: Negative for adenopathy.   Psychiatric/Behavioral: Negative for confusion.       Physical Exam   BP: (!) 141/86  Pulse: 73  Heart Rate: 80  Temp: 97.9  F (36.6  " C)  Resp: 16  Height: 180.3 cm (5' 11\")  Weight: 106.1 kg (234 lb)  SpO2: 94 %      Physical Exam  Vitals signs and nursing note reviewed.   Constitutional:       Appearance: He is well-developed.   HENT:      Head: Normocephalic and atraumatic.      Right Ear: External ear normal.      Left Ear: External ear normal.      Nose: Nose normal.      Mouth/Throat:      Mouth: Mucous membranes are moist.   Eyes:      Extraocular Movements: Extraocular movements intact.      Pupils: Pupils are equal, round, and reactive to light.   Cardiovascular:      Rate and Rhythm: Normal rate and regular rhythm.      Heart sounds: No murmur.   Pulmonary:      Effort: Pulmonary effort is normal.      Breath sounds: Normal breath sounds. No wheezing or rales.   Abdominal:      General: Abdomen is flat.      Palpations: Abdomen is soft.      Tenderness: There is no abdominal tenderness.   Musculoskeletal:      Right lower leg: No edema.      Left lower leg: No edema.   Skin:     General: Skin is warm and dry.   Neurological:      General: No focal deficit present.      Mental Status: He is alert and oriented to person, place, and time.   Psychiatric:         Mood and Affect: Mood is anxious.         Behavior: Behavior normal.         Thought Content: Thought content normal.         ED Course        Procedures             EKG Interpretation:      Interpreted by MURRAY RIVAS MD  Time reviewed: 1905  Symptoms at time of EKG: chest pain   Rhythm: normal sinus   Rate: Normal  Axis: Normal  Ectopy: none  Conduction: right bundle branch block (incomplete)  ST Segments/ T Waves: ST Segment Depression II, III, aVF, V5 and V6 and T wave inversion II, aVF, V5 and V6  Q Waves: I and aVL insignificant  Comparison to prior: No old EKG available    Clinical Impression: myocardial infarction  inferior and lateral    Results for orders placed or performed during the hospital encounter of 02/14/20 (from the past 48 hour(s))   EKG 12 lead   Result " Value Ref Range    Interpretation ECG Click View Image link to view waveform and result    CBC with platelets differential   Result Value Ref Range    WBC 8.6 4.0 - 11.0 10e9/L    RBC Count 5.27 4.4 - 5.9 10e12/L    Hemoglobin 14.9 13.3 - 17.7 g/dL    Hematocrit 43.1 40.0 - 53.0 %    MCV 82 78 - 100 fl    MCH 28.3 26.5 - 33.0 pg    MCHC 34.6 31.5 - 36.5 g/dL    RDW 12.8 10.0 - 15.0 %    Platelet Count 339 150 - 450 10e9/L    Diff Method Automated Method     % Neutrophils 54.0 %    % Lymphocytes 33.2 %    % Monocytes 9.4 %    % Eosinophils 2.3 %    % Basophils 0.5 %    % Immature Granulocytes 0.6 %    Nucleated RBCs 0 0 /100    Absolute Neutrophil 4.7 1.6 - 8.3 10e9/L    Absolute Lymphocytes 2.9 0.8 - 5.3 10e9/L    Absolute Monocytes 0.8 0.0 - 1.3 10e9/L    Absolute Eosinophils 0.2 0.0 - 0.7 10e9/L    Absolute Basophils 0.0 0.0 - 0.2 10e9/L    Abs Immature Granulocytes 0.1 0 - 0.4 10e9/L    Absolute Nucleated RBC 0.0    Comprehensive metabolic panel   Result Value Ref Range    Sodium 137 133 - 144 mmol/L    Potassium 3.6 3.4 - 5.3 mmol/L    Chloride 108 94 - 109 mmol/L    Carbon Dioxide 24 20 - 32 mmol/L    Anion Gap 5 3 - 14 mmol/L    Glucose 111 (H) 70 - 99 mg/dL    Urea Nitrogen 18 7 - 30 mg/dL    Creatinine 1.06 0.66 - 1.25 mg/dL    GFR Estimate 81 >60 mL/min/[1.73_m2]    GFR Estimate If Black >90 >60 mL/min/[1.73_m2]    Calcium 9.3 8.5 - 10.1 mg/dL    Bilirubin Total 0.4 0.2 - 1.3 mg/dL    Albumin 4.2 3.4 - 5.0 g/dL    Protein Total 7.8 6.8 - 8.8 g/dL    Alkaline Phosphatase 75 40 - 150 U/L    ALT 83 (H) 0 - 70 U/L    AST 42 0 - 45 U/L   INR   Result Value Ref Range    INR 1.04 0.86 - 1.14   Troponin I (second draw)   Result Value Ref Range    Troponin I ES 0.211 () 0.000 - 0.045 ug/L   CRP inflammation   Result Value Ref Range    CRP Inflammation 3.1 0.0 - 8.0 mg/L   D dimer quantitative   Result Value Ref Range    D Dimer 0.3 0.0 - 0.50 ug/ml FEU   Nt probnp inpatient   Result Value Ref Range    N-Terminal  Pro BNP Inpatient 154 0 - 900 pg/mL   Chest XR,  PA & LAT    Narrative    EXAMINATION: XR chest 2 view, 2/14/2020 7:06 PM    INDICATION: Shortness of breath    COMPARISON: Chest radiograph 2/14/2020    FINDINGS: PA and lateral upright views of the chest.     Trachea is mid line. Cardiomediastinal borders are clear. Cardiac  silhouette is not enlarged. No focal airspace opacity. No pleural  effusion. Likely mild right middle lobe atelectasis. No pleural  effusion. No pneumothorax. Soft tissues are grossly unremarkable. No  acute osseous abnormalities.      Impression    IMPRESSION: No acute cardiac or pulmonary abnormalities.    I have personally reviewed the examination and initial interpretation  and I agree with the findings.    AN FERNANDEZ MD       Assessments & Plan (with Medical Decision Making)   Impression:  Middle aged male presents with several months of typical angina with increasing frequency and dyspnea on exertion. He is currently normotensive, though he was mildly hypertensive on arrival associated with anxiety. He has inferolateral ST depressions and t wave inversions on EKG and mild elevation of troponin. CXR is clear. BNP is normal. He has no past history of cardiac disease. His angina symptoms are fairly classic. He will be admitted to the cardiology service. He was given aspirin in the ED and started on low dose heparin infusion. He developed some recurrent pain in the ED and will be placed on IV NTG.    I have reviewed the nursing notes.    I have reviewed the findings, diagnosis, plan and need for follow up with the patient.    New Prescriptions    No medications on file       Final diagnoses:   Non-STEMI (non-ST elevated myocardial infarction) (H)       2/14/2020   [facility], EMERGENCY DEPARTMENT     Washington Carpio MD  02/14/20 9527

## 2020-02-15 NOTE — H&P
[facility] Cardiology History and Physical    Andrew Atkinson MRN# [id number]   Age: 49 year old YOB: 1970     Date of Admission:  2/14/2020    Primary care provider: Wilfredo Dela Cruz          Assessment and Plan:   Andrew Atkinson is a 49 year old male with PMHx significant for hypothyroidism, chronic back pain, GERD who presented with typical exertional chest pain, subtle EKG changes, and troponinemia c/f NSTEMI.    #. NSTEMI (ERVIN 2, DEVORAH 93)  Admission EKG with subtle inferiorlateral TWIs, STDs. Typical chest pain w/ radiation into left arm, exertional dyspnea, relieved with NTG. Trop in PCP clinic 0.277, downtrended to 0.211 in ED. Chest pain fully extinguished by NTG gtt.    -- trend troponin  -- low intensity heparin  -- NTG gtt as needed for CP  -- ASA, metop tartrate 12.5 mg BID, pravastatin 40 mg  -- TTE, consider stress modality in AM  -- risk factor modification  -- NPO at MN    #. Hypertriglyceridemia  Suspect secondary to EtOH.   --  decreased use    #. Mild ALT elevation (85)  Unclear significance.   -- continue to monitor    ================  Chronic Problems:  # Hypothyroidism: TSH 3.06, FT4 1.65 on day of admission, continue PTA levothyroxine (150 mcg)  # GERD: TUMS PRN, pt no longer taking PPI  # Multivitamin: continue PTA  # Allergic rhinitis: PTA azelastine nasal available    FEN: lytes fine, NPO at MN   Prophylaxis: on heparin ggt  Code Status: FULL (confirmed on admission)  Disposition: 6C, CSI per Dr. Howard    Patient will be formally staffed in the AM    Timbo Arroyo MD  Internal Medicine PGY 2  Cardiology [facility]          Chief Complaint:   Exertional dyspnea, chest pain         History of Present Illness:   Andrew Atkinson is a 49 year old male with PMHx significant for hypothyroidism, chronic back pain, GERD who presented with typical exertional chest pain, subtle EKG changes, and troponinemia c/f NSTEMI.    Briefly, patient presents with 3-4 weeks of  intermittent exertional dyspnea and chest pain with radiation to his left arm. Today, he was seen by his PCP for an unrelated MSK issue (thumb) and happened to mention that he had been having the above symptoms.  His PCP began the work-up for ACS and recommended stress testing which was being arranged for early next week. The patient left clinic, though when his troponin resulted his PCP called him and recommended immediate evaluation in the [facility] ED. Patient had been out driving to [place], [place] at the time of this notification and was without chest pain at the time.     His chest pressure has been intermittent, substernal, and 7/10 in severity. It is not always associated with exertion. Often, it would involve radiation to his left axilla and down into his left arm. It had been so mild for so long that he did not really track it until this last week. His dyspnea has been universally associated with exertion. He has found it difficult to simply navigate his home and yard without needing to rest secondary to this shortness of breath.     No previous personal hx of chest pain or dyspnea of this nature.    He has not smoked cigarettes in approximately 15 years, though occasionally smokes marijuana. Approximately 20 alcoholic drinks/month. Denies other illicits, specifically MDMA, methamphetamine, cocaine.     He is not well versed in his family history, unknown amount/severity of CAD/MI/CVA.     All questions answered, patient comfortable with care plan.          Review of Systems:   10 point ROS negative unless noted above         Past Medical History:     Last Echocardiogram: N/A  Last Catheterization: N/A  Last Stress test: N/A    Medical History reviewed.   Past Medical History:   Diagnosis Date     Medial meniscus tear              Past Surgical History:   Surgical History reviewed.   Past Surgical History:   Procedure Laterality Date     ARTHROSCOPY KNEE WITH MEDIAL MENISCECTOMY Left 5/12/2017    Procedure:  ARTHROSCOPY KNEE WITH MEDIAL MENISCECTOMY;  Arthroscopic partial medial menisectomy,left knee;  Surgeon: Grant Muñoz MD;  Location: [facility] OR             Social History:   Social History reviewed.   Social History     Tobacco Use     Smoking status: Never Smoker     Smokeless tobacco: Never Used   Substance Use Topics     Alcohol use: Yes     Comment: Occ              Family History:   Family History reviewed.  Family History   Problem Relation Age of Onset     Unknown/Adopted No family hx of              Allergies:     Allergies   Allergen Reactions     Morphine Hives             Medications:   Medications Reviewed.   Current Facility-Administered Medications   Medication     acetaminophen (TYLENOL) Suppository 650 mg     acetaminophen (TYLENOL) tablet 650 mg     aspirin (ASA) tablet 325 mg     azelastine (ASTELIN) nasal spray 1 spray     bisacodyl (DULCOLAX) EC tablet 5 mg    Or     bisacodyl (DULCOLAX) EC tablet 10 mg    Or     bisacodyl (DULCOLAX) EC tablet 15 mg     calcium carbonate (TUMS) chewable tablet 1,000 mg     heparin  drip 25,000 units in 0.45% NaCl 250 mL  ANTICOAGULANT  (see additional administration details for dose)     heparin bolus from infusion pump     HOLD: nitroGLYcerin IF     levothyroxine (SYNTHROID/LEVOTHROID) tablet 150 mcg     lidocaine (LMX4) cream     lidocaine 1 % 0.1-1 mL     medication instruction     metoprolol tartrate (LOPRESSOR) half-tab 12.5 mg     multivitamin w/minerals (THERA-VIT-M) tablet 1 tablet     naloxone (NARCAN) injection 0.1-0.4 mg     nitroGLYcerin (NITROSTAT) sublingual tablet 0.4 mg     nitroGLYcerin 50 mg in D5W 250 mL (adult std) infusion     omeprazole (priLOSEC) CR capsule 20 mg     Patient is already receiving anticoagulation with heparin, enoxaparin (LOVENOX), warfarin (COUMADIN)  or other anticoagulant medication     phenylephrine-shark liver oil-mineral oil-petrolatum (PREPARATION H) 0.25-14-74.9 % rectal ointment     polyethylene glycol (MIRALAX)  "Packet 17 g     pravastatin (PRAVACHOL) tablet 40 mg     Reason ACE/ARB/ARNI order not selected     sodium chloride (PF) 0.9% PF flush 3 mL     sodium chloride (PF) 0.9% PF flush 3 mL             Physical Exam:   Vitals were reviewed.  Blood pressure 115/71, pulse 76, temperature 97.6  F (36.4  C), temperature source Oral, resp. rate 16, height 1.803 m (5' 11\"), weight 103.2 kg (227 lb 9.6 oz), SpO2 94 %.    General: NAD  HEENT: MMM, PERRLA, EOM intact  CV: RRR, normal S1S2, no murmur, clicks, rubs  Resp: Clear to auscultation bilaterally, no wheezes, rhonchi  Abd: Soft, non-tender, BS+, no masses appreciated  Skin: Not jaundiced, no rash, no ecchymoses  Extremities: warm and well perfused, palpable pulses, no edema  Neuro: A&Ox3, No lateralizing symptoms or focal neurologic deficits         Data:   No intake/output data recorded.    ROUTINE LABS (Last four results)  CMP  Recent Labs   Lab 02/14/20  1824 02/14/20  1111    138   POTASSIUM 3.6 3.7   CHLORIDE 108 107   CO2 24 27   ANIONGAP 5 4   * 106*   BUN 18 19   CR 1.06 0.97   GFRESTIMATED 81 >90   GFRESTBLACK >90 >90   NAPOLEON 9.3 9.1   PROTTOTAL 7.8 7.7   ALBUMIN 4.2 3.9   BILITOTAL 0.4 0.5   ALKPHOS 75 76   AST 42 40   ALT 83* 85*     CBC  Recent Labs   Lab 02/14/20  2346 02/14/20  1824   WBC 8.8 8.6   RBC 5.00 5.27   HGB 14.0 14.9   HCT 41.9 43.1   MCV 84 82   MCH 28.0 28.3   MCHC 33.4 34.6   RDW 12.9 12.8    339     INR  Recent Labs   Lab 02/14/20  1824   INR 1.04     Arterial Blood GasNo lab results found in last 7 days.    Imaging:  Admission CXR:  EXAMINATION: XR chest 2 view, 2/14/2020 7:06 PM     INDICATION: Shortness of breath     COMPARISON: Chest radiograph 2/14/2020     FINDINGS: PA and lateral upright views of the chest.      Trachea is mid line. Cardiomediastinal borders are clear. Cardiac  silhouette is not enlarged. No focal airspace opacity. No pleural  effusion. Likely mild right middle lobe atelectasis. No pleural  effusion. No " pneumothorax. Soft tissues are grossly unremarkable. No  acute osseous abnormalities.                                                                      IMPRESSION: No acute cardiac or pulmonary abnormalities.     I have personally reviewed the examination and initial interpretation  and I agree with the findings.     AN FERNANDEZ MD      I have seen and examined the patient with the [facility] team. I agree with the assessment and plan of the note above.I have reviewed pertinent labs.     Rinku Boyce MD  Interventional Cardiology  Pager: [phone]

## 2020-02-15 NOTE — ED TRIAGE NOTES
Keven comes to the ED because he was referred by his PCP after reporting some SOB.  Elevated troponin was reported on labs earlier today.  He denies CP however experiences exertional SOB.

## 2020-02-15 NOTE — PRE-PROCEDURE
GENERAL PRE-PROCEDURE:   Procedure:  Coronary angiogram  Date/Time:  2/15/2020 11:44 AM    Verbal consent obtained?: Yes    Written consent obtained?: Yes    Risks and benefits: Risks, benefits and alternatives were discussed    Consent given by:  Patient  Patient states understanding of procedure being performed: Yes    Patient's understanding of procedure matches consent: Yes    Procedure consent matches procedure scheduled: Yes    Expected level of sedation:  Moderate  Appropriately NPO:  Yes  ASA Class:  Class 2- mild systemic disease, no acute problems, no functional limitations  Mallampati  :  Grade 2- soft palate, base of uvula, tonsillar pillars, and portion of posterior pharyngeal wall visible  Lungs:  Lungs clear with good breath sounds bilaterally  Heart:  Normal heart sounds and rate  History & Physical reviewed:  History and physical reviewed and no updates needed  Statement of review:  I have reviewed the lab findings, diagnostic data, medications, and the plan for sedation        Simon Ahumada PA-C  [facility] Cardiology Team

## 2020-02-16 ENCOUNTER — DOCUMENTATION ONLY (OUTPATIENT)
Dept: CARE COORDINATION | Facility: CLINIC | Age: 50
End: 2020-02-16

## 2020-02-16 VITALS
SYSTOLIC BLOOD PRESSURE: 120 MMHG | HEART RATE: 66 BPM | WEIGHT: 226.7 LBS | DIASTOLIC BLOOD PRESSURE: 74 MMHG | HEIGHT: 71 IN | OXYGEN SATURATION: 97 % | RESPIRATION RATE: 18 BRPM | BODY MASS INDEX: 31.74 KG/M2 | TEMPERATURE: 97.8 F

## 2020-02-16 LAB
CK BB CFR SERPL ELPH: 0 % (ref 0–0)
CK MACRO1 CFR SERPL: 0 % (ref 0–0)
CK MACRO2 CFR SERPL: 0 % (ref 0–0)
CK MB CFR SERPL ELPH: 0 % (ref 0–4)
CK MM CFR SERPL ELPH: 100 % (ref 96–100)
CK SERPL-CCNC: 374 U/L (ref 20–200)
LMWH PPP CHRO-ACNC: <0.1 IU/ML

## 2020-02-16 PROCEDURE — 25000132 ZZH RX MED GY IP 250 OP 250 PS 637: Performed by: STUDENT IN AN ORGANIZED HEALTH CARE EDUCATION/TRAINING PROGRAM

## 2020-02-16 PROCEDURE — 25000132 ZZH RX MED GY IP 250 OP 250 PS 637: Performed by: PHYSICIAN ASSISTANT

## 2020-02-16 PROCEDURE — 99238 HOSP IP/OBS DSCHRG MGMT 30/<: CPT | Performed by: PHYSICIAN ASSISTANT

## 2020-02-16 PROCEDURE — 36415 COLL VENOUS BLD VENIPUNCTURE: CPT | Performed by: INTERNAL MEDICINE

## 2020-02-16 PROCEDURE — 85520 HEPARIN ASSAY: CPT | Performed by: INTERNAL MEDICINE

## 2020-02-16 RX ORDER — ASPIRIN 81 MG/1
81 TABLET, CHEWABLE ORAL DAILY
Qty: 30 TABLET | Refills: 11 | Status: SHIPPED | OUTPATIENT
Start: 2020-02-16 | End: 2020-11-30

## 2020-02-16 RX ORDER — PRAVASTATIN SODIUM 20 MG
20 TABLET ORAL EVERY EVENING
Qty: 30 TABLET | Refills: 11 | Status: SHIPPED | OUTPATIENT
Start: 2020-02-16 | End: 2020-11-30

## 2020-02-16 RX ADMIN — ASPIRIN 81 MG CHEWABLE TABLET 81 MG: 81 TABLET CHEWABLE at 09:26

## 2020-02-16 RX ADMIN — MULTIPLE VITAMINS W/ MINERALS TAB 1 TABLET: TAB at 09:27

## 2020-02-16 RX ADMIN — LEVOTHYROXINE SODIUM 150 MCG: 150 TABLET ORAL at 09:26

## 2020-02-16 RX ADMIN — LIDOCAINE 1 PATCH: 560 PATCH PERCUTANEOUS; TOPICAL; TRANSDERMAL at 09:25

## 2020-02-16 ASSESSMENT — MIFFLIN-ST. JEOR: SCORE: 1910.43

## 2020-02-16 ASSESSMENT — ACTIVITIES OF DAILY LIVING (ADL)
ADLS_ACUITY_SCORE: 13
ADLS_ACUITY_SCORE: 11

## 2020-02-16 NOTE — PLAN OF CARE
AVSS. SR.  HR down to 40's at times while asleep.  On scheduled Metoprolol. Pt denies discomfort/SOB.  R radial puncture site CDI.  CMS +.  Ambulating independently.  Not saving UOP.  Sleeping in NAD.  Continue to monitor s/p angiogram.  Anticipate discharge home today.

## 2020-02-16 NOTE — DISCHARGE SUMMARY
[address] [address]  p: [phone]    Discharge Summary: Cardiology Service    Andrew Atkinson MRN# [id number]   YOB: 1970 Age: 49 year old       Admission Date: 2/14/2020  Discharge Date: 02/16/20      Discharge Diagnoses:  1. Myocarditis   2. Pre-diabetes  3. Hypothyroidism  4. GERD    Pertinent Procedures:  1. Coronary angiogram    Consults:  NA    Imaging with results:  Echocardiogram 2/15/2020:  Interpretation Summary  No significant valvular abnormalities were noted. Global and regional left  ventricular function is normal with an EF of 60-65%.  Right ventricular function, chamber size, wall motion, and thickness are  normal.  No significant valvular abnormalities were noted.     Previous study not available for comparison.  ___________________________________________________________________  Left Ventricle  Left ventricular size is normal. Left ventricular wall thickness is normal.  Global and regional left ventricular function is normal with an EF of 60-65%.  Left ventricular diastolic function is normal.     Right Ventricle  Right ventricular function, chamber size, wall motion, and thickness are  normal.     Atria  Both atria appear normal.     Mitral Valve  The mitral valve is normal. Trace mitral insufficiency is present.        Aortic Valve  Aortic valve is normal in structure and function. The aortic valve is  tricuspid. Trace aortic insufficiency is present.     Tricuspid Valve  The tricuspid valve is normal. Trace tricuspid insufficiency is present. The  right ventricular systolic pressure is approximated at 20.8 mmHg plus the  right atrial pressure.     Pulmonic Valve  The pulmonic valve is normal.     Vessels  The aorta root is normal. The thoracic aorta is normal. The inferior vena cava  was normal in size with preserved respiratory variability. IVC diameter <2.1  cm collapsing >50% with sniff suggests a  normal RA pressure of 3 mmHg.     Pericardium  No pericardial effusion is present.        Compared to Previous Study  Previous study not available for comparison    Coronary Angiogram 2/15/2020:  Diagnostic   Dominance: Right   Left Main   The vessel was visualized by selective angiography and is moderate in size. There was 0% vessel disease.   Left Anterior Descending   The vessel was visualized by selective angiography and is moderate in size. There was 0% vessel disease.   Left Circumflex   The vessel was visualized by selective angiography and is moderate in size. There was 0% vessel disease.   Right Coronary Artery   The vessel was visualized by selective angiography and is moderate in size. There was 0% vessel disease.   Intervention     No interventions have been documented.       Brief HPI:  Andrew Atkinson is a 49 year old male with PMHx significant for hypothyroidism, chronic back pain, GERD who presented with typical exertional chest pain, subtle EKG changes, and troponinemia c/f NSTEMI.    Hospital Course by Diagnosis:  #Myocarditis  Admission EKG with 1mm ST-depressions and TWI in II, III, V5-V6. Typical chest pain w/ radiation into left arm, exertional dyspnea, relieved with NTG. Trop in PCP clinic 0.277, downtrended to 0.211 in ED. Chest pain fully extinguished by NTG gtt. Patient started on IV heparin and ntg. Echocardiogram with normal LVEF, no WMA. Patient taken to the cath lab on HOD 1, demonstrating normal coronary arteries without appreciable CAD. Patient afterwards did endorse having a viral illness about 2 weeks prior to admission. As such, symptoms and troponin felt secondary to myocarditis. Will have him follow up in clinic in about 4-6 weeks. He is instructed to avoid strenuous activity, avoid NSAIDs, and alcohol until he is seen in clinic. cMRI not ordered at this time, but can consider if ongoing symptoms.  - start aspirin 81mg daily for primary prevention  - start pravastatin 20mg daily  for primary prevention     #. Hypertriglyceridemia  Suspect secondary to EtOH.   --  decreased use    #Pre-diabetes  A1C this visit 6.4. Discussed this diagnosis with patient. Encouraged him to pursue more exercise and activity (after recovery from myocarditis), weight loss, and reducing etoh as above. He will follow up with his PCP in about a week. Could consider starting metformin at that visit.     ================  Chronic Problems:  # Hypothyroidism: TSH 3.06, FT4 1.65 on day of admission, continue PTA levothyroxine (150 mcg)  # GERD: TUMS PRN, pt no longer taking PPI  # Multivitamin: continue PTA  # Allergic rhinitis: PTA azelastine nasal available    Condition on discharge  Temp:  [97.3  F (36.3  C)-98.5  F (36.9  C)] 97.8  F (36.6  C)  Pulse:  [55-56] 56  Heart Rate:  [50-65] 63  Resp:  [16-20] 18  BP: (112-135)/() 129/91  SpO2:  [93 %-96 %] 96 %  General: Alert, interactive, NAD  Eyes: sclera anicteric, EOMI  Neck: JVP not appreciably elevated  Cardiovascular: regular rate and rhythm, normal S1 and S2, no murmurs, gallops, or rubs  Resp: clear to auscultation bilaterally, no rales, wheezes, or rhonchi  GI: Soft, nontender, nondistended. +BS.  No HSM or masses, no rebound or guarding.  Extremities: no edema, no cyanosis or clubbing, dorsalis pedis and posterior tibialis pulses 2+ bilaterally  Skin: Warm and dry, no jaundice or rash  Neuro: CN 2-12 intact, moves all extremities equally  Psych: Alert & oriented x 3      Medication Changes:  START aspirin 81mg daily  START pravastatin 20mg daily    Discharge medications:   Discharge Medication List as of 2/16/2020 11:44 AM      START taking these medications    Details   aspirin (ASA) 81 MG chewable tablet Take 1 tablet (81 mg) by mouth daily, Disp-30 tablet, R-11, E-Prescribe      pravastatin (PRAVACHOL) 20 MG tablet Take 1 tablet (20 mg) by mouth every evening, Disp-30 tablet, R-11, E-Prescribe         CONTINUE these medications which have NOT  CHANGED    Details   azelastine (ASTELIN) 0.1 % nasal spray Spray 1 spray into both nostrils 2 times daily, Disp-1 Bottle, R-0, E-Prescribe      levothyroxine (SYNTHROID/LEVOTHROID) 150 MCG tablet Take 1 tablet (150 mcg) by mouth daily, Disp-90 tablet, R-3, E-Prescribe      Lido-PE-Glycerin-Petrolatum (PREPARATION H RAPID RELIEF) 5-0.25-14.4-15 % CREA Place 1 Application rectally 2 times daily as needed (bleeding hemorrhoids), Disp-1 Tube, R-3, E-Prescribe      multivitamin, therapeutic with minerals (MULTI-VITAMIN) TABS tablet Take 1 tablet by mouth daily, Historical      omeprazole (PRILOSEC) 20 MG DR capsule Take 2x per day for 14 days then once nightly thereafter.  Wean off after 1 month., Disp-45 capsule, R-1, E-Prescribe             Labs or imaging requiring follow-up after discharge:  CMP with PCP visit in one week      Follow-up:  PCP in one week  Cardiology clinic in 4-6 weeks    Code status:  FULL    Simon Ahumada PA-C  [facility] Cardiology Team      I have seen and examined the patient with the [facility] team. I agree with the assessment and plan of the discharge summary note above.I have reviewed pertinent labs. More than 30 minutes were spent organizing the patient's discharge.    Rinku Boyce MD  Interventional Cardiology  Pager: [phone]

## 2020-02-16 NOTE — PROGRESS NOTES
-Pt received orders that were carried out to discharge to home.  -Pt up ad-chente with safe judgement.  -Pt had an agiogram through radial right sided access which showed clear coronary arteries.  -Pt and his wife reviewed and understood his discharge instructions, orders, follow-up appointments and prescriptions.  -Pt still has back pain treated with lidoderm patch.

## 2020-02-16 NOTE — SUMMARY OF CARE
-Pt had an angiogram through his right radial insertion site. The test reportedly showed no blockages of the coronary arteries.  -Pt had the radial TR band successfully removed per policies, protocols and proceedures.  -Pt tolerating meals.  -Pt slept throughout the afternoon.

## 2020-02-17 ENCOUNTER — TELEPHONE (OUTPATIENT)
Dept: CARDIOLOGY | Facility: CLINIC | Age: 50
End: 2020-02-17

## 2020-02-17 ENCOUNTER — TELEPHONE (OUTPATIENT)
Dept: FAMILY MEDICINE | Facility: CLINIC | Age: 50
End: 2020-02-17

## 2020-02-17 NOTE — PROGRESS NOTES
Patient has clinic visit within 24-48 hours of Discharge so no post DC follow up call is needed    Name: Andrew Atknison MRN: [id number]   Date: 2/17/2020 Status: [facility]   Time: 10:00 AM Length: 60   Visit Type: ECHO DOBUTAMINE STRESS TEST [[id number]] NALDO: [id number]   Provider: LIAM Department: [facility]     Lelia Brar CMA   Post Hospital Discharge Team

## 2020-02-17 NOTE — TELEPHONE ENCOUNTER
S-(situation): follow up coronary angiogram on February 15. Myocarditis post viral infection.   Normal coronaries. RT Radial Arterial used for access. Reviewed activity restrictions  ECHO - Interpretation Summary  No significant valvular abnormalities were noted. Global and regional left  ventricular function is normal with an EF of 60-65%.  Right ventricular function, chamber size, wall motion, and thickness are  normal.  No significant valvular abnormalities were noted.    B-(background): 2/14, Admission EKG with 1mm ST-depressions and TWI in II, III, V5-V6. Typical chest pain w/ radiation into left arm, exertional dyspnea, relieved with NTG. Trop in PCP clinic 0.277, downtrended to 0.211 in ED. Chest pain fully extinguished by NTG gtt. Patient started on IV heparin and ntg. Echocardiogram with normal LVEF, no WMA. Patient taken to the cath lab on HOD 1, demonstrating normal coronary arteries without appreciable CAD. Patient afterwards did endorse having a viral illness about 2 weeks prior to admission. As such, symptoms and troponin felt secondary to myocarditis. Will have him follow up in clinic in about 4-6 weeks. He is instructed to avoid strenuous activity, avoid NSAIDs, and alcohol until he is seen in clinic. cMRI not ordered at this time, but can consider if ongoing symptoms.    A-(assessment): doing well post angiography    R-(recommendations): follow up with cardiology in 4 - 6 weeks

## 2020-02-17 NOTE — TELEPHONE ENCOUNTER
Reason for call:  Other   Patient called regarding (reason for call): call back  Additional comments:  The patient was admitted to [facility] and had a angiogram completed. There would not be a reason to have the stress test today. Please cancel.     Phone number to reach patient:  Home number on file [phone] (home)    Best Time:   Any     Can we leave a detailed message on this number?  YES    Travel screening: Not Applicable

## 2020-02-19 NOTE — TELEPHONE ENCOUNTER
RECORDS RECEIVED FROM: Internal   DATE RECEIVED: 3-23-20   NOTES STATUS DETAILS   OFFICE NOTE from referring provider    Internal Hospital f/u 2-14-20   OFFICE NOTE from other cardiologist    N/A    DISCHARGE SUMMARY from hospital    Internal 2-14-20   DISCHARGE REPORT from the ER   N/A    OPERATIVE REPORT    Internal Cath 2-15-20   MEDICATION LIST   Internal    LABS     BMP   Internal 2-15-20   CBC   Internal 2-14-20   CMP   Internal 2-14-20   Lipids   Internal 2-14-20   TSH   Internal 2-14-20   DIAGNOSTIC PROCEDURES     EKG   Internal 2-14-20   Monitor Reports   N/A    IMAGING (DISC & REPORT)      Echo   Internal 2-15-20   Stress Tests   N/A    Cath   Internal 2-15-20   MRI/MRA   N/A    CT/CTA   N/A

## 2020-02-24 ENCOUNTER — DOCUMENTATION ONLY (OUTPATIENT)
Dept: CARE COORDINATION | Facility: CLINIC | Age: 50
End: 2020-02-24

## 2020-02-25 ENCOUNTER — OFFICE VISIT (OUTPATIENT)
Dept: ORTHOPEDICS | Facility: CLINIC | Age: 50
End: 2020-02-25
Payer: COMMERCIAL

## 2020-02-25 ENCOUNTER — ANCILLARY PROCEDURE (OUTPATIENT)
Dept: GENERAL RADIOLOGY | Facility: CLINIC | Age: 50
End: 2020-02-25
Attending: PEDIATRICS
Payer: COMMERCIAL

## 2020-02-25 VITALS
WEIGHT: 226 LBS | BODY MASS INDEX: 31.64 KG/M2 | DIASTOLIC BLOOD PRESSURE: 74 MMHG | SYSTOLIC BLOOD PRESSURE: 122 MMHG | HEIGHT: 71 IN

## 2020-02-25 DIAGNOSIS — M65.312 TRIGGER THUMB OF LEFT HAND: ICD-10-CM

## 2020-02-25 DIAGNOSIS — M79.645 PAIN OF LEFT THUMB: ICD-10-CM

## 2020-02-25 DIAGNOSIS — M79.645 PAIN OF LEFT THUMB: Primary | ICD-10-CM

## 2020-02-25 PROCEDURE — 99243 OFF/OP CNSLTJ NEW/EST LOW 30: CPT | Mod: 25 | Performed by: PEDIATRICS

## 2020-02-25 PROCEDURE — 20550 NJX 1 TENDON SHEATH/LIGAMENT: CPT | Mod: LT | Performed by: PEDIATRICS

## 2020-02-25 PROCEDURE — 73140 X-RAY EXAM OF FINGER(S): CPT | Mod: LT

## 2020-02-25 RX ORDER — LIDOCAINE HYDROCHLORIDE 10 MG/ML
0.5 INJECTION, SOLUTION INFILTRATION; PERINEURAL
Status: DISCONTINUED | OUTPATIENT
Start: 2020-02-25 | End: 2022-09-12

## 2020-02-25 RX ORDER — TRIAMCINOLONE ACETONIDE 40 MG/ML
20 INJECTION, SUSPENSION INTRA-ARTICULAR; INTRAMUSCULAR
Status: DISCONTINUED | OUTPATIENT
Start: 2020-02-25 | End: 2022-09-12

## 2020-02-25 RX ADMIN — TRIAMCINOLONE ACETONIDE 20 MG: 40 INJECTION, SUSPENSION INTRA-ARTICULAR; INTRAMUSCULAR at 10:30

## 2020-02-25 RX ADMIN — LIDOCAINE HYDROCHLORIDE 0.5 ML: 10 INJECTION, SOLUTION INFILTRATION; PERINEURAL at 10:30

## 2020-02-25 ASSESSMENT — MIFFLIN-ST. JEOR: SCORE: 1907.26

## 2020-02-25 NOTE — PATIENT INSTRUCTIONS
Injection Discharge Instructions      You may shower, however avoid swimming, tub baths or hot tubs for 24 hours following your procedure    You may have a mild to moderate increase in pain for several days following the injection.    It may take up to 14 days for the steroid medication to start working although you may feel the effect as early as a few days after the procedure.    You may use ice packs for 10-15 minutes, 3 to 4 times a day at the injection site for comfort    You may use anti-inflammatory medications (such as Ibuprofen or Aleve or Advil) if you are able to take safely, or acetaminophen (Tylenol) for pain control if necessary    If you were fasting, you may resume your normal diet and medications after the procedure    If you have diabetes, check your blood sugar more frequently than usual as your blood sugar may be higher than normal for 10-14 days following a steroid injection. Contact your doctor who manages your diabetes if your blood sugar is higher than usual      If you experience any of the following, call the clinic @ [phone]  - Fever over 100 degree F  - Swelling, bleeding, redness, drainage, warmth at the injection site  - New or worsening pain

## 2020-02-25 NOTE — PROGRESS NOTES
Sports Medicine Clinic Visit    PCP: Wilfredo Dela Cruz    Andrew Atkinson is a 49 year old male who is seen  in consultation at the request of  Wilfredo Valencia M.D. presenting with left thumb pain.  Did have triggering last week, but the triggering has lessened, but he continues to have pain.  Pain along the entire thumb.    He is left hand dominant.      Injury: gradual onset   **      Location of Pain: left thumb   Duration of Pain: 6 month(s)  Rating of Pain at worst: 10/10  Rating of Pain Currently: 5/10  Symptoms are better with: Rest  Symptoms are worse with: use, flexion   Additional Features:   Positive: swelling, popping, catching and locking   Negative: bruising, instability, paresthesias, numbness, weakness, pain in other joints and systemic symptoms  Other evaluation and/or treatments so far consists of:   Prior History of related problems: denies     Social History: construction/water proofing.      Review of Systems  Musculoskeletal: as above  Remainder of review of systems is negative including constitutional, CV, pulmonary, GI, Skin and Neurologic except as noted in HPI or medical history.    Past Medical History:   Diagnosis Date     Medial meniscus tear      Past Surgical History:   Procedure Laterality Date     ARTHROSCOPY KNEE WITH MEDIAL MENISCECTOMY Left 5/12/2017    Procedure: ARTHROSCOPY KNEE WITH MEDIAL MENISCECTOMY;  Arthroscopic partial medial menisectomy,left knee;  Surgeon: Grant Muñoz MD;  Location:  OR     CV CORONARY ANGIOGRAM N/A 2/15/2020    Procedure: Coronary Angiogram;  Surgeon: Rinku Boyce MD;  Location:  [facility]     Family History   Problem Relation Age of Onset     Unknown/Adopted No family hx of      Social History     Socioeconomic History     Marital status: Single     Spouse name: Not on file     Number of children: Not on file     Years of education: Not on file     Highest education level: Not on  "file   Occupational History     Not on file   Social Needs     Financial resource strain: Not on file     Food insecurity:     Worry: Not on file     Inability: Not on file     Transportation needs:     Medical: Not on file     Non-medical: Not on file   Tobacco Use     Smoking status: Never Smoker     Smokeless tobacco: Never Used   Substance and Sexual Activity     Alcohol use: Yes     Comment: Occ      Drug use: No     Sexual activity: Yes     Partners: Female   Lifestyle     Physical activity:     Days per week: Not on file     Minutes per session: Not on file     Stress: Not on file   Relationships     Social connections:     Talks on phone: Not on file     Gets together: Not on file     Attends Confucianist service: Not on file     Active member of club or organization: Not on file     Attends meetings of clubs or organizations: Not on file     Relationship status: Not on file     Intimate partner violence:     Fear of current or ex partner: Not on file     Emotionally abused: Not on file     Physically abused: Not on file     Forced sexual activity: Not on file   Other Topics Concern     Parent/sibling w/ CABG, MI or angioplasty before 65F 55M? Not Asked   Social History Narrative     Not on file       Objective  /74   Ht 1.803 m (5' 11\")   Wt 102.5 kg (226 lb)   BMI 31.52 kg/m      GENERAL APPEARANCE: healthy, alert and no distress   GAIT: NORMAL  SKIN: no suspicious lesions or rashes  NEURO: Normal strength and tone, mentation intact and speech normal  PSYCH:  mentation appears normal and affect normal/bright  HEENT: no scleral icterus  CV: distal perfusion intact  RESP: nonlabored breathing    Exam:    Hand/wrist (left):    Inspection:  No deformity noted.  No swelling. No ecchymosis.    Motion:  Forearm, wrist motion without symptoms  Digit motion mild limitation thumb flexion with stiffness, some pain  No clear triggering noted at thumb    Strength:  Able to resist fully at " thumb    Sensation:  Grossly intact light touch .    Radial pulses normal, +2/4, capillary refill brisk.    Palpation:  Tender palmar base of thumb, into thenar eminence; over area of A1 pulley  Mild at level of thumb MCP joint dorsally  Nontender remainder      Skin:       well perfused       capillary refill brisk        Radiology:  Visualized radiographs of left thumb obtained today, and reviewed the images with the patient.  Impression: mild first CMC arthrosis. No acute abnormality.    XR Finger LT G/E 2 vw    Narrative    LEFT FINGER TWO OR MORE VIEWS  2/25/2020 9:53 AM     HISTORY: Pain of left thumb.    COMPARISON: None.      Impression    IMPRESSION: No acute bony or soft tissue abnormality. Minimal  osteoarthritis at the first carpometacarpal joint.           Assessment:  1. Pain of left thumb    2. Trigger thumb of left hand         Plan:  Discussed the assessment with the patient.  Discussed with the patient the possible cause(s) of this condition, including direct trauma and repetitive trauma or activity such as gripping/grasping, also use of machinery/equipment.  Treatment includes avoiding the offending activity.  Consider use of padded gloves for gripping/grasping activity.  Discussed benefits of splinting finger to prevent triggering and overuse.  We discussed splinting the finger at night to start.  Also discussed potential benefit of steroid injection to relieve pain and symptoms.  Oval 8 splint provided.  We discussed potential injection for the pain; he was interested, injection done today.  Steroid injection of the left trigger thumb A1 pulley was performed today in clinic  Icing for the next 1-2 days may be helpful for pain. Injection may take 10-14 days to see the full effect.  Follow up: will leave open ended.  Future considerations may include therapy, and discussed potential for repeat injection in the future, vs referral to orthopedic surgeon.  Questions answered. The patient indicates  understanding of these issues and agrees with the plan.    Robert Briggs DO, CAQ    CC: Wilfredo Dela Cruz      Hand / Upper Extremity Injection/Arthrocentesis: L thumb A1  Date/Time: 2/25/2020 10:30 AM  Performed by: Robert Briggs DO  Authorized by: Robert Briggs DO     Indications:  Tendon swelling and pain  Needle Size:  25 G  Guidance: landmark    Approach:  Volar  Condition: trigger finger    Location:  Thumb    Site:  L thumb A1  Medications:  0.5 mL lidocaine 1 %; 20 mg triamcinolone 40 MG/ML  Outcome:  Tolerated well, no immediate complications  Procedure discussed: discussed risks, benefits, and alternatives    Timeout: timeout called immediately prior to procedure    Prep: patient was prepped and draped in usual sterile fashion     Half of volume injected            Patient Instructions       Injection Discharge Instructions      You may shower, however avoid swimming, tub baths or hot tubs for 24 hours following your procedure    You may have a mild to moderate increase in pain for several days following the injection.    It may take up to 14 days for the steroid medication to start working although you may feel the effect as early as a few days after the procedure.    You may use ice packs for 10-15 minutes, 3 to 4 times a day at the injection site for comfort    You may use anti-inflammatory medications (such as Ibuprofen or Aleve or Advil) if you are able to take safely, or acetaminophen (Tylenol) for pain control if necessary    If you were fasting, you may resume your normal diet and medications after the procedure    If you have diabetes, check your blood sugar more frequently than usual as your blood sugar may be higher than normal for 10-14 days following a steroid injection. Contact your doctor who manages your diabetes if your blood sugar is higher than usual      If you experience any of the following, call the clinic @ [phone]  - Fever over 100 degree  F  - Swelling, bleeding, redness, drainage, warmth at the injection site  - New or worsening pain            Disclaimer: This note consists of symbols derived from keyboarding, dictation and/or voice recognition software. As a result, there may be errors in the script that have gone undetected. Please consider this when interpreting information found in this chart.

## 2020-02-25 NOTE — LETTER
2/25/2020         RE: Andrew Atkinson  [address] [facility] [address]        Dear Colleague,    Thank you for referring your patient, Andrew Atkinson, to the [facility]. Please see a copy of my visit note below.    Sports Medicine Clinic Visit    PCP: Wilfredo Dela Cruz    Andrew Atkinson is a 49 year old male who is seen  in consultation at the request of  Wilfredo Valencia M.D. presenting with left thumb pain.  Did have triggering last week, but the triggering has lessened, but he continues to have pain.  Pain along the entire thumb.    He is left hand dominant.      Injury: gradual onset   **      Location of Pain: left thumb   Duration of Pain: 6 month(s)  Rating of Pain at worst: 10/10  Rating of Pain Currently: 5/10  Symptoms are better with: Rest  Symptoms are worse with: use, flexion   Additional Features:   Positive: swelling, popping, catching and locking   Negative: bruising, instability, paresthesias, numbness, weakness, pain in other joints and systemic symptoms  Other evaluation and/or treatments so far consists of:   Prior History of related problems: denies     Social History: construction/water proofing.      Review of Systems  Musculoskeletal: as above  Remainder of review of systems is negative including constitutional, CV, pulmonary, GI, Skin and Neurologic except as noted in HPI or medical history.    Past Medical History:   Diagnosis Date     Medial meniscus tear      Past Surgical History:   Procedure Laterality Date     ARTHROSCOPY KNEE WITH MEDIAL MENISCECTOMY Left 5/12/2017    Procedure: ARTHROSCOPY KNEE WITH MEDIAL MENISCECTOMY;  Arthroscopic partial medial menisectomy,left knee;  Surgeon: Grant Muñoz MD;  Location: [facility]     CV CORONARY ANGIOGRAM N/A 2/15/2020    Procedure: Coronary Angiogram;  Surgeon: Rinku Boyce MD;  Location:  [facility]     Family History   Problem Relation Age  "of Onset     Unknown/Adopted No family hx of      Social History     Socioeconomic History     Marital status: Single     Spouse name: Not on file     Number of children: Not on file     Years of education: Not on file     Highest education level: Not on file   Occupational History     Not on file   Social Needs     Financial resource strain: Not on file     Food insecurity:     Worry: Not on file     Inability: Not on file     Transportation needs:     Medical: Not on file     Non-medical: Not on file   Tobacco Use     Smoking status: Never Smoker     Smokeless tobacco: Never Used   Substance and Sexual Activity     Alcohol use: Yes     Comment: Occ      Drug use: No     Sexual activity: Yes     Partners: Female   Lifestyle     Physical activity:     Days per week: Not on file     Minutes per session: Not on file     Stress: Not on file   Relationships     Social connections:     Talks on phone: Not on file     Gets together: Not on file     Attends Mosque service: Not on file     Active member of club or organization: Not on file     Attends meetings of clubs or organizations: Not on file     Relationship status: Not on file     Intimate partner violence:     Fear of current or ex partner: Not on file     Emotionally abused: Not on file     Physically abused: Not on file     Forced sexual activity: Not on file   Other Topics Concern     Parent/sibling w/ CABG, MI or angioplasty before 65F 55M? Not Asked   Social History Narrative     Not on file       Objective  /74   Ht 1.803 m (5' 11\")   Wt 102.5 kg (226 lb)   BMI 31.52 kg/m       GENERAL APPEARANCE: healthy, alert and no distress   GAIT: NORMAL  SKIN: no suspicious lesions or rashes  NEURO: Normal strength and tone, mentation intact and speech normal  PSYCH:  mentation appears normal and affect normal/bright  HEENT: no scleral icterus  CV: distal perfusion intact  RESP: nonlabored breathing    Exam:    Hand/wrist (left):    Inspection:  No deformity " noted.  No swelling. No ecchymosis.    Motion:  Forearm, wrist motion without symptoms  Digit motion mild limitation thumb flexion with stiffness, some pain  No clear triggering noted at thumb    Strength:  Able to resist fully at thumb    Sensation:  Grossly intact light touch .    Radial pulses normal, +2/4, capillary refill brisk.    Palpation:  Tender palmar base of thumb, into thenar eminence; over area of A1 pulley  Mild at level of thumb MCP joint dorsally  Nontender remainder      Skin:       well perfused       capillary refill brisk        Radiology:  Visualized radiographs of left thumb obtained today, and reviewed the images with the patient.  Impression: mild first CMC arthrosis. No acute abnormality.    XR Finger LT G/E 2 vw    Narrative    LEFT FINGER TWO OR MORE VIEWS  2/25/2020 9:53 AM     HISTORY: Pain of left thumb.    COMPARISON: None.      Impression    IMPRESSION: No acute bony or soft tissue abnormality. Minimal  osteoarthritis at the first carpometacarpal joint.           Assessment:  1. Pain of left thumb    2. Trigger thumb of left hand         Plan:  Discussed the assessment with the patient.  Discussed with the patient the possible cause(s) of this condition, including direct trauma and repetitive trauma or activity such as gripping/grasping, also use of machinery/equipment.  Treatment includes avoiding the offending activity.  Consider use of padded gloves for gripping/grasping activity.  Discussed benefits of splinting finger to prevent triggering and overuse.  We discussed splinting the finger at night to start.  Also discussed potential benefit of steroid injection to relieve pain and symptoms.  Oval 8 splint provided.  We discussed potential injection for the pain; he was interested, injection done today.  Steroid injection of the left trigger thumb A1 pulley was performed today in clinic  Icing for the next 1-2 days may be helpful for pain. Injection may take 10-14 days to see the  full effect.  Follow up: will leave open ended.  Future considerations may include therapy, and discussed potential for repeat injection in the future, vs referral to orthopedic surgeon.  Questions answered. The patient indicates understanding of these issues and agrees with the plan.    Robert Briggs DO, NICOLAS    CC: Wilfredo Dela Cruz      Hand / Upper Extremity Injection/Arthrocentesis: L thumb A1  Date/Time: 2/25/2020 10:30 AM  Performed by: Robert Briggs DO  Authorized by: Robert Briggs DO     Indications:  Tendon swelling and pain  Needle Size:  25 G  Guidance: landmark    Approach:  Volar  Condition: trigger finger    Location:  Thumb    Site:  L thumb A1  Medications:  0.5 mL lidocaine 1 %; 20 mg triamcinolone 40 MG/ML  Outcome:  Tolerated well, no immediate complications  Procedure discussed: discussed risks, benefits, and alternatives    Timeout: timeout called immediately prior to procedure    Prep: patient was prepped and draped in usual sterile fashion     Half of volume injected            Patient Instructions       Injection Discharge Instructions      You may shower, however avoid swimming, tub baths or hot tubs for 24 hours following your procedure    You may have a mild to moderate increase in pain for several days following the injection.    It may take up to 14 days for the steroid medication to start working although you may feel the effect as early as a few days after the procedure.    You may use ice packs for 10-15 minutes, 3 to 4 times a day at the injection site for comfort    You may use anti-inflammatory medications (such as Ibuprofen or Aleve or Advil) if you are able to take safely, or acetaminophen (Tylenol) for pain control if necessary    If you were fasting, you may resume your normal diet and medications after the procedure    If you have diabetes, check your blood sugar more frequently than usual as your blood sugar may be higher than normal for  10-14 days following a steroid injection. Contact your doctor who manages your diabetes if your blood sugar is higher than usual      If you experience any of the following, call the clinic @ [phone]  - Fever over 100 degree F  - Swelling, bleeding, redness, drainage, warmth at the injection site  - New or worsening pain            Disclaimer: This note consists of symbols derived from keyboarding, dictation and/or voice recognition software. As a result, there may be errors in the script that have gone undetected. Please consider this when interpreting information found in this chart.          Again, thank you for allowing me to participate in the care of your patient.        Sincerely,        Robert Briggs, DO

## 2020-03-10 ENCOUNTER — OFFICE VISIT (OUTPATIENT)
Dept: FAMILY MEDICINE | Facility: CLINIC | Age: 50
End: 2020-03-10
Payer: COMMERCIAL

## 2020-03-10 VITALS
WEIGHT: 234.4 LBS | BODY MASS INDEX: 32.81 KG/M2 | RESPIRATION RATE: 18 BRPM | SYSTOLIC BLOOD PRESSURE: 130 MMHG | HEIGHT: 71 IN | DIASTOLIC BLOOD PRESSURE: 82 MMHG | OXYGEN SATURATION: 98 % | HEART RATE: 87 BPM

## 2020-03-10 DIAGNOSIS — I40.0 ACUTE VIRAL MYOCARDITIS: Primary | ICD-10-CM

## 2020-03-10 DIAGNOSIS — E03.9 HYPOTHYROIDISM, UNSPECIFIED TYPE: ICD-10-CM

## 2020-03-10 DIAGNOSIS — R73.9 HYPERGLYCEMIA: ICD-10-CM

## 2020-03-10 PROBLEM — B33.22 ACUTE VIRAL MYOCARDITIS: Status: ACTIVE | Noted: 2020-02-14

## 2020-03-10 LAB
HBA1C MFR BLD: 5.9 % (ref 0–5.6)
TSH SERPL DL<=0.005 MIU/L-ACNC: 2.91 MU/L (ref 0.4–4)

## 2020-03-10 PROCEDURE — 83036 HEMOGLOBIN GLYCOSYLATED A1C: CPT | Performed by: FAMILY MEDICINE

## 2020-03-10 PROCEDURE — 36415 COLL VENOUS BLD VENIPUNCTURE: CPT | Performed by: FAMILY MEDICINE

## 2020-03-10 PROCEDURE — 84443 ASSAY THYROID STIM HORMONE: CPT | Performed by: FAMILY MEDICINE

## 2020-03-10 PROCEDURE — 99214 OFFICE O/P EST MOD 30 MIN: CPT | Performed by: FAMILY MEDICINE

## 2020-03-10 ASSESSMENT — MIFFLIN-ST. JEOR: SCORE: 1945.36

## 2020-03-10 NOTE — PROGRESS NOTES
Subjective     Andrew Atkinson is a 49 year old male who presents to clinic today for the following health issues:    HPI       Hospital Follow-up Visit:    Hospital/Nursing Home/IP Rehab Facility: [facility]  Date of Admission: 2/14/2020  Date of Discharge: 2/16/2020  Reason(s) for Admission: Sore chest, Troponin level            Problems taking medications regularly:  None       Medication changes since discharge: aspirin 81mg, Levothyroxine, omeprazole, pravastatin       Problems adhering to non-medication therapy:  None    Summary of hospitalization:  [facility] discharge summary reviewed  Diagnostic Tests/Treatments reviewed.  Follow up needed: none  Other Healthcare Providers Involved in Patient s Care:         None  Update since discharge: improved.     Post Discharge Medication Reconciliation: discharge medications reconciled, continue medications without change.  Plan of care communicated with patient     Coding guidelines for this visit:  Type of Medical   Decision Making Face-to-Face Visit       within 7 Days of discharge Face-to-Face Visit        within 14 days of discharge   Moderate Complexity 15972 [id number]   High Complexity [id number]        Andrew presents for hospital follow-up visit.    Per chart review, discharge diagnoses include:  Acute viral myocarditis  Medication changes at discharge include:  ASA/statin  Status since discharge:  Improved, slightly tired  Hospital findings that need to be monitored today:  none      Myocarditis  Cardiac cath was normal  troponin decreased during hospital visit  Taking asa and statin        BP Readings from Last 3 Encounters:   03/10/20 130/82   02/25/20 122/74   02/16/20 120/74    Wt Readings from Last 3 Encounters:   03/10/20 106.3 kg (234 lb 6.4 oz)   02/25/20 102.5 kg (226 lb)   02/16/20 102.8 kg (226 lb 11.2 oz)                      Reviewed and updated as needed this visit by Provider  Tobacco  Allergies  Meds  Problems   "Med Hx  Surg Hx  Fam Hx         Review of Systems   ROS COMP: Constitutional, HEENT, cardiovascular, pulmonary, gi and gu systems are negative, except as otherwise noted.      Objective    /82   Pulse 87   Resp 18   Ht 1.803 m (5' 11\")   Wt 106.3 kg (234 lb 6.4 oz)   SpO2 98%   BMI 32.69 kg/m    Body mass index is 32.69 kg/m .  Physical Exam   GENERAL: healthy, alert and no distress  EYES: Eyes grossly normal to inspection, PERRL and conjunctivae and sclerae normal  NECK: no adenopathy, no asymmetry, masses, or scars and thyroid normal to palpation  RESP: lungs clear to auscultation - no rales, rhonchi or wheezes  CV: regular rate and rhythm, normal S1 S2, no S3 or S4, no murmur, click or rub, no peripheral edema and peripheral pulses strong  SKIN: no suspicious lesions or rashes  PSYCH: mentation appears normal, affect normal/bright          Assessment & Plan       ICD-10-CM    1. Acute viral myocarditis  I40.0    2. Hypothyroidism, unspecified type  E03.9 TSH with free T4 reflex   3. Hyperglycemia  R73.9 Hemoglobin A1c   follow-up with cardiology in the next 2 weeks  Check thyroid function  Check A1C          Follow up if symptoms worsen or fail to improve.   Wilfredo Hemphill MD  [facility]      "

## 2020-03-10 NOTE — LETTER
March 12, 2020      Andrew BARTLETT Atkinson  [address]  [place] [address]          Dear ,    We are writing to inform you of your test results.  Your results are overall not concerning.       Resulted Orders   TSH with free T4 reflex   Result Value Ref Range    TSH 2.91 0.40 - 4.00 mU/L   Hemoglobin A1c   Result Value Ref Range    Hemoglobin A1C 5.9 (H) 0 - 5.6 %      Comment:      Normal <5.7% Prediabetes 5.7-6.4%  Diabetes 6.5% or higher - adopted from ADA   consensus guidelines.         If you have any questions or concerns, please call the clinic at the number listed above.       Sincerely,        Wilfredo Hemphill MD

## 2020-03-10 NOTE — PATIENT INSTRUCTIONS
Healthy Lifestyle   Nutrition and healthy eating: The Mediterranean Diet  Ready to switch to a more heart-healthy diet? Here's how to get started with the Mediterranean diet.  By [facility] Staff   If you're looking for a heart-healthy eating plan, the Mediterranean diet might be right for you.  The Mediterranean diet blends the basics of healthy eating with the traditional flavors and cooking methods of the Mediterranean.  Interest in the Mediterranean diet began in the 1960s with the observation that coronary heart disease caused fewer deaths in Mediterranean countries, such as [place] and [place], than in the [place] and northern [place]. Subsequent studies found that the Mediterranean diet is associated with reduced risk factors for cardiovascular disease.  The Mediterranean diet is one of the healthy eating plans recommended by the Dietary Guidelines for Americans to promote health and prevent chronic disease.  It is also recognized by the World Health Organization as a healthy and sustainable dietary pattern and as an intangible cultural asset by the United National Educational, Scientific and Cultural Organization.  The Mediterranean diet is a way of eating based on the traditional cuisine of countries bordering the [place] Sea. While there is no single definition of the Mediterranean diet, it is typically high in vegetables, fruits, whole grains, beans, nut and seeds, and olive oil.  The main components of Mediterranean diet include:    Daily consumption of vegetables, fruits, whole grains and healthy fats     Weekly intake of fish, poultry, beans and eggs     Moderate portions of dairy products     Limited intake of red meat  Other important elements of the Mediterranean diet are sharing meals with family and friends, enjoying a glass of red wine and being physically active.  The foundation of the Mediterranean diet is vegetables, fruits, herbs, nuts, beans and whole grains. Meals are built around these  "plant-based foods. Moderate amounts of dairy, poultry and eggs are also central to the Mediterranean Diet, as is seafood. In contrast, red meat is eaten only occasionally.  Healthy fats are a mainstay of the Mediterranean diet. They're eaten instead of less healthy fats, such as saturated and trans fats, which contribute to heart disease.  Olive oil is the primary source of added fat in the Mediterranean diet. Olive oil provides monounsaturated fat, which has been found to lower total cholesterol and low-density lipoprotein (LDL or \"bad\") cholesterol levels. Nuts and seeds also contain monounsaturated fat.  Fish are also important in the Mediterranean diet. Fatty fish -- such as mackerel, herring, sardines, albacore tuna, salmon and lake trout -- are rich in omega-3 fatty acids, a type of polyunsaturated fat that may reduce inflammation in the body. Omega-3 fatty acids also help decrease triglycerides, reduce blood clotting, and decrease the risk of stroke and heart failure.  The Mediterranean diet typically allows red wine in moderation. Although alcohol has been associated with a reduced risk of heart disease in some studies, it's by no means risk free. The Dietary Guidelines for Americans caution against beginning to drink or drinking more often on the basis of potential health benefits.  Interested in trying the Mediterranean diet? These tips will help you get started:    Eat more fruits and vegetables. Aim for 7 to 10 servings a day of fruit and vegetables.     Opt for whole grains. Switch to whole-grain bread, cereal and pasta. Carmichael with other whole grains, such as bulgur and farro.  Flax, Mk seed    Use healthy fats. Try olive oil as a replacement for butter when cooking. Instead of putting butter or margarine on bread, try dipping it in flavored olive oil.     Eat more seafood. Eat fish twice a week. Fresh or water-packed tuna, salmon, trout, mackerel and herring are healthy choices. Grilled fish " tastes good and requires little cleanup. Avoid deep-fried fish.     Reduce red meat. Substitute fish, poultry or beans for meat. If you eat meat, make sure it's lean and keep portions small.     Enjoy some dairy. Eat low-fat Greek or plain yogurt and small amounts of a variety of cheeses.     Spice it up. Herbs and spices boost flavor and lessen the need for salt.  The Mediterranean diet is a delicious and healthy way to eat. Many people who switch to this style of eating say they'll never eat any other way.

## 2020-03-18 ENCOUNTER — TRANSFERRED RECORDS (OUTPATIENT)
Dept: HEALTH INFORMATION MANAGEMENT | Facility: CLINIC | Age: 50
End: 2020-03-18

## 2020-03-18 LAB — COLOGUARD-ABSTRACT: NEGATIVE

## 2020-03-20 ENCOUNTER — TELEPHONE (OUTPATIENT)
Dept: FAMILY MEDICINE | Facility: CLINIC | Age: 50
End: 2020-03-20

## 2020-03-20 NOTE — TELEPHONE ENCOUNTER
Panel Management Review      Patient has the following on his problem list: None      Composite cancer screening  Chart review shows that this patient is due/due soon for the following None  Summary:    Patient is due/failing the following:   COLONOSCOPY and PHYSICAL    Action needed:   Patient needs office visit for Physical and needs colonoscopy.    Type of outreach:    none, deferring physicals to July    Questions for provider review:    None                                                                                                                                    Oksana Barajas MA       Chart routed to none .

## 2020-03-23 ENCOUNTER — TELEPHONE (OUTPATIENT)
Dept: FAMILY MEDICINE | Facility: CLINIC | Age: 50
End: 2020-03-23

## 2020-03-23 ENCOUNTER — PRE VISIT (OUTPATIENT)
Dept: CARDIOLOGY | Facility: CLINIC | Age: 50
End: 2020-03-23

## 2020-03-23 NOTE — TELEPHONE ENCOUNTER
Cologuard results have been received.     The results are: Negative    Result Date: 03/19/2020    Results have been placed in provider basket- provider to review and sign off on results. Please send back to team with OK to send result letter and any additional follow-up needed.      will  Sed a copy of results to scanning.

## 2020-04-27 NOTE — PROGRESS NOTES
"Andrew Atkinson is a 49 year old male who is being evaluated via a billable telephone visit.      The patient has been notified of following:     \"This telephone visit will be conducted via a call between you and your physician/provider. We have found that certain health care needs can be provided without the need for a physical exam.  This service lets us provide the care you need with a short phone conversation.  If a prescription is necessary we can send it directly to your pharmacy.  If lab work is needed we can place an order for that and you can then stop by our lab to have the test done at a later time.    Telephone visits are billed at different rates depending on your insurance coverage. During this emergency period, for some insurers they may be billed the same as an in-person visit.  Please reach out to your insurance provider with any questions.    If during the course of the call the physician/provider feels a telephone visit is not appropriate, you will not be charged for this service.\"    Patient has given verbal consent for Telephone visit?  Yes    How would you like to obtain your AVS? Mail a copy    Subjective     Andrew Atkinson is a 49 year old male who presents to clinic today for the following health issues:    HPI  Hemorrhoids       Duration: Ongoing    Description:   Pain: YES  Itching: YES    Accompanying signs and symptoms:   Blood in stool: YES  Changes in stool pattern: no     History (similar episodes/previous evaluation): Has happened in the past    Precipitating or alleviating factors: None    Therapies tried and outcome: increased fiber in diet and increased fluids    Patient presents to discuss hemorrhoids   Has had frequent bleeding with bowel movements  No significant pain however does have frequent anal irritation  Has tried topical treatments  Has not done sitz baths  Diet low in fiber, high in animal protein, stool is often large/infrequent  Sits on toilet for extended periods " of time    BP Readings from Last 3 Encounters:   03/10/20 130/82   02/25/20 122/74   02/16/20 120/74    Wt Readings from Last 3 Encounters:   03/10/20 106.3 kg (234 lb 6.4 oz)   02/25/20 102.5 kg (226 lb)   02/16/20 102.8 kg (226 lb 11.2 oz)        Reviewed and updated as needed this visit by Provider  Tobacco  Allergies  Meds  Problems  Med Hx  Surg Hx  Fam Hx         Review of Systems   ROS COMP: Constitutional, HEENT, cardiovascular, pulmonary, gi and gu systems are negative, except as otherwise noted.       Objective   Reported vitals:  There were no vitals taken for this visit.   healthy, alert and no distress  PSYCH: Alert and oriented times 3; coherent speech, normal   rate and volume, able to articulate logical thoughts, able   to abstract reason, no tangential thoughts, no hallucinations   or delusions  His affect is normal  RESP: No cough, no audible wheezing, able to talk in full sentences  Remainder of exam unable to be completed due to telephone visits    Diagnostic Test Results:  Labs reviewed in Epic        Assessment/Plan:    ICD-10-CM    1. Grade III hemorrhoids  K64.2 Phenylephrine-Mineral Oil-Pet 0.25-14-71.9 % OINT     Lidocaine-Hydrocortisone Ace 3-2.5 % KIT   Discussed necessary diet changes, increase natural fiber intake, decrease intake of animal protein, soften stool  Sitz baths frequently to promote healing  Decreased need for forceful pushing with BM's  Decrease length of time on toilet  Will send in topical prescriptions    Follow-up in 3 months    Phone call duration:  21 minutes    Wilfredo Hemphill MD

## 2020-04-28 ENCOUNTER — VIRTUAL VISIT (OUTPATIENT)
Dept: FAMILY MEDICINE | Facility: CLINIC | Age: 50
End: 2020-04-28
Payer: COMMERCIAL

## 2020-04-28 DIAGNOSIS — K64.2 GRADE III HEMORRHOIDS: Primary | ICD-10-CM

## 2020-04-28 PROCEDURE — 99213 OFFICE O/P EST LOW 20 MIN: CPT | Mod: 95 | Performed by: FAMILY MEDICINE

## 2020-04-29 ENCOUNTER — VIRTUAL VISIT (OUTPATIENT)
Dept: CARDIOLOGY | Facility: CLINIC | Age: 50
End: 2020-04-29
Attending: PHYSICIAN ASSISTANT
Payer: COMMERCIAL

## 2020-04-29 DIAGNOSIS — I40.0 ACUTE VIRAL MYOCARDITIS: Primary | ICD-10-CM

## 2020-04-29 PROCEDURE — 99213 OFFICE O/P EST LOW 20 MIN: CPT | Mod: 95 | Performed by: INTERNAL MEDICINE

## 2020-04-29 RX ORDER — LIDOCAINE HYDROCHLORIDE AND HYDROCORTISONE ACETATE 30; 25 MG/G; MG/G
1 GEL RECTAL 2 TIMES DAILY PRN
Qty: 1 KIT | Refills: 11 | Status: SHIPPED | OUTPATIENT
Start: 2020-04-29 | End: 2020-11-30

## 2020-04-29 ASSESSMENT — PAIN SCALES - GENERAL: PAINLEVEL: NO PAIN (0)

## 2020-04-29 NOTE — PROGRESS NOTES
"Andrew Atkinson is a 49 year old male who is being evaluated via a billable telephone visit.      The patient has been notified of following:     \"This telephone visit will be conducted via a call between you and your physician/provider. We have found that certain health care needs can be provided without the need for a physical exam.  This service lets us provide the care you need with a short phone conversation.  If a prescription is necessary we can send it directly to your pharmacy.  If lab work is needed we can place an order for that and you can then stop by our lab to have the test done at a later time.    Telephone visits are billed at different rates depending on your insurance coverage. During this emergency period, for some insurers they may be billed the same as an in-person visit.  Please reach out to your insurance provider with any questions.    If during the course of the call the physician/provider feels a telephone visit is not appropriate, you will not be charged for this service.\"    Patient has given verbal consent for Telephone visit?  Yes    How would you like to obtain your AVS? Mail a copy     Medications were reconciled.     Sunita Cavanaugh CMA    7:11 AM    Mr. Atkinson is a 49 year old gentleman with a history of hypothyroidism, pre-diabetes, and hypertriglyceridemia who presented in February to the ED with concern for NSTEMI, an elevated troponin and chest pain. He subsequently underwent an echocardiogram which was normal and then an angiogram which showed for the most part normal coronary arteries. He did at that time recall a viral URI 1 -2 weeks prior to the onset of his symptoms, and it was presumed that he developed myocarditis.     He was started on aspirin and statin at that time and it was recommended that he abstain from intense exercise for 3-6 months, NSAIDs, and alcohol consumption.    Today, he reports that he has been doing quite well, he has returned to his normal daily " activities, and has not had any symptoms. He denies any chest pain shortness of breath, orthopnea, PND, lightheadedness, palpitations, or syncope.    Past Medical, social, family histories, medications, and allergies reviewed and updated    ROS: 10 point ROS neg other than the symptoms noted above in the HPI.    TTE 2/15/20     Interpretation Summary  No significant valvular abnormalities were noted. Global and regional left  ventricular function is normal with an EF of 60-65%.  Right ventricular function, chamber size, wall motion, and thickness are  normal.  No significant valvular abnormalities were noted.     Previous study not available for comparison.    Coronary angiogram 2/15/20  Conclusion     Myocarditis post viral infection.   Normal coronaries.       Plan       Follow bedrest per protocol    Continued medical management and lifestyle modifications for cardiovascular risk factor optimizations.    The patient will be observed in telemetry overnight and if stable, the patient will be allowed to return home tomorrow.      Assessment/Plan:    1. Myocarditis - mild, no cardiomyopathy, heart failure or arrhythmic signs or symptoms  -- continue exercise restriction for 3-6 months    2. Hypertriglyceridemia   -- continue aspirin/statin for secondary prevention    Follow up with PCP in the future unless new symptoms develop.    Cruz Larson MD    Duration of call 7 minutes.

## 2020-04-29 NOTE — PATIENT INSTRUCTIONS
Patient Instructions:  It was a pleasure to visit you in the cardiology clinic today.      If you have any questions, call  Denise Cosme RN, at [phone].  Press Option #1 for the [facility], and then press Option #4  We are encouraging the use of [employer] to communicate with your HealthCare Provider    Note the new medications: none  Stop the following medications: none    The results from today include: none  Please follow up with your primary care physician      If you have an urgent need after hours (8:00 am to 4:30 pm) please call [phone] and ask for the cardiology fellow on call.

## 2020-05-05 ENCOUNTER — TELEPHONE (OUTPATIENT)
Dept: FAMILY MEDICINE | Facility: CLINIC | Age: 50
End: 2020-05-05

## 2020-05-05 DIAGNOSIS — I40.0 ACUTE VIRAL MYOCARDITIS: ICD-10-CM

## 2020-05-05 DIAGNOSIS — Z20.822 SUSPECTED COVID-19 VIRUS INFECTION: Primary | ICD-10-CM

## 2020-05-05 NOTE — TELEPHONE ENCOUNTER
Wilfredo Hemphill MD  [facility] Rn Triage Pool               Please call patient to notify him that I have ordered a COVID-19 antibody test given his recent history of viral myocarditis.  He will be called by a  in the near future to set up this test however it would be nice if this call wasn't a surprise for him.     Wilfredo Hemphill MD     Patient notified of providers message as written. Patient verbalized understanding, no further questions or concerns.    Tracey Dobbs RN

## 2020-05-08 DIAGNOSIS — I40.0 ACUTE VIRAL MYOCARDITIS: ICD-10-CM

## 2020-05-08 DIAGNOSIS — Z20.822 SUSPECTED COVID-19 VIRUS INFECTION: ICD-10-CM

## 2020-05-08 PROCEDURE — 86769 SARS-COV-2 COVID-19 ANTIBODY: CPT | Mod: 90 | Performed by: FAMILY MEDICINE

## 2020-05-08 PROCEDURE — 99000 SPECIMEN HANDLING OFFICE-LAB: CPT | Performed by: FAMILY MEDICINE

## 2020-05-08 PROCEDURE — 36415 COLL VENOUS BLD VENIPUNCTURE: CPT | Performed by: FAMILY MEDICINE

## 2020-05-12 LAB
COVID-19 SPIKE RBD ABY TITER: NORMAL
COVID-19 SPIKE RBD ABY: NEGATIVE

## 2020-05-15 ENCOUNTER — TELEPHONE (OUTPATIENT)
Dept: FAMILY MEDICINE | Facility: CLINIC | Age: 50
End: 2020-05-15

## 2020-05-15 NOTE — TELEPHONE ENCOUNTER
Reason for call:  Results   Name of test or procedure: covid antibody test  Date of test or procedure:  5-8-2020  Location of test or procedure:  [facility]    Additional comments:  Na     Phone number to reach patient:  Home number on file [phone] (home)    Best Time:   Any     Can we leave a detailed message on this number?  YES    Travel screening: Not Applicable

## 2020-10-06 ENCOUNTER — VIRTUAL VISIT (OUTPATIENT)
Dept: FAMILY MEDICINE | Facility: CLINIC | Age: 50
End: 2020-10-06
Payer: COMMERCIAL

## 2020-10-06 DIAGNOSIS — K42.9 UMBILICAL HERNIA WITHOUT OBSTRUCTION AND WITHOUT GANGRENE: Primary | ICD-10-CM

## 2020-10-06 PROCEDURE — 99213 OFFICE O/P EST LOW 20 MIN: CPT | Mod: 95 | Performed by: FAMILY MEDICINE

## 2020-10-06 NOTE — PROGRESS NOTES
"Andrew Atkinson is a 49 year old male who is being evaluated via a billable telephone visit.      The patient has been notified of following:     \"This telephone visit will be conducted via a call between you and your physician/provider. We have found that certain health care needs can be provided without the need for a physical exam.  This service lets us provide the care you need with a short phone conversation.  If a prescription is necessary we can send it directly to your pharmacy.  If lab work is needed we can place an order for that and you can then stop by our lab to have the test done at a later time.    Telephone visits are billed at different rates depending on your insurance coverage. During this emergency period, for some insurers they may be billed the same as an in-person visit.  Please reach out to your insurance provider with any questions.    If during the course of the call the physician/provider feels a telephone visit is not appropriate, you will not be charged for this service.\"    Patient has given verbal consent for Telephone visit?  Yes    What phone number would you like to be contacted at? [phone]    How would you like to obtain your AVS? Mail a copy    Subjective     Andrew Atkinson is a 49 year old male who presents via phone visit today for the following health issues:    HPI     Chief Complaint   Patient presents with     Hernia     Was previously discussed 8/1/2019 Umbilical hernia  Has been getting more sensitive recently  Would like to get it taken care of    Umbilical hernia  Getting larger          Review of Systems   Constitutional, HEENT, cardiovascular, pulmonary, gi and gu systems are negative, except as otherwise noted.       Objective          Vitals:  No vitals were obtained today due to virtual visit.    healthy, alert and no distress  PSYCH: Alert and oriented times 3; coherent speech, normal   rate and volume, able to articulate logical thoughts, able   to abstract " reason, no tangential thoughts, no hallucinations   or delusions  His affect is normal  RESP: No cough, no audible wheezing, able to talk in full sentences  Remainder of exam unable to be completed due to telephone visits            Assessment/Plan:    Assessment & Plan       ICD-10-CM    1. Umbilical hernia without obstruction and without gangrene  K42.9 GENERAL SURG ADULT REFERRAL                       Return in about 2 weeks (around 10/20/2020) for With Specialist.    Wilfredo Hemphill MD  [facility]    Phone call duration:  12 minutes

## 2020-10-08 ENCOUNTER — OFFICE VISIT (OUTPATIENT)
Dept: SURGERY | Facility: CLINIC | Age: 50
End: 2020-10-08
Payer: COMMERCIAL

## 2020-10-08 VITALS
HEIGHT: 71 IN | SYSTOLIC BLOOD PRESSURE: 126 MMHG | HEART RATE: 83 BPM | BODY MASS INDEX: 32.34 KG/M2 | DIASTOLIC BLOOD PRESSURE: 88 MMHG | WEIGHT: 231 LBS

## 2020-10-08 DIAGNOSIS — K42.0 UMBILICAL HERNIA WITH OBSTRUCTION: Primary | ICD-10-CM

## 2020-10-08 PROCEDURE — 99203 OFFICE O/P NEW LOW 30 MIN: CPT | Performed by: SURGERY

## 2020-10-08 ASSESSMENT — MIFFLIN-ST. JEOR: SCORE: 1929.94

## 2020-10-08 NOTE — PROGRESS NOTES
"Pt watched the ECT after lunch.    Pt had EKG this afternoon.     Dr. Moffett has been contacted and will see patient.     Pt became very anxious after this and stated \"I don't want to do it\". \"What about my memory\". Pt visibly anxious. Pt was offered/accepted prn vistaril 25 mg. Pt was reassured that we could not make her have ECT with out a court order. Pt was encouraged to continue to think about possibly having treatments tomorrow if medicine clears her. Pt is aware that Dr. Moffett will come to the unit to see her either tonight or tomorrow morning.     Pt denies SI/SIB but feels that her situation is \"hopeless\". Pt admits that she was not doing well at home but feels that she would be able to be safe at home.   " Assessment: Primary, reducible umbilical hernia.    Plan:    We have discussed observation, reduction techniques and importance, incarceration and strangulation signs, symptoms and importance as well as need to seek emergency treatment.      We have discussed laparoscopic umbilical hernia repair with mesh in detail, including benefits, alternatives, complications, incision, scar, mesh, infection, anesthesia, bleeding, blood transfusion, DVT, PE, hernia recurrence, lifting and activity limits after surgery.  All questions have been answered to the best of my ability.    He has been given literature to review.   We will schedule surgery at the patient's convenience.    Number given to decide which surgery was cheaper, open or laparoscopic.     HPI:  Andrew is a 49 year old male who presents for evaluation of abdominal pain.  He first noticed it a few years ago he was recently on a dune riding trip and the hernia started to bother him. He took the day off and was able to reduce it and it improved.  The pain is rare and only happened after riding on the dunes a lot.  It is described as aching. Negative for associated symptoms of nausea, vomiting and bloating.  He has noticed an associated lump.  He has not had a previous surgery in this location. he has not had a previous herniorrhaphy in this location.  His employment does require heavy lifting.    Constipation: No  Colonoscopy: Yes   Cough: No  Diabetes: No  Current Smoker: No    Past Medical History:   has a past medical history of Medial meniscus tear.    Past Surgical History:  Past Surgical History:   Procedure Laterality Date     ARTHROSCOPY KNEE WITH MEDIAL MENISCECTOMY Left 5/12/2017    Procedure: ARTHROSCOPY KNEE WITH MEDIAL MENISCECTOMY;  Arthroscopic partial medial menisectomy,left knee;  Surgeon: Grant Muñoz MD;  Location: [facility]     CV CORONARY ANGIOGRAM N/A 2/15/2020    Procedure: Coronary Angiogram;  Surgeon: Rinku Boyce MD;   "Location:  [facility] CARDI[facility] CATH LAB        Review of Systems  General: negative for fever or chills  Skin: negative except mass noted above  Ears/Nose/Throat: negative for, epistaxis, bleeding gums  Respiratory: No shortness of breath, dyspnea on exertion, cough, or hemoptysis  Cardiovascular: negative for and chest pain  Gastrointestinal: negative for, nausea, vomiting and abdominal pain  Genitourinary: negative for and frequency  Neurologic: negative for and local weakness  Hematologic/Lymphatic/Immunologic: negative for, bleeding disorder and frequent infections  Endocrine: negative for, diabetes, polydipsia and polyuria      PE:    /88   Pulse 83   Ht 1.803 m (5' 11\")   Wt 104.8 kg (231 lb)   BMI 32.22 kg/m    General - Well developed, well nourished male in no apparent distress  HEENT:  Head normocephalic and atraumatic, pupils equal and round, conjunctivae clear, no scleral icterus, mucous membranes moist, external ears and nose normal  Lymphatic: No cervical, or supraclavicular lymphadenopathy  Lungs: Clear to auscultation bilaterally  Heart: Regular rate and rhythm, no murmurs  Abdomen:  abdomen is soft without significant tenderness, masses, organomegaly or guarding  Hernia:  umbilical, 1 cm, reducible, mildly tender  Extremities: Warm without edema  Musculoskeletal:  Normal station and gait  Neurologic: Nonfocal  Psychiatric: Mood and affect appropriate  Skin: Without lesions or rashes, or landon Arnold DO          "

## 2020-10-08 NOTE — LETTER
10/8/2020         RE: Andrew Atkinson  [address] [place] [id number]        Dear Colleague,    Thank you for referring your patient, Andrew Atkinson, to the [facility]. Please see a copy of my visit note below.    Assessment: Primary, reducible umbilical hernia.    Plan:    We have discussed observation, reduction techniques and importance, incarceration and strangulation signs, symptoms and importance as well as need to seek emergency treatment.      We have discussed laparoscopic umbilical hernia repair with mesh in detail, including benefits, alternatives, complications, incision, scar, mesh, infection, anesthesia, bleeding, blood transfusion, DVT, PE, hernia recurrence, lifting and activity limits after surgery.  All questions have been answered to the best of my ability.    He has been given literature to review.   We will schedule surgery at the patient's convenience.    Number given to decide which surgery was cheaper, open or laparoscopic.     HPI:  Andrew is a 49 year old male who presents for evaluation of abdominal pain.  He first noticed it a few years ago he was recently on a dune riding trip and the hernia started to bother him. He took the day off and was able to reduce it and it improved.  The pain is rare and only happened after riding on the dunes a lot.  It is described as aching. Negative for associated symptoms of nausea, vomiting and bloating.  He has noticed an associated lump.  He has not had a previous surgery in this location. he has not had a previous herniorrhaphy in this location.  His employment does require heavy lifting.    Constipation: No  Colonoscopy: Yes   Cough: No  Diabetes: No  Current Smoker: No    Past Medical History:   has a past medical history of Medial meniscus tear.    Past Surgical History:  Past Surgical History:   Procedure Laterality Date     ARTHROSCOPY KNEE WITH MEDIAL MENISCECTOMY Left 5/12/2017    Procedure: ARTHROSCOPY KNEE  "WITH MEDIAL MENISCECTOMY;  Arthroscopic partial medial menisectomy,left knee;  Surgeon: Grant Muñoz MD;  Location: [facility]     CV CORONARY ANGIOGRAM N/A 2/15/2020    Procedure: Coronary Angiogram;  Surgeon: Rinku Boyce MD;  Location:  HEART CARDIAC CATH LAB        Review of Systems  General: negative for fever or chills  Skin: negative except mass noted above  Ears/Nose/Throat: negative for, epistaxis, bleeding gums  Respiratory: No shortness of breath, dyspnea on exertion, cough, or hemoptysis  Cardiovascular: negative for and chest pain  Gastrointestinal: negative for, nausea, vomiting and abdominal pain  Genitourinary: negative for and frequency  Neurologic: negative for and local weakness  Hematologic/Lymphatic/Immunologic: negative for, bleeding disorder and frequent infections  Endocrine: negative for, diabetes, polydipsia and polyuria      PE:    /88   Pulse 83   Ht 1.803 m (5' 11\")   Wt 104.8 kg (231 lb)   BMI 32.22 kg/m    General - Well developed, well nourished male in no apparent distress  HEENT:  Head normocephalic and atraumatic, pupils equal and round, conjunctivae clear, no scleral icterus, mucous membranes moist, external ears and nose normal  Lymphatic: No cervical, or supraclavicular lymphadenopathy  Lungs: Clear to auscultation bilaterally  Heart: Regular rate and rhythm, no murmurs  Abdomen:  abdomen is soft without significant tenderness, masses, organomegaly or guarding  Hernia:  umbilical, 1 cm, reducible, mildly tender  Extremities: Warm without edema  Musculoskeletal:  Normal station and gait  Neurologic: Nonfocal  Psychiatric: Mood and affect appropriate  Skin: Without lesions or rashes, or juandice    Miguel Ángel Arnold DO              Again, thank you for allowing me to participate in the care of your patient.        Sincerely,        Miguel Ángel Arnold DO    "

## 2020-10-09 ENCOUNTER — TELEPHONE (OUTPATIENT)
Dept: SURGERY | Facility: CLINIC | Age: 50
End: 2020-10-09

## 2020-10-09 PROBLEM — K42.0 UMBILICAL HERNIA WITH OBSTRUCTION: Status: ACTIVE | Noted: 2020-10-09

## 2020-10-09 NOTE — TELEPHONE ENCOUNTER
Type of surgery: LAPAROSCOPIC UMBILICAL HERNIA REPAIR   CPT 71942   Umbilical hernia with obstruction  K42.0  Location of surgery: [facility]  Date and time of surgery: 12/08/2020  Surgeon: ALLISON  Pre-Op Appt Date: 11/30/2020  Post-Op Appt Date: 12/31/2020   Packet sent out: Yes  Pre-cert/Authorization completed: No prior auth needed per [facility] online list.  Date: 10/19/20    Sri Arboleda  Prior Authorization Dept  [phone]

## 2020-10-10 DIAGNOSIS — Z11.59 ENCOUNTER FOR SCREENING FOR OTHER VIRAL DISEASES: Primary | ICD-10-CM

## 2020-10-14 ENCOUNTER — PREP FOR PROCEDURE (OUTPATIENT)
Dept: SURGERY | Facility: CLINIC | Age: 50
End: 2020-10-14

## 2020-11-25 NOTE — PATIENT INSTRUCTIONS

## 2020-11-30 ENCOUNTER — OFFICE VISIT (OUTPATIENT)
Dept: FAMILY MEDICINE | Facility: CLINIC | Age: 50
End: 2020-11-30
Payer: COMMERCIAL

## 2020-11-30 VITALS
SYSTOLIC BLOOD PRESSURE: 118 MMHG | HEART RATE: 62 BPM | WEIGHT: 231 LBS | OXYGEN SATURATION: 96 % | BODY MASS INDEX: 32.34 KG/M2 | TEMPERATURE: 97.4 F | HEIGHT: 71 IN | DIASTOLIC BLOOD PRESSURE: 78 MMHG

## 2020-11-30 DIAGNOSIS — Z01.818 PREOP GENERAL PHYSICAL EXAM: Primary | ICD-10-CM

## 2020-11-30 DIAGNOSIS — E03.9 ACQUIRED HYPOTHYROIDISM: ICD-10-CM

## 2020-11-30 DIAGNOSIS — K42.0 UMBILICAL HERNIA WITH OBSTRUCTION: ICD-10-CM

## 2020-11-30 DIAGNOSIS — R94.31 ABNORMAL ELECTROCARDIOGRAM: ICD-10-CM

## 2020-11-30 DIAGNOSIS — Z86.79 HISTORY OF VIRAL MYOCARDITIS: ICD-10-CM

## 2020-11-30 LAB
CREAT SERPL-MCNC: 0.96 MG/DL (ref 0.66–1.25)
ERYTHROCYTE [DISTWIDTH] IN BLOOD BY AUTOMATED COUNT: 13.4 % (ref 10–15)
GFR SERPL CREATININE-BSD FRML MDRD: >90 ML/MIN/{1.73_M2}
HCT VFR BLD AUTO: 46.2 % (ref 40–53)
HGB BLD-MCNC: 15.8 G/DL (ref 13.3–17.7)
IRON STUDY JIC TUBE: NORMAL
MCH RBC QN AUTO: 28.5 PG (ref 26.5–33)
MCHC RBC AUTO-ENTMCNC: 34.2 G/DL (ref 31.5–36.5)
MCV RBC AUTO: 83 FL (ref 78–100)
PLATELET # BLD AUTO: 275 10E9/L (ref 150–450)
POTASSIUM SERPL-SCNC: 4 MMOL/L (ref 3.4–5.3)
RBC # BLD AUTO: 5.55 10E12/L (ref 4.4–5.9)
TSH SERPL DL<=0.005 MIU/L-ACNC: 1.6 MU/L (ref 0.4–4)
WBC # BLD AUTO: 6.7 10E9/L (ref 4–11)

## 2020-11-30 PROCEDURE — 84443 ASSAY THYROID STIM HORMONE: CPT | Performed by: NURSE PRACTITIONER

## 2020-11-30 PROCEDURE — 84132 ASSAY OF SERUM POTASSIUM: CPT | Performed by: NURSE PRACTITIONER

## 2020-11-30 PROCEDURE — 99214 OFFICE O/P EST MOD 30 MIN: CPT | Performed by: NURSE PRACTITIONER

## 2020-11-30 PROCEDURE — 93000 ELECTROCARDIOGRAM COMPLETE: CPT | Performed by: NURSE PRACTITIONER

## 2020-11-30 PROCEDURE — 36415 COLL VENOUS BLD VENIPUNCTURE: CPT | Performed by: NURSE PRACTITIONER

## 2020-11-30 PROCEDURE — 82565 ASSAY OF CREATININE: CPT | Performed by: NURSE PRACTITIONER

## 2020-11-30 PROCEDURE — 85027 COMPLETE CBC AUTOMATED: CPT | Performed by: NURSE PRACTITIONER

## 2020-11-30 ASSESSMENT — MIFFLIN-ST. JEOR: SCORE: 1924.94

## 2020-11-30 NOTE — PROGRESS NOTES
[facility]  [address]  [place] [address]  Phone: [phone]  Primary Provider: Wilfredo Dela Cruz  Pre-op Performing Provider: SELENA PABLO    PREOPERATIVE EVALUATION:  Today's date: 11/30/2020    Andrew Atkinson is a 50 year old male who presents for a preoperative evaluation.    Surgical Information:  Surgery/Procedure: Hernia Repair  Surgery Location: [facility]  Surgeon: Clayton  Surgery Date: 12/08/2020  Time of Surgery: tbd  Where patient plans to recover: At home with family  Fax number for surgical facility: Note does not need to be faxed, will be available electronically in Epic.    Type of Anesthesia Anticipated: to be determined    Subjective     HPI related to upcoming procedure: Patient with a umbilical hernia, worsening for the past couple of years.     Preop Questions 11/30/2020   1. Have you ever had a heart attack or stroke? No   2. Have you ever had surgery on your heart or blood vessels, such as a stent placement, a coronary artery bypass, or surgery on an artery in your head, neck, heart, or legs? No   3. Do you have chest pain with activity? No   4. Do you have a history of  heart failure? No   5. Do you currently have a cold, bronchitis or symptoms of other infection? No   6. Do you have a cough, shortness of breath, or wheezing? No   7. Do you or anyone in your family have previous history of blood clots? No   8. Do you or does anyone in your family have a serious bleeding problem such as prolonged bleeding following surgeries or cuts? No   9. Have you ever had problems with anemia or been told to take iron pills? No   10. Have you had any abnormal blood loss such as black, tarry or bloody stools? YES - bloody stools, states that he has a history of blood with stools that waxes and wanes   11. Have you ever had a blood transfusion? No   12. Are you willing to have a blood transfusion if it is medically needed before,  during, or after your surgery? Yes   13. Have you or any of your relatives ever had problems with anesthesia? No   14. Do you have sleep apnea, excessive snoring or daytime drowsiness? YES - has excessive snoring    14a. Do you have a CPAP machine? No   15. Do you have any artifical heart valves or other implanted medical devices like a pacemaker, defibrillator, or continuous glucose monitor? No   16. Do you have artificial joints? No   17. Are you allergic to latex? No       Health Care Directive:  Patient does not have a Health Care Directive or Living Will: Patient states has Advance Directive and will bring in a copy to clinic.    Preoperative Review of :   reviewed - no record of controlled substances prescribed.    Status of Chronic Conditions:  HYPOTHYROIDISM - Patient has a longstanding history of chronic Hypothyroidism. Patient has been doing well, noting no tremor, insomnia, hair loss or changes in skin texture. Continues to take medications as directed, without adverse reactions or side effects. Last TSH   Lab Results   Component Value Date    TSH 2.91 03/10/2020   .      Review of Systems  Constitutional, neuro, ENT, endocrine, pulmonary, cardiac, gastrointestinal, genitourinary, musculoskeletal, integument and psychiatric systems are negative, except as otherwise noted.    Patient Active Problem List    Diagnosis Date Noted     Umbilical hernia with obstruction 10/09/2020     Priority: Medium     Added automatically from request for surgery [id number]       Acute viral myocarditis 02/14/2020     Priority: Medium     Acquired hypothyroidism 04/18/2017     Priority: Medium      Past Medical History:   Diagnosis Date     Medial meniscus tear      Past Surgical History:   Procedure Laterality Date     ARTHROSCOPY KNEE WITH MEDIAL MENISCECTOMY Left 5/12/2017    Procedure: ARTHROSCOPY KNEE WITH MEDIAL MENISCECTOMY;  Arthroscopic partial medial menisectomy,left knee;  Surgeon: Grant Muñoz MD;   Location: [facility] OR     CV CORONARY ANGIOGRAM N/A 2/15/2020    Procedure: Coronary Angiogram;  Surgeon: Rinku Boyce MD;  Location:  [facility]     Current Outpatient Medications   Medication Sig Dispense Refill     levothyroxine (SYNTHROID/LEVOTHROID) 150 MCG tablet Take 1 tablet (150 mcg) by mouth daily 90 tablet 3     aspirin (ASA) 81 MG chewable tablet Take 1 tablet (81 mg) by mouth daily (Patient not taking: Reported on 11/30/2020) 30 tablet 11     azelastine (ASTELIN) 0.1 % nasal spray Spray 1 spray into both nostrils 2 times daily (Patient not taking: Reported on 4/29/2020) 1 Bottle 0     Lido-PE-Glycerin-Petrolatum (PREPARATION H RAPID RELIEF) 5-0.25-14.4-15 % CREA Place 1 Application rectally 2 times daily as needed (bleeding hemorrhoids) (Patient not taking: Reported on 10/6/2020) 1 Tube 3     Lidocaine-Hydrocortisone Ace 3-2.5 % KIT Place 1 Application rectally 2 times daily as needed (Patient not taking: Reported on 10/6/2020) 1 kit 11     multivitamin, therapeutic with minerals (MULTI-VITAMIN) TABS tablet Take 1 tablet by mouth daily       omeprazole (PRILOSEC) 20 MG DR capsule Take 2x per day for 14 days then once nightly thereafter.  Wean off after 1 month. (Patient not taking: Reported on 3/10/2020) 45 capsule 1     Phenylephrine-Mineral Oil-Pet 0.25-14-71.9 % OINT Place 1 Application rectally 3 times daily as needed (Patient not taking: Reported on 10/6/2020) 1 Tube 11     pravastatin (PRAVACHOL) 20 MG tablet Take 1 tablet (20 mg) by mouth every evening (Patient not taking: Reported on 10/6/2020) 30 tablet 11       Allergies   Allergen Reactions     Morphine Hives        Social History     Tobacco Use     Smoking status: Never Smoker     Smokeless tobacco: Never Used   Substance Use Topics     Alcohol use: Yes     Comment: Occ      History   Drug Use No         Objective     /78 (BP Location: Left arm, Patient Position: Chair, Cuff Size: Adult Large)   Pulse 62   Temp  "97.4  F (36.3  C) (Oral)   Ht 1.803 m (5' 11\")   Wt 104.8 kg (231 lb)   SpO2 96%   BMI 32.22 kg/m      Physical Exam    GENERAL APPEARANCE: healthy, alert and no distress     EYES: EOMI,  PERRL     HENT: ear canals and TM's normal and nose and mouth without ulcers or lesions     NECK: no adenopathy, no asymmetry, masses, or scars and thyroid normal to palpation     RESP: lungs clear to auscultation - no rales, rhonchi or wheezes     CV: regular rates and rhythm, normal S1 S2, no S3 or S4 and no murmur, click or rub     ABDOMEN:  soft, nontender, no HSM or masses and bowel sounds normal     MS: extremities normal- no gross deformities noted, no evidence of inflammation in joints, FROM in all extremities.     SKIN: no suspicious lesions or rashes     NEURO: Normal strength and tone, sensory exam grossly normal, mentation intact and speech normal     PSYCH: mentation appears normal. and affect normal/bright     LYMPHATICS: No cervical adenopathy    Recent Labs   Lab Test 03/10/20  1305 02/15/20  0658 02/14/20  2346 02/14/20  1824   HGB  --   --  14.0 14.9   PLT  --   --  335 339   INR  --  1.09  --  1.04   NA  --  139  --  137   POTASSIUM  --  3.7  --  3.6   CR  --  0.94  --  1.06   A1C 5.9*  --  6.4*  --         Diagnostics:  Labs pending at this time.  Results will be reviewed when available.       Coronary Angiogram 2/2020    Echocardiogram        Revised Cardiac Risk Index (RCRI):  The patient has the following serious cardiovascular risks for perioperative complications:   - No serious cardiac risks = 0 points     RCRI Interpretation: 0 points: Class I (very low risk - 0.4% complication rate)       Assessment & Plan   The proposed surgical procedure is considered INTERMEDIATE risk.    Preop general physical exam  - Potassium  - Creatinine  - CBC w/ Reflex to Iron Studies    Umbilical hernia with obstruction    Acquired hypothyroidism  Well controlled with medications without side effects.  - TSH with free T4 " reflex    History of viral myocarditis  History of myocarditis after viral infection 2/2020. Patient recovered, no symptoms. EKG abnormal.  - EKG 12-lead complete w/read - Clinics    Possible Sleep Apnea: excessive snoring, no recent sleep study. {     Risks and Recommendations:  The patient has the following additional risks and recommendations for perioperative complications:   - No identified additional risk factors other than previously addressed    Medication Instructions:  Patient is to take all scheduled medications on the day of surgery    RECOMMENDATION:  Patient referred to cardiology for evaluation before surgery. Surgery approval pending completion of consultation.    Signed Electronically by: CHUY Ivey CNP    Copy of this evaluation report is provided to requesting physician.    Preop [facility] Preop Guidelines    Revised Cardiac Risk Index

## 2020-11-30 NOTE — LETTER
November 30, 2020      Andrew BARTLETT Demetrio  [address] [place] [id number]        Dear ,    We are writing to inform you of your test results.    normal results      Resulted Orders   Potassium   Result Value Ref Range    Potassium 4.0 3.4 - 5.3 mmol/L   Creatinine   Result Value Ref Range    Creatinine 0.96 0.66 - 1.25 mg/dL    GFR Estimate >90 >60 mL/min/[1.73_m2]      Comment:      Non  GFR Calc  Starting 12/18/2018, serum creatinine based estimated GFR (eGFR) will be   calculated using the Chronic Kidney Disease Epidemiology Collaboration   (CKD-EPI) equation.      GFR Estimate If Black >90 >60 mL/min/[1.73_m2]      Comment:       GFR Calc  Starting 12/18/2018, serum creatinine based estimated GFR (eGFR) will be   calculated using the Chronic Kidney Disease Epidemiology Collaboration   (CKD-EPI) equation.     CBC w/ Reflex to Iron Studies   Result Value Ref Range    WBC 6.7 4.0 - 11.0 10e9/L    RBC Count 5.55 4.4 - 5.9 10e12/L    Hemoglobin 15.8 13.3 - 17.7 g/dL    Hematocrit 46.2 40.0 - 53.0 %    MCV 83 78 - 100 fl    MCH 28.5 26.5 - 33.0 pg    MCHC 34.2 31.5 - 36.5 g/dL    RDW 13.4 10.0 - 15.0 %    Platelet Count 275 150 - 450 10e9/L    Iron Study JIC Tube Done    TSH with free T4 reflex   Result Value Ref Range    TSH 1.60 0.40 - 4.00 mU/L       If you have any questions or concerns, please call the clinic at the number listed above.       Sincerely,      Mirna Lancaster, APRN CNP/rodriguez

## 2020-12-01 ENCOUNTER — VIRTUAL VISIT (OUTPATIENT)
Dept: CARDIOLOGY | Facility: CLINIC | Age: 50
End: 2020-12-01
Attending: INTERNAL MEDICINE
Payer: COMMERCIAL

## 2020-12-01 DIAGNOSIS — E78.5 HYPERLIPIDEMIA LDL GOAL <100: ICD-10-CM

## 2020-12-01 DIAGNOSIS — R94.31 NONSPECIFIC ABNORMAL ELECTROCARDIOGRAM (ECG) (EKG): ICD-10-CM

## 2020-12-01 DIAGNOSIS — I40.0 ACUTE VIRAL MYOCARDITIS: Primary | ICD-10-CM

## 2020-12-01 DIAGNOSIS — I21.4 NSTEMI (NON-ST ELEVATED MYOCARDIAL INFARCTION) (H): ICD-10-CM

## 2020-12-01 PROCEDURE — 99214 OFFICE O/P EST MOD 30 MIN: CPT | Mod: 95 | Performed by: INTERNAL MEDICINE

## 2020-12-01 RX ORDER — PRAVASTATIN SODIUM 20 MG
20 TABLET ORAL DAILY
Qty: 90 TABLET | Refills: 3 | Status: SHIPPED | OUTPATIENT
Start: 2020-12-01 | End: 2022-02-20

## 2020-12-01 NOTE — LETTER
"12/1/2020      RE: Andrew Atkinson  [address] [place] [id number]       Dear Colleague,    Thank you for the opportunity to participate in the care of your patient, Andrew Atkinson, at the [facility] at [facility]. Please see a copy of my visit note below.    Andrew Atkinson is a 50 year old male who is being evaluated via a billable video visit.      The patient has been notified of following:     \"This video visit will be conducted via a call between you and your physician/provider. We have found that certain health care needs can be provided without the need for an in-person physical exam.  This service lets us provide the care you need with a video conversation.  If a prescription is necessary we can send it directly to your pharmacy.  If lab work is needed we can place an order for that and you can then stop by our lab to have the test done at a later time.    Video visits are billed at different rates depending on your insurance coverage.  Please reach out to your insurance provider with any questions.    If during the course of the call the physician/provider feels a video visit is not appropriate, you will not be charged for this service.\"    Patient has given verbal consent for Video visit? Yes  How would you like to obtain your AVS? Mail a copy  If you are dropped from the video visit, the video invite should be resent to: Other e-mail: [employer] [phone]  Will anyone else be joining your video visit? No        Video-Visit Details    Type of service:  Video Visit    Video Start Time: 7.40AM  Video End Time: 8.04AM.    Originating Location (pt. Location): Other Office    Distant Location (provider location):  [facility]     Platform used for Video Visit: [employer]       SUBJECTIVE:  Andrew Atkinson is a 50 year old male who presents for evaluation of abnormal EKG.  Patient with no significant comorbidities.  " Very active asymptomatic prior to February 2020.  Early part of February 2020, patient visited [place] and also an oxygen problem.  When he was returning from [place] had a cold.  This progressed to shortness of breath and chest pain.  Had significant exertional shortness of breath.  Patient visited PCP and checked a troponin and it was elevated.  Patient was referred to the emergency room.  He is peak troponin was 0.2.  Patient was admitted to hospital and evaluated.  Had no significant complications during the hospital stay and it was uneventful.  Patient was discharged home on February 16.  He was admitted on February 14.  Currently patient is active and asymptomatic.  Has no respiratory or cardiac symptoms.    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  During his hospitalization he was diagnosed with hyperlipidemia and was started on Pravachol.  Even though his blood sugar was elevated at hospital there is no diagnosis of diabetes.  No hypertension.  Non-smoker.  No excess alcohol or coffee.  No premature CAD in family.    Patient Active Problem List    Diagnosis Date Noted     Umbilical hernia with obstruction 10/09/2020     Priority: Medium     Added automatically from request for surgery [id number]       Acute viral myocarditis 02/14/2020     Priority: Medium     Acquired hypothyroidism 04/18/2017     Priority: Medium    .  Current Outpatient Medications   Medication Sig     levothyroxine (SYNTHROID/LEVOTHROID) 150 MCG tablet Take 1 tablet (150 mcg) by mouth daily     multivitamin, therapeutic with minerals (MULTI-VITAMIN) TABS tablet Take 1 tablet by mouth daily     Current Facility-Administered Medications   Medication     lidocaine 1 % injection 0.5 mL     triamcinolone (KENALOG-40) injection 20 mg     Past Medical History:   Diagnosis Date     Medial meniscus tear      Past Surgical History:   Procedure Laterality Date     ARTHROSCOPY KNEE WITH MEDIAL  MENISCECTOMY Left 5/12/2017    Procedure: ARTHROSCOPY KNEE WITH MEDIAL MENISCECTOMY;  Arthroscopic partial medial menisectomy,left knee;  Surgeon: Grant Muñoz MD;  Location: [facility] OR     CV CORONARY ANGIOGRAM N/A 2/15/2020    Procedure: Coronary Angiogram;  Surgeon: Rinku Boyce MD;  Location:  [facility]     Allergies   Allergen Reactions     Morphine Hives     Social History     Socioeconomic History     Marital status: Single     Spouse name: Not on file     Number of children: Not on file     Years of education: Not on file     Highest education level: Not on file   Occupational History     Not on file   Social Needs     Financial resource strain: Not on file     Food insecurity     Worry: Not on file     Inability: Not on file     Transportation needs     Medical: Not on file     Non-medical: Not on file   Tobacco Use     Smoking status: Never Smoker     Smokeless tobacco: Never Used   Substance and Sexual Activity     Alcohol use: Yes     Comment: Occ      Drug use: No     Sexual activity: Yes     Partners: Female   Lifestyle     Physical activity     Days per week: Not on file     Minutes per session: Not on file     Stress: Not on file   Relationships     Social connections     Talks on phone: Not on file     Gets together: Not on file     Attends Baptism service: Not on file     Active member of club or organization: Not on file     Attends meetings of clubs or organizations: Not on file     Relationship status: Not on file     Intimate partner violence     Fear of current or ex partner: Not on file     Emotionally abused: Not on file     Physically abused: Not on file     Forced sexual activity: Not on file   Other Topics Concern     Parent/sibling w/ CABG, MI or angioplasty before 65F 55M? Not Asked   Social History Narrative     Not on file     Family History   Problem Relation Age of Onset     Unknown/Adopted No family hx of           REVIEW OF SYSTEMS:  Review of system  was unremarkable.       ASSESSMENT/PLAN:  Patient here for evaluation of abnormal EKG.  This was done preoperatively as he was planning for an umbilical hernia repair.  His cardiac history is significant for viral myocarditis.  Patient was admitted to [facility] from February 14-16, 2020 with myocarditis and mild troponin elevation of 0.2.  He presented with chest pain and shortness of breath.  His hospital course was uneventful and was discharged on 16 February.  Currently patient is active and has no cardiac symptoms.  He denied any respiratory symptoms also.  During the hospitalization he was tested for COVID-19 antibody and this was negative.  So the assumption is that it was not a COVID-19 related respiratory illness.  Patient has no significant cardiac risk factors.  He was diagnosed with a hyperlipidemia during the admission and was started on pravastatin.  His blood sugar was elevated but no diagnosis of diabetes.  No hypertension.  No premature CAD in family.  Patient is a non-smoker and no excess alcohol.  Reviewed EKGs from admission and recent EKG.  Since his viral myocarditis patient has diffuse ST-T changes in the limb leads as well as precordial leads.  This remains unchanged.  Recent EKG is sinus rhythm with inferior and all precordial lead  T wave inversion.  Patient had an echocardiogram.  This showed normal biventricular function.  No regional wall motion abnormality.  No significant valvular abnormalities.  Because of elevated troponin patient had a coronary angiogram this was completely normal.  Overall his EKG findings related to his viral myocarditis.  Patient has no prior EKG in the system or care everywhere.  As his LV function is normal and normal coronary angiogram patient can proceed with the planned umbilical hernia repair.  In the interim we will check a cardiac MRI with contrast and flow to evaluate for myocardial scarring or LVH.  This has no relation to the upcoming surgery and patient can  proceed with the surgery without waiting for the result of his MRI.  Patient's LDL was 161.  This came down to 78 with pravastatin.  Unfortunately patient do not like taking tablets and he is discontinued his pravastatin.  Patient is advised to restart his pravastatin at previous dose of 20 mg daily.  A prescription will be sent to [facility], which is his pharmacy now.  Patient will be contacted with the MRI result.  Per orders.   Return to Clinic as needed.        Please do not hesitate to contact me if you have any questions/concerns.     Sincerely,     XOCHILT Batista MD

## 2020-12-01 NOTE — PROGRESS NOTES
"Andrew Atkinson is a 50 year old male who is being evaluated via a billable video visit.      The patient has been notified of following:     \"This video visit will be conducted via a call between you and your physician/provider. We have found that certain health care needs can be provided without the need for an in-person physical exam.  This service lets us provide the care you need with a video conversation.  If a prescription is necessary we can send it directly to your pharmacy.  If lab work is needed we can place an order for that and you can then stop by our lab to have the test done at a later time.    Video visits are billed at different rates depending on your insurance coverage.  Please reach out to your insurance provider with any questions.    If during the course of the call the physician/provider feels a video visit is not appropriate, you will not be charged for this service.\"    Patient has given verbal consent for Video visit? Yes  How would you like to obtain your AVS? Mail a copy  If you are dropped from the video visit, the video invite should be resent to: Other e-mail: [employer] [phone]  Will anyone else be joining your video visit? No        Video-Visit Details    Type of service:  Video Visit    Video Start Time: 7.40AM  Video End Time: 8.04AM.    Originating Location (pt. Location): Other Office    Distant Location (provider location):  [facility]     Platform used for Video Visit: [employer]       SUBJECTIVE:  Andrew Atkinson is a 50 year old male who presents for evaluation of abnormal EKG.  Patient with no significant comorbidities.  Very active asymptomatic prior to February 2020.  Early part of February 2020, patient visited [place] and also an oxygen problem.  When he was returning from [place] had a cold.  This progressed to shortness of breath and chest pain.  Had significant exertional shortness of breath.  Patient visited PCP and checked a troponin " and it was elevated.  Patient was referred to the emergency room.  He is peak troponin was 0.2.  Patient was admitted to hospital and evaluated.  Had no significant complications during the hospital stay and it was uneventful.  Patient was discharged home on February 16.  He was admitted on February 14.  Currently patient is active and asymptomatic.  Has no respiratory or cardiac symptoms.    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  During his hospitalization he was diagnosed with hyperlipidemia and was started on Pravachol.  Even though his blood sugar was elevated at hospital there is no diagnosis of diabetes.  No hypertension.  Non-smoker.  No excess alcohol or coffee.  No premature CAD in family.    Patient Active Problem List    Diagnosis Date Noted     Umbilical hernia with obstruction 10/09/2020     Priority: Medium     Added automatically from request for surgery [id number]       Acute viral myocarditis 02/14/2020     Priority: Medium     Acquired hypothyroidism 04/18/2017     Priority: Medium    .  Current Outpatient Medications   Medication Sig     levothyroxine (SYNTHROID/LEVOTHROID) 150 MCG tablet Take 1 tablet (150 mcg) by mouth daily     multivitamin, therapeutic with minerals (MULTI-VITAMIN) TABS tablet Take 1 tablet by mouth daily     Current Facility-Administered Medications   Medication     lidocaine 1 % injection 0.5 mL     triamcinolone (KENALOG-40) injection 20 mg     Past Medical History:   Diagnosis Date     Medial meniscus tear      Past Surgical History:   Procedure Laterality Date     ARTHROSCOPY KNEE WITH MEDIAL MENISCECTOMY Left 5/12/2017    Procedure: ARTHROSCOPY KNEE WITH MEDIAL MENISCECTOMY;  Arthroscopic partial medial menisectomy,left knee;  Surgeon: Grant Muñoz MD;  Location: [facility] OR     CV CORONARY ANGIOGRAM N/A 2/15/2020    Procedure: Coronary Angiogram;  Surgeon: Rinku Boyce MD;  Location:  HEART CARDIAC  CATH LAB     Allergies   Allergen Reactions     Morphine Hives     Social History     Socioeconomic History     Marital status: Single     Spouse name: Not on file     Number of children: Not on file     Years of education: Not on file     Highest education level: Not on file   Occupational History     Not on file   Social Needs     Financial resource strain: Not on file     Food insecurity     Worry: Not on file     Inability: Not on file     Transportation needs     Medical: Not on file     Non-medical: Not on file   Tobacco Use     Smoking status: Never Smoker     Smokeless tobacco: Never Used   Substance and Sexual Activity     Alcohol use: Yes     Comment: Occ      Drug use: No     Sexual activity: Yes     Partners: Female   Lifestyle     Physical activity     Days per week: Not on file     Minutes per session: Not on file     Stress: Not on file   Relationships     Social connections     Talks on phone: Not on file     Gets together: Not on file     Attends [employer] service: Not on file     Active member of club or organization: Not on file     Attends meetings of clubs or organizations: Not on file     Relationship status: Not on file     Intimate partner violence     Fear of current or ex partner: Not on file     Emotionally abused: Not on file     Physically abused: Not on file     Forced sexual activity: Not on file   Other Topics Concern     Parent/sibling w/ CABG, MI or angioplasty before 65F 55M? Not Asked   Social History Narrative     Not on file     Family History   Problem Relation Age of Onset     Unknown/Adopted No family hx of           REVIEW OF SYSTEMS:  Review of system was unremarkable.       ASSESSMENT/PLAN:  Patient here for evaluation of abnormal EKG.  This was done preoperatively as he was planning for an umbilical hernia repair.  His cardiac history is significant for viral myocarditis.  Patient was admitted to [facility] from February 14-16, 2020 with myocarditis and mild troponin elevation  of 0.2.  He presented with chest pain and shortness of breath.  His hospital course was uneventful and was discharged on 16 February.  Currently patient is active and has no cardiac symptoms.  He denied any respiratory symptoms also.  During the hospitalization he was tested for COVID-19 antibody and this was negative.  So the assumption is that it was not a COVID-19 related respiratory illness.  Patient has no significant cardiac risk factors.  He was diagnosed with a hyperlipidemia during the admission and was started on pravastatin.  His blood sugar was elevated but no diagnosis of diabetes.  No hypertension.  No premature CAD in family.  Patient is a non-smoker and no excess alcohol.  Reviewed EKGs from admission and recent EKG.  Since his viral myocarditis patient has diffuse ST-T changes in the limb leads as well as precordial leads.  This remains unchanged.  Recent EKG is sinus rhythm with inferior and all precordial lead  T wave inversion.  Patient had an echocardiogram.  This showed normal biventricular function.  No regional wall motion abnormality.  No significant valvular abnormalities.  Because of elevated troponin patient had a coronary angiogram this was completely normal.  Overall his EKG findings related to his viral myocarditis.  Patient has no prior EKG in the system or care everywhere.  As his LV function is normal and normal coronary angiogram patient can proceed with the planned umbilical hernia repair.  In the interim we will check a cardiac MRI with contrast and flow to evaluate for myocardial scarring or LVH.  This has no relation to the upcoming surgery and patient can proceed with the surgery without waiting for the result of his MRI.  Patient's LDL was 161.  This came down to 78 with pravastatin.  Unfortunately patient do not like taking tablets and he is discontinued his pravastatin.  Patient is advised to restart his pravastatin at previous dose of 20 mg daily.  A prescription will be  sent to [facility], which is his pharmacy now.  Patient will be contacted with the MRI result.  Per orders.   Return to Clinic as needed.

## 2020-12-01 NOTE — PATIENT INSTRUCTIONS
Patient Instructions:  From cardiology standpoint, you are cleared for surgery.    Restart pravastatin 20mg daily - RX sent.     Complete a Cardiac MRI. As soon as results are compiled and reviewed you will be notified. Our scheduling team will reach out within the next week or so to help you schedule , or you may call at any time to schedule : [phone]    Cardiac MRI/MRA:     You will be given intravenous contrast for this exam. To prepare:    The day before your exam, drink extra fluids--at least six 8-ounce glasses (unless your doctor tells you to restrict your fluids).    The MRI machine uses a strong magnet. Please wear clothes without metal (snaps, zippers). A sweatsuit works well, or we may give you a hospital gown.    Please remove any body piercings and hair extensions before you arrive. You will also remove watches, jewelry, hairpins, wallets, dentures, partial dental plates and hearing aids. You may wear contact lenses, and you may be able to wear your rings. We have a safe place to keep your personal items, but it is safer to leave them at home.      It was a pleasure to see you in the cardiology clinic today.    We are encouraging the use of [employer] to communicate with your Healthcare Provider.  If you have any questions, call  Chris Alcantara LPN, at [phone].  Press Option #1 for the [facility], and then press Option #4 for nursing.  Cardiology Fax  : [phone]      If you have an urgent need after hours (8:00 am to 4:30 pm) please call [phone] and ask for the cardiology fellow on call.

## 2020-12-04 DIAGNOSIS — Z11.59 ENCOUNTER FOR SCREENING FOR OTHER VIRAL DISEASES: ICD-10-CM

## 2020-12-04 PROCEDURE — U0003 INFECTIOUS AGENT DETECTION BY NUCLEIC ACID (DNA OR RNA); SEVERE ACUTE RESPIRATORY SYNDROME CORONAVIRUS 2 (SARS-COV-2) (CORONAVIRUS DISEASE [COVID-19]), AMPLIFIED PROBE TECHNIQUE, MAKING USE OF HIGH THROUGHPUT TECHNOLOGIES AS DESCRIBED BY CMS-2020-01-R: HCPCS | Performed by: SURGERY

## 2020-12-05 LAB
SARS-COV-2 RNA SPEC QL NAA+PROBE: NOT DETECTED
SPECIMEN SOURCE: NORMAL

## 2020-12-07 ENCOUNTER — ANESTHESIA EVENT (OUTPATIENT)
Dept: SURGERY | Facility: AMBULATORY SURGERY CENTER | Age: 50
End: 2020-12-07

## 2020-12-08 ENCOUNTER — ANESTHESIA (OUTPATIENT)
Dept: SURGERY | Facility: AMBULATORY SURGERY CENTER | Age: 50
End: 2020-12-08

## 2020-12-08 ENCOUNTER — HOSPITAL ENCOUNTER (OUTPATIENT)
Facility: AMBULATORY SURGERY CENTER | Age: 50
Discharge: HOME | End: 2020-12-08
Attending: SURGERY | Admitting: SURGERY
Payer: COMMERCIAL

## 2020-12-08 VITALS
RESPIRATION RATE: 18 BRPM | TEMPERATURE: 97.2 F | OXYGEN SATURATION: 98 % | SYSTOLIC BLOOD PRESSURE: 131 MMHG | DIASTOLIC BLOOD PRESSURE: 87 MMHG | HEART RATE: 57 BPM

## 2020-12-08 DIAGNOSIS — K42.0 UMBILICAL HERNIA WITH OBSTRUCTION: ICD-10-CM

## 2020-12-08 PROCEDURE — G8907 PT DOC NO EVENTS ON DISCHARG: HCPCS

## 2020-12-08 PROCEDURE — 49653: CPT

## 2020-12-08 PROCEDURE — G8916 PT W IV AB GIVEN ON TIME: HCPCS

## 2020-12-08 PROCEDURE — 49653 PR LAP VENT/ABD HERN PROC COMP: CPT | Performed by: SURGERY

## 2020-12-08 DEVICE — MESH SYMBOTEX COMPOSITE STEX ROUND 12CM SYM12: Type: IMPLANTABLE DEVICE | Site: ABDOMEN | Status: FUNCTIONAL

## 2020-12-08 RX ORDER — PROPOFOL 10 MG/ML
INJECTION, EMULSION INTRAVENOUS CONTINUOUS PRN
Status: DISCONTINUED | OUTPATIENT
Start: 2020-12-08 | End: 2020-12-08

## 2020-12-08 RX ORDER — FENTANYL CITRATE 50 UG/ML
25-50 INJECTION, SOLUTION INTRAMUSCULAR; INTRAVENOUS EVERY 5 MIN PRN
Status: DISCONTINUED | OUTPATIENT
Start: 2020-12-08 | End: 2020-12-09 | Stop reason: HOSPADM

## 2020-12-08 RX ORDER — FENTANYL CITRATE 50 UG/ML
INJECTION, SOLUTION INTRAMUSCULAR; INTRAVENOUS PRN
Status: DISCONTINUED | OUTPATIENT
Start: 2020-12-08 | End: 2020-12-08

## 2020-12-08 RX ORDER — MEPERIDINE HYDROCHLORIDE 25 MG/ML
12.5 INJECTION INTRAMUSCULAR; INTRAVENOUS; SUBCUTANEOUS
Status: DISCONTINUED | OUTPATIENT
Start: 2020-12-08 | End: 2020-12-09 | Stop reason: HOSPADM

## 2020-12-08 RX ORDER — BUPIVACAINE HYDROCHLORIDE AND EPINEPHRINE 2.5; 5 MG/ML; UG/ML
INJECTION, SOLUTION INFILTRATION; PERINEURAL PRN
Status: DISCONTINUED | OUTPATIENT
Start: 2020-12-08 | End: 2020-12-08 | Stop reason: HOSPADM

## 2020-12-08 RX ORDER — ALBUTEROL SULFATE 90 UG/1
AEROSOL, METERED RESPIRATORY (INHALATION) PRN
Status: DISCONTINUED | OUTPATIENT
Start: 2020-12-08 | End: 2020-12-08

## 2020-12-08 RX ORDER — CEFAZOLIN SODIUM 2 G/100ML
2 INJECTION, SOLUTION INTRAVENOUS
Status: COMPLETED | OUTPATIENT
Start: 2020-12-08 | End: 2020-12-08

## 2020-12-08 RX ORDER — NALOXONE HYDROCHLORIDE 0.4 MG/ML
0.2 INJECTION, SOLUTION INTRAMUSCULAR; INTRAVENOUS; SUBCUTANEOUS
Status: DISCONTINUED | OUTPATIENT
Start: 2020-12-08 | End: 2020-12-09 | Stop reason: HOSPADM

## 2020-12-08 RX ORDER — AMOXICILLIN 250 MG
1-2 CAPSULE ORAL 2 TIMES DAILY
Qty: 30 TABLET | Refills: 0 | Status: SHIPPED | OUTPATIENT
Start: 2020-12-08 | End: 2022-02-28

## 2020-12-08 RX ORDER — ONDANSETRON 4 MG/1
4-8 TABLET, ORALLY DISINTEGRATING ORAL EVERY 8 HOURS PRN
Qty: 4 TABLET | Refills: 0 | Status: SHIPPED | OUTPATIENT
Start: 2020-12-08 | End: 2022-02-28

## 2020-12-08 RX ORDER — OXYCODONE HCL 5 MG/5 ML
5 SOLUTION, ORAL ORAL EVERY 4 HOURS PRN
Status: DISCONTINUED | OUTPATIENT
Start: 2020-12-08 | End: 2020-12-09 | Stop reason: HOSPADM

## 2020-12-08 RX ORDER — ONDANSETRON 4 MG/1
4 TABLET, ORALLY DISINTEGRATING ORAL EVERY 30 MIN PRN
Status: DISCONTINUED | OUTPATIENT
Start: 2020-12-08 | End: 2020-12-09 | Stop reason: HOSPADM

## 2020-12-08 RX ORDER — METOPROLOL TARTRATE 1 MG/ML
1-2 INJECTION, SOLUTION INTRAVENOUS EVERY 5 MIN PRN
Status: DISCONTINUED | OUTPATIENT
Start: 2020-12-08 | End: 2020-12-09 | Stop reason: HOSPADM

## 2020-12-08 RX ORDER — NALOXONE HYDROCHLORIDE 0.4 MG/ML
0.4 INJECTION, SOLUTION INTRAMUSCULAR; INTRAVENOUS; SUBCUTANEOUS
Status: DISCONTINUED | OUTPATIENT
Start: 2020-12-08 | End: 2020-12-09 | Stop reason: HOSPADM

## 2020-12-08 RX ORDER — LIDOCAINE HYDROCHLORIDE 20 MG/ML
INJECTION, SOLUTION INFILTRATION; PERINEURAL PRN
Status: DISCONTINUED | OUTPATIENT
Start: 2020-12-08 | End: 2020-12-08

## 2020-12-08 RX ORDER — PHYSOSTIGMINE SALICYLATE 1 MG/ML
1.2 INJECTION INTRAVENOUS
Status: DISCONTINUED | OUTPATIENT
Start: 2020-12-08 | End: 2020-12-09 | Stop reason: HOSPADM

## 2020-12-08 RX ORDER — SODIUM CHLORIDE, SODIUM LACTATE, POTASSIUM CHLORIDE, CALCIUM CHLORIDE 600; 310; 30; 20 MG/100ML; MG/100ML; MG/100ML; MG/100ML
INJECTION, SOLUTION INTRAVENOUS CONTINUOUS
Status: DISCONTINUED | OUTPATIENT
Start: 2020-12-08 | End: 2020-12-09 | Stop reason: HOSPADM

## 2020-12-08 RX ORDER — ALBUTEROL SULFATE 0.83 MG/ML
2.5 SOLUTION RESPIRATORY (INHALATION) EVERY 4 HOURS PRN
Status: DISCONTINUED | OUTPATIENT
Start: 2020-12-08 | End: 2020-12-09 | Stop reason: HOSPADM

## 2020-12-08 RX ORDER — PROPOFOL 10 MG/ML
INJECTION, EMULSION INTRAVENOUS PRN
Status: DISCONTINUED | OUTPATIENT
Start: 2020-12-08 | End: 2020-12-08

## 2020-12-08 RX ORDER — KETOROLAC TROMETHAMINE 30 MG/ML
INJECTION, SOLUTION INTRAMUSCULAR; INTRAVENOUS PRN
Status: DISCONTINUED | OUTPATIENT
Start: 2020-12-08 | End: 2020-12-08

## 2020-12-08 RX ORDER — ACETAMINOPHEN 325 MG/1
650 TABLET ORAL EVERY 6 HOURS PRN
Qty: 50 TABLET | Refills: 0 | COMMUNITY
Start: 2020-12-08 | End: 2023-11-14

## 2020-12-08 RX ORDER — ACETAMINOPHEN 325 MG/1
975 TABLET ORAL ONCE
Status: COMPLETED | OUTPATIENT
Start: 2020-12-08 | End: 2020-12-08

## 2020-12-08 RX ORDER — CEFAZOLIN SODIUM 1 G/3ML
1 INJECTION, POWDER, FOR SOLUTION INTRAMUSCULAR; INTRAVENOUS SEE ADMIN INSTRUCTIONS
Status: DISCONTINUED | OUTPATIENT
Start: 2020-12-08 | End: 2020-12-09 | Stop reason: HOSPADM

## 2020-12-08 RX ORDER — ONDANSETRON 2 MG/ML
INJECTION INTRAMUSCULAR; INTRAVENOUS PRN
Status: DISCONTINUED | OUTPATIENT
Start: 2020-12-08 | End: 2020-12-08

## 2020-12-08 RX ORDER — FENTANYL CITRATE 50 UG/ML
25-50 INJECTION, SOLUTION INTRAMUSCULAR; INTRAVENOUS
Status: DISCONTINUED | OUTPATIENT
Start: 2020-12-08 | End: 2020-12-09 | Stop reason: HOSPADM

## 2020-12-08 RX ORDER — ONDANSETRON 2 MG/ML
4 INJECTION INTRAMUSCULAR; INTRAVENOUS EVERY 30 MIN PRN
Status: DISCONTINUED | OUTPATIENT
Start: 2020-12-08 | End: 2020-12-09 | Stop reason: HOSPADM

## 2020-12-08 RX ORDER — LIDOCAINE 40 MG/G
CREAM TOPICAL
Status: DISCONTINUED | OUTPATIENT
Start: 2020-12-08 | End: 2020-12-09 | Stop reason: HOSPADM

## 2020-12-08 RX ORDER — OXYCODONE HYDROCHLORIDE 5 MG/1
5-10 TABLET ORAL EVERY 4 HOURS PRN
Qty: 12 TABLET | Refills: 0 | Status: SHIPPED | OUTPATIENT
Start: 2020-12-08 | End: 2022-02-28

## 2020-12-08 RX ORDER — HYDROMORPHONE HYDROCHLORIDE 1 MG/ML
.3-.5 INJECTION, SOLUTION INTRAMUSCULAR; INTRAVENOUS; SUBCUTANEOUS EVERY 10 MIN PRN
Status: DISCONTINUED | OUTPATIENT
Start: 2020-12-08 | End: 2020-12-09 | Stop reason: HOSPADM

## 2020-12-08 RX ORDER — DEXAMETHASONE SODIUM PHOSPHATE 4 MG/ML
INJECTION, SOLUTION INTRA-ARTICULAR; INTRALESIONAL; INTRAMUSCULAR; INTRAVENOUS; SOFT TISSUE PRN
Status: DISCONTINUED | OUTPATIENT
Start: 2020-12-08 | End: 2020-12-08

## 2020-12-08 RX ORDER — HYDRALAZINE HYDROCHLORIDE 20 MG/ML
2.5-5 INJECTION INTRAMUSCULAR; INTRAVENOUS EVERY 10 MIN PRN
Status: DISCONTINUED | OUTPATIENT
Start: 2020-12-08 | End: 2020-12-09 | Stop reason: HOSPADM

## 2020-12-08 RX ADMIN — ACETAMINOPHEN 975 MG: 325 TABLET ORAL at 06:54

## 2020-12-08 RX ADMIN — FENTANYL CITRATE 50 MCG: 50 INJECTION, SOLUTION INTRAMUSCULAR; INTRAVENOUS at 09:07

## 2020-12-08 RX ADMIN — Medication 50 MG: at 07:38

## 2020-12-08 RX ADMIN — PROPOFOL 75 MCG/KG/MIN: 10 INJECTION, EMULSION INTRAVENOUS at 07:38

## 2020-12-08 RX ADMIN — SODIUM CHLORIDE, SODIUM LACTATE, POTASSIUM CHLORIDE, CALCIUM CHLORIDE: 600; 310; 30; 20 INJECTION, SOLUTION INTRAVENOUS at 06:54

## 2020-12-08 RX ADMIN — Medication 5 MG: at 09:02

## 2020-12-08 RX ADMIN — FENTANYL CITRATE 50 MCG: 50 INJECTION, SOLUTION INTRAMUSCULAR; INTRAVENOUS at 07:38

## 2020-12-08 RX ADMIN — KETOROLAC TROMETHAMINE 30 MG: 30 INJECTION, SOLUTION INTRAMUSCULAR; INTRAVENOUS at 08:30

## 2020-12-08 RX ADMIN — FENTANYL CITRATE 50 MCG: 50 INJECTION, SOLUTION INTRAMUSCULAR; INTRAVENOUS at 07:29

## 2020-12-08 RX ADMIN — ALBUTEROL SULFATE 4 PUFF: 90 INHALANT RESPIRATORY (INHALATION) at 07:47

## 2020-12-08 RX ADMIN — Medication 20 MG: at 08:07

## 2020-12-08 RX ADMIN — DEXAMETHASONE SODIUM PHOSPHATE 4 MG: 4 INJECTION, SOLUTION INTRA-ARTICULAR; INTRALESIONAL; INTRAMUSCULAR; INTRAVENOUS; SOFT TISSUE at 07:52

## 2020-12-08 RX ADMIN — ONDANSETRON 4 MG: 2 INJECTION INTRAMUSCULAR; INTRAVENOUS at 07:52

## 2020-12-08 RX ADMIN — LIDOCAINE HYDROCHLORIDE 5 ML: 20 INJECTION, SOLUTION INFILTRATION; PERINEURAL at 07:38

## 2020-12-08 RX ADMIN — CEFAZOLIN SODIUM 2 G: 2 INJECTION, SOLUTION INTRAVENOUS at 07:48

## 2020-12-08 RX ADMIN — PROPOFOL 200 MG: 10 INJECTION, EMULSION INTRAVENOUS at 07:38

## 2020-12-08 NOTE — ANESTHESIA CARE TRANSFER NOTE
Patient: Andrew Atkinson    Procedure(s):  umbilical hernia repair    Diagnosis: Umbilical hernia with obstruction [K42.0]  Diagnosis Additional Information: No value filed.    Anesthesia Type:   General     Note:  Airway :Nasal Cannula  Patient transferred to:PACU  Handoff Report: Identifed the Patient, Identified the Reponsible Provider, Reviewed the pertinent medical history, Discussed the surgical course, Reviewed Intra-OP anesthesia mangement and issues during anesthesia, Set expectations for post-procedure period and Allowed opportunity for questions and acknowledgement of understanding      Vitals: (Last set prior to Anesthesia Care Transfer)    CRNA VITALS  12/8/2020 0811 - 12/8/2020 0850      12/8/2020             Resp Rate (observed):  (!) 2                Electronically Signed By: CHUY Melendez CRNA  December 8, 2020  8:50 AM

## 2020-12-08 NOTE — OP NOTE
Operative Note    Pre-operative diagnosis: Umbilical hernia with obstruction [K42.0]   Post-operative diagnosis SAme   Procedure: Procedure(s):  Laparoscopic umbilical hernia repair   Surgeon: Miguel Ángel Arnold DO   Assistants(s): None   Anesthesia: General    Estimated blood loss: 5 ml   Total IV fluids: (See anesthesia record)   Blood transfusion: No transfusion was given during surgery   Total urine output: (See anesthesia record)   Drains or packing: None   Specimens: None    Implants: 12 cm coated mesh    Findings: 1 cm umbilical hernia   Complications: None   Condition on discharge: Stable       Indications: This is a 50 year old who was evaluated for an umbilical hernia.  After discussing the risks and benefits and alternatives and laparoscopic approach was chosen.    Procedure: The patient was brought into the operating room and placed on the table in the supine position.  General anesthesia was administered.  Both arms were tucked and any bony prominences were padded.  The abdomen was prepped and draped in the usual fashion.  A timeout was performed prior to incision.    An incision was made in the left upper quadrant.  The 5 mm Optiview trocar was inserted into the abdominal cavity under direct visualization.  The abdomen was insufflated with carbon dioxide to pressure of 15 mmHg.  Patient tolerated insufflation well.  The abdomen was inspected and an umbilical hernia was noted.  A 5 mm trocar was inserted in the left lower quadrant. The abdomen was examined along with the bowel and no enterotomies were noted.  The hernia defect was noted to be 1 cm.  Using the maryland and cautery the peritoneum was incised and the hernia sac was reduced from the hernia and removed from the abdominal cavity.  A 10/12 port was then placed at the umbilicus under direct visualization and in the usual fashion.  The 12 cm mesh was then placed through the port into the abdominal cavity.  The abdominal insufflation pressure was  decreased to 8 to 10 mmHg.  The port was removed.  Using an 0 PDS and a suture passer the hernia defect was closed using one figure-of-eight suture.  Once primary defect was closed with a suture passer was used to grasp each arm the center stitch and a clamp was used to hold the mesh in place.  Using counterpressure and and observe attack device the mesh was stapled in place.   was then done with a second row of tacks circumferentially.  After the mesh was tacked in entirety, the center stitch was tied in place.    The abdominal cavity was inspected and the hernia repair was noted to be satisfactory, including that the mesh covered the 10/12 port site.  The trochars were then removed under direct vision and the abdomen deflated.  The skin was then closed with 4-0 Vicryl.  Glue with an embedded Steri-Strip was then applied.  The port sites were infiltrated with quarter percent Marcaine with epinephrine.    A debriefing checklist was completed.  Patient tolerated the procedure well and was brought to the PACU in stable condition.

## 2020-12-08 NOTE — ANESTHESIA PREPROCEDURE EVALUATION
"Anesthesia Pre-Procedure Evaluation    Patient: Andrew Atkinson   MRN:     [id number] Gender:   male   Age:    50 year old :      1970        Preoperative Diagnosis: Umbilical hernia with obstruction [K42.0]   Procedure(s):  umbilical hernia repair     LABS:  CBC:   Lab Results   Component Value Date    WBC 6.7 2020    WBC 8.8 2020    HGB 15.8 2020    HGB 14.0 2020    HCT 46.2 2020    HCT 41.9 2020     2020     2020     BMP:   Lab Results   Component Value Date     02/15/2020     2020    POTASSIUM 4.0 2020    POTASSIUM 3.7 02/15/2020    CHLORIDE 108 02/15/2020    CHLORIDE 108 2020    CO2 23 02/15/2020    CO2 24 2020    BUN 16 02/15/2020    BUN 18 2020    CR 0.96 2020    CR 0.94 02/15/2020     (H) 02/15/2020     (H) 2020     COAGS:   Lab Results   Component Value Date    INR 1.09 02/15/2020     POC: No results found for: BGM, HCG, HCGS  OTHER:   Lab Results   Component Value Date    A1C 5.9 (H) 03/10/2020    NAPOLEON 8.8 02/15/2020    ALBUMIN 4.2 2020    PROTTOTAL 7.8 2020    ALT 83 (H) 2020    AST 42 2020    ALKPHOS 75 2020    BILITOTAL 0.4 2020    TSH 1.60 2020    T4 1.65 (H) 2020    CRP 3.1 2020        Preop Vitals    BP Readings from Last 3 Encounters:   20 121/71   20 118/78   10/08/20 126/88    Pulse Readings from Last 3 Encounters:   20 57   20 62   10/08/20 83      Resp Readings from Last 3 Encounters:   20 16   03/10/20 18   20 18    SpO2 Readings from Last 3 Encounters:   20 95%   20 96%   03/10/20 98%      Temp Readings from Last 1 Encounters:   20 97.6  F (36.4  C)    Ht Readings from Last 1 Encounters:   20 1.803 m (5' 11\")      Wt Readings from Last 1 Encounters:   20 104.8 kg (231 lb)    Estimated body mass index is 32.22 kg/m  as calculated from the " "following:    Height as of 11/30/20: 1.803 m (5' 11\").    Weight as of 11/30/20: 104.8 kg (231 lb).     LDA:  Peripheral IV 12/08/20 Left Hand (Active)   Site Assessment WDL 12/08/20 0652   Line Status Infusing 12/08/20 0652   Dressing Intervention New dressing  12/08/20 0652   Phlebitis Scale 0-->no symptoms 12/08/20 0652   Infiltration Scale 0 12/08/20 0652   Infiltration Site Treatment Method  None 12/08/20 0652   Number of days: 0        Past Medical History:   Diagnosis Date     Medial meniscus tear       Past Surgical History:   Procedure Laterality Date     ARTHROSCOPY KNEE WITH MEDIAL MENISCECTOMY Left 5/12/2017    Procedure: ARTHROSCOPY KNEE WITH MEDIAL MENISCECTOMY;  Arthroscopic partial medial menisectomy,left knee;  Surgeon: Grant Muñoz MD;  Location: [facility] OR     CV CORONARY ANGIOGRAM N/A 2/15/2020    Procedure: Coronary Angiogram;  Surgeon: Rinku Boyce MD;  Location:  HEART CARDIAC CATH LAB      Allergies   Allergen Reactions     Morphine Hives        Anesthesia Evaluation     . Pt has had prior anesthetic. Type: General    History of anesthetic complications   - PONV        ROS/MED HX    ENT/Pulmonary:  - neg pulmonary ROS     Neurologic:  - neg neurologic ROS     Cardiovascular: Comment: Acute viral myocarditis in February    (+) ----. : . . . :. . Previous cardiac testing Echodate:2020results:Good functiondate: results:ECG reviewed date:2020 results:Myocarditis changes throughout all leadsCath date: 2020 results:Clean coronaries          METS/Exercise Tolerance:     Hematologic:  - neg hematologic  ROS       Musculoskeletal:         GI/Hepatic:     (+) GERD Asymptomatic on medication,       Renal/Genitourinary:  - ROS Renal section negative       Endo:     (+) thyroid problem hypothyroidism, .      Psychiatric:  - neg psychiatric ROS       Infectious Disease:  - neg infectious disease ROS       Malignancy:      - no malignancy   Other:    - neg other ROS                     " PHYSICAL EXAM:   Mental Status/Neuro: A/A/O   Airway: Facies: Feasible  Mallampati: I  Mouth/Opening: Full  TM distance: > 6 cm  Neck ROM: Full   Respiratory: Auscultation: CTAB     Resp. Rate: Normal     Resp. Effort: Normal      CV: Rhythm: Regular  Rate: Age appropriate  Heart: Normal Sounds  Edema: None   Comments: Beaard     Dental: Normal Dentition                Assessment:   ASA SCORE: 3    H&P: History and physical reviewed and following examination; no interval change.   Smoking Status:  Non-Smoker/Unknown   NPO Status: NPO Appropriate     Plan:   Anes. Type:  General   Pre-Medication: None   Induction:  IV (Standard)   Airway: ETT; Oral   Access/Monitoring: PIV   Maintenance: Balanced     Postop Plan:   Postop Pain: Opioids  Postop Sedation/Airway: Not planned  Disposition: Outpatient     PONV Management:   Adult Risk Factors:, Non-Smoker, Postop Opioids   Prevention: Ondansetron, Dexamethasone, Propofol     CONSENT: Direct conversation   Plan and risks discussed with: Patient   Blood Products: Consent Deferred (Minimal Blood Loss)       Comments for Plan/Consent:  Background propofol infusion.                 Sascha Guadalupe MD

## 2020-12-08 NOTE — ANESTHESIA POSTPROCEDURE EVALUATION
Anesthesia POST Procedure Evaluation    Patient: Andrew Atkinson   MRN:     [id number] Gender:   male   Age:    50 year old :      1970        Preoperative Diagnosis: Umbilical hernia with obstruction [K42.0]   Procedure(s):  umbilical hernia repair   Postop Comments: No value filed.     Anesthesia Type: General       Disposition: Outpatient   Postop Pain Control: Uneventful            Sign Out: Well controlled pain   PONV: No   Neuro/Psych: Uneventful            Sign Out: Acceptable/Baseline neuro status   Airway/Respiratory: Uneventful            Sign Out: Acceptable/Baseline resp. status   CV/Hemodynamics: Uneventful            Sign Out: Acceptable CV status   Other NRE: NONE   DID A NON-ROUTINE EVENT OCCUR? No         Last Anesthesia Record Vitals:  CRNA VITALS  2020 0811 - 2020 0911      2020             Resp Rate (observed):  (!) 2          Last PACU Vitals:  Vitals Value Taken Time   /93 20 0915   Temp 97.2  F (36.2  C) 20 0842   Pulse     Resp 16 20 0915   SpO2 96 % 20 0915   Temp src     NIBP     Pulse     SpO2     Resp     Temp     Ht Rate     Temp 2           Electronically Signed By: Sascha Guadalupe MD, 2020, 10:15 AM

## 2020-12-08 NOTE — ANESTHESIA PROCEDURE NOTES
Airway   Date/Time: 12/8/2020 7:43 AM   Patient location during procedure: OR    Staff -   CRNA: Eric Crisostomo APRN CRNA  Performed By: CRNA    Consent for Airway   Urgency: elective    Indications and Patient Condition  Indications for airway management: magalis-procedural  Induction type:intravenousMask difficulty assessment: 2 - vent by mask + OA or adjuvant +/- NMBA    Final Airway Details  Final airway type: endotracheal airway  Successful airway:ETT - single  Endotracheal Airway Details   ETT size (mm): 7.5  Cuffed: yes  Cuff volume (mL): 10  Successful intubation technique: direct laryngoscopy and video laryngoscopy  Grade View of Cords: 1  Adjucts: stylet  Measured from: lips  Secured at (cm): 22  Secured with: plastic tape  Bite block used: Oral Airway    Post intubation assessment   Placement verified by: capnometry, equal breath sounds and chest rise   Number of attempts at approach: 1 (1st attempt blind with DL - MAC 3)  Number of other approaches attempted: 1 (2nd attempt #4 Glidescope)  Secured with:plastic tape  Ease of procedure: easy  Dentition: Intact and Unchanged

## 2020-12-08 NOTE — DISCHARGE INSTRUCTIONS
[facility]  Same-Day Surgery   Adult Discharge Orders & Instructions   For 24 hours after surgery  1. Get plenty of rest.  A responsible adult must stay with you for at least 24 hours after you leave the hospital.   2. Do not drive or use heavy equipment.  If you have weakness or tingling, don't drive or use heavy equipment until this feeling goes away.  3. Do not drink alcohol.  4. Avoid strenuous or risky activities.  Ask for help when climbing stairs.   5. You may feel lightheaded.  IF so, sit for a few minutes before standing.  Have someone help you get up.   6. If you have nausea (feel sick to your stomach): Drink only clear liquids such as apple juice, ginger ale, broth or 7-Up.  Rest may also help.  Be sure to drink enough fluids.  Move to a regular diet as you feel able.  7. You may have a slight fever. Call the doctor if your fever is over 100 F (37.7 C) (taken under the tongue) or lasts longer than 24 hours.  8. You may have a dry mouth, a sore throat, muscle aches or trouble sleeping.  These should go away after 24 hours.  9. Do not make important or legal decisions.   Call your doctor for any of the followin.  Signs of infection (fever, growing tenderness at the surgery site, a large amount of drainage or bleeding, severe pain, foul-smelling drainage, redness, swelling).    2. It has been over 8 to 10 hours since surgery and you are still not able to urinate (pass water).    3.  Headache for over 24 hours.    4.  Numbness, tingling or weakness the day after surgery (if you had spinal anesthesia).  To contact a doctor, call ___________________________

## 2020-12-31 ENCOUNTER — OFFICE VISIT (OUTPATIENT)
Dept: SURGERY | Facility: CLINIC | Age: 50
End: 2020-12-31
Payer: COMMERCIAL

## 2020-12-31 VITALS
DIASTOLIC BLOOD PRESSURE: 79 MMHG | WEIGHT: 231 LBS | SYSTOLIC BLOOD PRESSURE: 131 MMHG | HEART RATE: 80 BPM | BODY MASS INDEX: 32.34 KG/M2 | HEIGHT: 71 IN

## 2020-12-31 DIAGNOSIS — K42.0 UMBILICAL HERNIA WITH OBSTRUCTION: Primary | ICD-10-CM

## 2020-12-31 PROCEDURE — 99024 POSTOP FOLLOW-UP VISIT: CPT | Performed by: SURGERY

## 2020-12-31 ASSESSMENT — MIFFLIN-ST. JEOR: SCORE: 1924.94

## 2020-12-31 NOTE — PROGRESS NOTES
"General Surgery Post Op    Pt returns for follow up visit s/p umbilical hernia repair.    Patient has been doing well, tolerating diet. Bowels moving well. Pain controlled, first week was pretty tender. No issues with wound healing/redness/drainage. No fevers.    Physical exam: Vitals: /79   Pulse 80   Ht 1.803 m (5' 11\")   Wt 104.8 kg (231 lb)   BMI 32.22 kg/m    BMI= Body mass index is 32.22 kg/m .    Exam:  Constitutional: healthy, alert and no distress  Respiratory: Non-labored  Gastrointestinal: Abdomen soft, non-tender. BS normal. No masses, organomegaly  Incisions are healing well with no erythema or exudate          Assessment: Pt is doing well s/p hernia repair    Plan: Pt doing well and can follow up as needed. We reviewed the lifting restrictions and when to return.       Miguel Ángel Arnold, DO      "

## 2020-12-31 NOTE — LETTER
"    12/31/2020         RE: Andrew Atkinson  [address] [place] [id number]        Dear Colleague,    Thank you for referring your patient, Andrew Atkinson, to the [facility]. Please see a copy of my visit note below.    General Surgery Post Op    Pt returns for follow up visit s/p umbilical hernia repair.    Patient has been doing well, tolerating diet. Bowels moving well. Pain controlled, first week was pretty tender. No issues with wound healing/redness/drainage. No fevers.    Physical exam: Vitals: /79   Pulse 80   Ht 1.803 m (5' 11\")   Wt 104.8 kg (231 lb)   BMI 32.22 kg/m    BMI= Body mass index is 32.22 kg/m .    Exam:  Constitutional: healthy, alert and no distress  Respiratory: Non-labored  Gastrointestinal: Abdomen soft, non-tender. BS normal. No masses, organomegaly  Incisions are healing well with no erythema or exudate          Assessment: Pt is doing well s/p hernia repair    Plan: Pt doing well and can follow up as needed. We reviewed the lifting restrictions and when to return.       Miguel Ángel Arnold, DO          Again, thank you for allowing me to participate in the care of your patient.        Sincerely,        Miguel Ángel Arnold, DO    "

## 2021-03-18 ENCOUNTER — VIRTUAL VISIT (OUTPATIENT)
Dept: FAMILY MEDICINE | Facility: CLINIC | Age: 51
End: 2021-03-18
Payer: COMMERCIAL

## 2021-03-18 DIAGNOSIS — H69.90 DYSFUNCTION OF EUSTACHIAN TUBE, UNSPECIFIED LATERALITY: ICD-10-CM

## 2021-03-18 DIAGNOSIS — J01.10 ACUTE NON-RECURRENT FRONTAL SINUSITIS: Primary | ICD-10-CM

## 2021-03-18 PROCEDURE — 99213 OFFICE O/P EST LOW 20 MIN: CPT | Mod: 95 | Performed by: FAMILY MEDICINE

## 2021-03-18 RX ORDER — FLUTICASONE PROPIONATE 50 MCG
1 SPRAY, SUSPENSION (ML) NASAL DAILY
Qty: 16 G | Refills: 2 | Status: SHIPPED | OUTPATIENT
Start: 2021-03-18 | End: 2022-02-28

## 2021-03-18 RX ORDER — GUAIFENESIN, PSEUDOEPHEDRINE HYDROCHLORIDE 600; 60 MG/1; MG/1
1 TABLET, EXTENDED RELEASE ORAL EVERY 12 HOURS
Qty: 10 TABLET | Refills: 0 | Status: SHIPPED | OUTPATIENT
Start: 2021-03-18 | End: 2021-03-23

## 2021-03-18 NOTE — PATIENT INSTRUCTIONS
Patient Education     Acute Bacterial Rhinosinusitis (ABRS)    Acute bacterial rhinosinusitis (ABRS) is an infection of your nasal cavity and sinuses. It s caused by bacteria. Acute means that you ve had symptoms for less than 4 weeks, but possibly up to 12 weeks.  Understanding your sinuses  The nasal cavity is the large air-filled space behind your nose. The sinuses are a group of spaces formed by the bones of your face. They connect with your nasal cavity. ABRS causes the tissue lining these spaces to become inflamed. Mucus may not drain normally. This leads to facial pain and other symptoms.  What causes ABRS?  ABRS most often follows an upper respiratory infection caused by a virus. Bacteria then infect the lining of your nasal cavity and sinuses. But you can also get ABRS if you have:    Nasal allergies    Long-term nasal swelling and congestion not caused by allergies    Blockage in the nose  Symptoms of ABRS  The symptoms of ABRS may be different for each person and include:    Nasal congestion or blockage    Pain or pressure in the face    Thick, colored drainage from the nose  Other symptoms may include:    Runny nose    Fluid draining from the nose down the throat (postnasal drip)    Headache    Cough    Pain    Fever  Diagnosing ABRS  ABRS may be diagnosed if you ve had an upper respiratory infection like a cold and cough for 10 or more days without improvement or with worsening symptoms. Your healthcare provider will ask about your symptoms and your medical history. The provider will check your vital signs, including your temperature. You ll have a physical exam. The healthcare provider will check your ears, nose, and throat. You likely won t need any tests. If ABRS comes back, you may have a culture or other tests.  Treatment for ABRS  Treatment may include:    Antibiotic medicine. This is for symptoms that last for at least 10 to 14 days.    Nasal corticosteroid medicine. Drops or spray used in the  nose can lessen swelling and congestion.    Over-the-counter pain medicine. This is to lessen sinus pain and pressure.    Nasal decongestant medicine. Spray or drops may help to lessen congestion. Do not use them for more than a few days.    Salt wash (saline irrigation). This can help to loosen mucus.  Possible complications of ABRS  ABRS may come back or become long-term (chronic). In rare cases, ABRS may cause complications such as:     Inflamed tissue around the brain and spinal cord (meningitis)    Inflamed tissue around the eyes (orbital cellulitis)    Inflamed bones around the sinuses (osteitis)  These problems may need to be treated in a hospital with intravenous (IV) antibiotic medicine or surgery.  When to call the healthcare provider  Call your healthcare provider if you have any of the following:    Symptoms that don t get better, or get worse    Symptoms that don t get better after 3 to 5 days on antibiotics    Trouble seeing    Swelling around your eyes    Confusion or trouble staying awake   [employer] last reviewed this educational content on 5/1/2017 2000-2020 [employer]. All rights reserved. This information is not intended as a substitute for professional medical care. Always follow your healthcare professional's instructions.

## 2021-03-18 NOTE — PROGRESS NOTES
Andrew is a 50 year old who is being evaluated via a billable telephone visit.      What phone number would you like to be contacted at? [phone]  How would you like to obtain your AVS? Mail a copy    Assessment & Plan       ICD-10-CM    1. Acute non-recurrent frontal sinusitis  J01.10 amoxicillin-clavulanate (AUGMENTIN) 875-125 MG tablet     fluticasone (FLONASE) 50 MCG/ACT nasal spray     pseudoePHEDrine-guaiFENesin (MUCINEX D)  MG 12 hr tablet   2. Dysfunction of Eustachian tube, unspecified laterality  H69.80 fluticasone (FLONASE) 50 MCG/ACT nasal spray         Review of external notes as documented elsewhere in note          Return in about 2 weeks (around 4/1/2021) for If symptoms persist.    Wilfredo Hemphill MD  [facility]   Andrew is a 50 year old who presents for the following health issues     HPI     Acute Illness  Acute illness concerns: Sinus/URI  Onset/Duration: 2 weeks  Symptoms:  Fever: no  Chills/Sweats: no  Headache (location?): YES  Sinus Pressure: YES  Conjunctivitis:  no  Ear Pain: YES: left  Rhinorrhea: no  Congestion: YES  Sore Throat: no  Cough: YES-non-productive  Wheeze: no  Decreased Appetite: no  Nausea: no  Vomiting: no  Diarrhea: no  Dysuria/Freq.: no  Dysuria or Hematuria: no  Fatigue/Achiness: YES  Sick/Strep Exposure: YES- girlfriend was sick  Therapies tried and outcome: OTC decongestant     Sinus pressure  X 2 weeks  Ear is plugged, muffled hearing  Sinus headache        Review of Systems   Constitutional, HEENT, cardiovascular, pulmonary, gi and gu systems are negative, except as otherwise noted.      Objective           Vitals:  No vitals were obtained today due to virtual visit.    Physical Exam   healthy, alert and no distress  PSYCH: Alert and oriented times 3; coherent speech, normal   rate and volume, able to articulate logical thoughts, able   to abstract reason, no tangential thoughts, no hallucinations   or delusions  His  affect is normal  RESP: No cough, no audible wheezing, able to talk in full sentences  Remainder of exam unable to be completed due to telephone visits                Phone call duration: 12 minutes

## 2021-03-29 ENCOUNTER — TELEPHONE (OUTPATIENT)
Dept: FAMILY MEDICINE | Facility: CLINIC | Age: 51
End: 2021-03-29

## 2021-04-05 ENCOUNTER — OFFICE VISIT (OUTPATIENT)
Dept: FAMILY MEDICINE | Facility: CLINIC | Age: 51
End: 2021-04-05
Payer: COMMERCIAL

## 2021-04-05 VITALS
DIASTOLIC BLOOD PRESSURE: 88 MMHG | SYSTOLIC BLOOD PRESSURE: 134 MMHG | OXYGEN SATURATION: 97 % | BODY MASS INDEX: 33.58 KG/M2 | TEMPERATURE: 98.3 F | HEART RATE: 81 BPM | WEIGHT: 240.8 LBS

## 2021-04-05 DIAGNOSIS — H69.92 DYSFUNCTION OF LEFT EUSTACHIAN TUBE: Primary | ICD-10-CM

## 2021-04-05 PROCEDURE — 99214 OFFICE O/P EST MOD 30 MIN: CPT | Performed by: FAMILY MEDICINE

## 2021-04-05 RX ORDER — AZELASTINE 1 MG/ML
1 SPRAY, METERED NASAL 2 TIMES DAILY
Qty: 30 ML | Refills: 0 | Status: SHIPPED | OUTPATIENT
Start: 2021-04-05 | End: 2022-02-28

## 2021-04-05 RX ORDER — CETIRIZINE HYDROCHLORIDE 10 MG/1
10 TABLET ORAL DAILY
Qty: 30 TABLET | Refills: 2 | Status: SHIPPED | OUTPATIENT
Start: 2021-04-05 | End: 2022-02-28

## 2021-04-05 RX ORDER — GUAIFENESIN, PSEUDOEPHEDRINE HYDROCHLORIDE 600; 60 MG/1; MG/1
1 TABLET, EXTENDED RELEASE ORAL EVERY 12 HOURS
Qty: 30 TABLET | Refills: 0 | Status: SHIPPED | OUTPATIENT
Start: 2021-04-05 | End: 2022-02-28

## 2021-04-05 NOTE — PROGRESS NOTES
Assessment & Plan       ICD-10-CM    1. Dysfunction of left eustachian tube  H69.82 cetirizine (ZYRTEC) 10 MG tablet     azelastine (ASTELIN) 0.1 % nasal spray     pseudoePHEDrine-guaiFENesin (MUCINEX D)  MG 12 hr tablet   popping sensation with valsalva  ENT if no improvement in 2 weeks      Review of external notes as documented elsewhere in note               Follow-up in 2 weeks  Wilfredo Hemphill MD  [facility] GABI Morales is a 50 year old who presents for the following health issues     HPI     Concern - Left ear plugged  Onset:   Description: Feels like left ear is plugged and sometimes cause headaches  Intensity: moderate  Progression of Symptoms:  same  Accompanying Signs & Symptoms:   Previous history of similar problem: Unknown if allergy related  Precipitating factors:        Worsened by:   Alleviating factors:        Improved by:   Therapies tried and outcome: Nasal spray and antibiotic that was given previously    Distant history of TM surgery 15-20 years ago  Left ear plugged, feels like one nostril closed  Flonase/augmentin helped somewhat  Ear pops with valsalva          Review of Systems   Constitutional, HEENT, cardiovascular, pulmonary, gi and gu systems are negative, except as otherwise noted.      Objective    /88   Pulse 81   Temp 98.3  F (36.8  C) (Oral)   Wt 109.2 kg (240 lb 12.8 oz)   SpO2 97%   BMI 33.58 kg/m    Body mass index is 33.58 kg/m .  Physical Exam   GENERAL: healthy, alert and no distress  EYES: Eyes grossly normal to inspection, PERRL and conjunctivae and sclerae normal  HENT: bilateral TM scarring, nose and mouth without ulcers or lesions  NECK: no adenopathy, no asymmetry, masses, or scars and thyroid normal to palpation  SKIN: no suspicious lesions or rashes  PSYCH: mentation appears normal, affect normal/bright

## 2021-05-05 DIAGNOSIS — E03.9 ACQUIRED HYPOTHYROIDISM: Primary | ICD-10-CM

## 2021-05-05 RX ORDER — LEVOTHYROXINE SODIUM 150 UG/1
150 TABLET ORAL DAILY
Qty: 90 TABLET | Refills: 1 | Status: SHIPPED | OUTPATIENT
Start: 2021-05-05 | End: 2021-11-10

## 2021-05-05 NOTE — TELEPHONE ENCOUNTER
Last Written Prescription Date:  2/14/20  Last Fill Quantity: 90,  # refills: 3   Last office visit: 4/5/2021 with prescribing provider:  Macintire   Future Office Visit: none    TSH   Date Value Ref Range Status   11/30/2020 1.60 0.40 - 4.00 mU/L Final     Prescription approved per [facility] Refill Protocol.    Afshan Reilly RN, BSN  [facility]

## 2021-05-05 NOTE — TELEPHONE ENCOUNTER
Patient is in need of refill of levothyroxine (SYNTHROID/LEVOTHROID) 150 MCG tablet--    Switched pharmacies and should now go to [facility] ([phone]).      Needs today.  Only one pill left.    Please call with any questions.  OK to LM on VM

## 2021-05-18 ENCOUNTER — TRANSFERRED RECORDS (OUTPATIENT)
Dept: HEALTH INFORMATION MANAGEMENT | Facility: CLINIC | Age: 51
End: 2021-05-18

## 2021-06-01 ENCOUNTER — TRANSFERRED RECORDS (OUTPATIENT)
Dept: HEALTH INFORMATION MANAGEMENT | Facility: CLINIC | Age: 51
End: 2021-06-01

## 2021-08-18 NOTE — TELEPHONE ENCOUNTER
"Patient called stating he recently finished antibiotics for URI and has had no improvement in symptoms. He states that he feels the left side of his head is plugged up and he's got a slight headache. He denies fever, cough, shortness of breath. No other symptoms other than feeling \"plugged up\". Scheduled for OV 04/05/2021.    EDUARDO Melgoza RN  [facility], [place]  " No

## 2021-08-31 ENCOUNTER — TRANSFERRED RECORDS (OUTPATIENT)
Dept: HEALTH INFORMATION MANAGEMENT | Facility: CLINIC | Age: 51
End: 2021-08-31

## 2021-11-08 DIAGNOSIS — E03.9 ACQUIRED HYPOTHYROIDISM: ICD-10-CM

## 2021-11-10 RX ORDER — LEVOTHYROXINE SODIUM 150 UG/1
TABLET ORAL
Qty: 90 TABLET | Refills: 0 | Status: SHIPPED | OUTPATIENT
Start: 2021-11-10 | End: 2022-02-21

## 2021-11-10 NOTE — TELEPHONE ENCOUNTER
"Prescription approved per [facility] Refill Protocol.  Requested Prescriptions   Pending Prescriptions Disp Refills     levothyroxine (SYNTHROID/LEVOTHROID) 150 MCG tablet [Pharmacy Med Name: Levothyroxine Sodium Oral Tablet 150 MCG] 90 tablet 0     Sig: TAKE 1 TABLET BY MOUTH ONCE DAILY       Thyroid Protocol Passed - 11/8/2021 11:42 AM        Passed - Patient is 12 years or older        Passed - Recent (12 mo) or future (30 days) visit within the authorizing provider's specialty     Patient has had an office visit with the authorizing provider or a provider within the authorizing providers department within the previous 12 mos or has a future within next 30 days. See \"Patient Info\" tab in inbasket, or \"Choose Columns\" in Meds & Orders section of the refill encounter.              Passed - Medication is active on med list        Passed - Normal TSH on file in past 12 months     Recent Labs   Lab Test 11/30/20  0752   TSH 1.60                   "

## 2021-12-08 ENCOUNTER — TRANSFERRED RECORDS (OUTPATIENT)
Dept: HEALTH INFORMATION MANAGEMENT | Facility: CLINIC | Age: 51
End: 2021-12-08
Payer: COMMERCIAL

## 2022-01-20 ENCOUNTER — MEDICAL CORRESPONDENCE (OUTPATIENT)
Dept: HEALTH INFORMATION MANAGEMENT | Facility: CLINIC | Age: 52
End: 2022-01-20
Payer: COMMERCIAL

## 2022-01-21 ENCOUNTER — E-CONSULT (OUTPATIENT)
Dept: CARDIOLOGY | Facility: CLINIC | Age: 52
End: 2022-01-21
Payer: COMMERCIAL

## 2022-01-21 ENCOUNTER — OFFICE VISIT (OUTPATIENT)
Dept: FAMILY MEDICINE | Facility: CLINIC | Age: 52
End: 2022-01-21
Payer: COMMERCIAL

## 2022-01-21 ENCOUNTER — ANCILLARY PROCEDURE (OUTPATIENT)
Dept: GENERAL RADIOLOGY | Facility: CLINIC | Age: 52
End: 2022-01-21
Attending: FAMILY MEDICINE
Payer: COMMERCIAL

## 2022-01-21 VITALS
DIASTOLIC BLOOD PRESSURE: 82 MMHG | SYSTOLIC BLOOD PRESSURE: 136 MMHG | HEIGHT: 70 IN | HEART RATE: 98 BPM | OXYGEN SATURATION: 96 % | TEMPERATURE: 97.8 F | BODY MASS INDEX: 34.04 KG/M2 | WEIGHT: 237.8 LBS

## 2022-01-21 DIAGNOSIS — R94.31 ABNORMAL ELECTROCARDIOGRAM: ICD-10-CM

## 2022-01-21 DIAGNOSIS — Z01.818 PREOP GENERAL PHYSICAL EXAM: ICD-10-CM

## 2022-01-21 DIAGNOSIS — Z86.79 HISTORY OF MYOCARDITIS: ICD-10-CM

## 2022-01-21 DIAGNOSIS — R73.03 PREDIABETES: ICD-10-CM

## 2022-01-21 DIAGNOSIS — Z01.818 PREOP GENERAL PHYSICAL EXAM: Primary | ICD-10-CM

## 2022-01-21 DIAGNOSIS — E03.9 ACQUIRED HYPOTHYROIDISM: ICD-10-CM

## 2022-01-21 DIAGNOSIS — R94.31 NONSPECIFIC ABNORMAL ELECTROCARDIOGRAM (ECG) (EKG): ICD-10-CM

## 2022-01-21 LAB
ALBUMIN SERPL-MCNC: 3.9 G/DL (ref 3.4–5)
ALP SERPL-CCNC: 76 U/L (ref 40–150)
ALT SERPL W P-5'-P-CCNC: 94 U/L (ref 0–70)
ANION GAP SERPL CALCULATED.3IONS-SCNC: 5 MMOL/L (ref 3–14)
AST SERPL W P-5'-P-CCNC: 34 U/L (ref 0–45)
BASOPHILS # BLD AUTO: 0 10E3/UL (ref 0–0.2)
BASOPHILS NFR BLD AUTO: 1 %
BILIRUB SERPL-MCNC: 0.3 MG/DL (ref 0.2–1.3)
BUN SERPL-MCNC: 16 MG/DL (ref 7–30)
CALCIUM SERPL-MCNC: 9 MG/DL (ref 8.5–10.1)
CHLORIDE BLD-SCNC: 107 MMOL/L (ref 94–109)
CO2 SERPL-SCNC: 26 MMOL/L (ref 20–32)
CREAT SERPL-MCNC: 0.96 MG/DL (ref 0.66–1.25)
EOSINOPHIL # BLD AUTO: 0.2 10E3/UL (ref 0–0.7)
EOSINOPHIL NFR BLD AUTO: 4 %
ERYTHROCYTE [DISTWIDTH] IN BLOOD BY AUTOMATED COUNT: 13.2 % (ref 10–15)
GFR SERPL CREATININE-BSD FRML MDRD: >90 ML/MIN/1.73M2
GLUCOSE BLD-MCNC: 144 MG/DL (ref 70–99)
HBA1C MFR BLD: 7.3 % (ref 0–5.6)
HCT VFR BLD AUTO: 47.2 % (ref 40–53)
HGB BLD-MCNC: 16.3 G/DL (ref 13.3–17.7)
INR PPP: 0.95 (ref 0.85–1.15)
LYMPHOCYTES # BLD AUTO: 1.5 10E3/UL (ref 0.8–5.3)
LYMPHOCYTES NFR BLD AUTO: 39 %
MCH RBC QN AUTO: 28.7 PG (ref 26.5–33)
MCHC RBC AUTO-ENTMCNC: 34.5 G/DL (ref 31.5–36.5)
MCV RBC AUTO: 83 FL (ref 78–100)
MONOCYTES # BLD AUTO: 0.8 10E3/UL (ref 0–1.3)
MONOCYTES NFR BLD AUTO: 20 %
NEUTROPHILS # BLD AUTO: 1.3 10E3/UL (ref 1.6–8.3)
NEUTROPHILS NFR BLD AUTO: 36 %
PLATELET # BLD AUTO: 214 10E3/UL (ref 150–450)
POTASSIUM BLD-SCNC: 4.1 MMOL/L (ref 3.4–5.3)
PROT SERPL-MCNC: 7.7 G/DL (ref 6.8–8.8)
RBC # BLD AUTO: 5.68 10E6/UL (ref 4.4–5.9)
SARS-COV-2 RNA RESP QL NAA+PROBE: POSITIVE
SODIUM SERPL-SCNC: 138 MMOL/L (ref 133–144)
T4 FREE SERPL-MCNC: 1.29 NG/DL (ref 0.76–1.46)
TSH SERPL DL<=0.005 MIU/L-ACNC: 4.27 MU/L (ref 0.4–4)
WBC # BLD AUTO: 3.8 10E3/UL (ref 4–11)

## 2022-01-21 PROCEDURE — 80050 GENERAL HEALTH PANEL: CPT | Performed by: FAMILY MEDICINE

## 2022-01-21 PROCEDURE — 85610 PROTHROMBIN TIME: CPT | Performed by: FAMILY MEDICINE

## 2022-01-21 PROCEDURE — 83036 HEMOGLOBIN GLYCOSYLATED A1C: CPT | Performed by: FAMILY MEDICINE

## 2022-01-21 PROCEDURE — U0003 INFECTIOUS AGENT DETECTION BY NUCLEIC ACID (DNA OR RNA); SEVERE ACUTE RESPIRATORY SYNDROME CORONAVIRUS 2 (SARS-COV-2) (CORONAVIRUS DISEASE [COVID-19]), AMPLIFIED PROBE TECHNIQUE, MAKING USE OF HIGH THROUGHPUT TECHNOLOGIES AS DESCRIBED BY CMS-2020-01-R: HCPCS | Performed by: FAMILY MEDICINE

## 2022-01-21 PROCEDURE — 84439 ASSAY OF FREE THYROXINE: CPT | Performed by: FAMILY MEDICINE

## 2022-01-21 PROCEDURE — U0005 INFEC AGEN DETEC AMPLI PROBE: HCPCS | Performed by: FAMILY MEDICINE

## 2022-01-21 PROCEDURE — 99207 PR NO BILLABLE SERVICE THIS VISIT: CPT | Performed by: INTERNAL MEDICINE

## 2022-01-21 PROCEDURE — 71046 X-RAY EXAM CHEST 2 VIEWS: CPT | Performed by: RADIOLOGY

## 2022-01-21 PROCEDURE — 36415 COLL VENOUS BLD VENIPUNCTURE: CPT | Performed by: FAMILY MEDICINE

## 2022-01-21 PROCEDURE — 99215 OFFICE O/P EST HI 40 MIN: CPT | Performed by: FAMILY MEDICINE

## 2022-01-21 PROCEDURE — 93000 ELECTROCARDIOGRAM COMPLETE: CPT | Performed by: FAMILY MEDICINE

## 2022-01-21 ASSESSMENT — MIFFLIN-ST. JEOR: SCORE: 1939.9

## 2022-01-21 NOTE — PROGRESS NOTES
[facility]  [address]  [place] [address]  Phone: [phone]  Primary Provider: Wilfredo Dela Cruz        PREOPERATIVE EVALUATION:  Today's date: 1/21/2022    Andrew Atkinson is a 51 year old male who presents for a preoperative evaluation.    Surgical Information:   Surgery/Procedure: Decompression - Laminectomy L3 to: L4, Decompression - Foraminotomy L3 to: L4 Bilateral, Decompression - Discectomy L3 to: L4 Right  Surgery Location: [facility]  Surgeon: Dr. Strange  Surgery Date: 01/24/2022  Time of Surgery: 7:30am  Where patient plans to recover: At home with family  Fax number for surgical facility: [phone]    Type of Anesthesia Anticipated: General    Assessment & Plan     The proposed surgical procedure is considered HIGH risk.      ICD-10-CM    1. Preop general physical exam  Z01.818 EKG 12-lead complete w/read - Clinics     Comprehensive metabolic panel (BMP + Alb, Alk Phos, ALT, AST, Total. Bili, TP)     CBC with platelets and differential     INR     XR Chest 2 Views     Asymptomatic COVID-19 Virus (Coronavirus) by PCR Nose     INR     CBC with platelets and differential     Comprehensive metabolic panel (BMP + Alb, Alk Phos, ALT, AST, Total. Bili, TP)     Adult Cardiology Eval Referral   2. Abnormal electrocardiogram  R94.31 E-CONSULT TO CARDIOLOGY (ADULT OUTPT PROVIDER TO SPECIALIST WRITTEN QUESTION & RESPONSE)   3. History of myocarditis  Z86.79 E-CONSULT TO CARDIOLOGY (ADULT OUTPT PROVIDER TO SPECIALIST WRITTEN QUESTION & RESPONSE)     Adult Cardiology Eval Referral   4. Acquired hypothyroidism  E03.9 TSH with free T4 reflex     TSH with free T4 reflex     T4 free   5. Prediabetes  R73.03 Hemoglobin A1c     Hemoglobin A1c   6. Nonspecific abnormal electrocardiogram (ECG) (EKG)  R94.31 Adult Cardiology Eval Referral          A total of 60 minutes spent face-to-face with greater than 50% of the time spent in counseling and coordinating  cares of the issues above     Risks and Recommendations:  The patient has the following additional risks and recommendations for perioperative complications:  Diabetes:  - Patient is not on insulin therapy: regular NPO guidelines can be followed.     Medication Instructions:  Patient is to take all scheduled medications on the day of surgery    RECOMMENDATION:  Patient referred to cardiology for evaluation before surgery. Surgery approval pending completion of consultation.    Review of external notes as documented above                   Subjective     HPI related to upcoming procedure: Patient presents for pre-op evaluation in anticipation for neurosurgery    Preop Questions 1/21/2022   1. Have you ever had a heart attack or stroke? No   2. Have you ever had surgery on your heart or blood vessels, such as a stent placement, a coronary artery bypass, or surgery on an artery in your head, neck, heart, or legs? No   3. Do you have chest pain with activity? No   4. Do you have a history of  heart failure? No   5. Do you currently have a cold, bronchitis or symptoms of other infection? No   6. Do you have a cough, shortness of breath, or wheezing? No   7. Do you or anyone in your family have previous history of blood clots? No   8. Do you or does anyone in your family have a serious bleeding problem such as prolonged bleeding following surgeries or cuts? No   9. Have you ever had problems with anemia or been told to take iron pills? No   10. Have you had any abnormal blood loss such as black, tarry or bloody stools? YES -    11. Have you ever had a blood transfusion? No   12. Are you willing to have a blood transfusion if it is medically needed before, during, or after your surgery? Yes   13. Have you or any of your relatives ever had problems with anesthesia? No   14. Do you have sleep apnea, excessive snoring or daytime drowsiness? YES -    14a. Do you have a CPAP machine? No   15. Do you have any artifical heart  valves or other implanted medical devices like a pacemaker, defibrillator, or continuous glucose monitor? No   16. Do you have artificial joints? No   17. Are you allergic to latex? No       Health Care Directive:  Patient does not have a Health Care Directive or Living Will: Discussed advance care planning with patient; however, patient declined at this time.    Preoperative Review of :   reviewed - no record of controlled substances prescribed.      Status of Chronic Conditions:  See problem list for active medical problems.  Problems all longstanding and stable, except as noted/documented.  See ROS for pertinent symptoms related to these conditions.      Review of Systems  Constitutional, neuro, ENT, endocrine, pulmonary, cardiac, gastrointestinal, genitourinary, musculoskeletal, integument and psychiatric systems are negative, except as otherwise noted.    Patient Active Problem List    Diagnosis Date Noted     Umbilical hernia with obstruction 10/09/2020     Priority: Medium     Added automatically from request for surgery [id number]       Acute viral myocarditis 02/14/2020     Priority: Medium     Acquired hypothyroidism 04/18/2017     Priority: Medium      Past Medical History:   Diagnosis Date     Medial meniscus tear      Past Surgical History:   Procedure Laterality Date     ARTHROSCOPY KNEE WITH MEDIAL MENISCECTOMY Left 5/12/2017    Procedure: ARTHROSCOPY KNEE WITH MEDIAL MENISCECTOMY;  Arthroscopic partial medial menisectomy,left knee;  Surgeon: Grant Muñoz MD;  Location: [facility] OR     CV CORONARY ANGIOGRAM N/A 2/15/2020    Procedure: Coronary Angiogram;  Surgeon: Rinku Boyce MD;  Location:  [facility]     LAPAROSCOPIC HERNIORRHAPHY UMBILICAL N/A 12/8/2020    Procedure: umbilical hernia repair;  Surgeon: Miguel Ángel Arnold DO;  Location: [facility] OR     Current Outpatient Medications   Medication Sig Dispense Refill     acetaminophen (TYLENOL) 325 MG tablet Take 2 tablets (650  "mg) by mouth every 6 hours as needed for mild pain Alternate with ibuprofen so you are taking one or the other every three hours. For example take tylenol at 5am, then ibuprofen at 8am, then tylenol at 11am, and so on. 50 tablet 0     azelastine (ASTELIN) 0.1 % nasal spray Spray 1 spray into both nostrils 2 times daily 30 mL 0     cetirizine (ZYRTEC) 10 MG tablet Take 1 tablet (10 mg) by mouth daily 30 tablet 2     fluticasone (FLONASE) 50 MCG/ACT nasal spray Spray 1 spray into both nostrils daily 16 g 2     levothyroxine (SYNTHROID/LEVOTHROID) 150 MCG tablet TAKE 1 TABLET BY MOUTH ONCE DAILY 90 tablet 0     multivitamin, therapeutic with minerals (MULTI-VITAMIN) TABS tablet Take 1 tablet by mouth daily       pravastatin (PRAVACHOL) 20 MG tablet Take 1 tablet (20 mg) by mouth daily 90 tablet 3     pseudoePHEDrine-guaiFENesin (MUCINEX D)  MG 12 hr tablet Take 1 tablet by mouth every 12 hours 30 tablet 0     senna-docusate (SENOKOT-S/PERICOLACE) 8.6-50 MG tablet Take 1-2 tablets by mouth 2 times daily 30 tablet 0     ondansetron (ZOFRAN-ODT) 4 MG ODT tab Take 1-2 tablets (4-8 mg) by mouth every 8 hours as needed for nausea (Patient not taking: Reported on 3/18/2021) 4 tablet 0     oxyCODONE (ROXICODONE) 5 MG tablet Take 1-2 tablets (5-10 mg) by mouth every 4 hours as needed for moderate to severe pain (Patient not taking: Reported on 3/18/2021) 12 tablet 0       Allergies   Allergen Reactions     Morphine Hives        Social History     Tobacco Use     Smoking status: Never Smoker     Smokeless tobacco: Never Used   Substance Use Topics     Alcohol use: Yes     Comment: Occ        History   Drug Use No         Objective     /82   Pulse 98   Temp 97.8  F (36.6  C) (Oral)   Ht 1.786 m (5' 10.32\")   Wt 107.9 kg (237 lb 12.8 oz)   SpO2 96%   BMI 33.82 kg/m      Physical Exam    GENERAL APPEARANCE: healthy, alert and no distress     EYES: EOMI,  PERRL     HENT: ear canals and TM's normal and nose and " mouth without ulcers or lesions     NECK: no adenopathy, no asymmetry, masses, or scars and thyroid normal to palpation     RESP: lungs clear to auscultation - no rales, rhonchi or wheezes     CV: regular rates and rhythm, normal S1 S2, no S3 or S4 and no murmur, click or rub     ABDOMEN:  soft, nontender, no HSM or masses and bowel sounds normal     MS: extremities normal- no gross deformities noted, no evidence of inflammation in joints, FROM in all extremities.     SKIN: no suspicious lesions or rashes     NEURO: Normal strength and tone, sensory exam grossly normal, mentation intact and speech normal     PSYCH: mentation appears normal. and affect normal/bright     LYMPHATICS: No cervical adenopathy    Recent Labs   Lab Test 11/30/20  0752 03/10/20  1305 02/15/20  0658 02/14/20  2346 02/14/20  1824   HGB 15.8  --   --  14.0 14.9     --   --  335 339   INR  --   --  1.09  --  1.04   NA  --   --  139  --  137   POTASSIUM 4.0  --  3.7  --  3.6   CR 0.96  --  0.94  --  1.06   A1C  --  5.9*  --  6.4*  --         Diagnostics:  Labs pending at this time.  Results will be reviewed when available.   EKG: ST depression in leads II, III, AVF, nonspecific ST-T changes    Revised Cardiac Risk Index (RCRI):  The patient has the following serious cardiovascular risks for perioperative complications:   - High risk surgery (>5% cardiac complication risk) = 1 point     RCRI Interpretation: 1 point: Class II (low risk - 0.9% complication rate)           Signed Electronically by: Wilfredo Hemphill MD  Copy of this evaluation report is provided to requesting physician.

## 2022-01-21 NOTE — PATIENT INSTRUCTIONS
Andrew    It was a pleasure seeing you in clinic today.  Here's the plan:    1. Preop eval - COVID testing (stat), labs, chest xray as requested.  Questionable EKG requiring cardiac clearance.  E-consult placed for cardio clearance today, which will likely happen within the next 2 days    Let me know if you have questions.    Wilfredo Hemphill MD              Preparing for Your Surgery  Getting started  A nurse will call you to review your health history and instructions. They will give you an arrival time based on your scheduled surgery time. Please be ready to share:    Your doctor's clinic name and phone number    Your medical, surgical and anesthesia history    A list of allergies and sensitivities    A list of medicines, including herbal treatments and over-the-counter drugs    Whether the patient has a legal guardian (ask how to send us the papers in advance)  Please tell us if you're pregnant--or if there's any chance you might be pregnant. Some surgeries may injure a fetus (unborn baby), so they require a pregnancy test. Surgeries that are safe for a fetus don't always need a test, and you can choose whether to have one.   If you have a child who's having surgery, please ask for a copy of Preparing for Your Child's Surgery.    Preparing for surgery    Within 30 days of surgery: Have a pre-op exam (sometimes called an H&P, or History and Physical). This can be done at a clinic or pre-operative center.  ? If you're having a , you may not need this exam. Talk to your care team.    At your pre-op exam, talk to your care team about all medicines you take. If you need to stop any medicines before surgery, ask when to start taking them again.  ? We do this for your safety. Many medicines can make you bleed too much during surgery. Some change how well surgery (anesthesia) drugs work.    Call your insurance company to let them know you're having surgery. (If you don't have insurance, call  [phone].)    Call your clinic if there's any change in your health. This includes signs of a cold or flu (sore throat, runny nose, cough, rash, fever). It also includes a scrape or scratch near the surgery site.    If you have questions on the day of surgery, call your hospital or surgery center.  COVID testing  You may need to be tested for COVID-19 before having surgery. If so, your surgical team will give you instructions for scheduling this test, separate from your preoperative history and physical.  Eating and drinking guidelines  For your safety: Unless your surgeon tells you otherwise, follow the guidelines below.    Eat and drink as usual until 8 hours before surgery. After that, no food or milk.    Drink clear liquids until 2 hours before surgery. These are liquids you can see through, like water, Gatorade and Propel Water. You may also have black coffee and tea (no cream or milk).    Nothing by mouth within 2 hours of surgery. This includes gum, candy and breath mints.    If you drink alcohol: Stop drinking it the night before surgery.    If your care team tells you to take medicine on the morning of surgery, it's okay to take it with a sip of water.  Preventing infection    Shower or bathe the night before and morning of your surgery. Follow the instructions your clinic gave you. (If no instructions, use regular soap.)    Don't shave or clip hair near your surgery site. We'll remove the hair if needed.    Don't smoke or vape the morning of surgery. You may chew nicotine gum up to 2 hours before surgery. A nicotine patch is okay.  ? Note: Some surgeries require you to completely quit smoking and nicotine. Check with your surgeon.    Your care team will make every effort to keep you safe from infection. We will:  ? Clean our hands often with soap and water (or an alcohol-based hand rub).  ? Clean the skin at your surgery site with a special soap that kills germs.  ? Give you a special gown to keep you  warm. (Cold raises the risk of infection.)  ? Wear special hair covers, masks, gowns and gloves during surgery.  ? Give antibiotic medicine, if prescribed. Not all surgeries need antibiotics.  What to bring on the day of surgery    Photo ID and insurance card    Copy of your health care directive, if you have one    Glasses and hearing aides (bring cases)  ? You can't wear contacts during surgery    Inhaler and eye drops, if you use them (tell us about these when you arrive)    CPAP machine or breathing device, if you use them    A few personal items, if spending the night    If you have . . .  ? A pacemaker, ICD (cardiac defibrillator) or other implant: Bring the ID card.  ? An implanted stimulator: Bring the remote control.  ? A legal guardian: Bring a copy of the certified (court-stamped) guardianship papers.  Please remove any jewelry, including body piercings. Leave jewelry and other valuables at home.  If you're going home the day of surgery    You must have a responsible adult drive you home. They should stay with you overnight as well.    If you don't have someone to stay with you, and you aren't safe to go home alone, we may keep you overnight. Insurance often won't pay for this.  After surgery  If it's hard to control your pain or you need more pain medicine, please call your surgeon's office.  Questions?   If you have any questions for your care team, list them here: _________________________________________________________________________________________________________________________________________________________________________ ____________________________________ ____________________________________ ____________________________________  For informational purposes only. Not to replace the advice of your health care provider. Copyright   2003, 2019 [facility]. All rights reserved. Clinically reviewed by Caron Ugarte MD. [employer] [id number] - REV 07/21.

## 2022-01-21 NOTE — PROGRESS NOTES
ALL SMARTFIELDS MUST BE COMPLETED FOR PATIENT CARE AND BILLING    1/21/2022     E-Consult has been denied due to: Complexity of question, needs in-person referral.    Interprofessional consultation requested by:  Wilfredo Dela Cruz MD      Clinical Question/Purpose: MY CLINICAL QUESTION IS: Cardiac clearance for preop.  Surgery scheduled 1/24/22.  EKG currently shows ST depression, meets LVH criteria.  He's asymptomatic.  History of myocarditis Feb 2020, with chest pain, elevated troponin, normal cardiac cath, normal echo.  Would like to ensure his abnormal EKG simply shows residual changes from myocarditis.    Patient assessment and information reviewed    Recommendations:     The recommendations provided in this E-Consult are based on the clinical data available to me in the medical record, and are furnished without the benefit of a comprehensive in-person or virtual patient evaluation.  This consultation should not replace the clinical judgement and evaluation of the provider ordering this E-Consult. Any new clinical issues, or changes in patient status since the filing of this E-Consult will need to be taken into account when assessing these recommendations. Please contact me if you have further questions.    My total time spent reviewing clinical information and formulating assessment was 5 minutes.    Report sent automatically to requesting provider once signed.     Mandeep Harper MD

## 2022-01-22 ENCOUNTER — TELEPHONE (OUTPATIENT)
Dept: FAMILY MEDICINE | Facility: CLINIC | Age: 52
End: 2022-01-22
Payer: COMMERCIAL

## 2022-01-22 NOTE — TELEPHONE ENCOUNTER
"PRE-SURGERY PATIENT            Coronavirus (COVID-19) Notification    Caller Name (Patient, parent, daughter/son, grandparent, etc)  patient    Reason for call  Notify of Positive Coronavirus (COVID-19) lab results, assess symptoms,  review [facility] recommendations    Lab Result    Lab test:  2019-nCoV rRt-PCR or SARS-CoV-2 PCR    Oropharyngeal AND/OR nasopharyngeal swabs is POSITIVE for 2019-nCoV RNA/SARS-COV-2 PCR (COVID-19 virus)    RN Recommendations/Instructions per [facility] Coronavirus COVID-19 recommendations    Brief introduction script  Introduce self then review script:  \"I am calling on behalf of [employer].  We were notified that your Coronavirus test (COVID-19) for was POSITIVE for the virus.     Patient refuses all Covid education @ this time reporting he has already been notified.    A Positive COVID-19 letter will be sent via [employer] or the mail. (Exception, no letters sent to Presurgerical/Preprocedure Patients)    Jenifer Mclaughlin LPN    "

## 2022-01-24 ENCOUNTER — TELEPHONE (OUTPATIENT)
Dept: FAMILY MEDICINE | Facility: CLINIC | Age: 52
End: 2022-01-24
Payer: COMMERCIAL

## 2022-01-24 NOTE — TELEPHONE ENCOUNTER
[facility] Call Center    Phone Message    May a detailed message be left on voicemail: yes      Reason for Call: Pt has existing MRI order.  Reason for requested: Is MRI needed for pt's upcoming 2/3 appt with Dr. Goodson      Provider name: Dr. Goodson      Action Taken: Message routed to:  [facility] ([facility] Dr. Gregory    Travel Screening : Pt tested positive on 1/21

## 2022-01-25 NOTE — TELEPHONE ENCOUNTER
Pt was seen by dr young 12/1/20- he is not new pt. That appt was completed at [facility].     Recommended [facility] at that time due to pericarditis. Does not appear it was scheduled.     Due to time since last visit, will not schd [facility] prior to visit.

## 2022-02-03 ENCOUNTER — ANCILLARY PROCEDURE (OUTPATIENT)
Dept: CARDIOLOGY | Facility: CLINIC | Age: 52
End: 2022-02-03
Attending: INTERNAL MEDICINE
Payer: COMMERCIAL

## 2022-02-03 ENCOUNTER — OFFICE VISIT (OUTPATIENT)
Dept: CARDIOLOGY | Facility: CLINIC | Age: 52
End: 2022-02-03
Attending: FAMILY MEDICINE
Payer: COMMERCIAL

## 2022-02-03 VITALS
WEIGHT: 234 LBS | DIASTOLIC BLOOD PRESSURE: 92 MMHG | BODY MASS INDEX: 33.28 KG/M2 | OXYGEN SATURATION: 93 % | SYSTOLIC BLOOD PRESSURE: 136 MMHG | HEART RATE: 86 BPM

## 2022-02-03 DIAGNOSIS — Z86.79 HISTORY OF MYOCARDITIS: ICD-10-CM

## 2022-02-03 DIAGNOSIS — I51.7 LVH (LEFT VENTRICULAR HYPERTROPHY): ICD-10-CM

## 2022-02-03 DIAGNOSIS — Z01.818 PREOP GENERAL PHYSICAL EXAM: ICD-10-CM

## 2022-02-03 DIAGNOSIS — R06.09 DYSPNEA ON EXERTION: Primary | ICD-10-CM

## 2022-02-03 DIAGNOSIS — R94.31 NONSPECIFIC ABNORMAL ELECTROCARDIOGRAM (ECG) (EKG): ICD-10-CM

## 2022-02-03 LAB — LVEF ECHO: NORMAL

## 2022-02-03 PROCEDURE — 93306 TTE W/DOPPLER COMPLETE: CPT | Performed by: INTERNAL MEDICINE

## 2022-02-03 PROCEDURE — 99214 OFFICE O/P EST MOD 30 MIN: CPT | Performed by: INTERNAL MEDICINE

## 2022-02-03 NOTE — LETTER
2/3/2022      RE: Andrew Atkinson  [address] [place] [id number]       Dear Colleague,    Thank you for the opportunity to participate in the care of your patient, Andrew Atkinson, at the [facility]. Please see a copy of my visit note below.       SUBJECTIVE:  Andrew Atkinson is a 51 year old male who presents for preop evaluation due to abnormal EKG.  Patient used to be a very healthy active person with no cardiac symptoms or prior cardiac history.  In February 2020 patient visited [place].  He attended an oxygen parlor.  After returning developed flulike illness and shortness of breath.  Also had mild troponin elevation of 0.2.  He was hospitalized from February 14-18.  Patient had a normal coronary angiogram.  Echocardiogram showed normal biventricular function.  No significant valvular abnormalities.  A diagnosis of viral myocarditis was made.  At this time EKG showed deep T inversion in inferior and lateral leads.  Since discharge patient was doing well.  He do have significant backache related to prior spinal surgery.  Currently patient has significant low backache and neurological symptoms involving the lower extremity.  He is planning for another surgery.  As part of preop evaluation had an EKG which again showed deep T inversion in inferior and lateral leads.  As patient is not very active he is also having some dyspnea on exertion.  Denied chest pain.  According to him he became very out of shape due to his low backache and inactivity.  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  On January 21st as part of preop evaluation patient had a COVID-19 test.  He tested positive.  He had minimal symptoms and required no supplemental oxygen or any additional measures.  Patient Active Problem List    Diagnosis Date Noted     Umbilical hernia with obstruction  10/09/2020     Priority: Medium     Added automatically from request for surgery [id number]       Acute viral myocarditis 02/14/2020     Priority: Medium     Acquired hypothyroidism 04/18/2017     Priority: Medium    .  Current Outpatient Medications   Medication Sig     acetaminophen (TYLENOL) 325 MG tablet Take 2 tablets (650 mg) by mouth every 6 hours as needed for mild pain Alternate with ibuprofen so you are taking one or the other every three hours. For example take tylenol at 5am, then ibuprofen at 8am, then tylenol at 11am, and so on.     azelastine (ASTELIN) 0.1 % nasal spray Spray 1 spray into both nostrils 2 times daily     fluticasone (FLONASE) 50 MCG/ACT nasal spray Spray 1 spray into both nostrils daily     levothyroxine (SYNTHROID/LEVOTHROID) 150 MCG tablet TAKE 1 TABLET BY MOUTH ONCE DAILY     multivitamin, therapeutic with minerals (MULTI-VITAMIN) TABS tablet Take 1 tablet by mouth daily     pravastatin (PRAVACHOL) 20 MG tablet Take 1 tablet (20 mg) by mouth daily     cetirizine (ZYRTEC) 10 MG tablet Take 1 tablet (10 mg) by mouth daily (Patient not taking: Reported on 2/3/2022)     ondansetron (ZOFRAN-ODT) 4 MG ODT tab Take 1-2 tablets (4-8 mg) by mouth every 8 hours as needed for nausea (Patient not taking: Reported on 2/3/2022)     oxyCODONE (ROXICODONE) 5 MG tablet Take 1-2 tablets (5-10 mg) by mouth every 4 hours as needed for moderate to severe pain (Patient not taking: Reported on 2/3/2022)     pseudoePHEDrine-guaiFENesin (MUCINEX D)  MG 12 hr tablet Take 1 tablet by mouth every 12 hours (Patient not taking: Reported on 2/3/2022)     senna-docusate (SENOKOT-S/PERICOLACE) 8.6-50 MG tablet Take 1-2 tablets by mouth 2 times daily (Patient not taking: Reported on 2/3/2022)     Current Facility-Administered Medications   Medication     lidocaine 1 % injection 0.5 mL     triamcinolone (KENALOG-40) injection 20 mg     Past Medical History:   Diagnosis Date     Medial meniscus tear      Past  Surgical History:   Procedure Laterality Date     ARTHROSCOPY KNEE WITH MEDIAL MENISCECTOMY Left 5/12/2017    Procedure: ARTHROSCOPY KNEE WITH MEDIAL MENISCECTOMY;  Arthroscopic partial medial menisectomy,left knee;  Surgeon: Grant Muñoz MD;  Location: [facility] OR     CV CORONARY ANGIOGRAM N/A 2/15/2020    Procedure: Coronary Angiogram;  Surgeon: Rinku Boyce MD;  Location:  HEART CARDIAC CATH LAB     LAPAROSCOPIC HERNIORRHAPHY UMBILICAL N/A 12/8/2020    Procedure: umbilical hernia repair;  Surgeon: Miguel Ángel Arnold DO;  Location: [facility] OR     Allergies   Allergen Reactions     Morphine Hives     Social History     Socioeconomic History     Marital status: Single     Spouse name: Not on file     Number of children: Not on file     Years of education: Not on file     Highest education level: Not on file   Occupational History     Not on file   Tobacco Use     Smoking status: Never Smoker     Smokeless tobacco: Never Used   Substance and Sexual Activity     Alcohol use: Yes     Comment: Occ      Drug use: No     Sexual activity: Yes     Partners: Female   Other Topics Concern     Parent/sibling w/ CABG, MI or angioplasty before 65F 55M? Not Asked   Social History Narrative     Not on file     Social Determinants of Health     Financial Resource Strain: Not on file   Food Insecurity: Not on file   Transportation Needs: Not on file   Physical Activity: Not on file   Stress: Not on file   Social Connections: Not on file   Intimate Partner Violence: Not on file   Housing Stability: Not on file     Family History   Problem Relation Age of Onset     Unknown/Adopted No family hx of           REVIEW OF SYSTEMS:  General: negative, fever, chills, night sweats  Skin: negative, acne, rash and scaling  Eyes: negative, double vision, eye pain and contacts  Ears/Nose/Throat: negative, nasal congestion and purulent rhinorrhea  Respiratory: No cough, No hemoptysis and negative  Cardiovascular: negative, palpitations,  tachycardia, irregular heart beat, chest pain, exertional chest pain or pressure, paroxysmal nocturnal dyspnea and orthopnea       OBJECTIVE:  Blood pressure (!) 143/103, pulse 86, weight 106.1 kg (234 lb), SpO2 93 %.  General Appearance: alert and no distress  Head: Normocephalic. No masses, lesions, tenderness or abnormalities  Eyes: conjuctiva clear, PERRL, EOM intact  Ears: External ears normal. Canals clear. TM's normal.  Nose: Nares normal  Mouth: normal  Neck: Supple, no cervical adenopathy, no thyromegaly  Lungs: clear to auscultation  Cardiac: regular rate and rhythm, normal S1 and S2, no murmur         ASSESSMENT/PLAN:  Patient with prior history of viral myocarditis in February 2020.  Symptoms started after visiting an oxygen parlor in [place]..  Peak troponin was 0.2.  Had normal echocardiogram and normal coronary angiogram.  At that time his EKG showed initially mild inferior and lateral T wave inversion but later on developed deep T inversion in inferior and lateral leads.  Recently patient had a preop evaluation.  As part of this he had an EKG which showed T wave changes.  He also tested positive for COVID-19 infection on January 21 of this year.  He do have significant backache from prior back surgery.  Currently do have some neurological symptoms involving the lower extremity.  Planning for a repeat back surgery.  Patient denied any cardiac symptoms apart from mild shortness of breath on exertion.  He blames it on inactivity due to back pain.  Reviewed records.  Echocardiogram from 2020 reviewed.  Normal biventricular function no significant valvular abnormalities.  EKG is from 2020 and current EKG reviewed.  Changes are same.  No significant difference.  We will plan for an echocardiogram to assess cardiac function and any evidence for hypertrophy.  As he had a completely normal coronary angiogram in 2020 CAD is not a concern and he can proceed with planned surgery.  Per orders.   Return to  Clinic as needed.  Total visit duration 30 minutes.  This include face-to-face interview, physical exam, chart review, review of echocardiograms, EKG, coronary angiogram and documentation.    Today's echocardiogram reviewed.  Normal LV function.  Significant LVH.  Mild aortic insufficiency.  Patient do not have a clear history of hypertension.  Will provide.  A BP apparatus to check blood pressure at home.  He will record blood pressure twice a week and contact us through [employer].  He appears to be very anxious and the blood pressure was high at clinic today.  Due to  deep T inversion and LVH on  echo will plan for a cardiac MRI.  He will return to clinic in 3 months for a follow-up.    Please do not hesitate to contact me if you have any questions/concerns.     Sincerely,     XOCHILT Batista MD

## 2022-02-03 NOTE — PATIENT INSTRUCTIONS
Thank you for coming to the [facility]; please note the following instructions:    1. Cardiac MRI    2.  Continue to monitor your blood pressure twice a week    3.  Follow up in 3 months        If you have any questions regarding your visit please contact your care team:     Cardiology  Telephone Number   Priyanka HOWELL, RN  Ashley WISE, RN   Puja THORNTON, RMA  Michelle ISABEL, RMCHAU GONSALVES, LPN   [phone] (option 1)   For scheduling appts:     [phone] (select option 1)       For the Device Clinic (Pacemakers and ICD's)  RN's :  Gena Chamberlain   During business hours: [phone]    *After business hours:  [phone] (select option 4)      Normal test result notifications will be released via [employer] or mailed within 7 business days.  All other test results, will be communicated via telephone once reviewed by your cardiologist.    If you need a medication refill please contact your pharmacy.  Please allow 3 business days for your refill to be completed.    As always, thank you for trusting us with your health care needs!

## 2022-02-03 NOTE — NURSING NOTE
"Chief Complaint   Patient presents with     Pre Op Exam     : Cardiac clearance for preop. Surgery scheduled 1/24/22. EKG currently shows ST depression, meets LVH criteria. He's asymptomatic. History of myocarditis Feb 2020, with chest pain, elevated troponin, normal cardiac cath, normal echo       Initial BP (!) 143/103 (BP Location: Left arm, Patient Position: Chair, Cuff Size: Adult Large)   Pulse 86   Wt 106.1 kg (234 lb)   SpO2 93%   BMI 33.28 kg/m   Estimated body mass index is 33.28 kg/m  as calculated from the following:    Height as of 1/21/22: 1.786 m (5' 10.32\").    Weight as of this encounter: 106.1 kg (234 lb)..  BP completed using cuff size: pal Mccann MA  "

## 2022-02-03 NOTE — PROGRESS NOTES
SUBJECTIVE:  Andrew Atkinson is a 51 year old male who presents for preop evaluation due to abnormal EKG.  Patient used to be a very healthy active person with no cardiac symptoms or prior cardiac history.  In February 2020 patient visited [place].  He attended an oxygen parlor.  After returning developed flulike illness and shortness of breath.  Also had mild troponin elevation of 0.2.  He was hospitalized from February 14-18.  Patient had a normal coronary angiogram.  Echocardiogram showed normal biventricular function.  No significant valvular abnormalities.  A diagnosis of viral myocarditis was made.  At this time EKG showed deep T inversion in inferior and lateral leads.  Since discharge patient was doing well.  He do have significant backache related to prior spinal surgery.  Currently patient has significant low backache and neurological symptoms involving the lower extremity.  He is planning for another surgery.  As part of preop evaluation had an EKG which again showed deep T inversion in inferior and lateral leads.  As patient is not very active he is also having some dyspnea on exertion.  Denied chest pain.  According to him he became very out of shape due to his low backache and inactivity.  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  On January 21st as part of preop evaluation patient had a COVID-19 test.  He tested positive.  He had minimal symptoms and required no supplemental oxygen or any additional measures.  Patient Active Problem List    Diagnosis Date Noted     Umbilical hernia with obstruction 10/09/2020     Priority: Medium     Added automatically from request for surgery [id number]       Acute viral myocarditis 02/14/2020     Priority: Medium     Acquired hypothyroidism 04/18/2017     Priority: Medium    .  Current Outpatient Medications   Medication Sig     acetaminophen (TYLENOL) 325 MG tablet Take 2 tablets (650 mg) by mouth  every 6 hours as needed for mild pain Alternate with ibuprofen so you are taking one or the other every three hours. For example take tylenol at 5am, then ibuprofen at 8am, then tylenol at 11am, and so on.     azelastine (ASTELIN) 0.1 % nasal spray Spray 1 spray into both nostrils 2 times daily     fluticasone (FLONASE) 50 MCG/ACT nasal spray Spray 1 spray into both nostrils daily     levothyroxine (SYNTHROID/LEVOTHROID) 150 MCG tablet TAKE 1 TABLET BY MOUTH ONCE DAILY     multivitamin, therapeutic with minerals (MULTI-VITAMIN) TABS tablet Take 1 tablet by mouth daily     pravastatin (PRAVACHOL) 20 MG tablet Take 1 tablet (20 mg) by mouth daily     cetirizine (ZYRTEC) 10 MG tablet Take 1 tablet (10 mg) by mouth daily (Patient not taking: Reported on 2/3/2022)     ondansetron (ZOFRAN-ODT) 4 MG ODT tab Take 1-2 tablets (4-8 mg) by mouth every 8 hours as needed for nausea (Patient not taking: Reported on 2/3/2022)     oxyCODONE (ROXICODONE) 5 MG tablet Take 1-2 tablets (5-10 mg) by mouth every 4 hours as needed for moderate to severe pain (Patient not taking: Reported on 2/3/2022)     pseudoePHEDrine-guaiFENesin (MUCINEX D)  MG 12 hr tablet Take 1 tablet by mouth every 12 hours (Patient not taking: Reported on 2/3/2022)     senna-docusate (SENOKOT-S/PERICOLACE) 8.6-50 MG tablet Take 1-2 tablets by mouth 2 times daily (Patient not taking: Reported on 2/3/2022)     Current Facility-Administered Medications   Medication     lidocaine 1 % injection 0.5 mL     triamcinolone (KENALOG-40) injection 20 mg     Past Medical History:   Diagnosis Date     Medial meniscus tear      Past Surgical History:   Procedure Laterality Date     ARTHROSCOPY KNEE WITH MEDIAL MENISCECTOMY Left 5/12/2017    Procedure: ARTHROSCOPY KNEE WITH MEDIAL MENISCECTOMY;  Arthroscopic partial medial menisectomy,left knee;  Surgeon: Grant Muñoz MD;  Location: [facility]     CV CORONARY ANGIOGRAM N/A 2/15/2020    Procedure: Coronary Angiogram;   Surgeon: Rinku Boyce MD;  Location:  [facility]     LAPAROSCOPIC HERNIORRHAPHY UMBILICAL N/A 12/8/2020    Procedure: umbilical hernia repair;  Surgeon: Miguel Ángel Arnold DO;  Location: [facility] OR     Allergies   Allergen Reactions     Morphine Hives     Social History     Socioeconomic History     Marital status: Single     Spouse name: Not on file     Number of children: Not on file     Years of education: Not on file     Highest education level: Not on file   Occupational History     Not on file   Tobacco Use     Smoking status: Never Smoker     Smokeless tobacco: Never Used   Substance and Sexual Activity     Alcohol use: Yes     Comment: Occ      Drug use: No     Sexual activity: Yes     Partners: Female   Other Topics Concern     Parent/sibling w/ CABG, MI or angioplasty before 65F 55M? Not Asked   Social History Narrative     Not on file     Social Determinants of Health     Financial Resource Strain: Not on file   Food Insecurity: Not on file   Transportation Needs: Not on file   Physical Activity: Not on file   Stress: Not on file   Social Connections: Not on file   Intimate Partner Violence: Not on file   Housing Stability: Not on file     Family History   Problem Relation Age of Onset     Unknown/Adopted No family hx of           REVIEW OF SYSTEMS:  General: negative, fever, chills, night sweats  Skin: negative, acne, rash and scaling  Eyes: negative, double vision, eye pain and contacts  Ears/Nose/Throat: negative, nasal congestion and purulent rhinorrhea  Respiratory: No cough, No hemoptysis and negative  Cardiovascular: negative, palpitations, tachycardia, irregular heart beat, chest pain, exertional chest pain or pressure, paroxysmal nocturnal dyspnea and orthopnea       OBJECTIVE:  Blood pressure (!) 143/103, pulse 86, weight 106.1 kg (234 lb), SpO2 93 %.  General Appearance: alert and no distress  Head: Normocephalic. No masses, lesions, tenderness or abnormalities  Eyes:  conjuctiva clear, PERRL, EOM intact  Ears: External ears normal. Canals clear. TM's normal.  Nose: Nares normal  Mouth: normal  Neck: Supple, no cervical adenopathy, no thyromegaly  Lungs: clear to auscultation  Cardiac: regular rate and rhythm, normal S1 and S2, no murmur         ASSESSMENT/PLAN:  Patient with prior history of viral myocarditis in February 2020.  Symptoms started after visiting an oxygen parlor in [place]..  Peak troponin was 0.2.  Had normal echocardiogram and normal coronary angiogram.  At that time his EKG showed initially mild inferior and lateral T wave inversion but later on developed deep T inversion in inferior and lateral leads.  Recently patient had a preop evaluation.  As part of this he had an EKG which showed T wave changes.  He also tested positive for COVID-19 infection on January 21 of this year.  He do have significant backache from prior back surgery.  Currently do have some neurological symptoms involving the lower extremity.  Planning for a repeat back surgery.  Patient denied any cardiac symptoms apart from mild shortness of breath on exertion.  He blames it on inactivity due to back pain.  Reviewed records.  Echocardiogram from 2020 reviewed.  Normal biventricular function no significant valvular abnormalities.  EKG is from 2020 and current EKG reviewed.  Changes are same.  No significant difference.  We will plan for an echocardiogram to assess cardiac function and any evidence for hypertrophy.  As he had a completely normal coronary angiogram in 2020 CAD is not a concern and he can proceed with planned surgery.  Per orders.   Return to Clinic as needed.  Total visit duration 30 minutes.  This include face-to-face interview, physical exam, chart review, review of echocardiograms, EKG, coronary angiogram and documentation.    Today's echocardiogram reviewed.  Normal LV function.  Significant LVH.  Mild aortic insufficiency.  Patient do not have a clear history of  hypertension.  Will provide.  A BP apparatus to check blood pressure at home.  He will record blood pressure twice a week and contact us through [employer].  He appears to be very anxious and the blood pressure was high at clinic today.  Due to  deep T inversion and LVH on  echo will plan for a cardiac MRI.  He will return to clinic in 3 months for a follow-up.

## 2022-02-13 ENCOUNTER — HEALTH MAINTENANCE LETTER (OUTPATIENT)
Age: 52
End: 2022-02-13

## 2022-02-17 DIAGNOSIS — R94.31 NONSPECIFIC ABNORMAL ELECTROCARDIOGRAM (ECG) (EKG): ICD-10-CM

## 2022-02-17 DIAGNOSIS — I40.0 ACUTE VIRAL MYOCARDITIS: ICD-10-CM

## 2022-02-17 DIAGNOSIS — E78.5 HYPERLIPIDEMIA LDL GOAL <100: ICD-10-CM

## 2022-02-17 DIAGNOSIS — I21.4 NSTEMI (NON-ST ELEVATED MYOCARDIAL INFARCTION) (H): ICD-10-CM

## 2022-02-20 RX ORDER — PRAVASTATIN SODIUM 20 MG
20 TABLET ORAL DAILY
Qty: 90 TABLET | Refills: 0 | Status: SHIPPED | OUTPATIENT
Start: 2022-02-20 | End: 2022-03-01

## 2022-02-20 NOTE — TELEPHONE ENCOUNTER
Signed Prescriptions:                        Disp   Refills    pravastatin (PRAVACHOL) 20 MG tablet       90 tab*0        Sig: Take 1 tablet (20 mg) by mouth daily Fasting labs           needed for future refills  Authorizing Provider: XOCHILT MCCLURE  Ordering User: PRIYANKA HDEZ    Patient is due for fasting labs.  Please contact patient to schedule.  He is scheduled to see his primary care provider on 2/28/22-he may get them drawn then but he will have to be fasting (nothing to eat or drink 8 hours prior except for water).  Please contact patient to schedule.    Priyanka Hdez RN  Cardiology Care Coordinator  [facility]  [phone] option 1

## 2022-02-23 ENCOUNTER — TELEPHONE (OUTPATIENT)
Dept: CARDIOLOGY | Facility: CLINIC | Age: 52
End: 2022-02-23
Payer: COMMERCIAL

## 2022-02-23 NOTE — TELEPHONE ENCOUNTER
Patient is due for fasting labs.  Please contact patient to schedule.  He is scheduled to see his primary care provider on 2/28/22-he may get them drawn then but he will have to be fasting (nothing to eat or drink 8 hours prior except for water).  Please contact patient to schedule, copied note.then Left message to return clinic call to .  David Gee L.P.N.

## 2022-02-23 NOTE — TELEPHONE ENCOUNTER
Patient acknowledged  a understanding of new Lab order , and confirmed to have it drawn same day as family Doctor visit .David Gee L.P.N.,[facility]. Dept.

## 2022-02-28 ENCOUNTER — OFFICE VISIT (OUTPATIENT)
Dept: FAMILY MEDICINE | Facility: CLINIC | Age: 52
End: 2022-02-28
Payer: OTHER MISCELLANEOUS

## 2022-02-28 VITALS
OXYGEN SATURATION: 96 % | WEIGHT: 238 LBS | TEMPERATURE: 98.5 F | HEART RATE: 77 BPM | BODY MASS INDEX: 34.07 KG/M2 | DIASTOLIC BLOOD PRESSURE: 92 MMHG | SYSTOLIC BLOOD PRESSURE: 130 MMHG | HEIGHT: 70 IN

## 2022-02-28 DIAGNOSIS — Z11.59 NEED FOR HEPATITIS C SCREENING TEST: ICD-10-CM

## 2022-02-28 DIAGNOSIS — Z11.4 SCREENING FOR HIV (HUMAN IMMUNODEFICIENCY VIRUS): ICD-10-CM

## 2022-02-28 DIAGNOSIS — E78.5 HYPERLIPIDEMIA LDL GOAL <100: ICD-10-CM

## 2022-02-28 DIAGNOSIS — Z86.79 HISTORY OF VIRAL MYOCARDITIS: ICD-10-CM

## 2022-02-28 DIAGNOSIS — Z01.818 PRE-OP EVALUATION: Primary | ICD-10-CM

## 2022-02-28 DIAGNOSIS — E11.9 TYPE 2 DIABETES MELLITUS WITHOUT COMPLICATION, WITHOUT LONG-TERM CURRENT USE OF INSULIN (H): ICD-10-CM

## 2022-02-28 LAB
CHOLEST SERPL-MCNC: 197 MG/DL
FASTING STATUS PATIENT QL REPORTED: YES
HDLC SERPL-MCNC: 38 MG/DL
LDLC SERPL CALC-MCNC: 106 MG/DL
NONHDLC SERPL-MCNC: 159 MG/DL
TRIGL SERPL-MCNC: 265 MG/DL

## 2022-02-28 PROCEDURE — 80061 LIPID PANEL: CPT | Performed by: FAMILY MEDICINE

## 2022-02-28 PROCEDURE — 36415 COLL VENOUS BLD VENIPUNCTURE: CPT | Performed by: FAMILY MEDICINE

## 2022-02-28 PROCEDURE — 99214 OFFICE O/P EST MOD 30 MIN: CPT | Performed by: FAMILY MEDICINE

## 2022-02-28 NOTE — PROGRESS NOTES
[facility]  [address]  [place] [address]  Phone: [phone]  Primary Provider: Wilfredo Chan  Pre-op Performing Provider: WILFREDO CHAN      PREOPERATIVE EVALUATION:  Today's date: 2/28/2022    Andrew Atkinson is a 51 year old male who presents for a preoperative evaluation.    Surgical Information:   Surgery/Procedure: DECOMPRESSION- LAMINECTOMY L3-L4, DECOMPRESSION- FORAMINOTOMY L3-L4 BILATERAL, DECOMPRESSION- DISCECTOMY L3-L4 RIGHT  Surgery Location: [facility]   Surgeon: Cristal Strange MD  Surgery Date: 03/14/2022  Time of Surgery: 7:15am  Where patient plans to recover: At home with family   Fax number for surgical facility: [phone]    Wt Readings from Last 4 Encounters:   02/28/22 108 kg (238 lb)   02/03/22 106.1 kg (234 lb)   01/21/22 107.9 kg (237 lb 12.8 oz)   04/05/21 109.2 kg (240 lb 12.8 oz)       Type of Anesthesia Anticipated: to be determined    Assessment & Plan     The proposed surgical procedure is considered INTERMEDIATE risk.      ICD-10-CM    1. Pre-op evaluation  Z01.818    2. Type 2 diabetes mellitus without complication, without long-term current use of insulin (H)  E11.9 REVIEW OF HEALTH MAINTENANCE PROTOCOL ORDERS   3. History of viral myocarditis  Z86.79    4. Screening for HIV (human immunodeficiency virus)  Z11.4    5. Need for hepatitis C screening test  Z11.59    6. Hyperlipidemia LDL goal <100  E78.5 Lipid panel reflex to direct LDL Fasting              Risks and Recommendations:  The patient has the following additional risks and recommendations for perioperative complications:  Diabetes:  - Patient is not on insulin therapy: regular NPO guidelines can be followed.     Medication Instructions:  Patient is to take all scheduled medications on the day of surgery    RECOMMENDATION:  APPROVAL GIVEN to proceed with proposed procedure, without further diagnostic evaluation.    Review of  external notes as documented above                   Subjective     HPI related to upcoming procedure: Patient presents for pre-op evaluation in anticipation for  Foraminotomy/discectomy    Preop Questions 2/28/2022   1. Have you ever had a heart attack or stroke? No   2. Have you ever had surgery on your heart or blood vessels, such as a stent placement, a coronary artery bypass, or surgery on an artery in your head, neck, heart, or legs? No   3. Do you have chest pain with activity? No   4. Do you have a history of  heart failure? No   5. Do you currently have a cold, bronchitis or symptoms of other infection? No   6. Do you have a cough, shortness of breath, or wheezing? YES -    7. Do you or anyone in your family have previous history of blood clots? UNKNOWN -    8. Do you or does anyone in your family have a serious bleeding problem such as prolonged bleeding following surgeries or cuts? UNKNOWN -    9. Have you ever had problems with anemia or been told to take iron pills? No   10. Have you had any abnormal blood loss such as black, tarry or bloody stools? YES -    11. Have you ever had a blood transfusion? No   12. Are you willing to have a blood transfusion if it is medically needed before, during, or after your surgery? Yes   13. Have you or any of your relatives ever had problems with anesthesia? No   14. Do you have sleep apnea, excessive snoring or daytime drowsiness? YES -    14a. Do you have a CPAP machine? No   15. Do you have any artifical heart valves or other implanted medical devices like a pacemaker, defibrillator, or continuous glucose monitor? No   16. Do you have artificial joints? No   17. Are you allergic to latex? No       Health Care Directive:  Patient does not have a Health Care Directive or Living Will: Discussed advance care planning with patient; however, patient declined at this time.    Preoperative Review of :   reviewed - no record of controlled substances  prescribed.      Status of Chronic Conditions:  See problem list for active medical problems.  Problems all longstanding and stable, except as noted/documented.  See ROS for pertinent symptoms related to these conditions.      Review of Systems  Constitutional, neuro, ENT, endocrine, pulmonary, cardiac, gastrointestinal, genitourinary, musculoskeletal, integument and psychiatric systems are negative, except as otherwise noted.    Patient Active Problem List    Diagnosis Date Noted     Spinal stenosis of lumbar region with neurogenic claudication 03/09/2022     Priority: Medium     Arthrodesis status 03/09/2022     Priority: Medium     Diabetes mellitus, type 2 (H) 02/28/2022     Priority: Medium     Umbilical hernia with obstruction 10/09/2020     Priority: Medium     Added automatically from request for surgery [id number]       Acute viral myocarditis 02/14/2020     Priority: Medium     Acquired hypothyroidism 04/18/2017     Priority: Medium      Past Medical History:   Diagnosis Date     Medial meniscus tear      Past Surgical History:   Procedure Laterality Date     ARTHROSCOPY KNEE WITH MEDIAL MENISCECTOMY Left 5/12/2017    Procedure: ARTHROSCOPY KNEE WITH MEDIAL MENISCECTOMY;  Arthroscopic partial medial menisectomy,left knee;  Surgeon: Grant Muñoz MD;  Location: [facility] OR     CV CORONARY ANGIOGRAM N/A 2/15/2020    Procedure: Coronary Angiogram;  Surgeon: Rinku Boyce MD;  Location:  [facility]     LAPAROSCOPIC HERNIORRHAPHY UMBILICAL N/A 12/8/2020    Procedure: umbilical hernia repair;  Surgeon: Miguel Ángel Arnold DO;  Location: [facility] OR     Current Outpatient Medications   Medication Sig Dispense Refill     acetaminophen (TYLENOL) 325 MG tablet Take 2 tablets (650 mg) by mouth every 6 hours as needed for mild pain Alternate with ibuprofen so you are taking one or the other every three hours. For example take tylenol at 5am, then ibuprofen at 8am, then tylenol at 11am, and so on. 50  "tablet 0     levothyroxine (SYNTHROID/LEVOTHROID) 150 MCG tablet TAKE 1 TABLET BY MOUTH ONCE DAILY 90 tablet 1     multivitamin, therapeutic with minerals (MULTI-VITAMIN) TABS tablet Take 1 tablet by mouth daily       amLODIPine (NORVASC) 5 MG tablet Take 1 tablet (5 mg) by mouth daily 30 tablet 5     pravastatin (PRAVACHOL) 40 MG tablet Take 1 tablet (40 mg) by mouth daily 90 tablet 3       Allergies   Allergen Reactions     Morphine Hives        Social History     Tobacco Use     Smoking status: Never Smoker     Smokeless tobacco: Never Used   Substance Use Topics     Alcohol use: Yes     Comment: Occ        History   Drug Use No         Objective     BP (!) 130/92 (BP Location: Left arm, Patient Position: Sitting, Cuff Size: Adult Large)   Pulse 77   Temp 98.5  F (36.9  C) (Oral)   Ht 1.786 m (5' 10.32\")   Wt 108 kg (238 lb)   SpO2 96%   BMI 33.84 kg/m      Physical Exam    GENERAL APPEARANCE: healthy, alert and no distress     EYES: EOMI,  PERRL     HENT: ear canals and TM's normal and nose and mouth without ulcers or lesions     NECK: no adenopathy, no asymmetry, masses, or scars and thyroid normal to palpation     RESP: lungs clear to auscultation - no rales, rhonchi or wheezes     CV: regular rates and rhythm, normal S1 S2, no S3 or S4 and no murmur, click or rub     ABDOMEN:  soft, nontender, no HSM or masses and bowel sounds normal     MS: extremities normal- no gross deformities noted, no evidence of inflammation in joints, FROM in all extremities.     SKIN: no suspicious lesions or rashes     NEURO: Normal strength and tone, sensory exam grossly normal, mentation intact and speech normal     PSYCH: mentation appears normal. and affect normal/bright     LYMPHATICS: No cervical adenopathy    Recent Labs   Lab Test 01/21/22  1104 11/30/20  0752 03/10/20  1305   HGB 16.3 15.8  --     275  --    INR 0.95  --   --      --   --    POTASSIUM 4.1 4.0  --    CR 0.96 0.96  --    A1C 7.3*  --  5.9* "        Diagnostics:  No labs were ordered during this visit.   No EKG this visit, completed in the last 90 days.    Revised Cardiac Risk Index (RCRI):  The patient has the following serious cardiovascular risks for perioperative complications:   - No serious cardiac risks = 0 points     RCRI Interpretation: 0 points: Class I (very low risk - 0.4% complication rate)           Signed Electronically by: Wilfredo Hemphill MD  Copy of this evaluation report is provided to requesting physician.

## 2022-03-09 PROBLEM — M48.062 SPINAL STENOSIS OF LUMBAR REGION WITH NEUROGENIC CLAUDICATION: Status: ACTIVE | Noted: 2022-03-09

## 2022-03-09 PROBLEM — Z98.1 ARTHRODESIS STATUS: Status: ACTIVE | Noted: 2022-03-09

## 2022-04-29 ENCOUNTER — TRANSFERRED RECORDS (OUTPATIENT)
Dept: HEALTH INFORMATION MANAGEMENT | Facility: CLINIC | Age: 52
End: 2022-04-29
Payer: COMMERCIAL

## 2022-05-03 ENCOUNTER — HOSPITAL ENCOUNTER (OUTPATIENT)
Dept: MRI IMAGING | Facility: CLINIC | Age: 52
Discharge: HOME | End: 2022-05-03
Attending: INTERNAL MEDICINE | Admitting: INTERNAL MEDICINE
Payer: COMMERCIAL

## 2022-05-03 DIAGNOSIS — Z86.79 HISTORY OF MYOCARDITIS: ICD-10-CM

## 2022-05-03 DIAGNOSIS — R94.31 NONSPECIFIC ABNORMAL ELECTROCARDIOGRAM (ECG) (EKG): ICD-10-CM

## 2022-05-03 DIAGNOSIS — I51.7 LVH (LEFT VENTRICULAR HYPERTROPHY): ICD-10-CM

## 2022-05-03 DIAGNOSIS — R06.09 DYSPNEA ON EXERTION: ICD-10-CM

## 2022-05-03 PROCEDURE — 75561 CARDIAC MRI FOR MORPH W/DYE: CPT

## 2022-05-03 PROCEDURE — A9585 GADOBUTROL INJECTION: HCPCS | Performed by: INTERNAL MEDICINE

## 2022-05-03 PROCEDURE — 75561 CARDIAC MRI FOR MORPH W/DYE: CPT | Mod: 26 | Performed by: STUDENT IN AN ORGANIZED HEALTH CARE EDUCATION/TRAINING PROGRAM

## 2022-05-03 PROCEDURE — 255N000002 HC RX 255 OP 636: Performed by: INTERNAL MEDICINE

## 2022-05-03 RX ORDER — GADOBUTROL 604.72 MG/ML
7.5 INJECTION INTRAVENOUS ONCE
Status: COMPLETED | OUTPATIENT
Start: 2022-05-03 | End: 2022-05-03

## 2022-05-03 RX ADMIN — GADOBUTROL 7.5 ML: 604.72 INJECTION INTRAVENOUS at 09:47

## 2022-05-03 RX ADMIN — GADOBUTROL 6.5 ML: 604.72 INJECTION INTRAVENOUS at 09:48

## 2022-05-12 ENCOUNTER — OFFICE VISIT (OUTPATIENT)
Dept: CARDIOLOGY | Facility: CLINIC | Age: 52
End: 2022-05-12
Payer: COMMERCIAL

## 2022-05-12 VITALS
SYSTOLIC BLOOD PRESSURE: 130 MMHG | HEART RATE: 71 BPM | WEIGHT: 241 LBS | OXYGEN SATURATION: 95 % | DIASTOLIC BLOOD PRESSURE: 91 MMHG | BODY MASS INDEX: 34.27 KG/M2

## 2022-05-12 DIAGNOSIS — I42.2 HYPERTROPHIC CARDIOMYOPATHY (H): Primary | ICD-10-CM

## 2022-05-12 PROCEDURE — 99214 OFFICE O/P EST MOD 30 MIN: CPT | Performed by: INTERNAL MEDICINE

## 2022-05-12 NOTE — PROGRESS NOTES
SUBJECTIVE:  Andrew Atkinson is a 51 year old male who presents for follow-up after cardiac MRI.  Patient was a very healthy active person prior to 2020.  In February 2020 patient visited [place].  He attended an oxygen parlor.  By the time returning to home he developed cold-like symptoms.  This progressed to significant shortness of breath.  Reported to PCP and a troponin was checked which was elevated.  Patient was sent to emergency room and subsequently hospitalized.  Peak troponin was 0.2.  EKG showed diffuse precordial T wave inversion.  Patient had a coronary angiogram which was completely normal.  Reviewing the echocardiogram and EKG this was suggestive of hypertrophic cardiomyopathy.  So patient was advised to have a cardiac MRI.  Here to discuss the results as it was suggestive of hypertrophic cardiomyopathy.  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  Non-smoker.  Cardiac risk factor is hyperlipidemia.  No diagnosis of diabetes.  Patient do not know about his parents.  He do have 2 children 17 and 20 years old.  Patient Active Problem List    Diagnosis Date Noted     Spinal stenosis of lumbar region with neurogenic claudication 03/09/2022     Priority: Medium     Arthrodesis status 03/09/2022     Priority: Medium     Diabetes mellitus, type 2 (H) 02/28/2022     Priority: Medium     Umbilical hernia with obstruction 10/09/2020     Priority: Medium     Added automatically from request for surgery [id number]       Acute viral myocarditis 02/14/2020     Priority: Medium     Acquired hypothyroidism 04/18/2017     Priority: Medium    .  Current Outpatient Medications   Medication Sig     acetaminophen (TYLENOL) 325 MG tablet Take 2 tablets (650 mg) by mouth every 6 hours as needed for mild pain Alternate with ibuprofen so you are taking one or the other every three hours. For example take tylenol at 5am, then ibuprofen at 8am, then tylenol at 11am, and so on.      amLODIPine (NORVASC) 5 MG tablet Take 1 tablet (5 mg) by mouth daily     levothyroxine (SYNTHROID/LEVOTHROID) 150 MCG tablet TAKE 1 TABLET BY MOUTH ONCE DAILY     multivitamin w/minerals (THERA-VIT-M) tablet Take 1 tablet by mouth daily     pravastatin (PRAVACHOL) 40 MG tablet Take 1 tablet (40 mg) by mouth daily     Current Facility-Administered Medications   Medication     lidocaine 1 % injection 0.5 mL     triamcinolone (KENALOG-40) injection 20 mg     Past Medical History:   Diagnosis Date     Medial meniscus tear      Past Surgical History:   Procedure Laterality Date     ARTHROSCOPY KNEE WITH MEDIAL MENISCECTOMY Left 5/12/2017    Procedure: ARTHROSCOPY KNEE WITH MEDIAL MENISCECTOMY;  Arthroscopic partial medial menisectomy,left knee;  Surgeon: Grant Muñoz MD;  Location: [facility]     CV CORONARY ANGIOGRAM N/A 2/15/2020    Procedure: Coronary Angiogram;  Surgeon: Rinku Boyce MD;  Location:  HEART CARDIAC CATH LAB     LAPAROSCOPIC HERNIORRHAPHY UMBILICAL N/A 12/8/2020    Procedure: umbilical hernia repair;  Surgeon: Miguel Ángel Arnold DO;  Location: [facility] OR     Allergies   Allergen Reactions     Morphine Hives     Social History     Socioeconomic History     Marital status: Single     Spouse name: Not on file     Number of children: Not on file     Years of education: Not on file     Highest education level: Not on file   Occupational History     Not on file   Tobacco Use     Smoking status: Never Smoker     Smokeless tobacco: Never Used   Substance and Sexual Activity     Alcohol use: Yes     Comment: Occ      Drug use: No     Sexual activity: Yes     Partners: Female   Other Topics Concern     Parent/sibling w/ CABG, MI or angioplasty before 65F 55M? Not Asked   Social History Narrative     Not on file     Social Determinants of Health     Financial Resource Strain: Not on file   Food Insecurity: Not on file   Transportation Needs: Not on file   Physical Activity: Not on file   Stress: Not on  file   Social Connections: Not on file   Intimate Partner Violence: Not on file   Housing Stability: Not on file     Family History   Problem Relation Age of Onset     Unknown/Adopted No family hx of           REVIEW OF SYSTEMS:  General: negative, fever, chills, night sweats  Skin: negative, acne, rash and scaling  Eyes: negative, double vision, eye pain and photophobia  Ears/Nose/Throat: negative, nasal congestion and purulent rhinorrhea  Respiratory: No dyspnea on exertion, No cough, No hemoptysis and negative  Cardiovascular: negative, palpitations, tachycardia, irregular heart beat, chest pain, exertional chest pain or pressure, paroxysmal nocturnal dyspnea, dyspnea on exertion and orthopnea       OBJECTIVE:  Blood pressure (!) 130/91, pulse 71, weight 109.3 kg (241 lb), SpO2 95 %.  General Appearance: healthy, alert, active and no distress  Head: Normocephalic. No masses, lesions, tenderness or abnormalities  Eyes: conjuctiva clear, PERRL, EOM intact  Ears: External ears normal. Canals clear. TM's normal.  Nose: Nares normal  Mouth: normal  Neck: Supple, no cervical adenopathy, no thyromegaly  Lungs: clear to auscultation  Cardiac: regular rate and rhythm, normal S1 and S2, no murmur       ASSESSMENT/PLAN:  Patient here to discuss results of recent cardiac MRI.  In 2020 patient presented with cold-like symptoms progressing to significant shortness of breath.  This started after attending an oxygen parlor in [place].  Patient was hospitalized.  He had a peak troponin of 0.2.  Coronary angiogram was completely normal.  A diagnosis of myocarditis was made.  Patient's EKG showed diffuse deep precordial T wave inversion.  Echocardiogram was suggestive of hypertrophic cardiomyopathy.  Because of this patient was advised to have a cardiac MRI.  This was suggestive of hypertrophic cardiomyopathy with a maximum septal thickness of 20 mm.  He do not have any history of arrhythmia or syncope or near syncope.  He has no  family history as he do not know his parents.  He do have 2 children aged 17 and 20.  Discussed the diagnosis with patient.  Suggested need for further testing including genetic testing.  Patient will be referred to Dr. Jeffers for further follow-up and additional testing and management.  Return to Clinic as needed.  Total visit duration 30 minutes.  This included face-to-face interview, chart review, review of EKGs echocardiogram, MRI and result discussion with patient and documentation.

## 2022-05-12 NOTE — LETTER
5/12/2022      RE: Andrew Atkinson  [address] [place] [id number]       Dear Colleague,    Thank you for the opportunity to participate in the care of your patient, Andrew Atkinson, at the [facility]. Please see a copy of my visit note below.       SUBJECTIVE:  Andrew Atkinson is a 51 year old male who presents for follow-up after cardiac MRI.  Patient was a very healthy active person prior to 2020.  In February 2020 patient visited [place].  He attended an oxygen parlor.  By the time returning to home he developed cold-like symptoms.  This progressed to significant shortness of breath.  Reported to PCP and a troponin was checked which was elevated.  Patient was sent to emergency room and subsequently hospitalized.  Peak troponin was 0.2.  EKG showed diffuse precordial T wave inversion.  Patient had a coronary angiogram which was completely normal.  Reviewing the echocardiogram and EKG this was suggestive of hypertrophic cardiomyopathy.  So patient was advised to have a cardiac MRI.  Here to discuss the results as it was suggestive of hypertrophic cardiomyopathy.  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  Non-smoker.  Cardiac risk factor is hyperlipidemia.  No diagnosis of diabetes.  Patient do not know about his parents.  He do have 2 children 17 and 20 years old.  Patient Active Problem List    Diagnosis Date Noted     Spinal stenosis of lumbar region with neurogenic claudication 03/09/2022     Priority: Medium     Arthrodesis status 03/09/2022     Priority: Medium     Diabetes mellitus, type 2 (H) 02/28/2022     Priority: Medium     Umbilical hernia with obstruction 10/09/2020     Priority: Medium     Added automatically from request for surgery [id number]       Acute viral myocarditis 02/14/2020     Priority: Medium     Acquired hypothyroidism 04/18/2017     Priority: Medium     .  Current Outpatient Medications   Medication Sig     acetaminophen (TYLENOL) 325 MG tablet Take 2 tablets (650 mg) by mouth every 6 hours as needed for mild pain Alternate with ibuprofen so you are taking one or the other every three hours. For example take tylenol at 5am, then ibuprofen at 8am, then tylenol at 11am, and so on.     amLODIPine (NORVASC) 5 MG tablet Take 1 tablet (5 mg) by mouth daily     levothyroxine (SYNTHROID/LEVOTHROID) 150 MCG tablet TAKE 1 TABLET BY MOUTH ONCE DAILY     multivitamin w/minerals (THERA-VIT-M) tablet Take 1 tablet by mouth daily     pravastatin (PRAVACHOL) 40 MG tablet Take 1 tablet (40 mg) by mouth daily     Current Facility-Administered Medications   Medication     lidocaine 1 % injection 0.5 mL     triamcinolone (KENALOG-40) injection 20 mg     Past Medical History:   Diagnosis Date     Medial meniscus tear      Past Surgical History:   Procedure Laterality Date     ARTHROSCOPY KNEE WITH MEDIAL MENISCECTOMY Left 5/12/2017    Procedure: ARTHROSCOPY KNEE WITH MEDIAL MENISCECTOMY;  Arthroscopic partial medial menisectomy,left knee;  Surgeon: Grant Muñoz MD;  Location: [facility]     CV CORONARY ANGIOGRAM N/A 2/15/2020    Procedure: Coronary Angiogram;  Surgeon: Rniku Boyce MD;  Location:  HEART CARDIAC CATH LAB     LAPAROSCOPIC HERNIORRHAPHY UMBILICAL N/A 12/8/2020    Procedure: umbilical hernia repair;  Surgeon: Miguel Ángel Arnold DO;  Location: [facility] OR     Allergies   Allergen Reactions     Morphine Hives     Social History     Socioeconomic History     Marital status: Single     Spouse name: Not on file     Number of children: Not on file     Years of education: Not on file     Highest education level: Not on file   Occupational History     Not on file   Tobacco Use     Smoking status: Never Smoker     Smokeless tobacco: Never Used   Substance and Sexual Activity     Alcohol use: Yes     Comment: Occ      Drug use: No     Sexual activity: Yes     Partners:  Female   Other Topics Concern     Parent/sibling w/ CABG, MI or angioplasty before 65F 55M? Not Asked   Social History Narrative     Not on file     Social Determinants of Health     Financial Resource Strain: Not on file   Food Insecurity: Not on file   Transportation Needs: Not on file   Physical Activity: Not on file   Stress: Not on file   Social Connections: Not on file   Intimate Partner Violence: Not on file   Housing Stability: Not on file     Family History   Problem Relation Age of Onset     Unknown/Adopted No family hx of           REVIEW OF SYSTEMS:  General: negative, fever, chills, night sweats  Skin: negative, acne, rash and scaling  Eyes: negative, double vision, eye pain and photophobia  Ears/Nose/Throat: negative, nasal congestion and purulent rhinorrhea  Respiratory: No dyspnea on exertion, No cough, No hemoptysis and negative  Cardiovascular: negative, palpitations, tachycardia, irregular heart beat, chest pain, exertional chest pain or pressure, paroxysmal nocturnal dyspnea, dyspnea on exertion and orthopnea       OBJECTIVE:  Blood pressure (!) 130/91, pulse 71, weight 109.3 kg (241 lb), SpO2 95 %.  General Appearance: healthy, alert, active and no distress  Head: Normocephalic. No masses, lesions, tenderness or abnormalities  Eyes: conjuctiva clear, PERRL, EOM intact  Ears: External ears normal. Canals clear. TM's normal.  Nose: Nares normal  Mouth: normal  Neck: Supple, no cervical adenopathy, no thyromegaly  Lungs: clear to auscultation  Cardiac: regular rate and rhythm, normal S1 and S2, no murmur       ASSESSMENT/PLAN:  Patient here to discuss results of recent cardiac MRI.  In 2020 patient presented with cold-like symptoms progressing to significant shortness of breath.  This started after attending an oxygen parlor in [place].  Patient was hospitalized.  He had a peak troponin of 0.2.  Coronary angiogram was completely normal.  A diagnosis of myocarditis was made.  Patient's EKG showed  diffuse deep precordial T wave inversion.  Echocardiogram was suggestive of hypertrophic cardiomyopathy.  Because of this patient was advised to have a cardiac MRI.  This was suggestive of hypertrophic cardiomyopathy with a maximum septal thickness of 20 mm.  He do not have any history of arrhythmia or syncope or near syncope.  He has no family history as he do not know his parents.  He do have 2 children aged 17 and 20.  Discussed the diagnosis with patient.  Suggested need for further testing including genetic testing.  Patient will be referred to Dr. Jeffers for further follow-up and additional testing and management.  Return to Clinic as needed.  Total visit duration 30 minutes.  This included face-to-face interview, chart review, review of EKGs echocardiogram, MRI and result discussion with patient and documentation.      Please do not hesitate to contact me if you have any questions/concerns.     Sincerely,     XOCHILT Batista MD

## 2022-05-12 NOTE — PATIENT INSTRUCTIONS
Thank you for coming to the [facility] [place]; please note the following instructions:    1. Referral to Dr. Lomeli, cardiologist at [facility].  [facility] [facility] cardiology will be contacting you to schedule a consultation.        If you have any questions regarding your visit please contact your care team:     Cardiology  Telephone Number   Priyanka BURT., RN  Ashley WISE, RN   Puja THORNTON, RMA  Michelle ISABEL, RMA  Gertrude Whittington., [facility]  Afshan COSBY, Visit Facilitator   [phone] (option 1)   For scheduling appts:     [phone] (select option 1)       For the Device Clinic (Pacemakers and ICD's)  RN's :  Gena Chamberlain   During business hours: [phone]    *After business hours:  [phone] (select option 4)      Normal test result notifications will be released via [facility] or mailed within 7 business days.  All other test results, will be communicated via telephone once reviewed by your cardiologist.    If you need a medication refill please contact your pharmacy.  Please allow 3 business days for your refill to be completed.    As always, thank you for trusting us with your health care needs!

## 2022-05-12 NOTE — NURSING NOTE
"Chief Complaint   Patient presents with     RECHECK     3 mo follow up post cardiac MRI.        Initial BP (!) 130/91 (BP Location: Right arm, Patient Position: Chair, Cuff Size: Adult Large)   Pulse 71   Wt 109.3 kg (241 lb)   SpO2 95%   BMI 34.27 kg/m   Estimated body mass index is 34.27 kg/m  as calculated from the following:    Height as of 2/28/22: 1.786 m (5' 10.32\").    Weight as of this encounter: 109.3 kg (241 lb)..  BP completed using cuff size: GUERDA Andreson  "

## 2022-05-18 DIAGNOSIS — E78.5 HYPERLIPIDEMIA LDL GOAL <100: ICD-10-CM

## 2022-05-18 DIAGNOSIS — I21.4 NSTEMI (NON-ST ELEVATED MYOCARDIAL INFARCTION) (H): ICD-10-CM

## 2022-05-18 DIAGNOSIS — R94.31 NONSPECIFIC ABNORMAL ELECTROCARDIOGRAM (ECG) (EKG): ICD-10-CM

## 2022-05-18 DIAGNOSIS — I40.0 ACUTE VIRAL MYOCARDITIS: ICD-10-CM

## 2022-05-19 RX ORDER — PRAVASTATIN SODIUM 20 MG
TABLET ORAL
Qty: 90 TABLET | Refills: 0 | OUTPATIENT
Start: 2022-05-19

## 2022-05-26 ENCOUNTER — PATIENT OUTREACH (OUTPATIENT)
Dept: CARDIOLOGY | Facility: CLINIC | Age: 52
End: 2022-05-26
Payer: COMMERCIAL

## 2022-05-26 DIAGNOSIS — I42.2 HYPERTROPHIC CARDIOMYOPATHY (H): Primary | ICD-10-CM

## 2022-05-26 NOTE — TELEPHONE ENCOUNTER
Received referral for patient to be seen by Dr. Lomeli for HCM. Dr. Lomeli reviewed patient's chart and would like patient to have CPX/Stress echo prior to seeing him if possible.     Patient scheduled to see Dr. Lomeli on July 28.

## 2022-05-26 NOTE — TELEPHONE ENCOUNTER
Patient was able to fit in on June 23 in [place]. Imaging orders placed. [facility] will reach out to patient to schedule his imaging.     Leroy Meek RN   Cardiology Care Coordinator  [facility]   Phone: [phone]  Fax: [phone]

## 2022-06-05 ENCOUNTER — HEALTH MAINTENANCE LETTER (OUTPATIENT)
Age: 52
End: 2022-06-05

## 2022-06-08 ENCOUNTER — TRANSFERRED RECORDS (OUTPATIENT)
Dept: HEALTH INFORMATION MANAGEMENT | Facility: CLINIC | Age: 52
End: 2022-06-08
Payer: COMMERCIAL

## 2022-06-20 ENCOUNTER — HOSPITAL ENCOUNTER (OUTPATIENT)
Dept: CARDIOLOGY | Facility: CLINIC | Age: 52
Discharge: HOME | End: 2022-06-20
Attending: INTERNAL MEDICINE
Payer: COMMERCIAL

## 2022-06-20 VITALS — HEIGHT: 70 IN | WEIGHT: 239.86 LBS | BODY MASS INDEX: 34.34 KG/M2

## 2022-06-20 DIAGNOSIS — I42.2 HYPERTROPHIC CARDIOMYOPATHY (H): ICD-10-CM

## 2022-06-20 PROCEDURE — 94621 CARDIOPULM EXERCISE TESTING: CPT

## 2022-06-20 PROCEDURE — 93321 DOPPLER ECHO F-UP/LMTD STD: CPT | Mod: 26 | Performed by: INTERNAL MEDICINE

## 2022-06-20 PROCEDURE — 94621 CARDIOPULM EXERCISE TESTING: CPT | Mod: 26 | Performed by: INTERNAL MEDICINE

## 2022-06-20 PROCEDURE — 93325 DOPPLER ECHO COLOR FLOW MAPG: CPT | Mod: TC

## 2022-06-20 PROCEDURE — 93016 CV STRESS TEST SUPVJ ONLY: CPT | Mod: 59 | Performed by: INTERNAL MEDICINE

## 2022-06-20 PROCEDURE — 93350 STRESS TTE ONLY: CPT | Mod: 26 | Performed by: INTERNAL MEDICINE

## 2022-06-20 PROCEDURE — 93325 DOPPLER ECHO COLOR FLOW MAPG: CPT | Mod: 26 | Performed by: INTERNAL MEDICINE

## 2022-06-20 PROCEDURE — 93018 CV STRESS TEST I&R ONLY: CPT | Mod: 59 | Performed by: INTERNAL MEDICINE

## 2022-06-21 LAB
CARDIOPULMONARY ANAEROBIC THRESHOLD PREDICTED PEAK: 76 %
CARDIOPULMONARY ANAEROBIC THRESHOLD VO2: 25.4 ML/KG/MIN
CARDIOPULMONARY BLOOD PRESSURE REST: NORMAL MMHG
CARDIOPULMONARY BREATHING RESERVE REST: 83.6
CARDIOPULMONARY BREATHING RESERVE V02MAX: 5
CARDIOPULMONARY CO2 OUTPUT REST: 518 ML/MIN
CARDIOPULMONARY CO2 OUTPUT VO2MAX: 3295 ML/MIN
CARDIOPULMONARY FEV 1.0 (L) ACTUAL: 3.24
CARDIOPULMONARY FEV 1.0 (L) PRECENT: 83 %
CARDIOPULMONARY FEV 1.0 (L) PREDICTED: 3.91
CARDIOPULMONARY FEV 1.0 FVC (%) ACTUAL: 83.7
CARDIOPULMONARY FEV 1.0 FVC (%) PERCENT: 108 %
CARDIOPULMONARY FEV 1.0 FVC (%) PREDICTED: 77.3
CARDIOPULMONARY FUNCTIONAL CAPACITY MAX ML/KG/MIN: 27.2 ML/KG/MIN
CARDIOPULMONARY FUNCTIONAL CAPACITY PERCENT: 81 %
CARDIOPULMONARY FUNCTIONAL CAPACITY PREDICTED: 33.4 ML/KG/MIN
CARDIOPULMONARY FVC (L) ACTUAL: 3.88
CARDIOPULMONARY FVC (L) PERCENT: 77 %
CARDIOPULMONARY FVC (L) PREDICTED: 5.06
CARDIOPULMONARY HEART RATE REST: 68 BPM
CARDIOPULMONARY MET'S REST: 1.8
CARDIOPULMONARY MINUTE VENTILATION REST: 18.6 L/MIN
CARDIOPULMONARY MINUTE VENTILATION VO2MAX: 107.9 L/MIN
CARDIOPULMONARY MYOCARDIAC O2 DEMAND MAX: NORMAL
CARDIOPULMONARY OXYGEN CONSUMPTION REST: 6.2 ML/KG/MIN
CARDIOPULMONARY OXYGEN CONSUMPTION VO2MAX: 27.2 ML/KG/MIN
CARDIOPULMONARY OXYGEN PULSE REST: 9 ML/BEAT
CARDIOPULMONARY OXYGEN PULSE VO2MAX: 20.8 ML/BEAT
CARDIOPULMONARY OXYGEN SATURATION- OXIMETRY REST: 99 %
CARDIOPULMONARY OXYGEN SATURATION- OXIMETRY VO2MAX: 100 %
CARDIOPULMONARY PET C02 REST: 33
CARDIOPULMONARY PET C02 VO2MAX: 36
CARDIOPULMONARY PET02 REST: 103
CARDIOPULMONARY PET02 V02 MAX: 110
CARDIOPULMONARY RER: 1.07
CARDIOPULMONARY RESPIRALORY EXCHANGE RATIO VO2MAX: 1.07
CARDIOPULMONARY RESPIRALORY EXCHANGE RATIO: 0.77
CARDIOPULMONARY RESPIRATORY RATE REST: 16 BR/MIN
CARDIOPULMONARY RESPIRATORY RATE VO2MAX: 38 BR/MIN
CARDIOPULMONARY STRESS BASE 1 BP MMHG: NORMAL MMHG
CARDIOPULMONARY STRESS BASE 1 BPA: 122 BPM
CARDIOPULMONARY STRESS BASE 1 SPO2: 100 % SPO2
CARDIOPULMONARY STRESS BASE 1 TIME: 1 MINS
CARDIOPULMONARY STRESS BASE 2 BP MMHG: NORMAL MMHG
CARDIOPULMONARY STRESS BASE 2 BPA: 98 BPM
CARDIOPULMONARY STRESS BASE 2 SPO2: 100 % SPO2
CARDIOPULMONARY STRESS BASE 2 TIME: 3 MINS
CARDIOPULMONARY STRESS BASE 3 BP MMHG: NORMAL MMHG
CARDIOPULMONARY STRESS BASE 3 BPA: 89 BPM
CARDIOPULMONARY STRESS BASE 3 SPO2: 100 % SPO2
CARDIOPULMONARY STRESS BASE 3 TIME: 7 MINS
CARDIOPULMONARY STRESS PHASE 1 BP MMHG: NORMAL MMHG
CARDIOPULMONARY STRESS PHASE 1 BPM: 86 BPM
CARDIOPULMONARY STRESS PHASE 1 SPO2: 100 % SPO2
CARDIOPULMONARY STRESS PHASE 1 TIME: 3 MINS
CARDIOPULMONARY STRESS PHASE 2 BP MMHG: NORMAL MMHG
CARDIOPULMONARY STRESS PHASE 2 BPM: 95 BPM
CARDIOPULMONARY STRESS PHASE 2 SPO2: 100 % SPO2
CARDIOPULMONARY STRESS PHASE 2 TIME: 6 MINS
CARDIOPULMONARY STRESS PHASE 3 BP MMHG: NORMAL MMHG
CARDIOPULMONARY STRESS PHASE 3 BPM: 106 BPM
CARDIOPULMONARY STRESS PHASE 3 SPO2: 100 % SPO2
CARDIOPULMONARY STRESS PHASE 3 TIME: 9 MINS
CARDIOPULMONARY STRESS PHASE 4 BP MMHG: NORMAL MMHG
CARDIOPULMONARY STRESS PHASE 4 BPM: 117 BPM
CARDIOPULMONARY STRESS PHASE 4 SPO2: 99 % SPO2
CARDIOPULMONARY STRESS PHASE 4 TIME: 12 MINS
CARDIOPULMONARY STRESS PHASE 5 BP MMHG: NORMAL MMHG
CARDIOPULMONARY STRESS PHASE 5 BPM: 126 BPM
CARDIOPULMONARY STRESS PHASE 5 SPO2: 100 % SPO2
CARDIOPULMONARY STRESS PHASE 5 TIME: 15 MINS
CARDIOPULMONARY STRESS PHASE 6 BP MMHG: NORMAL MMHG
CARDIOPULMONARY STRESS PHASE 6 BPA: 142 BPM
CARDIOPULMONARY STRESS PHASE 6 SPO2: 100 % SPO2
CARDIOPULMONARY STRESS PHASE 6 TIME SEC: 49 SEC
CARDIOPULMONARY STRESS PHASE 6 TIME: 17 MINS
CARDIOPULMONARY SVC (L) ACTUAL: 3.89
CARDIOPULMONARY SVC (L) PERCENT: 77 %
CARDIOPULMONARY SVC (L) PREDICTED: 5.06
CARDIOPULMONARY TIDAL VOLUME REST: 1131 ML
CARDIOPULMONARY TIDAL VOLUME VO2MAX: 2814 ML
CARDIOPULMONARY VE/VCO2 SLOPE: 31.88
CARDIOPULMONARY VENTILATORY EQUIVALENT 02 REST: 28
CARDIOPULMONARY VENTILATORY EQUIVALENT 02 V02: 35
CARDIOPULMONARY VENTILATORY EQUIVALENT C02 REST: 36
CARDIOPULMONARY VENTILATORY EQUIVALENT C02 SLOPE VO2MAX: 31.88
CARDIOPULMONARY VENTILATORY EQUIVALENT C02 VO2MAX: 33
CV STRESS MAX HR HE: 142
PREDICTED VO2MAX: 33.4
RATED PERCEIVED EXERTION: 18
STRESS ANGINA INDEX: 0
STRESS ECHO BASELINE BP: NORMAL MMHG
STRESS ECHO BASELINE HR: 61 BPM
STRESS ECHO CALCULATED PERCENT HR: 85 %
STRESS ECHO LAST STRESS BP: NORMAL MMHG
STRESS ECHO POST ESTIMATED WORKLOAD: 7.8 METS
STRESS ECHO POST EXERCISE DUR MIN: 17 MIN
STRESS ECHO POST EXERCISE DUR SEC: 49 SEC
STRESS ECHO TARGET HR: 168

## 2022-06-23 ENCOUNTER — OFFICE VISIT (OUTPATIENT)
Dept: CARDIOLOGY | Facility: CLINIC | Age: 52
End: 2022-06-23
Payer: COMMERCIAL

## 2022-06-23 ENCOUNTER — ANCILLARY PROCEDURE (OUTPATIENT)
Dept: CARDIOLOGY | Facility: CLINIC | Age: 52
End: 2022-06-23
Attending: INTERNAL MEDICINE
Payer: COMMERCIAL

## 2022-06-23 VITALS
DIASTOLIC BLOOD PRESSURE: 89 MMHG | HEART RATE: 67 BPM | SYSTOLIC BLOOD PRESSURE: 137 MMHG | OXYGEN SATURATION: 95 % | BODY MASS INDEX: 34.41 KG/M2 | WEIGHT: 242 LBS

## 2022-06-23 DIAGNOSIS — G47.33 OSA (OBSTRUCTIVE SLEEP APNEA): ICD-10-CM

## 2022-06-23 DIAGNOSIS — I10 BENIGN ESSENTIAL HYPERTENSION: ICD-10-CM

## 2022-06-23 DIAGNOSIS — I42.2 HYPERTROPHIC CARDIOMYOPATHY (H): ICD-10-CM

## 2022-06-23 DIAGNOSIS — I42.2 HYPERTROPHIC CARDIOMYOPATHY (H): Primary | ICD-10-CM

## 2022-06-23 DIAGNOSIS — E78.5 HYPERLIPIDEMIA, ACQUIRED: ICD-10-CM

## 2022-06-23 PROCEDURE — 99215 OFFICE O/P EST HI 40 MIN: CPT | Performed by: INTERNAL MEDICINE

## 2022-06-23 PROCEDURE — 93246 EXT ECG>7D<15D RECORDING: CPT | Performed by: INTERNAL MEDICINE

## 2022-06-23 PROCEDURE — 93248 EXT ECG>7D<15D REV&INTERPJ: CPT | Performed by: INTERNAL MEDICINE

## 2022-06-23 RX ORDER — METOPROLOL SUCCINATE 50 MG/1
50 TABLET, EXTENDED RELEASE ORAL DAILY
Qty: 90 TABLET | Refills: 3 | Status: SHIPPED | OUTPATIENT
Start: 2022-06-23 | End: 2022-08-03

## 2022-06-23 NOTE — PATIENT INSTRUCTIONS
The following is a summary of your office visit today:    Zio Patch Today  Appointment with Brenda Gutierrez, genetic counselor, you will be contacted by her team.  Start 50mg Toprol XL daily  Sleep study, if you don't hear from the sleep clinic by Monday please call to schedule [phone]  Follow up in 6 months with Dr. Lomeli         Nurse contact information:  Cardiology Care Coordinators:  Nely Ibarra RN and Leroy Meek RN   Phone  [phone]    Fax [phone]       HOW TO CHECK YOUR BLOOD PRESSURE AT HOME:     Avoid eating, smoking, and exercising for at least 30 minutes before taking a reading.    Be sure you have taken your BP medication at least 2-3 hours before you check it.     Sit quietly for 10 minutes before a reading.     Sit in a chair with your feet flat on the floor. Rest your  arm on a table so that the arm cuff is at the same level as your heart.    Remain still during the reading.    Record your blood pressure and pulse in a log and bring to your next appointment.     If you have had any blood work, imaging or other testing completed we will be in touch within 1-2 weeks regarding the results. If you have any questions, concerns or need to schedule a follow up, please contact us at [phone]. If you are needing refills please contact your pharmacy. For urgent after hour care please call the [facility] at [phone] or the [facility] at [phone] and ask to speak to the cardiologist on call.    It was a pleasure meeting with you today. Please let us know if there is anything else we can do for you so that we can be sure you are leaving completely satisfied with your care experience.     Your Cardiology Team at [facility]  RN Care Coordinators: Elham  Support Staff: Ramsey and Lou

## 2022-06-23 NOTE — NURSING NOTE
Andrew Atkinson's goals for this visit include:   Chief Complaint   Patient presents with     New Patient     Referred by Dr. Abner Atkinson requests these members of Keven Atkinson's care team be copied on today's visit information: yes     PCP: Wilfredo Dela Cruz    Referring Provider:  No referring provider defined for this encounter.    /89 (BP Location: Left arm, Patient Position: Chair, Cuff Size: Adult Large)   Pulse 67   Wt 109.8 kg (242 lb)   SpO2 95%   BMI 34.41 kg/m      Do you need any medication refills at today's visit? No       GUERDA Garcia   Cardiology Team  [facility]

## 2022-06-23 NOTE — PROGRESS NOTES
Chief complaint: HCM, establish care    HPI:   Andrew Atkinson is a 51 year old adult with history of HTN and HL referred for evaluation and management of recently diagnosed HCM.     He has previously been followed by my partners Ashish Larson and Bishnu.  He established care with Dr. Larson (4/29/20 note reviewed) and subsequently with Dr. Goetz for follow-up after an illness 2/2020.  At that time, he had been visiting [place] and attended an oxygen parlor, after which he developed viral upper respiratory symptoms and dyspnea.  He reported the symptoms to his primary care physician obtained troponin, which was elevated at 0.2.  He was sent to the emergency department where he was found to have diffuse T wave inversions on ECG.  Coronary angiogram was performed and showed normal coronary arteries.  He was discharged home with a working diagnosis of myocarditis and advised to have a cardiac MRI.  Dr. Goetz ordered a cardiac MRI 5/3/22 for follow up of what had been suspected to be viral myocarditis (see below), which revealed HCM prompting referral. He was last seen by Dr. Goetz 5/12/22 (notes reviewed.)    Prior to his illness 2/2020 he had been relatively active, but his illness and also back pain and subsequent surgery have limited his activity. He had back surgery in March which limited his activity, but he has been walking his dog without difficulty. He reports limited exercise capacity.    Of note, he has been observed repeatedly stopping breathing while sleeping and also snoring loudly and reports fatigue and daytime sleepiness.    He denies any chest pain, dyspnea at rest or exertional dyspnea above baseline, PND, orthopnea, peripheral edema, palpitations, lightheadedness or syncope.     His family history is not well-known to him as his parents gave him up when he was a young child, but he does know that his mother has HCM (but she is unwilling to share any further information about this.) He has 2  sisters (who both live in [place]) and 1 brother; to the best of his knowledge, none of them has HCM. He has 2 sons, ages 18 and 20, both well. No known family history of sudden death.     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 His is a never-smoker.      PAST MEDICAL HISTORY:  HTN  HL  Hypothyroidism    CURRENT MEDICATIONS:  Current Outpatient Medications   Medication Sig Dispense Refill     acetaminophen (TYLENOL) 325 MG tablet Take 2 tablets (650 mg) by mouth every 6 hours as needed for mild pain Alternate with ibuprofen so you are taking one or the other every three hours. For example take tylenol at 5am, then ibuprofen at 8am, then tylenol at 11am, and so on. 50 tablet 0     amLODIPine (NORVASC) 5 MG tablet Take 1 tablet (5 mg) by mouth daily 30 tablet 5     levothyroxine (SYNTHROID/LEVOTHROID) 150 MCG tablet TAKE 1 TABLET BY MOUTH ONCE DAILY 90 tablet 1     multivitamin w/minerals (THERA-VIT-M) tablet Take 1 tablet by mouth daily       pravastatin (PRAVACHOL) 40 MG tablet Take 1 tablet (40 mg) by mouth daily 90 tablet 3       ALLERGIES:     Allergies   Allergen Reactions     Morphine Hives       FAMILY HISTORY:  His family history is not well-known to him as his parents gave him up when he was a young child, but he does know that his mother has HCM (but she is unwilling to share any further information about this.) He has 2 sisters (who both live in [place]) and 1 brother; to the best of his knowledge, none of them has HCM. He has 2 sons, ages 18 and 20, both well. No known family history of sudden death.       SOCIAL HISTORY:  Social History     Tobacco Use     Smoking status: Never Smoker     Smokeless tobacco: Never Used   Substance Use Topics     Alcohol use: Yes     Comment: Occ      Drug use: No       ROS:   A comprehensive 14 point review of systems is negative other than as mentioned in HPI.    Exam:  /89 (BP Location: Left arm, Patient Position: Chair,  Cuff Size: Adult Large)   Pulse 67   Wt 109.8 kg (242 lb)   SpO2 95%   BMI 34.41 kg/m    GENERAL APPEARANCE: healthy, alert and no distress  EYES: no icterus, no xanthelasmas  ENT: normal palate, mucosa moist, no central cyanosis  NECK: JVP not elevated  RESPIRATORY: lungs clear to auscultation - no rales, rhonchi or wheezes, no use of accessory muscles, no retractions, respirations are unlabored, normal respiratory rate  CARDIOVASCULAR: regular rhythm, normal S1 with physiologic split S2, no S3 or S4 and no murmur, click or rub.  GI: soft, non tender, bowel sounds normal,no abdominal bruits  EXTREMITIES: no edema, no bruits  NEURO: alert and oriented to person/place/time, normal speech, gait and affect  VASC: Radial, dorsalis pedis and posterior tibialis pulses 2+ bilaterally.  SKIN: no ecchymoses, no rashes.  PSYCH: cooperative, affect appropriate.     Labs:  Reviewed.       Testing/Procedures:  I personally visualized and interpreted:  CPX/exercise echocardiogram (HCM protocol) 6/20/22:  Exercise 17:49 on a Modified Rolando protocol  MVO2 27.2 mL/kg/min (81% predicted, class I, 7.8 METS) VE/VCO2 slope 31.88 RER 1.07  Blunted HR and hypertensive BP response  No detectable resting or Valsalva LVOT gradient. Minimal latent LVOT obstruction, maximal post-exercise LVOT gradient 31 mmHg.     CMR 5/3/22:  HCM with asymmetric septal hypertrophy predominantly involving the basal-mid inferoseptum (max thickness 2.1 cm mid inferoseptum); there is also apical hypertrophy. Minimal patchy midmyocardial LGE consistent with HCM, LGE burden<5%. Minimal JEET without septal contact.       Assessment and Plan:   1. Hypertrophic cardiomyopathy cardiomyopathy with minimal latent LVOT obstruction (post-exercise LVOT gradient 31 mmHg), newly-diagnosed  The patient was counseled at length regarding the nature of hypertrophic cardiomyopathy including its genetic nature, its natural history, and potential complications.     We  discussed the potential complications of HCM, including outflow obstruction, arrhythmias, and heart failure.  He has minimal latent LVOT obstruction (PG 31 mmHg post-exercise) without significant symptoms and excellent exercise capacity on CPX. The patient was counseled regarding behavioral interventions to avoid worsening outflow obstruction, in particular avoiding dehydration and minimizing diuretics and peripheral vasodilators. Low-dose beta blocker was started.     Keven has no identified high-risk features for sudden death. In particular, he has only mild LGE burden on his cardiac MRI (<5%). Maximal LV wall thickness is 2.1 cm. He also has no known family history of SCD (though his FHx is not well ascertained.) lenora has no history of unexplained syncope.   He has not had ambulatory ECG monitoring and 14-day ZioPatch was placed at today's visit.     Keven's CPX is excellent and I suspect his extremely mild functional limitation is largely related to musculoskeletal limitations and deconditioning. We discussed the activity recommendations for hypertrophic cardiomyopathy, and in particular that moderate intensity aerobic exercise is encouraged and that only the highest intensity competitive sports with start-stop activity (e.g. basketball and ice hockey) are felt to be contraindicated.      We also discussed the genetic nature of HCM and its generally autosomal dominant inheritance. We counseled that all 1st-degree relatives should be screened and explained the strategies of clinical screening and genetic testing. The patient is aware that a pathogentic mutation is only identified in 40-50% of patients, and that in the absence of an identified mutation that would allow cascade testing of his family members, that all first-degree relatives should undergo clinical screening.   -Toprol XL 25 mg daily  -CV genetic counseling referral (Sunita Gutierrez GC)  -14-day ZioPatch  -follow up in 6 months      2. HTN, controlled:  continue present therapy  3. HL, controlled: continue present therapy    4. Probable TIFFANY, newly reported: Multiple symptoms and numerous witnessed apneas.  I did  Keven regarding the association between TIFFANY and multiple cardiovascular conditions including HTN, arrhythmias (both AF and ventricular arrhythmias), CAD/ACS, HF, and pulmonary hypertension, as well as chronic pain. I also advised him that treatment of TIFFANY, if present, can reduce all of these risks and may also improve his pain.   -Sleep Medicine referral    The patient states understanding and is agreeable with plan.   Chart review time today: 20 minutes  Visit time today: 46 minutes  Total time spent today: 66 minutes    Federico Lomeli MD  Cardiology    CC      ADDENDUM 07/14/22  14-day ZioPatch 6/23/22-7/7/22:   4 asymptomatic runs of nonsustained VT, longest 13 beats.   Other findings--baseline sinus rhythm (average VR 80 bpm, range ) with rare (<1%) PACs and PVCs and 4 asymptomatic nonsustained runs of SVT (in addition to VT noted above.)    ZioPatch shows repetitive nonsustained VT. Plan for referral to EP (Dr. Kaufman) for consideration of primary prevention ICD (class IIb indication by 2020 AHA guidelines), provided patient is agreeable to referral. Attention Sciencest message sent to patient.    Federico Lomeli MD  Cardiology

## 2022-06-23 NOTE — PROGRESS NOTES
Patient has been prescribed a ZioPatch holter for 14 days.  Patient was instructed regarding the indication, function, care and prompt return of the ZioPatch holter monitor. The monitor, with S/N [id number],  was placed on the patient with instructions regarding care of the skin, electrodes, and monitor, as well as documentation in the patient diary. Patient demonstrated understanding of this information and agreed to call [employer] with further questions or concerns.    Ramsey Chang, EMT  Clinic Support  [facility]    [phone]    Employed by [facility]

## 2022-06-23 NOTE — PATIENT INSTRUCTIONS
Patient has been prescribed a ZioPatch holter for 14 days.  Patient was instructed regarding the indication, function, care and prompt return of the ZioPatch holter monitor. The monitor, with S/N [id number],  was placed on the patient with instructions regarding care of the skin, electrodes, and monitor, as well as documentation in the patient diary. Patient demonstrated understanding of this information and agreed to call [employer] with further questions or concerns.    Ramsey Chang, EMT  Clinic Support  [facility]

## 2022-07-08 NOTE — PLAN OF CARE
D: Pt admitted with CP/elevated troponin/ SOB/INFANTE.  +NSTEMI.  A&OX4.  AVSS.    Ntg @ 0.17mcg/kg/min.  Heparin at 1200 units/hr.  Troponin 0.227, 0.211, 0.202.  K+3.6.      I:Scheduled Metoprolol, Pravastatin given per orders.  Tums per pt request.  Oriented to call light and unit routines.  No K+ replacement per MD.  O2 sat occasionally dipping to high 80's while asleep-placed on 2L O2 w/ sat 96%.      A:Pt stable.  Reports pain controlled with Ntg gtt.  Denies SOB/nausea. Hep10a at 0300. SB/SR.  Resting in bed in NAD.  Up independently to void. 2 person skin assessment done w/ Isis HOWELL RN.  No issues noted.    P:Continue current POC.  Notify MD w/ any issues/concerns.  Pt NPO for possible heart cath.     Patient sent another message about adding the labs pended. She had her labs drawn this morning at [facility].

## 2022-07-14 ENCOUNTER — MYC MEDICAL ADVICE (OUTPATIENT)
Dept: CARDIOLOGY | Facility: CLINIC | Age: 52
End: 2022-07-14

## 2022-07-18 ENCOUNTER — PATIENT OUTREACH (OUTPATIENT)
Dept: CARDIOLOGY | Facility: CLINIC | Age: 52
End: 2022-07-18

## 2022-07-18 NOTE — TELEPHONE ENCOUNTER
I spoke to patient at length about the zio patch result and recommendation to see Dr. Kaufman. Patient agreeable to the plan.     Leroy Meek RN   Cardiology Care Coordinator  [facility]   Phone: [phone]  Fax: [phone]

## 2022-07-20 NOTE — TELEPHONE ENCOUNTER
Discussed referral to EP with patient.     Leroy Meek RN   Cardiology Care Coordinator  [facility]   Phone: [phone]  Fax: [phone]

## 2022-08-01 ENCOUNTER — MYC MEDICAL ADVICE (OUTPATIENT)
Dept: CARDIOLOGY | Facility: CLINIC | Age: 52
End: 2022-08-01

## 2022-08-01 DIAGNOSIS — I42.2 HYPERTROPHIC CARDIOMYOPATHY (H): Primary | ICD-10-CM

## 2022-08-03 RX ORDER — DILTIAZEM HYDROCHLORIDE 180 MG/1
180 CAPSULE, EXTENDED RELEASE ORAL DAILY
Qty: 30 CAPSULE | Refills: 11 | Status: SHIPPED | OUTPATIENT
Start: 2022-08-03 | End: 2022-12-13

## 2022-08-03 NOTE — RESULT ENCOUNTER NOTE
ZioPatch 6/23/22-7/7/22: Baseline sinus rhythm, average VR 80 bpm (range .) 4 runs of nonsustained VT, longest 13 beats. 4 runs of nonsustained SVT, longest 6 beats. Both SVT and VT were asymptomatic. No sustained arrhythmias. No A.fib. 7 patient-triggered events predominantly correlated with sinus rhythm/sinus tachycardia (2 of these also had PVCs.)     ZioPatch shows 4 short runs of nonsustained VT. Keven does not have any other identified high-risk features for ventricular arrhythmias. We should continue to monitor to reassess his burden of arrhythmias for risk stratification, but no changes are needed at this time.     Federico Lomeli MD  Cardiology full weight-bearing

## 2022-08-03 NOTE — TELEPHONE ENCOUNTER
Due to fatigue on metoprolol, we should make the following changes:    1. START diltiazem  mg daily  2. STOP amlodipine  3. STOP metoprolol    Orders placed.    Federico Lomeli MD  Cardiology

## 2022-08-05 ENCOUNTER — VIRTUAL VISIT (OUTPATIENT)
Dept: CARDIOLOGY | Facility: CLINIC | Age: 52
End: 2022-08-05
Attending: GENETIC COUNSELOR, MS
Payer: COMMERCIAL

## 2022-08-05 DIAGNOSIS — I42.2 HYPERTROPHIC CARDIOMYOPATHY (H): ICD-10-CM

## 2022-08-05 PROCEDURE — 96040 HC GENETIC COUNSELING, EACH 30 MINUTES: CPT | Mod: GT,95 | Performed by: GENETIC COUNSELOR, MS

## 2022-08-05 NOTE — NURSING NOTE
Chief Complaint   Patient presents with     Consult     Dr. Lomeli prescribed diltiazem and having him stop amlodipine and metoprolol, Pt states he has not started diltiazem yet, he is picking up today, he is wondering if he should take his last dose of amlodipine and metoprolol as well today or just take the diltiazem, no other vitals to report today     Kirill Purdy, VF/CMA

## 2022-08-05 NOTE — LETTER
8/5/2022      RE: Andrew Atkinson  [address] [place] [id number]       Dear Colleague,    Thank you for the opportunity to participate in the care of your patient, Andrew Atkinson, at the [facility] [place] at [facility]. Please see a copy of my visit note below.        PROVIDER APPOINTMENT  Here is a copy of the progress note from your recent genetic counseling visit through the [facility] on Date: 8/5/2022.  Due to Covid-19 pandemic, this appointment was moved to a virtual visit.    PROGRESS NOTE:Keven was referred by Federico Lomeli MD for genetic counseling due to his recent diagnosis of hypertrophic cardiomyopathy (HCM).  I had the opportunity to talk with Andrew today to discuss the genetic component of HCM and testing options available to him.     MEDICAL HISTORY: Keven went in for pre-op visit in January 2022 and had an abnormal EKG that prompted a cardiac evaluation.  ECHO was performed and showed left ventricular hypertrophy (IVS 1.7 cm).  He was recently seen by Dr. Lomeli and diagnosed with HCM.  MRI (5/3/22) showed maximum wall thickness of 2.1 cm. He reports history of shortness of breath and had an episode of myocarditis in 2020.  He denies any history of fainting or dizziness.       FAMILY HISTORY:A detailed family history was obtained during today's consult.  Family history was significant for the following cardiac history:    Mom with HCM.  She gave up parenting rights when he was a child so they have little contact and limited information about HCM diagnosis.    Two full siblings and one maternal half sister with no known heart issues.    Two sons in good health.  There is no additional history of cardiomyopathy, arrhythmias, heart attacks, fainting, sudden cardiac death, genetic conditions, or birth defects. (A copy of pedigree may be found under media tab).    DISCUSSION:   Reviewed definition of hypertrophic cardiomyopathy (HCM). Explained that a good portion of HCM cases have a genetic component.     Reviewed autosomal dominant (AD) inheritance pattern most commonly associated with HCM, including the 50% risk for recurrence. Briefly reviewed autosomal recessive and X-linked inheritance. Explained that HCM gene mutations are associated with reduced penetrance and variable expressivity, meaning that individuals who carry a gene mutation may or may not get the disease and onset and severity can vary from one family member to the next. This explains why you may not see cardiomyopathy in each generation of a family.     Currently over 25 genes have been found to be related to HCM. Genetic testing currently identifies mutations in about 50-60% of idiopathic cases. Reviewed capabilities, limitations, and logistics of testing.  DNA sample via saliva or blood is collected and sent to testing lab for evaluation of selected genes. The results could directly impact care and treatment.      Explained three possible outcomes of genetic testing including: positive identification of a mutation, no mutation identified, and identification of a variant of unknown significance (VUS). If a mutation is identified, presymptomatic testing would be available to at risk family members. If no mutation is identified, it does not rule out the possibility of a genetic component to this disease. Family members could still be at risk for developing the same condition. If a VUS is identified, it is unclear if the mutation is disease causing or just a normal variation. It may take time and possibly additional testing to determine the meaning of a VUS result.     Test results take approximately 2-4 weeks on average. Discussed cost of testing through commercial labs. Explained that the lab will work with insurance to determine coverage and contact patient if out of pocket costs are expected to exceed $100.      Discussed pros and cons of genetic testing. Explained that results could determine the cause for HCM. If a mutation is identified, presymptomatic testing is available to all at risk relatives. Reviewed possible issues associated with presymptomatic testing including genetic discrimination, current laws to prevent discrimination (ie. ZACHARY), insurance issues, and emotional and psychosocial outcomes of testing.     Recommend clinical evaluation for all first degree relatives (parents, siblings, and children) of an affected individual regardless of decision to pursue genetic testing. Based on the Heart Failure Society of [place] Practice Guidelines (Dori et al, 2018), clinical evaluation should be performed at least every 3 years, except yearly during puberty, and should include history, cardiac exam, ECHO, EKG, Holter monitoring, and exercise treadmill.    All questions answered at this time.     PLAN:Andrew elected to proceed with genetic testing.  Requisition and consent forms were completed and signed.  DNA will be collected via saliva sample and [facility]. I will contact patient when results are available.    TOTAL TIME SPENT IN COUNSELIN minutes    Sunita Gutierrez MS, [facility]  Licensed, Certified Genetic Counselor  [facility]

## 2022-08-05 NOTE — PROGRESS NOTES
Keven is a 51 year old who is being evaluated via a billable video visit.      How would you like to obtain your AVS? MyChart  If the video visit is dropped, the invitation should be resent by: Text to cell phone: [phone]   Will anyone else be joining your video visit? KARLI Jane/SHIELA    PROVIDER APPOINTMENT  Here is a copy of the progress note from your recent genetic counseling visit through the [facility] on Date: 8/5/2022.  Due to Covid-19 pandemic, this appointment was moved to a virtual visit.    PROGRESS NOTE:Keven was referred by Federico Lomeli MD for genetic counseling due to his recent diagnosis of hypertrophic cardiomyopathy (HCM).  I had the opportunity to talk with Andrew today to discuss the genetic component of HCM and testing options available to him.     MEDICAL HISTORY: Keven went in for pre-op visit in January 2022 and had an abnormal EKG that prompted a cardiac evaluation.  ECHO was performed and showed left ventricular hypertrophy (IVS 1.7 cm).  He was recently seen by Dr. Lomeli and diagnosed with HCM.  MRI (5/3/22) showed maximum wall thickness of 2.1 cm. He reports history of shortness of breath and had an episode of myocarditis in 2020.  He denies any history of fainting or dizziness.       FAMILY HISTORY:A detailed family history was obtained during today's consult.  Family history was significant for the following cardiac history:    Mom with HCM.  She gave up parenting rights when he was a child so they have little contact and limited information about HCM diagnosis.    Two full siblings and one maternal half sister with no known heart issues.    Two sons in good health.  There is no additional history of cardiomyopathy, arrhythmias, heart attacks, fainting, sudden cardiac death, genetic conditions, or birth defects. (A copy of pedigree may be found under media tab).    DISCUSSION:  Reviewed definition of hypertrophic  cardiomyopathy (HCM). Explained that a good portion of HCM cases have a genetic component.     Reviewed autosomal dominant (AD) inheritance pattern most commonly associated with HCM, including the 50% risk for recurrence. Briefly reviewed autosomal recessive and X-linked inheritance. Explained that HCM gene mutations are associated with reduced penetrance and variable expressivity, meaning that individuals who carry a gene mutation may or may not get the disease and onset and severity can vary from one family member to the next. This explains why you may not see cardiomyopathy in each generation of a family.     Currently over 25 genes have been found to be related to HCM. Genetic testing currently identifies mutations in about 50-60% of idiopathic cases. Reviewed capabilities, limitations, and logistics of testing.  DNA sample via saliva or blood is collected and sent to testing lab for evaluation of selected genes. The results could directly impact care and treatment.      Explained three possible outcomes of genetic testing including: positive identification of a mutation, no mutation identified, and identification of a variant of unknown significance (VUS). If a mutation is identified, presymptomatic testing would be available to at risk family members. If no mutation is identified, it does not rule out the possibility of a genetic component to this disease. Family members could still be at risk for developing the same condition. If a VUS is identified, it is unclear if the mutation is disease causing or just a normal variation. It may take time and possibly additional testing to determine the meaning of a VUS result.     Test results take approximately 2-4 weeks on average. Discussed cost of testing through commercial labs. Explained that the lab will work with insurance to determine coverage and contact patient if out of pocket costs are expected to exceed $100.     Discussed pros and cons of genetic testing.  Explained that results could determine the cause for HCM. If a mutation is identified, presymptomatic testing is available to all at risk relatives. Reviewed possible issues associated with presymptomatic testing including genetic discrimination, current laws to prevent discrimination (ie. ZACHARY), insurance issues, and emotional and psychosocial outcomes of testing.     Recommend clinical evaluation for all first degree relatives (parents, siblings, and children) of an affected individual regardless of decision to pursue genetic testing. Based on the Heart Failure Society of [place] Practice Guidelines (Dori et al, 2018), clinical evaluation should be performed at least every 3 years, except yearly during puberty, and should include history, cardiac exam, ECHO, EKG, Holter monitoring, and exercise treadmill.    All questions answered at this time.     PLAN:Andrew elected to proceed with genetic testing.  Requisition and consent forms were completed and signed.  DNA will be collected via saliva sample and [facility]. I will contact patient when results are available.    TOTAL TIME SPENT IN COUNSELIN minutes    Sunita Gutierrez MS, [facility]  Licensed, Certified Genetic Counselor  [facility]

## 2022-08-05 NOTE — PATIENT INSTRUCTIONS
"Indication for Genetic Counseling:     Hypertrophic cardiomyopathy (HCM) is a condition that causes part of the heart muscle (the left ventricle) to become thick and enlarged (hypertrophy).  It is usually diagnosed by echocardiogram (ECHO) or cardiac magnetic resonance imaging (MRI).  When the heart becomes enlarged, it cannot pump blood as effectively, which can lead to symptoms such as shortness of breath, fatigue, chest pains, irregular heartbeat, fainting, stroke, cardiac arrest, and sudden cardiac death (SCD).  HCM can be caused by a variety of factors, such as chronic hypertension, a narrow aorta, athletic heart, or genetics.  There are at least 20 known genes that, when not working properly, can cause HCM.    Inheritance:   Humans have over 20,000 genes that instruct our bodies how to function.  We have two copies of each gene because we inherit one from our mother and one from our father.  In most cardiac cases with a genetic component, the condition is inherited in an autosomal dominant (AD) pattern.  This means that in order to have the condition, a person needs to inherit a mutation on one copy of a particular gene.  This mutation or pathogenic variant dominates the \"normal\" working copy of the gene.  When an affected individual has children, they can either pass on the \"normal\" copy of the gene or the mutation.  Therefore, children have a 50% chance of inheriting the mutation.  Other family members also have an increased risk but the specific risk depends on the degree of relationship.  Additional inheritance patterns can occur within families and may alter the risk of recurrence.     Testing Options:   Genetic testing is available to assess a panel of genes known to cause this condition.  This test reads through the DNA (sequencing) of these genes to look for spelling mistakes or mutations that could cause the condition.      There are three types of results you could receive from this test.     " -Positive result (mutation identified) - confirms diagnosis and provides an answer to why this happened.  In addition, identifying a mutation allows family members to have testing to determine their risk.     -Negative result (mutation not identified) - no genetic changes were identified.  This does not rule out a genetic cause for the condition as the genetic testing only identifies 50-60% of genetic causes for this condition.    -Variant of uncertain significance (VUS) - a genetic change was identified, but there is not enough information to determine whether it is disease-causing or normal human genetic variation.     Although genetic testing may identify a mutation, it cannot provide information about the severity of symptoms or the progression of disease.  We cannot predict age of onset or severity of symptoms due to reduced penetrance and variable expressivity.    Logistics:   Genetic testing involves collecting a sample of DNA, thru blood, saliva, or cheek cells.  The sample will be sent to a laboratory to extract the DNA and sequence the genes for mutations.  The laboratory will work with your insurance company to determine the out of pocket (OOP) cost and will notify you if the OOP cost is greater than $100.  Remember to ask the lab about financial assistance pricing and self pay options as well.  Sometimes those are much lower than insurance pricing.  When testing is initiated, results take about 2-4 weeks to return. I will contact you over the phone when results are available.     Genetic Information and Nondiscrimination Act:  The Genetic Information and Nondiscrimination Act of 2008 (ZACHARY) is a federal law that protects individuals from genetic discrimination in health insurance and employment. Genetic discrimination is defined as the misuse of genetic information. This law does not address potential discrimination regarding life insurance or disability insurance.      This is especially relevant for at  risk individuals who are considering presymptomatic testing.    Screening Recommendations:  Recommend clinical evaluation for all first degree relatives (parents, siblings, and children) of an affected individual regardless of decision to pursue genetic testing.  Clinical evaluation should be performed at least every 3 years, except yearly during puberty, and should include history, cardiac exam, ECHO, EKG, Holter monitoring, and exercise treadmill.    Resources:  Hypertrophic Cardiomyopathy Association - 4hcm.org  Children's Cardiomyopathy Foundation - childrenscardiomyopathy.org  [place] Cardiomyopathy Association - cardiomyopathy.org  HCM Care phone john    General   American Heart Association - americanheart.org  Genetics Home Reference - ghr.Stepsss.nih.gov  Genetic Information and Nondiscrimination Act - Jelly HQnahelp.org    Contact Information:  Sunita Gutierrez MS  Licensed Genetic Counselor  Adult Congenital and Cardiovascular Genetics Center  [facility]    Office:  [phone]  Appointments:  [phone]  Fax: [phone]  Email: [email or web address]

## 2022-08-11 NOTE — PROGRESS NOTES
Subjective     Andrew Atkinson is a 48 year old male who presents to clinic today for the following health issues:    HPI   Chief Complaint   Patient presents with     Mass     Belly button ; X1 year noticed getting worse for about a month     Patient presents for a few issues today:    Umbilical hernia x 3 years, feels its getting larger over the last year, sometimes painful, job is physically active, is reducible, would like to be assessed by gen surg.    GERD - has symptoms most often at night, no diet changes, no medications tried, has morning cough sometimes    Hemorrhoids - intermittent blood on toilet paper, some irritation when sitting, using preparation h which helps    Hypothyroidism - taking synthroid as prescribed, asytmptomatic    BP Readings from Last 3 Encounters:   08/01/19 130/76   03/12/19 122/78   12/06/18 120/86    Wt Readings from Last 3 Encounters:   08/01/19 102.1 kg (225 lb)   03/12/19 105.4 kg (232 lb 6.4 oz)   12/06/18 105.2 kg (232 lb)              Reviewed and updated as needed this visit by Provider  Tobacco  Allergies  Meds  Problems  Med Hx  Surg Hx  Fam Hx         Review of Systems   ROS COMP: Constitutional, HEENT, cardiovascular, pulmonary, gi and gu systems are negative, except as otherwise noted.      Objective    /76   Pulse 67   Resp 18   Wt 102.1 kg (225 lb)   SpO2 98%   BMI 31.85 kg/m    Body mass index is 31.85 kg/m .  Physical Exam   GENERAL: healthy, alert and no distress  EYES: Eyes grossly normal to inspection, PERRL and conjunctivae and sclerae normal  HENT: ear canals and TM's normal, nose and mouth without ulcers or lesions  NECK: no adenopathy, no asymmetry, masses, or scars and thyroid normal to palpation  RESP: lungs clear to auscultation - no rales, rhonchi or wheezes  CV: regular rate and rhythm, normal S1 S2, no S3 or S4, no murmur, click or rub, no peripheral edema and peripheral pulses strong  ABDOMEN: soft, nontender, small reducible  non-tender umbilical hernia, no hepatosplenomegaly, no masses and bowel sounds normal  MS: no gross musculoskeletal defects noted, no edema  SKIN: no suspicious lesions or rashes  NEURO: Normal strength and tone, mentation intact and speech normal  PSYCH: mentation appears normal, affect normal/bright          Assessment & Plan       ICD-10-CM    1. Umbilical hernia without obstruction and without gangrene K42.9 GENERAL SURG ADULT REFERRAL   2. Gastroesophageal reflux disease without esophagitis K21.9 omeprazole (PRILOSEC) 20 MG DR capsule   3. Post-nasal drip R09.82 azelastine (ASTELIN) 0.1 % nasal spray   4. Grade III hemorrhoids K64.2 Lido-PE-Glycerin-Petrolatum (PREPARATION H RAPID RELIEF) 5-0.25-14.4-15 % CREA   5. Hypothyroidism, unspecified type E03.9 TSH     T4, free           A total of 30 minutes spent face-to-face with greater than 50% of the time spent in counseling and coordinating cares of the issues above       Return in about 1 month (around 9/1/2019) for Gerd.    Wilfredo Hemphill MD  [facility]       Alert and oriented to person, place and time

## 2022-08-22 DIAGNOSIS — E03.9 ACQUIRED HYPOTHYROIDISM: ICD-10-CM

## 2022-08-22 RX ORDER — LEVOTHYROXINE SODIUM 150 UG/1
150 TABLET ORAL DAILY
Qty: 90 TABLET | Refills: 3 | Status: SHIPPED | OUTPATIENT
Start: 2022-08-22 | End: 2023-08-01

## 2022-08-22 NOTE — TELEPHONE ENCOUNTER
Reason for call:  Medication   If this is a refill request, has the caller requested the refill from the pharmacy already? Yes  Will the patient be using a [facility]? No  Name of the pharmacy and phone number for the current request: [id number] - [place], [place] - [address] [address]    Name of the medication requested: levothyroxine (SYNTHROID/LEVOTHROID) 150 MCG tablet    Other request: patient said the pharmacy has tried contacting the clinic several times with no response. He is nearly out of medications. Can this please be sent to his pharmacy   Next 5 appointments (look out 90 days)    Sep 01, 2022 11:00 AM  (Arrive by 10:40 AM)  Adult Preventative Visit with Wilfredo Dela Cruz MD  [facility] ([facility] ) [address]  [place] [id number] [phone]        Phone number to reach patient:  Cell number on file:    Telephone Information:   Mobile [phone]     Best Time:      Can we leave a detailed message on this number?  YES    Travel screening: Not Applicable

## 2022-08-22 NOTE — TELEPHONE ENCOUNTER
Routing refill request to provider for review/approval because:  Failed Protocol    Gena Rodríguez RN BSN  [facility]

## 2022-08-23 ENCOUNTER — TELEPHONE (OUTPATIENT)
Dept: CARDIOLOGY | Facility: CLINIC | Age: 52
End: 2022-08-23

## 2022-08-23 NOTE — TELEPHONE ENCOUNTER
Spoke with Andrew today to review results of genetic testing. He underwent genetic testing due to his diagnosis of hypertrophic cardiomyopathy (HCM).   Genetic testing for the HCM panel thru [employer] revealed that Andrew does NOT carry a mutation in the 30 genes analyzed. It is predicted that this panel will identify mutations in 50-60% of HCM cases.   Therefore, this negative result does not rule out a genetic basis for the HCM in Andrew but eliminates the option of presymptomatic genetic testing in at risk family members, at this time. However, since this condition could still be genetic, clinical screening is recommended in all first degree relatives (children, siblings, parents).  Clinical screening should include cardiac exam, ECHO, and EKG to assess their current status. Testing may also include heart monitor, stress testing, and MRI.   Explained that with continued research and genetic knowledge the detection rate for identifying mutations may increase over time. Therefore, it is worth touching base in the years to come to learn about new testing options.   A summary letter and copy of the results will be sent to patient. All questions answered at this time.   Sunita Gutierrez MS, [facility]  Licensed, Certified Genetic Counselor  Adult Congenital and Cardiovascular Genetics Center  [facility]

## 2022-08-31 ENCOUNTER — VIRTUAL VISIT (OUTPATIENT)
Dept: CARDIOLOGY | Facility: CLINIC | Age: 52
End: 2022-08-31
Attending: INTERNAL MEDICINE
Payer: COMMERCIAL

## 2022-08-31 DIAGNOSIS — I10 BENIGN ESSENTIAL HYPERTENSION: ICD-10-CM

## 2022-08-31 DIAGNOSIS — I42.2 HYPERTROPHIC CARDIOMYOPATHY (H): Primary | ICD-10-CM

## 2022-08-31 PROCEDURE — 99215 OFFICE O/P EST HI 40 MIN: CPT | Mod: 95 | Performed by: INTERNAL MEDICINE

## 2022-08-31 PROCEDURE — G0463 HOSPITAL OUTPT CLINIC VISIT: HCPCS | Mod: PN,RTG | Performed by: INTERNAL MEDICINE

## 2022-08-31 NOTE — Clinical Note
8/31/2022      RE: Andrew Atkinson  [address] [place] [id number]       Dear Colleague,    Thank you for the opportunity to participate in the care of your patient, Andrew Atkinson, at the [facility] [place] at [facility]. Please see a copy of my visit note below.    No notes on file    Please do not hesitate to contact me if you have any questions/concerns.     Sincerely,     Martin Kaufman MD

## 2022-08-31 NOTE — NURSING NOTE
Chief Complaint   Patient presents with     Consult     Referred by Yani, no questions while rooming pt, no other vitals to report today     Kirill Purdy, VF/CMA

## 2022-08-31 NOTE — PROGRESS NOTES
"Keven is a 51 year old who is being evaluated via a billable video visit.      How would you like to obtain your AVS? MyChart  If the video visit is dropped, the invitation should be resent by: Send to e-mail at: [email or web address]  Will anyone else be joining your video visit? No   Kirill Purdy, VF/CMA              ELECTROPHYSIOLOGY VIRTUAL CLINIC VISIT    Andrew Atkinson is a 51 year old adult who is being evaluated via a billable video visit.      The patient has been notified of following:     \"This video visit will be conducted via a call between you and your physician/provider. We have found that certain health care needs can be provided without the need for an in-person physical exam.  This service lets us provide the care you need with a video conversation.  If a prescription is necessary we can send it directly to your pharmacy.  If lab work is needed we can place an order for that and you can then stop by our lab to have the test done at a later time.    If during the course of the call the physician/provider feels a video visit is not appropriate, you will not be charged for this service.\"     Physician/provider has received verbal consent for a Video Visit from the patient? Yes      Assessment/Recommendations   Assessment/Plan:    Keven Atkinson is a 51 year old adult who has a past medical history significant for HTN, HLD, hypothyroidism, and HCM.     Hypertrophic cardiomyopathy  -Continue diltiazem  -Encouraged adequate hydration and avoidance of strenous physical activity   -Reinforced exercise recommendations with HCM (e.g. Circ 2014 recommendations: Avoidance of burst exercise, competitive training,weight-lifting)  Family Risk   -Will arrange for patient to see genetic counselor in our Cardiovascular Genetics Clinic   - Discussed in detail with patient family screening- kids, sister, and brother screening.   Risk stratification for sudden cardiac death   Risk Factors " Screening:  Family history of SCD: negative   Wall thickness: negative (+ if wall thickness >3 cm)   Unexplained Syncope: negative (does not have a personal history of syncope)  Abnormal blood pressure response with exercise: negative (No hypotensive response with exercise)  VT/NSVT: positive (does not have VT/NSVT noted on monitoring).   - reinforced to patient to consider all presyncope or syncope seriously, and activate EMS if occurs.   We discussed overall, he has at best a class IIb indication for ICD and overall no high risk factors. Therefore, we recommend holding off on ICD at this time.   He will follow-up with Dr. Salazar for his HCM.   Follow up with EP on an as needed basis if further risk factors develop.       History of Present Illness/Subjective      Andrew Atkinson is a 51 year old adult who comes in today for EP consultation of SCD risk stratification for HCM.    Keven Atkinson is a 51 year old adult who has a past medical history significant for HTN, HLD, hypothyroidism, and HCM.     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 Keven saw PCP who obtained troponin which was mildly elevated. Patient was sent to ER where ECG showed diffuse T wave inversions. Coronary angiogram demonstrated normal coronary arteries. Patient was discharged home with diagnosis of likely myocarditis and advised for CMR. CMR from 5/3/22 revealed HCM with max wall thickness 2.1 cm, mild patchy mid myocardial LGE, and minimal JEET. and patient was referred to Dr. Salazar for further evaluation and management. His family history is not well-known to him as his parents gave him up when he was a young child, but he does know that his mother has HCM (but she is unwilling to share any further information about this.) He has 2 sisters (who both live in [place]) and 1 brother; to the best of his knowledge, none of  them has HCM. He has 2 sons, ages 18 and 20, both well. No known family history of sudden death. Genetic testing showed he does not carry a mutation in the 30 genes analyzed (it is predicted that this panel will identify mutations in 50-60% of HCM cases). CPX/exercise stress echo showed MVO2 27.2 mL/kg/min (81% predicted, class I, 7.8 METS) VE/VCO2 slope 31.88 RER 1.07. Blunted HR and hypertensive BP response. No detectable resting or Valsalva LVOT gradient. Minimal latent LVOT obstruction, maximal post-exercise LVOT gradient 31 mmHg. A zio patch monitor from 6/23/22-7/7/22 showed 4 NSVT up to 13 beats, 5 PSVT up to 7 beats, and rare isolated ectopy. Keven was started on beta blocker, but then had fatigue and was changed to diltiazem. Patient referred to EP for SCD risk stratification in setting of HCM.     Keven reports feeling at baseline. Patient denies chest discomfort, palpitations, abdominal fullness/bloating or peripheral edema, shortness of breath, paroxysmal nocturnal dyspnea, orthopnea, lightheadedness, dizziness, pre-syncope, or syncope. He has 2 boys 20 and 18 that are well in good health. Current cardiac medications include: diltiazem and pravastatin.     I have reviewed and updated the patient's Past Medical History, Social History, Family History and Medication List.     Cardiographics (Personally Reviewed) :   6/20/22 Exercise Stress Echo:   Interpretation Summary  Normal exercise echocardiogram without evidence of inducible wall motion  abnormalities to suggest ischemia.     Target heart rate was achieved. Heart rate response to exercise was normal,  hypertensive blood pressure response. With stress, the left ventricular  ejection fraction increased from 70% to greater than 75-80% and the left  ventricular size decreased. No regional wall motion abnormalities at rest or  with exercise. LV walls are thickened especially in the apical segments  consistent with known HOCM.  Normal functional  capacity. Patient noted lightheadedness at peak exercise.  Study was stopped due to lightheadedness and hypertensive response.  Profound ST segment depression and T wave inversions noted in essentially all  leads except for aVR with mild ST elevation. This is consistent with known  HOCM.     On screening echocardiogram there is normal biventricular function with LV  hypertrophy as above. There is no MR and no JEET. Intracavitary gradient is  measured at 10mmHg at rest and with Valsalva. Localization is difficult but  appears to be 31mmHg at peak stress. Trace AI. Sinuses of Valsava measured at  4.1cm.    5/3/22 CMR:  1. The LV is normal in cavity size. The global systolic function is normal. The LVEF is 60%. There are no  regional wall motion abnormalities. There is asymmetric mid inferoseptal and apical septal hypertrophy with  measurements as follows:  basal inferoseptum: 2.0 cm, basal anteroseptum 1.4 cm, basal inferolateral: 1.1  cm, mid inferseptal 2.1 cm. Maximal wall thickness of 2.1 cm in the mid inferoseptal segment.  There is  mild systolic anterior motion of the anterior mitral valve with LVOT flow acceleration at rest. These  findings are consistent with hypertrophic cardiomyopathy.      2. The RV is normal in cavity size. The global systolic function is normal. The RVEF is 59%.      3. The left atrium is mildly dilated; the right atrium is normal in size.      4. There is evidence of mild JEET and mild LVOT flow acceleration at rest. Mild aortic regurgitation.      5. Late gadolinium enhancement imaging shows minimal patchy hyperenhancement in the basal to apical  inferoseptal segments. These findings are seen in patients with hypertrophic cardiomyopathy. The global  myocardial scar burden is less than 5%.      6. There is no intracardiac thrombus.     CONCLUSIONS: Hypertrophic cardiomyopathy with maximal wall thickness of 2.1 cm, mild JEET and myocardial  scar burden less than 5%. Normal cardiac function,  LVEF of 60% and RVEF of 59%.              Physical Examination   There were no vitals taken for this visit.  Wt Readings from Last 3 Encounters:   06/23/22 109.8 kg (242 lb)   06/20/22 108.8 kg (239 lb 13.8 oz)   05/12/22 109.3 kg (241 lb)     The rest of a comprehensive physical examination is deferred due to nature of video visit restrictions.  CONSITUTIONAL: no acute distress  HEENT: no icterus, no redness or discharge, neck supple  CV: no visible edema of visualized extremities. No JVD.   RESPIRATORY: respirations nonlabored, no cough  NEURO: AA&Ox3, speech fluent/appropriate, motor grossly nonfocal  PSYCH: cooperative, affect appropriate  DERM: no rashes on visualized face/neck/upper extremities         Medications  Allergies   Current Outpatient Medications   Medication Sig Dispense Refill     acetaminophen (TYLENOL) 325 MG tablet Take 2 tablets (650 mg) by mouth every 6 hours as needed for mild pain Alternate with ibuprofen so you are taking one or the other every three hours. For example take tylenol at 5am, then ibuprofen at 8am, then tylenol at 11am, and so on. 50 tablet 0     diltiazem ER (DILT-XR) 180 MG 24 hr capsule Take 1 capsule (180 mg) by mouth daily 30 capsule 11     levothyroxine (SYNTHROID/LEVOTHROID) 150 MCG tablet Take 1 tablet (150 mcg) by mouth daily 90 tablet 3     multivitamin w/minerals (THERA-VIT-M) tablet Take 1 tablet by mouth daily       pravastatin (PRAVACHOL) 40 MG tablet Take 1 tablet (40 mg) by mouth daily 90 tablet 3    Allergies   Allergen Reactions     Morphine Hives         Lab Results (Personally Reviewed)    Chemistry/lipid CBC Cardiac Enzymes/BNP/TSH/INR   Lab Results   Component Value Date    BUN 16 01/21/2022     01/21/2022    CO2 26 01/21/2022     Creatinine   Date Value Ref Range Status   01/21/2022 0.96 0.66 - 1.25 mg/dL Final   11/30/2020 0.96 0.66 - 1.25 mg/dL Final       Lab Results   Component Value Date    CHOL 197 02/28/2022    HDL 38 (L) 02/28/2022    LDL  106 (H) 02/28/2022      Lab Results   Component Value Date    WBC 3.8 (L) 01/21/2022    HGB 16.3 01/21/2022    HCT 47.2 01/21/2022    MCV 83 01/21/2022     01/21/2022    Lab Results   Component Value Date    TSH 4.27 (H) 01/21/2022    INR 0.95 01/21/2022            Video-Visit Details    Type of service:  Video Visit    Video Start Time: 9:30 AM    Video End Time:10:00 AM    Originating Location (pt. Location): Home    Distant Location (provider location):  [facility]     Platform used for Video Visit: Postdeck    I have reviewed the note as documented above.  This accurately captures the substance of my virtual visit with the patient. The patient states understanding and is agreeable with the plan.   Martin Kaufman MD [facility]  Cardiology - Electrophysiology        Total time spent on patient visit, reviewing notes, imaging, labs, orders, and completing necessary documentation: 45 minutes.

## 2022-08-31 NOTE — PATIENT INSTRUCTIONS
You were seen today in the Adult Congenital and Cardiovascular Genetics Clinic at the [facility].    Cardiology Providers you saw during your visit:  Martin Kaufman MD    Diagnosis:  Hypertrophic CM    Results:  Martin Kaufman MD reviewed the results.    Recommendations:    Continue to eat a heart healthy, low salt diet.  Continue to get 20-30 minutes of aerobic activity, 4-5 days per week.  Examples of aerobic activity include walking, running, swimming, cycling, etc.  Continue to observe good oral hygiene, with regular dental visits.      Exercise restrictions:   Yes__X__  No____         If yes, list restrictions:  Must be allowed to rest if fatigued or SOB      Work restrictions:  Yes____  No_X___         If yes, list restrictions:    FASTING CHOLESTEROL was checked in the last 5 years YES__X_  NO___ (2/2022)  Continue to eat a heart healthy, low salt diet.      Follow-up:  with Dr Lomeli in June 2023    If you have questions or concerns please contact us at:    Halley Ellison, JASMINN, RN    Irene Macias (Scheduling)  Nurse Care Coordinator     Clinic   Adult Congenital and CV Genetics   Adult Congenital and CV Genetic  [facility]   [facility]  (P) [phone]     (P) [phone]         (F) [phone]        For after hours urgent needs, call [phone] and ask to speak to the Adult Congenital Physician on call.  Mention Job Code 0401.    For emergencies call [phone].    [facility]  [facility]   [facility]in[facility]  Mail Code [id number]  [address] [place]  [address]

## 2022-09-01 ENCOUNTER — OFFICE VISIT (OUTPATIENT)
Dept: FAMILY MEDICINE | Facility: CLINIC | Age: 52
End: 2022-09-01
Payer: COMMERCIAL

## 2022-09-01 VITALS
WEIGHT: 237.8 LBS | HEIGHT: 70 IN | TEMPERATURE: 97.6 F | SYSTOLIC BLOOD PRESSURE: 114 MMHG | BODY MASS INDEX: 34.04 KG/M2 | DIASTOLIC BLOOD PRESSURE: 72 MMHG | HEART RATE: 69 BPM | OXYGEN SATURATION: 97 %

## 2022-09-01 DIAGNOSIS — E11.9 TYPE 2 DIABETES MELLITUS WITHOUT COMPLICATION, WITHOUT LONG-TERM CURRENT USE OF INSULIN (H): ICD-10-CM

## 2022-09-01 DIAGNOSIS — E03.9 ACQUIRED HYPOTHYROIDISM: ICD-10-CM

## 2022-09-01 DIAGNOSIS — Z13.6 ENCOUNTER FOR LIPID SCREENING FOR CARDIOVASCULAR DISEASE: ICD-10-CM

## 2022-09-01 DIAGNOSIS — Z00.00 ROUTINE GENERAL MEDICAL EXAMINATION AT A HEALTH CARE FACILITY: Primary | ICD-10-CM

## 2022-09-01 DIAGNOSIS — Z13.220 ENCOUNTER FOR LIPID SCREENING FOR CARDIOVASCULAR DISEASE: ICD-10-CM

## 2022-09-01 DIAGNOSIS — M75.41 IMPINGEMENT SYNDROME OF SHOULDER REGION, RIGHT: ICD-10-CM

## 2022-09-01 DIAGNOSIS — Z12.5 SCREENING FOR PROSTATE CANCER: ICD-10-CM

## 2022-09-01 LAB
ALBUMIN SERPL-MCNC: 4.3 G/DL (ref 3.4–5)
ALP SERPL-CCNC: 75 U/L (ref 40–150)
ALT SERPL W P-5'-P-CCNC: 69 U/L (ref 0–70)
ANION GAP SERPL CALCULATED.3IONS-SCNC: 8 MMOL/L (ref 3–14)
AST SERPL W P-5'-P-CCNC: 33 U/L (ref 0–45)
BILIRUB SERPL-MCNC: 0.4 MG/DL (ref 0.2–1.3)
BUN SERPL-MCNC: 16 MG/DL (ref 7–30)
CALCIUM SERPL-MCNC: 9.7 MG/DL (ref 8.5–10.1)
CHLORIDE BLD-SCNC: 103 MMOL/L (ref 94–109)
CHOLEST SERPL-MCNC: 142 MG/DL
CO2 SERPL-SCNC: 27 MMOL/L (ref 20–32)
CREAT SERPL-MCNC: 0.98 MG/DL (ref 0.52–1.25)
CREAT UR-MCNC: 250 MG/DL
FASTING STATUS PATIENT QL REPORTED: ABNORMAL
GFR SERPL CREATININE-BSD FRML MDRD: >90 ML/MIN/1.73M2
GLUCOSE BLD-MCNC: 111 MG/DL (ref 70–99)
HBA1C MFR BLD: 6.9 % (ref 0–5.6)
HDLC SERPL-MCNC: 36 MG/DL
LDLC SERPL CALC-MCNC: 77 MG/DL
MICROALBUMIN UR-MCNC: 48 MG/L
MICROALBUMIN/CREAT UR: 19.2 MG/G CR
NONHDLC SERPL-MCNC: 106 MG/DL
POTASSIUM BLD-SCNC: 4.1 MMOL/L (ref 3.4–5.3)
PROT SERPL-MCNC: 8 G/DL (ref 6.8–8.8)
PSA SERPL-MCNC: 1.22 UG/L (ref 0–4)
SODIUM SERPL-SCNC: 138 MMOL/L (ref 133–144)
T4 FREE SERPL-MCNC: 1.27 NG/DL
TRIGL SERPL-MCNC: 146 MG/DL
TSH SERPL DL<=0.005 MIU/L-ACNC: 8.13 MU/L (ref 0.4–4)

## 2022-09-01 PROCEDURE — 84443 ASSAY THYROID STIM HORMONE: CPT | Performed by: FAMILY MEDICINE

## 2022-09-01 PROCEDURE — 82043 UR ALBUMIN QUANTITATIVE: CPT | Performed by: FAMILY MEDICINE

## 2022-09-01 PROCEDURE — 83036 HEMOGLOBIN GLYCOSYLATED A1C: CPT | Performed by: FAMILY MEDICINE

## 2022-09-01 PROCEDURE — 99396 PREV VISIT EST AGE 40-64: CPT | Performed by: FAMILY MEDICINE

## 2022-09-01 PROCEDURE — G0103 PSA SCREENING: HCPCS | Performed by: FAMILY MEDICINE

## 2022-09-01 PROCEDURE — 80053 COMPREHEN METABOLIC PANEL: CPT | Performed by: FAMILY MEDICINE

## 2022-09-01 PROCEDURE — 99213 OFFICE O/P EST LOW 20 MIN: CPT | Mod: 25 | Performed by: FAMILY MEDICINE

## 2022-09-01 PROCEDURE — 36415 COLL VENOUS BLD VENIPUNCTURE: CPT | Performed by: FAMILY MEDICINE

## 2022-09-01 PROCEDURE — 80061 LIPID PANEL: CPT | Performed by: FAMILY MEDICINE

## 2022-09-01 PROCEDURE — 84439 ASSAY OF FREE THYROXINE: CPT | Performed by: FAMILY MEDICINE

## 2022-09-01 ASSESSMENT — ENCOUNTER SYMPTOMS
MYALGIAS: 0
DYSURIA: 0
FEVER: 0
PALPITATIONS: 0
COUGH: 0
DIARRHEA: 0
PARESTHESIAS: 0
FREQUENCY: 0
NAUSEA: 0
NERVOUS/ANXIOUS: 0
HEARTBURN: 1
ARTHRALGIAS: 1
BREAST MASS: 0
HEMATURIA: 0
CONSTIPATION: 0
HEADACHES: 0
JOINT SWELLING: 0
HEMATOCHEZIA: 1
CHILLS: 0
SHORTNESS OF BREATH: 0
DIZZINESS: 0
SORE THROAT: 0
ABDOMINAL PAIN: 0
WEAKNESS: 0
EYE PAIN: 0
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
## 2022-09-12 ENCOUNTER — OFFICE VISIT (OUTPATIENT)
Dept: ORTHOPEDICS | Facility: CLINIC | Age: 52
End: 2022-09-12

## 2022-09-12 ENCOUNTER — ANCILLARY PROCEDURE (OUTPATIENT)
Dept: GENERAL RADIOLOGY | Facility: CLINIC | Age: 52
End: 2022-09-12
Attending: PEDIATRICS
Payer: COMMERCIAL

## 2022-09-12 VITALS
HEIGHT: 70 IN | WEIGHT: 237 LBS | SYSTOLIC BLOOD PRESSURE: 120 MMHG | BODY MASS INDEX: 33.93 KG/M2 | DIASTOLIC BLOOD PRESSURE: 72 MMHG

## 2022-09-12 DIAGNOSIS — M25.511 ACUTE PAIN OF RIGHT SHOULDER: ICD-10-CM

## 2022-09-12 DIAGNOSIS — M75.41 IMPINGEMENT SYNDROME OF SHOULDER REGION, RIGHT: ICD-10-CM

## 2022-09-12 DIAGNOSIS — M75.101 ROTATOR CUFF SYNDROME OF RIGHT SHOULDER: Primary | ICD-10-CM

## 2022-09-12 PROCEDURE — 73030 X-RAY EXAM OF SHOULDER: CPT | Mod: TC | Performed by: RADIOLOGY

## 2022-09-12 PROCEDURE — 20610 DRAIN/INJ JOINT/BURSA W/O US: CPT | Mod: RT | Performed by: PEDIATRICS

## 2022-09-12 PROCEDURE — 99213 OFFICE O/P EST LOW 20 MIN: CPT | Mod: 25 | Performed by: PEDIATRICS

## 2022-09-12 RX ORDER — TRIAMCINOLONE ACETONIDE 40 MG/ML
40 INJECTION, SUSPENSION INTRA-ARTICULAR; INTRAMUSCULAR
Status: DISCONTINUED | OUTPATIENT
Start: 2022-09-12 | End: 2023-11-16

## 2022-09-12 RX ORDER — LIDOCAINE HYDROCHLORIDE 10 MG/ML
2 INJECTION, SOLUTION INFILTRATION; PERINEURAL
Status: DISCONTINUED | OUTPATIENT
Start: 2022-09-12 | End: 2023-11-16

## 2022-09-12 RX ADMIN — LIDOCAINE HYDROCHLORIDE 2 ML: 10 INJECTION, SOLUTION INFILTRATION; PERINEURAL at 11:41

## 2022-09-12 RX ADMIN — TRIAMCINOLONE ACETONIDE 40 MG: 40 INJECTION, SUSPENSION INTRA-ARTICULAR; INTRAMUSCULAR at 11:41

## 2022-09-12 NOTE — PROGRESS NOTES
ASSESSMENT & PLAN    Andrew was seen today for pain.    Diagnoses and all orders for this visit:    Rotator cuff syndrome of right shoulder  -     Large Joint Injection/Arthocentesis: R subacromial bursa    Acute pain of right shoulder  -     Orthopedic  Referral  -     XR Shoulder Right G/E 3 Views; Future  -     Large Joint Injection/Arthocentesis: R subacromial bursa          See Patient Instructions  Patient Instructions   Most consistent with cuff source, cuff syndrome/impingement.  Reviewed options with imaging, injection, therapy, potential use of medications.  Following discussion, right shoulder subacromial steroid injection done today.  Okay to primarily monitor for now, given the current range of motion.  If interested in physical therapy in the future, contact clinic.  We discussed consideration of additional imaging of the affected area with MRI, but this is not required currently given assessment and plan for other intervention.  Plan to monitor at least 2 to 3 weeks to start with the injection.      If you have any further questions for your physician or physician s care team you can call [phone] and use option 3 to leave a voice message. Calls received during business hours will be returned same day.            Injection Discharge Instructions      You may shower, however avoid swimming, tub baths or hot tubs for 24 hours following your procedure    You may have a mild to moderate increase in pain for several days following the injection.    It may take up to 14 days for the steroid medication to start working although you may feel the effect as early as a few days after the procedure.    You may use ice packs for 10-15 minutes, 3 to 4 times a day at the injection site for comfort    You may use anti-inflammatory medications (such as Ibuprofen or Aleve or Advil) if you are able to take safely, or acetaminophen (Tylenol) for pain control if necessary    If you were fasting, you may  resume your normal diet and medications after the procedure    If you have diabetes, check your blood sugar more frequently than usual as your blood sugar may be higher than normal for 10-14 days following a steroid injection. Contact your doctor who manages your diabetes if your blood sugar is higher than usual      If you experience any of the following, call the clinic @ [phone]  - Fever over 100 degree F  - Swelling, bleeding, redness, drainage, warmth at the injection site  - New or worsening pain        Robert Briggs [facility]      CC: Wilfredo Dela Cruz      -----  Chief Complaint   Patient presents with     Right Shoulder - Pain       SUBJECTIVE  Andrew Atkinson is a/an 51 year old adult who is seen in consultation at the request of  Wilfredo Dela Cruz M.D. for evaluation of right shoulder pain.  Pain has been present for the past month with no known injury.  Soreness in his shoulder with ROM.    Was having issues with lifting, difficulty with lifting a gallon of milk.     The patient is seen by themselves.  The patient is Left handed    Onset: 1 month(s) ago. Reports insidious onset without acute precipitating event.  Location of Pain: right shoulder, down biceps into medial elbow.  Denies any neck pain   Worsened by: lifting  Better with: positioning   Treatments tried: Aleve and CBD oil  Associated symptoms: weakness of arm     Orthopedic/Surgical history: NO  Social History/Occupation: business owner, desk job     No family history pertinent to patient's problem today.     **  Low back surgery 9/3. Has been resting.  No injury or change in activities.  Pain increased past ~1 week. Tried some CBD oil yesterday, feels like may have had some benefit.  Notes pain with reaching away from body.  No noted joint noise.      **  Patient Active Problem List   Diagnosis     Acquired hypothyroidism     Acute viral myocarditis      "Umbilical hernia with obstruction     Diabetes mellitus, type 2 (H)     Spinal stenosis of lumbar region with neurogenic claudication     Arthrodesis status           REVIEW OF SYSTEMS:  Review of Systems   All other systems reviewed and are negative.        OBJECTIVE:  /72   Ht 1.786 m (5' 10.3\")   Wt 107.5 kg (237 lb)   BMI 33.72 kg/m     General: healthy, alert and in no distress  HEENT: no scleral icterus or conjunctival erythema  Skin: no suspicious lesions or rash. No jaundice.  CV: distal perfusion intact   Resp: normal respiratory effort without conversational dyspnea   Psych: normal mood and affect  Gait: normal steady gait with appropriate coordination and balance   Neuro: Normal light sensory exam of extremity       Right Shoulder exam    ROM:      forward flexion grossly full, symmetric to near 180, some pain        abduction ~120, limited, pain        internal rotation grossly symmetric       external rotation lacking few deg    Strength:      abduction 5/5       internal rotation 5/5       external rotation 4+/5  No change with testing    Impingement testing:      No change with Hedrick       positive (+) empty can       neg (-) crossover       neg (-) O'tracie    Skin:      no visible deformities       well perfused       capillary refill brisk    Sensation:      normal sensation over shoulder and upper extremity       RADIOLOGY:  I independently ordered, visualized and reviewed these images with the patient  No acute bony abnormality. GH and AC joints appear preserved. Minimal spurring greater tuberosity.    Recent Results (from the past 24 hour(s))   XR Shoulder Right G/E 3 Views    Narrative    EXAM: XR SHOULDER RIGHT G/E 3 VIEWS  DATE/TIME: 9/12/2022 11:12 AM     INDICATION: Right shoulder pain. Impingement syndrome.  COMPARISON: None.      Impression    IMPRESSION: Normal joint spacing and alignment.  No fracture. Small  enthesophyte at the superior margin of the humerus greater " tuberosity.    LELIA RIVAS MD         SYSTEM ID:  [id number]           Large Joint Injection/Arthocentesis: R subacromial bursa    Date/Time: 9/12/2022 11:41 AM  Performed by: Robert Briggs DO  Authorized by: Robert Briggs DO     Needle Size:  25 G  Guidance: landmark guided    Approach:  Posterolateral  Location:  Shoulder      Site:  R subacromial bursa  Medications:  40 mg triamcinolone 40 MG/ML; 2 mL lidocaine 1 %  Outcome:  Tolerated well, no immediate complications  Procedure discussed: discussed risks, benefits, and alternatives    Consent Given by:  Patient  Timeout: timeout called immediately prior to procedure    Prep: patient was prepped and draped in usual sterile fashion

## 2022-09-12 NOTE — LETTER
9/12/2022         RE: Andrew Atkinson  [address] [place] [id number]        Dear Colleague,    Thank you for referring your patient, Andrew Atkinson, to the [facility]. Please see a copy of my visit note below.    ASSESSMENT & PLAN    Andrew was seen today for pain.    Diagnoses and all orders for this visit:    Rotator cuff syndrome of right shoulder  -     Large Joint Injection/Arthocentesis: R subacromial bursa    Acute pain of right shoulder  -     Orthopedic  Referral  -     XR Shoulder Right G/E 3 Views; Future  -     Large Joint Injection/Arthocentesis: R subacromial bursa          See Patient Instructions  Patient Instructions   Most consistent with cuff source, cuff syndrome/impingement.  Reviewed options with imaging, injection, therapy, potential use of medications.  Following discussion, right shoulder subacromial steroid injection done today.  Okay to primarily monitor for now, given the current range of motion.  If interested in physical therapy in the future, contact clinic.  We discussed consideration of additional imaging of the affected area with MRI, but this is not required currently given assessment and plan for other intervention.  Plan to monitor at least 2 to 3 weeks to start with the injection.      If you have any further questions for your physician or physician s care team you can call [phone] and use option 3 to leave a voice message. Calls received during business hours will be returned same day.            Injection Discharge Instructions      You may shower, however avoid swimming, tub baths or hot tubs for 24 hours following your procedure    You may have a mild to moderate increase in pain for several days following the injection.    It may take up to 14 days for the steroid medication to start working although you may feel the effect as early as a few days after the procedure.    You may use ice packs for 10-15 minutes,  3 to 4 times a day at the injection site for comfort    You may use anti-inflammatory medications (such as Ibuprofen or Aleve or Advil) if you are able to take safely, or acetaminophen (Tylenol) for pain control if necessary    If you were fasting, you may resume your normal diet and medications after the procedure    If you have diabetes, check your blood sugar more frequently than usual as your blood sugar may be higher than normal for 10-14 days following a steroid injection. Contact your doctor who manages your diabetes if your blood sugar is higher than usual      If you experience any of the following, call the clinic @ [phone]  - Fever over 100 degree F  - Swelling, bleeding, redness, drainage, warmth at the injection site  - New or worsening pain        Robert Shea [facility]      CC: Wilfredo Dela Cruz      -----  Chief Complaint   Patient presents with     Right Shoulder - Pain       SUBJECTIVE  Andrew Atkinson is a/an 51 year old adult who is seen in consultation at the request of  Wilfredo Dela Cruz M.D. for evaluation of right shoulder pain.  Pain has been present for the past month with no known injury.  Soreness in his shoulder with ROM.    Was having issues with lifting, difficulty with lifting a gallon of milk.     The patient is seen by themselves.  The patient is Left handed    Onset: 1 month(s) ago. Reports insidious onset without acute precipitating event.  Location of Pain: right shoulder, down biceps into medial elbow.  Denies any neck pain   Worsened by: lifting  Better with: positioning   Treatments tried: Aleve and CBD oil  Associated symptoms: weakness of arm     Orthopedic/Surgical history: NO  Social History/Occupation: business owner, desk job     No family history pertinent to patient's problem today.     **  Low back surgery 9/3. Has been resting.  No injury or change in activities.  Pain increased  "past ~1 week. Tried some CBD oil yesterday, feels like may have had some benefit.  Notes pain with reaching away from body.  No noted joint noise.      **  Patient Active Problem List   Diagnosis     Acquired hypothyroidism     Acute viral myocarditis     Umbilical hernia with obstruction     Diabetes mellitus, type 2 (H)     Spinal stenosis of lumbar region with neurogenic claudication     Arthrodesis status           REVIEW OF SYSTEMS:  Review of Systems   All other systems reviewed and are negative.        OBJECTIVE:  /72   Ht 1.786 m (5' 10.3\")   Wt 107.5 kg (237 lb)   BMI 33.72 kg/m     General: healthy, alert and in no distress  HEENT: no scleral icterus or conjunctival erythema  Skin: no suspicious lesions or rash. No jaundice.  CV: distal perfusion intact   Resp: normal respiratory effort without conversational dyspnea   Psych: normal mood and affect  Gait: normal steady gait with appropriate coordination and balance   Neuro: Normal light sensory exam of extremity       Right Shoulder exam    ROM:      forward flexion grossly full, symmetric to near 180, some pain        abduction ~120, limited, pain        internal rotation grossly symmetric       external rotation lacking few deg    Strength:      abduction 5/5       internal rotation 5/5       external rotation 4+/5  No change with testing    Impingement testing:      No change with Hedrick       positive (+) empty can       neg (-) crossover       neg (-) O'tracie    Skin:      no visible deformities       well perfused       capillary refill brisk    Sensation:      normal sensation over shoulder and upper extremity       RADIOLOGY:  I independently ordered, visualized and reviewed these images with the patient  No acute bony abnormality. GH and AC joints appear preserved. Minimal spurring greater tuberosity.    Recent Results (from the past 24 hour(s))   XR Shoulder Right G/E 3 Views    Narrative    EXAM: XR SHOULDER RIGHT G/E 3 " VIEWS  DATE/TIME: 9/12/2022 11:12 AM     INDICATION: Right shoulder pain. Impingement syndrome.  COMPARISON: None.      Impression    IMPRESSION: Normal joint spacing and alignment.  No fracture. Small  enthesophyte at the superior margin of the humerus greater tuberosity.    LELIA RIVAS MD         SYSTEM ID:  [id number]           Large Joint Injection/Arthocentesis: R subacromial bursa    Date/Time: 9/12/2022 11:41 AM  Performed by: Robert Briggs DO  Authorized by: Robert Briggs DO     Needle Size:  25 G  Guidance: landmark guided    Approach:  Posterolateral  Location:  Shoulder      Site:  R subacromial bursa  Medications:  40 mg triamcinolone 40 MG/ML; 2 mL lidocaine 1 %  Outcome:  Tolerated well, no immediate complications  Procedure discussed: discussed risks, benefits, and alternatives    Consent Given by:  Patient  Timeout: timeout called immediately prior to procedure    Prep: patient was prepped and draped in usual sterile fashion                      Again, thank you for allowing me to participate in the care of your patient.        Sincerely,        Robert Briggs DO

## 2022-09-12 NOTE — PATIENT INSTRUCTIONS
Most consistent with cuff source, cuff syndrome/impingement.  Reviewed options with imaging, injection, therapy, potential use of medications.  Following discussion, right shoulder subacromial steroid injection done today.  Okay to primarily monitor for now, given the current range of motion.  If interested in physical therapy in the future, contact clinic.  We discussed consideration of additional imaging of the affected area with MRI, but this is not required currently given assessment and plan for other intervention.  Plan to monitor at least 2 to 3 weeks to start with the injection.      If you have any further questions for your physician or physician s care team you can call [phone] and use option 3 to leave a voice message. Calls received during business hours will be returned same day.            Injection Discharge Instructions    You may shower, however avoid swimming, tub baths or hot tubs for 24 hours following your procedure  You may have a mild to moderate increase in pain for several days following the injection.  It may take up to 14 days for the steroid medication to start working although you may feel the effect as early as a few days after the procedure.  You may use ice packs for 10-15 minutes, 3 to 4 times a day at the injection site for comfort  You may use anti-inflammatory medications (such as Ibuprofen or Aleve or Advil) if you are able to take safely, or acetaminophen (Tylenol) for pain control if necessary  If you were fasting, you may resume your normal diet and medications after the procedure  If you have diabetes, check your blood sugar more frequently than usual as your blood sugar may be higher than normal for 10-14 days following a steroid injection. Contact your doctor who manages your diabetes if your blood sugar is higher than usual    If you experience any of the following, call the clinic @ [phone]  - Fever over 100 degree F  - Swelling, bleeding, redness, drainage,  warmth at the injection site  - New or worsening pain

## 2022-10-11 ENCOUNTER — TRANSFERRED RECORDS (OUTPATIENT)
Dept: HEALTH INFORMATION MANAGEMENT | Facility: CLINIC | Age: 52
End: 2022-10-11

## 2022-10-16 ENCOUNTER — HEALTH MAINTENANCE LETTER (OUTPATIENT)
Age: 52
End: 2022-10-16

## 2022-10-16 NOTE — PATIENT INSTRUCTIONS
Preventive Health Recommendations  Male Ages 40 to 49    Yearly exam:             See your health care provider every year in order to  o   Review health changes.   o   Discuss preventive care.    o   Review your medicines if your doctor has prescribed any.    You should be tested each year for STDs (sexually transmitted diseases) if you re at risk.     Have a cholesterol test every 5 years.     Have a colonoscopy (test for colon cancer) if someone in your family has had colon cancer or polyps before age 50.     After age 45, have a diabetes test (fasting glucose). If you are at risk for diabetes, you should have this test every 3 years.      Talk with your health care provider about whether or not a prostate cancer screening test (PSA) is right for you.    Shots: Get a flu shot each year. Get a tetanus shot every 10 years.     Nutrition:    Eat at least 5 servings of fruits and vegetables daily.     Eat whole-grain bread, whole-wheat pasta and brown rice instead of white grains and rice.     Get adequate Calcium and Vitamin D.     Lifestyle    Exercise for at least 150 minutes a week (30 minutes a day, 5 days a week). This will help you control your weight and prevent disease.     Limit alcohol to one drink per day.     No smoking.     Wear sunscreen to prevent skin cancer.     See your dentist every six months for an exam and cleaning.      
3 = unable to understand or speak (not related to language barrier)

## 2022-11-01 NOTE — PROGRESS NOTES
Does Andrew have a CPAP/Bipap?  No     Delta Sleep Scale: 3  BMI: 34.21      Outpatient Sleep Medicine Consultation:      Name: Andrew Atkinson MRN# [id number]   Age: 52 year old YOB: 1970     Date of Consultation: November 4, 2022  Consultation is requested by: Federico Lomeli MD  [address] [place] [address] Federico Lomeli  Primary care provider: Wilfredo Dela Cruz       Reason for Sleep Consult:     Andrew Atkinson is sent by Federico Lomeli for a sleep consultation regarding sleep apnea. Referred from cardiology due to HCM. .    Patient s Reason for visit  Andrew Atkinson main reason for visit: hypertropic cardiopathy  Patient states problem(s) started:    Andrew Atkinson's goals for this visit:             Assessment and Plan:     Summary Sleep Diagnoses:  Suspected sleep apnea- snoring, witnessed apneas, daytime fatigue.     Comorbid Diagnoses:  Hypothyroid  Hypertrophic cardiomyopathy  T2DM  Chronic pain      Summary Recommendations:  Lab study for suspected sleep apnea.   No orders of the defined types were placed in this encounter.        Summary Counseling:    Sleep Testing Reviewed  Obstructive Sleep Apnea Reviewed  Complications of Untreated Sleep Apnea Reviewed      Medical Decision-making:   Educational materials provided in instructions    Total time spent reviewing medical records, history and physical examination, review of previous testing and interpretation as well as documentation on this date:30 min    CC: Federico Lomeli          History of Present Illness:     Past Sleep Evaluations:    SLEEP-WAKE SCHEDULE:     Work/School Days: Patient goes to school/work: Yes   Usually gets into bed at between 9 and 10pm  Takes patient about 30 to 40 minutes to fall asleep  Has trouble falling asleep every nite nights per week  Wakes up in the middle of the night 8 to 10 times times.  Wakes up due to Snorting self awake;Pain;Use the  bathroom  Keven Atkinson has trouble falling back asleep a little times a week.   It usually takes 10 or so minutes to get back to sleep  Patient is usually up at 6:30 am  Uses alarm: Yes    Weekends/Non-work Days/All Other Days:  Usually gets into bed at     Takes patient about   to fall asleep  Patient is usually up at    Uses alarm:      Sleep Need  Patient gets  4 to 6 hours sleep on average   Patient thinks Keven Atkinson needs about 8hours sleep    Andrew Atkinson prefers to sleep in this position(s): Back;Side   Patient states they do the following activities in bed: Read;Use phone, computer, or tablet    Naps  Patient takes a purposeful nap   times a week and naps are usually dont get in duration  Keven Atkinson feels better after a nap: No  Keven Atkinson dozes off unintentionally rarely days per week  Patient has had a driving accident or near-miss due to sleepiness/drowsiness: No      SLEEP DISRUPTIONS:    Breathing/Snoring  Patient snores:Yes  Other people complain about Keven Atkinson's snoring: Yes  Patient has been told Keven Atkinson stops breathing in Keven Atkinson's sleep:Yes  Keven Atkinson has issues with the following: Morning mouth dryness;Heartburn or reflux at night;Getting up to urinate more than once    Movement:  Patient gets pain, discomfort, with an urge to move:  Yes  It happens when Keven Atkinson is resting:  Yes  It happens more at night:  No  Patient has been told Keven Atkinson kicks Keven Atkinson's legs at night:  No     Behaviors in Sleep:  Andrew DHRUV Atkinson has experienced the following behaviors while sleeping: Sleep-talking  Keven Atkinson has experienced sudden muscle weakness during the day: No      Is there anything else you would like your sleep provider to know: back surgery 3 times lomits my comfort at night  i wake up alot to reposition myself every hour or two        CAFFEINE AND OTHER SUBSTANCES:    Patient consumes caffeinated beverages per day:  2  Last caffeine use  is usually: 6 or 7pm  List of any prescribed or over the counter stimulants that patient takes:    List of any prescribed or over the counter sleep medication patient takes:    List of previous sleep medications that patient has tried:    Patient drinks alcohol to help them sleep: No  Patient drinks alcohol near bedtime: No    Family History:  Patient has a family member been diagnosed with a sleep disorder: No            SCALES:    EPWORTH SLEEPINESS SCALE      [place] Sleepiness Scale ( ESME Deleon  [id number]<br>ESS - [place]/English - Final version - 21 Nov 07 - [facility] [place].) 11/4/2022   Sitting and reading Slight chance of dozing   Watching TV Slight chance of dozing   Sitting, inactive in a public place (e.g. a theatre or a meeting) Would never doze   As a passenger in a car for an hour without a break Would never doze   Lying down to rest in the afternoon when circumstances permit Slight chance of dozing   Sitting and talking to someone Would never doze   Sitting quietly after a lunch without alcohol Would never doze   In a car, while stopped for a few minutes in traffic Would never doze   [place] Score (MC) 3   [place] Score (Sleep) 3         INSOMNIA SEVERITY INDEX (FERNIE)      Insomnia Severity Index (FERNIE) 11/4/2022   Difficulty falling asleep 3   Difficulty staying asleep 4   Problems waking up too early 3   How SATISFIED/DISSATISFIED are you with your CURRENT sleep pattern? 3   How NOTICEABLE to others do you think your sleep problem is in terms of impairing the quality of your life? 4   How WORRIED/DISTRESSED are you about your current sleep problem? 3   To what extent do you consider your sleep problem to INTERFERE with your daily functioning (e.g. daytime fatigue, mood, ability to function at work/daily chores, concentration, memory, mood, etc.) CURRENTLY? 4   FERNIE Total Score 24       Guidelines for Scoring/Interpretation:  Total score categories:  0-7 = No clinically significant insomnia   8-14 =  "Subthreshold insomnia   15-21 = Clinical insomnia (moderate severity)  22-28 = Clinical insomnia (severe)  Used via courtesy of www.myhealth.[place].gov with permission from Nehemias Shafer PhD., [facility]      STOP BANG     STOP BANG Questionnaire (  2008, the American Society of Anesthesiologists, Inc. [employer] 11/4/2022   1. Snoring - Do you snore loudly (louder than talking or loud enough to be heard through closed doors)? Yes   2. Tired - Do you often feel tired, fatigued, or sleepy during daytime? Yes   3. Observed - Has anyone observed you stop breathing during your sleep? Yes   4. Blood pressure - Do you have or are you being treated for high blood pressure? No   5. BMI - BMI more than 35 kg/m2? No   6. Age - Age over 50 yr old? Yes   7. Neck circumference - Neck circumference greater than 40 cm? Yes   8. Gender - Gender male? Yes   STOP BANG Score (MC): 5 (High risk of TIFFANY)   B/P Clinic: -   BMI Clinic: 34.21         GAD7    No flowsheet data found.      CAGE-AID    No flowsheet data found.    CAGE-AID reprinted with permission from the [place] Medical Journal, BLANCA Eisenberg. and ZOILA Dalton, \"Conjoint screening questionnaires for alcohol and drug abuse\" [place] Medical Journal 94: 135-140, 1995.      PATIENT HEALTH QUESTIONNAIRE-9 (PHQ - 9)    No flowsheet data found.    Developed by Ashish Gordon, Jody New, Srikanth Villalba and colleagues, with an educational kennedy from [employer] No permission required to reproduce, translate, display or distribute.        Allergies:    Allergies   Allergen Reactions     Morphine Hives       Medications:    Current Outpatient Medications   Medication Sig Dispense Refill     acetaminophen (TYLENOL) 325 MG tablet Take 2 tablets (650 mg) by mouth every 6 hours as needed for mild pain Alternate with ibuprofen so you are taking one or the other every three hours. For example take tylenol at 5am, then ibuprofen at 8am, then " tylenol at 11am, and so on. 50 tablet 0     diltiazem ER (DILT-XR) 180 MG 24 hr capsule Take 1 capsule (180 mg) by mouth daily 30 capsule 11     levothyroxine (SYNTHROID/LEVOTHROID) 150 MCG tablet Take 1 tablet (150 mcg) by mouth daily 90 tablet 3     multivitamin w/minerals (THERA-VIT-M) tablet Take 1 tablet by mouth daily       pravastatin (PRAVACHOL) 40 MG tablet Take 1 tablet (40 mg) by mouth daily 90 tablet 3       Problem List:  Patient Active Problem List    Diagnosis Date Noted     Spinal stenosis of lumbar region with neurogenic claudication 03/09/2022     Priority: Medium     Arthrodesis status 03/09/2022     Priority: Medium     Diabetes mellitus, type 2 (H) 02/28/2022     Priority: Medium     Umbilical hernia with obstruction 10/09/2020     Priority: Medium     Added automatically from request for surgery [id number]       Acute viral myocarditis 02/14/2020     Priority: Medium     Acquired hypothyroidism 04/18/2017     Priority: Medium        Past Medical/Surgical History:  Past Medical History:   Diagnosis Date     Medial meniscus tear      Past Surgical History:   Procedure Laterality Date     ARTHROSCOPY KNEE WITH MEDIAL MENISCECTOMY Left 5/12/2017    Procedure: ARTHROSCOPY KNEE WITH MEDIAL MENISCECTOMY;  Arthroscopic partial medial menisectomy,left knee;  Surgeon: Grant Muñoz MD;  Location:  OR     CV CORONARY ANGIOGRAM N/A 2/15/2020    Procedure: Coronary Angiogram;  Surgeon: Rinku Boyce MD;  Location:  HEART [facility]ARDI[facility] CATH LAB     LAPAROSCOPIC HERNIORRHAPHY UMBILICAL N/A 12/8/2020    Procedure: umbilical hernia repair;  Surgeon: Miguel Ángel Arnold DO;  Location:  OR       Social History:  Social History     Socioeconomic History     Marital status: Single     Spouse name: Not on file     Number of children: Not on file     Years of education: Not on file     Highest education level: Not on file   Occupational History     Not on file   Tobacco Use     Smoking status: Never      "Smokeless tobacco: Never   Substance and Sexual Activity     Alcohol use: Yes     Comment: Occ      Drug use: No     Sexual activity: Yes     Partners: Female   Other Topics Concern     Parent/sibling w/ CABG, MI or angioplasty before 65F 55M? Not Asked   Social History Narrative     Not on file     Social Determinants of Health     Financial Resource Strain: Not on file   Food Insecurity: Not on file   Transportation Needs: Not on file   Physical Activity: Not on file   Stress: Not on file   Social Connections: Not on file   Intimate Partner Violence: Not on file   Housing Stability: Not on file       Family History:  Family History   Problem Relation Age of Onset     Unknown/Adopted No family hx of        Review of Systems:  A complete review of systems reviewed by me is negative with the exeption of what has been mentioned in the history of present illness.  In the last TWO WEEKS have you experienced any of the following symptoms?  Fevers: No  Night Sweats: No  Sore Throat in Morning: No  Dry Mouth in the Morning: Yes  Heart Racing at Night: No  Diarrhea at Night: No  Heartburn at Night: Yes  Urinating More than Once at Night: Yes  Losing Control of Urine at Night: No  Joint Pains at Night: Yes  Headaches in Morning: No  Weakness in Arms or Legs: Yes  Depressed Mood: No  Anxiety: No     Physical Examination:  Vitals: Ht 1.786 m (5' 10.32\")   Wt 109.1 kg (240 lb 9.6 oz)   BMI 34.21 kg/m    BMI= Body mass index is 34.21 kg/m .           GENERAL APPEARANCE: healthy, alert and no distress  EYES: Eyes grossly normal to inspection, PERRL and conjunctivae and sclerae normal  HENT: nose and mouth without ulcers or lesions and normal cephalic/atraumatic  NECK: no adenopathy, no asymmetry, masses, or scars and trachea midline and normal to palpation  RESP: lungs clear to auscultation - no rales, rhonchi or wheezes  CV: regular rates and rhythm, normal S1 S2, no S3 or S4 and no murmur, click or rub  MS: extremities " normal- no gross deformities noted  PSYCH: mentation appears normal and affect normal/bright  Mallampati Class: I.  Tonsillar Stage: 1  hidden by pillars.         Data: All pertinent previous laboratory data reviewed     Recent Labs   Lab Test 09/01/22  1256 01/21/22  1104    138   POTASSIUM 4.1 4.1   CHLORIDE 103 107   CO2 27 26   ANIONGAP 8 5   * 144*   BUN 16 16   CR 0.98 0.96   NAPOLEON 9.7 9.0       Recent Labs   Lab Test 01/21/22  1104   WBC 3.8*   RBC 5.68   HGB 16.3   HCT 47.2   MCV 83   MCH 28.7   MCHC 34.5   RDW 13.2          Recent Labs   Lab Test 09/01/22  1256   PROTTOTAL 8.0   ALBUMIN 4.3   BILITOTAL 0.4   ALKPHOS 75   AST 33   ALT 69       TSH (mU/L)   Date Value   09/01/2022 8.13 (H)   01/21/2022 4.27 (H)   11/30/2020 1.60   03/10/2020 2.91       No results found for: UAMP, UBARB, BENZODIAZEUR, UCANN, UCOC, OPIT, UPCP    No results found for: IRONSAT, [id number], AMY    No results found for: PH, PHARTERIAL, PO2, [id number], SAT, PCO2, HCO3, BASEEXCESS, JR, BEB    @LABRCNTIPR(phv:4,pco2v:4,po2v:4,hco3v:4,serina:4,o2per:4)@    Echocardiology: No results found for this or any previous visit (from the past 4320 hour(s)).    Chest x-ray: XR Chest 2 Views 01/21/2022    Narrative  XR CHEST 2 VW 1/21/2022 11:39 AM    HISTORY: Preop general physical exam    COMPARISON: 2/14/2020    Impression  IMPRESSION: New mild patchy opacity in the left midlung could  represent evolving infection or scarring. Consider follow-up  radiograph to assess for changes in 8-12 weeks. No pleural effusion or  pneumothorax. The cardiac and mediastinal silhouettes are normal.    ADI FRIEDMAN MD      SYSTEM ID:  [id number]      Chest CT: No results found for this or any previous visit from the past 365 days.      PFT: Most Recent [facility] Pulmonary Function Testing    No results found for: 20001  No results found for: 20002  No results found for: 20003  No results found for: 20015  No results found for: 20016  No results  found for: 20027  No results found for: 20028  No results found for: 20029  No results found for: 20079  No results found for: 20080  No results found for: 20081  No results found for: 20335  No results found for: 20105  No results found for: 20053  No results found for: 20054  No results found for: 20055      Shoshana Peoples CMA 11/4/2022

## 2022-11-04 ENCOUNTER — OFFICE VISIT (OUTPATIENT)
Dept: SLEEP MEDICINE | Facility: CLINIC | Age: 52
End: 2022-11-04
Attending: INTERNAL MEDICINE
Payer: COMMERCIAL

## 2022-11-04 VITALS
WEIGHT: 240.6 LBS | SYSTOLIC BLOOD PRESSURE: 118 MMHG | HEART RATE: 60 BPM | HEIGHT: 70 IN | BODY MASS INDEX: 34.44 KG/M2 | DIASTOLIC BLOOD PRESSURE: 80 MMHG | OXYGEN SATURATION: 95 %

## 2022-11-04 DIAGNOSIS — R29.818 SUSPECTED SLEEP APNEA: Primary | ICD-10-CM

## 2022-11-04 DIAGNOSIS — G47.33 OSA (OBSTRUCTIVE SLEEP APNEA): ICD-10-CM

## 2022-11-04 PROCEDURE — 99203 OFFICE O/P NEW LOW 30 MIN: CPT | Performed by: PHYSICIAN ASSISTANT

## 2022-11-04 RX ORDER — ZOLPIDEM TARTRATE 10 MG/1
TABLET ORAL
Qty: 1 TABLET | Refills: 0 | Status: SHIPPED | OUTPATIENT
Start: 2022-11-04 | End: 2023-03-10

## 2022-11-04 ASSESSMENT — SLEEP AND FATIGUE QUESTIONNAIRES
HOW LIKELY ARE YOU TO NOD OFF OR FALL ASLEEP WHILE SITTING AND TALKING TO SOMEONE: WOULD NEVER DOZE
HOW LIKELY ARE YOU TO NOD OFF OR FALL ASLEEP WHILE WATCHING TV: SLIGHT CHANCE OF DOZING
HOW LIKELY ARE YOU TO NOD OFF OR FALL ASLEEP WHILE SITTING QUIETLY AFTER LUNCH WITHOUT ALCOHOL: WOULD NEVER DOZE
HOW LIKELY ARE YOU TO NOD OFF OR FALL ASLEEP WHILE LYING DOWN TO REST IN THE AFTERNOON WHEN CIRCUMSTANCES PERMIT: SLIGHT CHANCE OF DOZING
HOW LIKELY ARE YOU TO NOD OFF OR FALL ASLEEP WHEN YOU ARE A PASSENGER IN A CAR FOR AN HOUR WITHOUT A BREAK: WOULD NEVER DOZE
HOW LIKELY ARE YOU TO NOD OFF OR FALL ASLEEP WHILE SITTING AND READING: SLIGHT CHANCE OF DOZING
HOW LIKELY ARE YOU TO NOD OFF OR FALL ASLEEP IN A CAR, WHILE STOPPED FOR A FEW MINUTES IN TRAFFIC: WOULD NEVER DOZE
HOW LIKELY ARE YOU TO NOD OFF OR FALL ASLEEP WHILE SITTING INACTIVE IN A PUBLIC PLACE: WOULD NEVER DOZE

## 2022-11-04 NOTE — PATIENT INSTRUCTIONS
"      MY TREATMENT INFORMATION FOR SLEEP APNEA-  Andrew Atkinson    DOCTOR : NIK Morel-C    Am I having a sleep study at a sleep center?  --->Due to normal delays, you will be contacted within 2-4 weeks to schedule    Am I having a home sleep study?  --->Watch the video for the device you are using:    -/drop off device-   https://www.[employer].com/watch?v=yGGFBdELGhk    -Disposable device sent out require phone/computer application-   https://www.[employer].com/watch?v=BCce_vbiwxE      Frequently asked questions:  1. What is Obstructive Sleep Apnea (TIFFANY)? TIFFANY is the most common type of sleep apnea. Apnea means, \"without breath.\"  Apnea is most often caused by narrowing or collapse of the upper airway as muscles relax during sleep.   Almost everyone has occasional apneas. Most people with sleep apnea have had brief interruptions at night frequently for many years.  The severity of sleep apnea is related to how frequent and severe the events are.   2. What are the consequences of TIFFANY? Symptoms include: feeling sleepy during the day, snoring loudly, gasping or stopping of breathing, trouble sleeping, and occasionally morning headaches or heartburn at night.  Sleepiness can be serious and even increase the risk of falling asleep while driving. Other health consequences may include development of high blood pressure and other cardiovascular disease in persons who are susceptible. Untreated TIFFANY  can contribute to heart disease, stroke and diabetes.   3. What are the treatment options? In most situations, sleep apnea is a lifelong disease that must be managed with daily therapy. Medications are not effective for sleep apnea and surgery is generally not considered until other therapies have been tried. Your treatment is your choice . Continuous Positive Airway (CPAP) works right away and is the therapy that is effective in nearly everyone. An oral device to hold your jaw forward is usually the next most " reliable option. Other options include postioning devices (to keep you off your back), weight loss, and surgery including a tongue pacing device. There is more detail about some of these options below.  4. Are my sleep studies covered by insurance? Although we will request verification of coverage, we advise you also check in advance of the study to ensure there is coverage.    Important tips for those choosing CPAP and similar devices   Know your equipment:  CPAP is continuous positive airway pressure that prevents obstructive sleep apnea by keeping the throat from collapsing while you are sleeping. In most cases, the device is  smart  and can slowly self-adjusts if your throat collapses and keeps a record every day of how well you are treated-this information is available to you and your care team.  BPAP is bilevel positive airway pressure that keeps your throat open and also assists each breath with a pressure boost to maintain adequate breathing.  Special kinds of BPAP are used in patients who have inadequate breathing from lung or heart disease. In most cases, the device is  smart  and can slowly self-adjusts to assist breathing. Like CPAP, the device keeps a record of how well you are treated.  Your mask is your connection to the device. You get to choose what feels most comfortable and the staff will help to make sure if fits. Here: are some examples of the different masks that are available:       Key points to remember on your journey with sleep apnea:  Sleep study.  PAP devices often need to be adjusted during a sleep study to show that they are effective and adjusted right.  Good tips to remember: Try wearing just the mask during a quiet time during the day so your body adapts to wearing it. A humidifier is recommended for comfort in most cases to prevent drying of your nose and throat. Allergy medication from your provider may help you if you are having nasal congestion.  Getting settled-in. It takes  more than one night for most of us to get used to wearing a mask. Try wearing just the mask during a quiet time during the day so your body adapts to wearing it. A humidifier is recommended for comfort in most cases. Our team will work with you carefully on the first day and will be in contact within 4 days and again at 2 and 4 weeks for advice and remote device adjustments. Your therapy is evaluated by the device each day.   Use it every night. The more you are able to sleep naturally for 7-8 hours, the more likely you will have good sleep and to prevent health risks or symptoms from sleep apnea. Even if you use it 4 hours it helps. Occasionally all of us are unable to use a medical therapy, in sleep apnea, it is not dangerous to miss one night.   Communicate. Call our skilled team on the number provided on the first day if your visit for problems that make it difficult to wear the device. Over 2 out of 3 patients can learn to wear the device long-term with help from our team. Remember to call our team or your sleep providers if you are unable to wear the device as we may have other solutions for those who cannot adapt to mask CPAP therapy. It is recommended that you sleep your sleep provider within the first 3 months and yearly after that if you are not having problems.   Use it for your health. We encourage use of CPAP masks during daytime quiet periods to allow your face and brain to adapt to the sensation of CPAP so that it will be a more natural sensation to awaken to at night or during naps. This can be very useful during the first few weeks or months of adapting to CPAP though it does not help medically to wear CPAP during wakefulness and  should not be used as a strategy just to meet guidelines.  Take care of your equipment. Make sure you clean your mask and tubing using directions every day and that your filter and mask are replaced as recommended or if they are not working.     BESIDES CPAP, WHAT OTHER  THERAPIES ARE THERE?    Positioning Device  Positioning devices are generally used when sleep apnea is mild and only occurs on your back.This example shows a pillow that straps around the waist. It may be appropriate for those whose sleep study shows milder sleep apnea that occurs primarily when lying flat on one's back. Preliminary studies have shown benefit but effectiveness at home may need to be verified by a home sleep test. These devices are generally not covered by medical insurance.  Examples of devices that maintain sleeping on the back to prevent snoring and mild sleep apnea.    Belt type body positioner  http://[employer]/    Electronic reminder  http://nightshifttherapy.com/            Oral Appliance  What is oral appliance therapy?  An oral appliance device fits on your teeth at night like a retainer used after having braces. The device is made by a specialized dentist and requires several visits over 1-2 months before a manufactured device is made to fit your teeth and is adjusted to prevent your sleep apnea. Once an oral device is working properly, snoring should be improved. A home sleep test may be recommended at that time if to determine whether the sleep apnea is adequately treated.       Some things to remember:  -Oral devices are often, but not always, covered by your medical insurance. Be sure to check with your insurance provider.   -If you are referred for oral therapy, you will be given a list of specialized dentists to consider or you may choose to visit the Web site of the American Academy of Dental Sleep Medicine  -Oral devices are less likely to work if you have severe sleep apnea or are extremely overweight.     More detailed information  An oral appliance is a small acrylic device that fits over the upper and lower teeth  (similar to a retainer or a mouth guard). This device slightly moves jaw forward, which moves the base of the tongue forward, opens the airway, improves breathing  for effective treat snoring and obstructive sleep apnea in perhaps 7 out of 10 people .  The best working devices are custom-made by a dental device  after a mold is made of the teeth 1, 2, 3.  When is an oral appliance indicated?  Oral appliance therapy is recommended as a first-line treatment for patients with primary snoring, mild sleep apnea, and for patients with moderate sleep apnea who prefer appliance therapy to use of CPAP4, 5. Severity of sleep apnea is determined by sleep testing and is based on the number of respiratory events per hour of sleep.   How successful is oral appliance therapy?  The success rate of oral appliance therapy in patients with mild sleep apnea is 75-80% while in patients with moderate sleep apnea it is 50-70%. The chance of success in patients with severe sleep apnea is 40-50%. The research also shows that oral appliances have a beneficial effect on the cardiovascular health of TIFFANY patients at the same magnitude as CPAP therapy7.  Oral appliances should be a second-line treatment in cases of severe sleep apnea, but if not completely successful then a combination therapy utilizing CPAP plus oral appliance therapy may be effective. Oral appliances tend to be effective in a broad range of patients although studies show that the patients who have the highest success are females, younger patients, those with milder disease, and less severe obesity. 3, 6.   Finding a dentist that practices dental sleep medicine  Specific training is available through the American Academy of Dental Sleep Medicine for dentists interested in working in the field of sleep. To find a dentist who is educated in the field of sleep and the use of oral appliances, near you, visit the Web site of the American Academy of Dental Sleep Medicine.    References  1. Jose Raul et al. Objectively measured vs self-reported compliance during oral appliance therapy for sleep-disordered breathing. Chest 2013;  144(5): 4931-9644.  2. Maggie et al. Objective measurement of compliance during oral appliance therapy for sleep-disordered breathing. Thorax 2013; 68(1): 91-96.  3. Madison et al. Mandibular advancement devices in 620 men and women with TIFFANY and snoring: tolerability and predictors of treatment success. Chest 2004; 125: 3770-0169.  4. Dawn, et al. Oral appliances for snoring and TIFFANY: a review. Sleep 2006; 29: 244-262.  5. Haresh et al. Oral appliance treatment for TIFFANY: an update. J Clin Sleep Med 2014; 10(2): 215-227.  6. Nathan et al. Predictors of OSAH treatment outcome. J Dent Res 2007; 86: 1504-3870.      Weight Loss:    Weight loss is a long-term strategy that may improve sleep apnea in some patients.    Weight management is a personal decision and the decision should be based on your interest and the potential benefits.  If you are interested in exploring weight loss strategies, the following discussion covers the impact on weight loss on sleep apnea and the approaches that may be successful.    Being overweight does not necessarily mean you will have health consequences.  Those who have BMI over 35 or over 27 with existing medical conditions carries greater risk.   Weight loss decreases severity of sleep apnea in most people with obesity. For those with mild obesity who have developed snoring with weight gain, even 15-30 pound weight loss can improve and occasionally eliminate sleep apnea.  Structured and life-long dietary and health habits are necessary to lose weight and keep healthier weight levels.     Though there may be significant health benefits from weight loss, long-term weight loss is very difficult to achieve- studies show success with dietary management in less than 10% of people. In addition, substantial weight loss may require years of dietary control and may be difficult if patients have severe obesity. In these cases, surgical management may be considered.  Finally, older  individuals who have tolerated obesity without health complications may be less likely to benefit from weight loss strategies.      [unfilled]    Surgery:    Surgery for obstructive sleep apnea is considered generally only when other therapies fail to work. Surgery may be discussed with you if you are having a difficult time tolerating CPAP and or when there is an abnormal structure that requires surgical correction.  Nose and throat surgeries often enlarge the airway to prevent collapse.  Most of these surgeries create pain for 1-2 weeks and up to half of the most common surgeries are not effective throughout life.  You should carefully discuss the benefits and drawbacks to surgery with your sleep provider and surgeon to determine if it is the best solution for you.   More information  Surgery for TIFFANY is directed at areas that are responsible for narrowing or complete obstruction of the airway during sleep.  There are a wide range of procedures available to enlarge and/or stabilize the airway to prevent blockage of breathing in the three major areas where it can occur: the palate, tongue, and nasal regions.  Successful surgical treatment depends on the accurate identification of the factors responsible for obstructive sleep apnea in each person.  A personalized approach is required because there is no single treatment that works well for everyone.  Because of anatomic variation, consultation with an examination by a sleep surgeon is a critical first step in determining what surgical options are best for each patient.  In some cases, examination during sedation may be recommended in order to guide the selection of procedures.  Patients will be counseled about risks and benefits as well as the typical recovery course after surgery. Surgery is typically not a cure for a person s TIFFANY.  However, surgery will often significantly improve one s TIFFANY severity (termed  success rate ).  Even in the absence of a cure, surgery  will decrease the cardiovascular risk associated with OSA7; improve overall quality of life8 (sleepiness, functionality, sleep quality, etc).      Palate Procedures:  Patients with TIFFANY often have narrowing of their airway in the region of their tonsils and uvula.  The goals of palate procedures are to widen the airway in this region as well as to help the tissues resist collapse.  Modern palate procedure techniques focus on tissue conservation and soft tissue rearrangement, rather than tissue removal.  Often the uvula is preserved in this procedure. Residual sleep apnea is common in patient after pharyngoplasty with an average reduction in sleep apnea events of 33%2.      Tongue Procedures:  ExamWhile patients are awake, the muscles that surround the throat are active and keep this region open for breathing. These muscles relax during sleep, allowing the tongue and other structures to collapse and block breathing.  There are several different tongue procedures available.  Selection of a tongue base procedure depends on characteristics seen on physical exam.  Generally, procedures are aimed at removing bulky tissues in this area or preventing the back of the tongue from falling back during sleep.  Success rates for tongue surgery range from 50-62%3.    Hypoglossal Nerve Stimulation:  Hypoglossal nerve stimulation has recently received approval from the [place] Food and Drug Administration for the treatment of obstructive sleep apnea.  This is based on research showing that the system was safe and effective in treating sleep apnea6.  Results showed that the median AHI score decreased 68%, from 29.3 to 9.0. This therapy uses an implant system that senses breathing patterns and delivers mild stimulation to airway muscles, which keeps the airway open during sleep.  The system consists of three fully implanted components: a small generator (similar in size to a pacemaker), a breathing sensor, and a stimulation lead.   Using a small handheld remote, a patient turns the therapy on before bed and off upon awakening.    Candidates for this device must be greater than 18 years of age, have moderate to severe TIFFANY (AHI between 15-65), BMI less than 35, have tried CPAP/oral appliance for at least 8 weeks without success, and have appropriate upper airway anatomy (determined by a sleep endoscopy performed by Dr. Redd Keane).    Hypoglossal Nerve Stimulation Pathway:    The sleep surgeon s office will work with the patient through the insurance prior-authorization process (including communications and appeals).    Nasal Procedures:  Nasal obstruction can interfere with nasal breathing during the day and night.  Studies have shown that relief of nasal obstruction can improve the ability of some patients to tolerate positive airway pressure therapy for obstructive sleep apnea1.  Treatment options include medications such as nasal saline, topical corticosteroid and antihistamine sprays, and oral medications such as antihistamines or decongestants. Non-surgical treatments can include external nasal dilators for selected patients. If these are not successful by themselves, surgery can improve the nasal airway either alone or in combination with these other options.      Combination Procedures:  Combination of surgical procedures and other treatments may be recommended, particularly if patients have more than one area of narrowing or persistent positional disease.  The success rate of combination surgery ranges from 66-80%2,3.    References  Robby BARTLETT. The Role of the Nose in Snoring and Obstructive Sleep Apnoea: An Update.  Eur Arch Otorhinolaryngol. 2011; 268: 1365-73.   Aracelis SM; Perla JA; Alexander JR; Pallanch JF; Steff MB; Trung SG; Cynthia PHILLIP. Surgical modifications of the upper airway for obstructive sleep apnea in adults: a systematic review and meta-analysis. SLEEP 2010;33(10):6733-1846. Francine CAMPOS. Hypopharyngeal surgery in  obstructive sleep apnea: an evidence-based medicine review.  Arch Otolaryngol Head Neck Surg. 2006 Feb;132(2):206-13.  Magen YH1, Mauro Y, Aryan CAILIN. The efficacy of anatomically based multilevel surgery for obstructive sleep apnea. Otolaryngol Head Neck Surg. 2003 Oct;129(4):327-35.  Francine CAMPOS, Goldberg A. Hypopharyngeal Surgery in Obstructive Sleep Apnea: An Evidence-Based Medicine Review. Arch Otolaryngol Head Neck Surg. 2006 Feb;132(2):206-13.  Yuliana SANTOS et al. Upper-Airway Stimulation for Obstructive Sleep Apnea.  N Engl J Med. 2014 Jan 9;370(2):139-49.  Nicole Y et al. Increased Incidence of Cardiovascular Disease in Middle-aged Men with Obstructive Sleep Apnea. Am J Respir Crit Care Med; 2002 166: 159-165  Perdomoisis LEMUS et al. Studying Life Effects and Effectiveness of Palatopharyngoplasty (SLEEP) study: Subjective Outcomes of Isolated Uvulopalatopharyngoplasty. Otolaryngol Head Neck Surg. 2011; 144: 623-631.        WHAT IF I ONLY HAVE SNORING?    Mandibular advancement devices, lateral sleep positioning, long-term weight loss and treatment of nasal allergies have been shown to improve snoring.  Exercising tongue muscles with a game (HipWayttps://[employer]/us/john/soundly-reduce-snoring/lf7319450308) or stimulating the tongue during the day with a device (https://doi.org/10.3390/cmx10466204) have improved snoring in some individuals.    Remember to Drive Safe... Drive Alive     Sleep health profoundly affects your health, mood, and your safety.  Thirty three percent of the population (one in three of us) is not getting enough sleep and many have a sleep disorder. Not getting enough sleep or having an untreated / undertreated sleep condition may make us sleepy without even knowing it. In fact, our driving could be dramatically impaired due to our sleep health. As your provider, here are some things I would like you to know about driving:     Here are some warning signs for impairment and dangerous drowsy driving:               -Having been awake more than 16 hours               -Looking tired               -Eyelid drooping              -Head nodding (it could be too late at this point)              -Driving for more than 30 minutes     Some things you could do to make the driving safer if you are experiencing some drowsiness:              -Stop driving and rest              -Call for transportation              -Make sure your sleep disorder is adequately treated     Some things that have been shown NOT to work when experiencing drowsiness while driving:              -Turning on the radio              -Opening windows              -Eating any  distracting  /  entertaining  foods (e.g., sunflower seeds, candy, or any other)              -Talking on the phone      Your decision may not only impact your life, but also the life of others. Please, remember to drive safe for yourself and all of us.

## 2022-11-09 ENCOUNTER — OFFICE VISIT (OUTPATIENT)
Dept: ORTHOPEDICS | Facility: CLINIC | Age: 52
End: 2022-11-09
Payer: COMMERCIAL

## 2022-11-09 ENCOUNTER — ANCILLARY PROCEDURE (OUTPATIENT)
Dept: GENERAL RADIOLOGY | Facility: CLINIC | Age: 52
End: 2022-11-09
Attending: PEDIATRICS
Payer: COMMERCIAL

## 2022-11-09 VITALS
HEIGHT: 71 IN | SYSTOLIC BLOOD PRESSURE: 120 MMHG | WEIGHT: 240 LBS | DIASTOLIC BLOOD PRESSURE: 78 MMHG | BODY MASS INDEX: 33.6 KG/M2

## 2022-11-09 DIAGNOSIS — S89.91XA INJURY OF RIGHT KNEE, INITIAL ENCOUNTER: ICD-10-CM

## 2022-11-09 DIAGNOSIS — S89.91XA INJURY OF RIGHT KNEE, INITIAL ENCOUNTER: Primary | ICD-10-CM

## 2022-11-09 DIAGNOSIS — M25.511 RIGHT SHOULDER PAIN, UNSPECIFIED CHRONICITY: ICD-10-CM

## 2022-11-09 PROCEDURE — 73562 X-RAY EXAM OF KNEE 3: CPT | Mod: TC | Performed by: RADIOLOGY

## 2022-11-09 PROCEDURE — 99214 OFFICE O/P EST MOD 30 MIN: CPT | Performed by: PEDIATRICS

## 2022-11-09 NOTE — PROGRESS NOTES
ASSESSMENT & PLAN    Andrew was seen today for injury.    Diagnoses and all orders for this visit:    Injury of right knee, initial encounter  -     XR Knee Standing AP [place] Bilat Lat Right; Future  -     MR Knee Right w/o Contrast; Future    Right shoulder pain, unspecified chronicity      Right knee: undiagnosed new problem, unclear prognosis currently. Question meniscus tear, otherwise irritation from underlying arthrosis. MRI next.    Right shoulder: no benefit from injection. PT offered, HEP reviewed. Hold with imaging for now, focus on knee.    Questions answered. Discussed signs and symptoms that may indicate more serious issues; the patient was instructed to seek appropriate care if noted. Keven indicates understanding of these issues and agrees with the plan.      See Patient Instructions  Patient Instructions   Baseline right knee pain with painful pop and increased symptoms including swelling raise question of internal derangement, such as with potential cartilage injury, including meniscus.  It is also possible to have some underlying right knee degenerative change, with aggravation as well.  For next steps, we discussed potential for MRI, use of compression/support, also potential for an injection.  Following discussion, plan MRI of the right knee next.  In the meantime, icing, activity modification/rest, and may continue with use of compression/support.    For the ongoing right shoulder symptoms, we discussed next steps could include therapy approach, also additional imaging with MRI, given that the injection was of no benefit.  Home exercises reviewed today.  If interested in formal PT, contact clinic.  Defer additional imaging for now, but if not improving with additional time and rehab exercises, can reconsider.    Plan to contact you with right knee MRI results.      Advanced imaging is done by appointment. Please call [place] [place] [facility] [place]: [phone] to schedule your MRI.      Depending on your availability you can usually schedule within the next 1-2 days.    Some insurance companies may require a prior authorization to be completed which can delay the time until you are able to schedule your appointment.       If you are active on [employer], you may have access to your test results before your provider is able to review the study and advise on next steps.      The clinic will call you with results. If you have not heard from the clinic within 2-3 days following your MRI, please contact us at the number listed below.      .jak Briggs [facility]    -----  Chief Complaint   Patient presents with     Right Knee - Injury       SUBJECTIVE  Andrew Atkinson is a/an 52 year old adult who is seen as a self referral for evaluation of right knee injury.  While lifting this weekend he felt a pop in his knee.  Pain is mostly in the back of his knee.    Pain with twisting, or adjusting weight on his knee.       Would also like to discuss his right shoulder for which he had a steroid injection on 9/12/22.  Does not feel the injection was helpful.  Painful with any movements over the height of his shoulder laterally.       The patient is seen with their wife.      Onset:11/5/22 4 day(s) ago. Patient describes injury as pop while lifting an object   Location of Pain: right posterior knee   Worsened by: twisting, adjusting weight on knee   Better with: rest   Treatments tried: ice and ibuprofen, brace  Associated symptoms: popping, feeling of instability     Orthopedic/Surgical history: NO  Social History/Occupation: self employed       **  Feels like has had some nerve pain in right knee prior to this time.  Had been wearing compression with activities.  4 days ago was moving some objects in an auto trailer, noted a pop, and had pain.  Has been walking on right LE past couple days, but if too much pressure notes more pain.  Has been  "icing, and using compression.  A little better, but not overall significant improvement.  Has continued to get some popping sensation in knee.  Pain with pivot/twist.  Knee feels tight.            REVIEW OF SYSTEMS:  Review of Systems      OBJECTIVE:  /78   Ht 1.791 m (5' 10.5\")   Wt 108.9 kg (240 lb)   BMI 33.95 kg/m     General: healthy, alert and in no distress  HEENT: no scleral icterus or conjunctival erythema  Skin: no suspicious lesions or rash. No jaundice.  CV: distal perfusion intact   Resp: normal respiratory effort without conversational dyspnea   Psych: normal mood and affect  Gait: antalgic  Neuro: Normal light sensory exam of extremity       Right Knee exam    Inspection:   + moderate effusion   no ecchymosis  Mild posterior knee fullness    ROM:      Flexion lacking few degrees       Extension full degrees       Range of motion limited by tightness, effusion    Patellar Motion:      Normal patellar tracking noted through range of motion    Tender:      lateral joint line    Special Tests:      Pain with Lenny       neg (-) Lachman       neg (-) anterior drawer       neg (-) posterior drawer       neg (-) varus       neg (-) valgus        + pain with forced extension        **  Right shoulder:    Pain with active motion approaching and above shoulder level.        RADIOLOGY:  I independently ordered, visualized and reviewed these images with the patient  Mild lateral compartment narrowing on right compared to left, though could also be positional. + effusion on right.        Recent Results (from the past 24 hour(s))   XR Knee Standing AP [place] Bilat Lat Right    Narrative     XR KNEE STANDING AP [place] BILAT LAT RIGHT  11/9/2022 9:18 AM     HISTORY: Injury of right knee, initial encounter; pain  COMPARISON: None.      Impression    IMPRESSION: No acute fracture or malalignment. Moderate knee joint  degenerative changes.    JAVIER RODRIGUEZ MD         SYSTEM ID:  [id number] "

## 2022-11-09 NOTE — LETTER
11/9/2022         RE: Andrew Atkinson  [address] [place] [id number]        Dear Colleague,    Thank you for referring your patient, Andrew Atkinson, to the [facility]. Please see a copy of my visit note below.    ASSESSMENT & PLAN    Andrew was seen today for injury.    Diagnoses and all orders for this visit:    Injury of right knee, initial encounter  -     XR Knee Standing AP [place] Bilat Lat Right; Future  -     MR Knee Right w/o Contrast; Future    Right shoulder pain, unspecified chronicity      Right knee: undiagnosed new problem, unclear prognosis currently. Question meniscus tear, otherwise irritation from underlying arthrosis. MRI next.    Right shoulder: no benefit from injection. PT offered, HEP reviewed. Hold with imaging for now, focus on knee.    Questions answered. Discussed signs and symptoms that may indicate more serious issues; the patient was instructed to seek appropriate care if noted. Keven indicates understanding of these issues and agrees with the plan.      See Patient Instructions  Patient Instructions   Baseline right knee pain with painful pop and increased symptoms including swelling raise question of internal derangement, such as with potential cartilage injury, including meniscus.  It is also possible to have some underlying right knee degenerative change, with aggravation as well.  For next steps, we discussed potential for MRI, use of compression/support, also potential for an injection.  Following discussion, plan MRI of the right knee next.  In the meantime, icing, activity modification/rest, and may continue with use of compression/support.    For the ongoing right shoulder symptoms, we discussed next steps could include therapy approach, also additional imaging with MRI, given that the injection was of no benefit.  Home exercises reviewed today.  If interested in formal PT, contact clinic.  Defer additional imaging for now,  but if not improving with additional time and rehab exercises, can reconsider.    Plan to contact you with right knee MRI results.      Advanced imaging is done by appointment. Please call [place] [facility]: [phone] to schedule your MRI.     Depending on your availability you can usually schedule within the next 1-2 days.    Some insurance companies may require a prior authorization to be completed which can delay the time until you are able to schedule your appointment.       If you are active on [employer], you may have access to your test results before your provider is able to review the study and advise on next steps.      The clinic will call you with results. If you have not heard from the clinic within 2-3 days following your MRI, please contact us at the number listed below.      .jak Briggs [facility]    -----  Chief Complaint   Patient presents with     Right Knee - Injury       SUBJECTIVE  Andrew Atkinson is a/an 52 year old adult who is seen as a self referral for evaluation of right knee injury.  While lifting this weekend he felt a pop in his knee.  Pain is mostly in the back of his knee.    Pain with twisting, or adjusting weight on his knee.       Would also like to discuss his right shoulder for which he had a steroid injection on 9/12/22.  Does not feel the injection was helpful.  Painful with any movements over the height of his shoulder laterally.       The patient is seen with their wife.      Onset:11/5/22 4 day(s) ago. Patient describes injury as pop while lifting an object   Location of Pain: right posterior knee   Worsened by: twisting, adjusting weight on knee   Better with: rest   Treatments tried: ice and ibuprofen, brace  Associated symptoms: popping, feeling of instability     Orthopedic/Surgical history: NO  Social History/Occupation: self employed       **  Feels like has had some nerve pain in  "right knee prior to this time.  Had been wearing compression with activities.  4 days ago was moving some objects in an auto trailer, noted a pop, and had pain.  Has been walking on right LE past couple days, but if too much pressure notes more pain.  Has been icing, and using compression.  A little better, but not overall significant improvement.  Has continued to get some popping sensation in knee.  Pain with pivot/twist.  Knee feels tight.            REVIEW OF SYSTEMS:  Review of Systems      OBJECTIVE:  /78   Ht 1.791 m (5' 10.5\")   Wt 108.9 kg (240 lb)   BMI 33.95 kg/m     General: healthy, alert and in no distress  HEENT: no scleral icterus or conjunctival erythema  Skin: no suspicious lesions or rash. No jaundice.  CV: distal perfusion intact   Resp: normal respiratory effort without conversational dyspnea   Psych: normal mood and affect  Gait: antalgic  Neuro: Normal light sensory exam of extremity       Right Knee exam    Inspection:   + moderate effusion   no ecchymosis  Mild posterior knee fullness    ROM:      Flexion lacking few degrees       Extension full degrees       Range of motion limited by tightness, effusion    Patellar Motion:      Normal patellar tracking noted through range of motion    Tender:      lateral joint line    Special Tests:      Pain with Lenny       neg (-) Lachman       neg (-) anterior drawer       neg (-) posterior drawer       neg (-) varus       neg (-) valgus        + pain with forced extension        **  Right shoulder:    Pain with active motion approaching and above shoulder level.        RADIOLOGY:  I independently ordered, visualized and reviewed these images with the patient  Mild lateral compartment narrowing on right compared to left, though could also be positional. + effusion on right.        Recent Results (from the past 24 hour(s))   XR Knee Standing AP [place] Bilat Lat Right    Narrative     XR KNEE STANDING AP [place] BILAT LAT RIGHT  11/9/2022 " 9:18 AM     HISTORY: Injury of right knee, initial encounter; pain  COMPARISON: None.      Impression    IMPRESSION: No acute fracture or malalignment. Moderate knee joint  degenerative changes.    JAVIER RODRIGUEZ MD         SYSTEM ID:  [id number]                        Again, thank you for allowing me to participate in the care of your patient.        Sincerely,        Robert Briggs DO

## 2022-11-09 NOTE — PATIENT INSTRUCTIONS
Baseline right knee pain with painful pop and increased symptoms including swelling raise question of internal derangement, such as with potential cartilage injury, including meniscus.  It is also possible to have some underlying right knee degenerative change, with aggravation as well.  For next steps, we discussed potential for MRI, use of compression/support, also potential for an injection.  Following discussion, plan MRI of the right knee next.  In the meantime, icing, activity modification/rest, and may continue with use of compression/support.    For the ongoing right shoulder symptoms, we discussed next steps could include therapy approach, also additional imaging with MRI, given that the injection was of no benefit.  Home exercises reviewed today.  If interested in formal PT, contact clinic.  Defer additional imaging for now, but if not improving with additional time and rehab exercises, can reconsider.    Plan to contact you with right knee MRI results.      Advanced imaging is done by appointment. Please call [place] [place] [facility] [place]: [phone] to schedule your MRI.     Depending on your availability you can usually schedule within the next 1-2 days.    Some insurance companies may require a prior authorization to be completed which can delay the time until you are able to schedule your appointment.       If you are active on [employer], you may have access to your test results before your provider is able to review the study and advise on next steps.      The clinic will call you with results. If you have not heard from the clinic within 2-3 days following your MRI, please contact us at the number listed below.      .jak

## 2022-12-03 ENCOUNTER — HEALTH MAINTENANCE LETTER (OUTPATIENT)
Age: 52
End: 2022-12-03

## 2022-12-07 PROBLEM — I47.29 NSVT (NONSUSTAINED VENTRICULAR TACHYCARDIA) (H): Status: ACTIVE | Noted: 2022-12-07

## 2022-12-07 PROBLEM — I10 BENIGN ESSENTIAL HYPERTENSION: Status: ACTIVE | Noted: 2022-12-07

## 2022-12-07 PROBLEM — I42.2 HYPERTROPHIC CARDIOMYOPATHY (H): Status: ACTIVE | Noted: 2022-12-07

## 2022-12-07 PROBLEM — E78.5 HYPERLIPIDEMIA, ACQUIRED: Status: ACTIVE | Noted: 2022-12-07

## 2022-12-07 NOTE — PROGRESS NOTES
Chief complaint: HCM, establish care    HPI:   Andrew Atkinson is a 52 year old adult with history of HTN and HL referred for evaluation and management of recently diagnosed HCM.     He has previously been followed by my partners Ashish Larson and Bishnu.  He established care with Dr. Larson (4/29/20 note reviewed) and subsequently with Dr. Goetz for follow-up after an illness 2/2020.  At that time, he had been visiting [place] and attended an oxygen parlor, after which he developed viral upper respiratory symptoms and dyspnea.  He reported the symptoms to his primary care physician obtained troponin, which was elevated at 0.2.  He was sent to the emergency department where he was found to have diffuse T wave inversions on ECG.  Coronary angiogram was performed and showed normal coronary arteries.  He was discharged home with a working diagnosis of myocarditis and advised to have a cardiac MRI.  Dr. Goetz ordered a cardiac MRI 5/3/22 for follow up of what had been suspected to be viral myocarditis (see below), which revealed HCM prompting referral. He was last seen by Dr. Goetz 5/12/22 (notes reviewed.)    Prior to his illness 2/2020 he had been relatively active, but his illness and also back pain and subsequent surgery have limited his activity. He had back surgery in March which limited his activity, but he has been walking his dog without difficulty. He reports limited exercise capacity.    Of note, he has been observed repeatedly stopping breathing while sleeping and also snoring loudly and reports fatigue and daytime sleepiness.    He denies any chest pain, dyspnea at rest or exertional dyspnea above baseline, PND, orthopnea, peripheral edema, palpitations, lightheadedness or syncope.     His family history is not well-known to him as his parents gave him up when he was a young child, but he does know that his mother has HCM (but she is unwilling to share any further information about this.) He has 2  sisters (who both live in [place]) and 1 brother; to the best of his knowledge, none of them has HCM. He has 2 sons, ages 18 and 20, both well. No known family history of sudden death.     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 His is a never-smoker.    12/08/22:  He reports feeling well since his last visit. He is active doing yardwork on his large property, taking his dogs to the dog park every other day, and working on cars but does not follow a formal exercise program.     He denies any chest pain, dyspnea at rest or with exertion, palpitations, PND, orthopnea, peripheral edema, lightheadedness, or syncope.     PAST MEDICAL HISTORY:  HTN  HL  Hypothyroidism    CURRENT MEDICATIONS:  Current Outpatient Medications   Medication Sig Dispense Refill     acetaminophen (TYLENOL) 325 MG tablet Take 2 tablets (650 mg) by mouth every 6 hours as needed for mild pain Alternate with ibuprofen so you are taking one or the other every three hours. For example take tylenol at 5am, then ibuprofen at 8am, then tylenol at 11am, and so on. 50 tablet 0     diltiazem ER (DILT-XR) 180 MG 24 hr capsule Take 1 capsule (180 mg) by mouth daily 30 capsule 11     levothyroxine (SYNTHROID/LEVOTHROID) 150 MCG tablet Take 1 tablet (150 mcg) by mouth daily 90 tablet 3     multivitamin w/minerals (THERA-VIT-M) tablet Take 1 tablet by mouth daily       pravastatin (PRAVACHOL) 40 MG tablet Take 1 tablet (40 mg) by mouth daily 90 tablet 3     zolpidem (AMBIEN) 10 MG tablet Take tablet by mouth 15 minutes prior to sleep, for Sleep Study 1 tablet 0       ALLERGIES:     Allergies   Allergen Reactions     Morphine Hives       FAMILY HISTORY:  His family history is not well-known to him as his parents gave him up when he was a young child, but he does know that his mother has HCM (but she is unwilling to share any further information about this.) He has 2 sisters (who both live in [place]) and 1 brother; to the  best of his knowledge, none of them has HCM. He has 2 sons, ages 18 and 20, both well. No known family history of sudden death.       SOCIAL HISTORY:  Social History     Tobacco Use     Smoking status: Never     Smokeless tobacco: Never   Substance Use Topics     Alcohol use: Yes     Comment: Occ      Drug use: No       ROS:   A comprehensive 14 point review of systems is negative other than as mentioned in HPI.    Exam:  /87 (BP Location: Left arm, Patient Position: Sitting, Cuff Size: Adult Regular)   Pulse 61   SpO2 95%   GENERAL APPEARANCE: healthy, alert and no distress  EYES: no icterus, no xanthelasmas  ENT: normal palate, mucosa moist, no central cyanosis  NECK: JVP not elevated  RESPIRATORY: lungs clear to auscultation - no rales, rhonchi or wheezes, no use of accessory muscles, no retractions, respirations are unlabored, normal respiratory rate  CARDIOVASCULAR: regular rhythm, normal S1 with physiologic split S2, no S3 or S4 and no murmur, click or rub.  GI: soft, non tender, bowel sounds normal,no abdominal bruits  EXTREMITIES: no edema, no bruits  NEURO: alert and oriented to person/place/time, normal speech, gait and affect  VASC: Radial, dorsalis pedis and posterior tibialis pulses 2+ bilaterally.  SKIN: no ecchymoses, no rashes.  PSYCH: cooperative, affect appropriate.     Labs:  Reviewed.   Genetic testing 8/23/22: Andrew does NOT carry a mutation in the 30 genes analyzed    Testing/Procedures:  CPX/exercise echocardiogram (HCM protocol) 6/20/22:  Exercise 17:49 on a Modified [place]ando protocol  MVO2 27.2 mL/kg/min (81% predicted, class I, 7.8 METS) VE/VCO2 slope 31.88 RER 1.07  Blunted HR and hypertensive BP response  No detectable resting or Valsalva LVOT gradient. Minimal latent LVOT obstruction, maximal post-exercise LVOT gradient 31 mmHg.     CMR 5/3/22:  HCM with asymmetric septal hypertrophy predominantly involving the basal-mid inferoseptum (max thickness 2.1 cm mid inferoseptum);  there is also apical hypertrophy. Minimal patchy midmyocardial LGE consistent with HCM, LGE burden<5%. Minimal JEET without septal contact.     I personally visualized and interpreted:  14-day ZioPatch 6/23/22-7/7/22:   4 asymptomatic runs of nonsustained VT, longest 13 beats.   Other findings--baseline sinus rhythm (average VR 80 bpm, range ) with rare (<1%) PACs and PVCs and 4 asymptomatic nonsustained runs of SVT (in addition to VT noted above.)      Assessment and Plan:   1. Hypertrophic cardiomyopathy cardiomyopathy with minimal latent LVOT obstruction (post-exercise LVOT gradient 31 mmHg), newly-diagnosed  2. Nonsustained VT  The patient was counseled at length regarding the nature of hypertrophic cardiomyopathy including its genetic nature, its natural history, and potential complications.     We discussed the potential complications of HCM, including outflow obstruction, arrhythmias, and heart failure.  He has minimal latent LVOT obstruction (PG 31 mmHg post-exercise) without significant symptoms and excellent exercise capacity on CPX. The patient was counseled regarding behavioral interventions to avoid worsening outflow obstruction, in particular avoiding dehydration and minimizing diuretics and peripheral vasodilators. Low-dose beta blocker was started.     Keven has only 1 risk factor for SCD, nonsustained VT (4 runs of up to 13 beats on 14-day ZioPatch 6/23/22-7/7/22)-- this is at most a class IIb indication for primary prevention ICD. Will continue surveillance. Otherwise, he has only mild LGE burden on his cardiac MRI (<5%). Maximal LV wall thickness is 2.1 cm. He also has no known family history of SCD (though his FHx is not well ascertained.) He has no history of unexplained syncope. LVEF is normal. He has no LV aneurysm. walker Baca's 6/2022 CPX is excellent and I suspect his extremely mild functional limitation is largely related to musculoskeletal limitations and deconditioning. We  discussed the activity recommendations for hypertrophic cardiomyopathy, and in particular that moderate intensity aerobic exercise is encouraged and that only the highest intensity competitive sports with start-stop activity (e.g. basketball and ice hockey) are felt to be contraindicated.      Genetic testing was done 8/2022 and was negative. I have counseled Keven that in the absence of an identified mutation that would allow cascade testing of his family members, that all first-degree relatives should undergo clinical screening.    -continue diltiazem  mg daily  -seen by Dr. Kaufman 8/31/22: at most a class IIb indication for ICD, will continue surveillance for now  -Genetic testing 8/23/22: Andrew does NOT carry a mutation in the 30 genes analyzed  -14-day ZioPatch in 1 year  -TTE in 1 year  -follow up in 1 year    2. HTN, controlled: continue present therapy  3. HL, controlled: continue present therapy    4. Probable TIFFANY: Multiple symptoms and numerous witnessed apneas.  I did  Keven regarding the association between TIFFANY and multiple cardiovascular conditions including HTN, arrhythmias (both AF and ventricular arrhythmias), CAD/ACS, HF, and pulmonary hypertension, as well as chronic pain. I have also advised him that treatment of TIFFANY, if present, can reduce all of these risks and may also improve his pain.   -Sleep study scheduled 2/1/23    The patient states understanding and is agreeable with plan.   Total time spent Dec 8, 2022: 41 minutes    Federico Lomeli MD  Cardiology

## 2022-12-08 ENCOUNTER — OFFICE VISIT (OUTPATIENT)
Dept: CARDIOLOGY | Facility: CLINIC | Age: 52
End: 2022-12-08
Payer: COMMERCIAL

## 2022-12-08 VITALS — OXYGEN SATURATION: 95 % | HEART RATE: 61 BPM | SYSTOLIC BLOOD PRESSURE: 134 MMHG | DIASTOLIC BLOOD PRESSURE: 87 MMHG

## 2022-12-08 DIAGNOSIS — I10 BENIGN ESSENTIAL HYPERTENSION: ICD-10-CM

## 2022-12-08 DIAGNOSIS — I42.2 HYPERTROPHIC CARDIOMYOPATHY (H): Primary | ICD-10-CM

## 2022-12-08 DIAGNOSIS — I47.29 NSVT (NONSUSTAINED VENTRICULAR TACHYCARDIA) (H): ICD-10-CM

## 2022-12-08 DIAGNOSIS — G47.33 OSA (OBSTRUCTIVE SLEEP APNEA): ICD-10-CM

## 2022-12-08 DIAGNOSIS — E78.5 HYPERLIPIDEMIA, ACQUIRED: ICD-10-CM

## 2022-12-08 PROCEDURE — 99215 OFFICE O/P EST HI 40 MIN: CPT | Performed by: INTERNAL MEDICINE

## 2022-12-08 NOTE — PROGRESS NOTES
Andrew Atkinson's goals for this visit include:     Keven Atkinson requests these members of Keven Atkinson's care team be copied on today's visit information: PCP    PCP: Wilfredo Dela Cruz    Referring Provider:  No referring provider defined for this encounter.    /87 (BP Location: Left arm, Patient Position: Sitting, Cuff Size: Adult Regular)   Pulse 61   SpO2 95%     Do you need any medication refills at today's visit? No.    Ramsey Chang, EMT  Clinic Support  [facility]    [phone]    Employed by [facility] [employer]

## 2023-01-23 ENCOUNTER — ALLIED HEALTH/NURSE VISIT (OUTPATIENT)
Dept: RESEARCH | Facility: CLINIC | Age: 53
End: 2023-01-23
Payer: COMMERCIAL

## 2023-01-23 VITALS
OXYGEN SATURATION: 95 % | WEIGHT: 240 LBS | BODY MASS INDEX: 33.6 KG/M2 | SYSTOLIC BLOOD PRESSURE: 141 MMHG | RESPIRATION RATE: 16 BRPM | DIASTOLIC BLOOD PRESSURE: 89 MMHG | TEMPERATURE: 97.3 F | HEART RATE: 70 BPM | HEIGHT: 71 IN

## 2023-01-23 DIAGNOSIS — Z00.6 EXAMINATION OF PARTICIPANT OR CONTROL IN CLINICAL RESEARCH: Primary | ICD-10-CM

## 2023-01-23 PROCEDURE — 99207 PR NO CHARGE NURSE ONLY: CPT

## 2023-01-23 NOTE — PROGRESS NOTES
[place] Study Consent    Study Description: The purpose of this study is to collect sleep data for product research and development. We will compare the performance of the Smartwatch to a medical-grade Home Sleep Test (HST). We hope to learn whether the Smartwatch can be used to track sleep quality at home.        Andrew Atkinson a 52 year old adult, was on-site  today to discuss participation in the [place] sleep data collection study.     The consent form was reviewed with the patient.     The review of the study included:    Study Purpose     COVID-19 Criteria     Participant Responsibilities      Study Data and Devices    Benefits and Risks of Participation    Compensation and Costs of Participation    Voluntary Participation    Confidentiality     Injury and Legal Rights    Protocol Version: 3.0    The subject was queried in regards to Keven Atkinson's willingness to continue and Keven Atkinson's questions were answered to Keven Atkinson's satisfaction.     The patient has given Keven Atkinson's agreement to volunteer and participate in the above noted study.     The Consent  and HIPAA form version 3.0 6-JAN-2023 was signed at 09:26  on  23-JAN-2023 with the Clinical Research Unit of [facility].     A copy of the [place] consent will be placed in subject's medical record. A copy of the consent form was given to the subject today.    Study data is directly entered into [employer] and [employer] per protocol.     No study procedures were done prior to Andrew Atkinson providing informed consent.       23-JAN-2023    Oziel Saucedo

## 2023-01-23 NOTE — PROGRESS NOTES
"  [place] Sleep Study Inclusion/Exclusion Criteria:    Study Name: [place]  : Marilia Roche MD    Study Description: The purpose of this study is to collect sleep data for product research and development. We will compare the performance of the Smartwatch to a medical-grade Home Sleep Test (HST). We hope to learn whether the Smartwatch can be used to track sleep quality at home.    Protocol Version: 3.0  22-DEC-2022     Criteria #  Inclusion Criteria (ALL MUST BE YES)  YES/NO/N/A   1  Adult (age > 18 years old) Yes   2  Subject has a reliable WiFi network (examples of \"reliable\": able to video-conference call with a clear image, able to stream movies or video without interruption, play online computer games) Yes   3  Subject has access to a computer or smartphone with audiovisual capabilities (e.g., webcam and microphone/speaker*)  Yes   4  Subject is willing to comply with the study procedures    Yes         * applicable for subjects that are remotely consented and/or trained.     Criteria # Exclusion Criteria (ALL MUST BE NO) YES/NO/N/A   1  Active COVID infection   No   2  Decisionally impaired participants (no surrogate consenting is allowed)    No   3  Not understanding written and spoken English     No   4  Open wounds(s) on the wrist and/or forearm (in area where Smartwatch would be worn) No   5  Tattoos, large moles or scars on the dorsal aspect of wrist where the Smartwatch would be worn; individuals with tattoo on only one wrist may participate, if Smartwatch is worn on non-tattooed wrist    No   6  Known sensitivity or allergy to component of the Smartwatch   No   7  Planned travel across 2 or more times zones during study participation   No   8  Planned travel away from home for more than 5 days during the study period No   9  Overnight rotating shift work     No   10  Active and adherent to treatment for sleep apnea   (e.g., CPAP, dental appliance, Hypoglossal nerve stimulator)    " No   11  Plans to start treatment for apnea within the study timeframe    No   12  Overnight supplemental oxygen use   No   13    Employed by a company that develops or sells medical and/or fitness devices (e.g., ECG monitors, wearable fitness bands, sleep monitors, etc.) or are technology journalists (e.g., professional bloggers, TV, magazine, newspaper reporters, etc.) No   14    Participated in a previous sleep study that used a wrist-worn sensor device by same sponsor  No     Patient does fulfill study inclusion criteria and no exclusion criteria are found.     Marilia Roche MD    23-JAN-2023    Oziel Saucedo  [facility]      Physical Exam

## 2023-01-23 NOTE — PROGRESS NOTES
[place] In-Person Study Note    Study Description: The purpose of this study is to collect sleep data for product research and development. We will compare the performance of the Smartwatch to a medical-grade Home Sleep Test (HST). We hope to learn whether the Smartwatch can be used to track sleep quality at home.       Subject ID:      SCREENING     Demographic Info  Andrew Atkinson   1970          52 year old  choose not to disclose    Race: White  Ethnicity: Non-/     Allison Scale: OBAN Allison: 3    Medical History:  Past Medical History:   Diagnosis Date     Medial meniscus tear    - Hypertrophic cardiomyopathy  - Acquired hypothyroidism  - Acute viral myocarditis         Sleep Apnea Diagnosis: No    Allergies:  Allergies   Allergen Reactions     Morphine Hives        Current Medications:     Review of your medicines          Accurate as of January 23, 2023  9:29 AM. If you have any questions, ask your nurse or doctor.            CONTINUE these medicines which have NOT CHANGED      Dose / Directions   acetaminophen 325 MG tablet  Commonly known as: TYLENOL  Used for: Pain      Dose: 650 mg  Take 2 tablets (650 mg) by mouth every 6 hours as needed for mild pain Alternate with ibuprofen so you are taking one or the other every three hours. For example take tylenol at 5am, then ibuprofen at 8am, then tylenol at 11am, and so on.  Quantity: 50 tablet  Refills: 0  Start Date: 12/8/2020, Ongoing   diltiazem  MG 24 hr capsule  Commonly known as: DILT-XR  Used for: Hypertrophic cardiomyopathy (H)      Dose: 180 mg  Take 1 capsule (180 mg) by mouth daily  Quantity: 90 capsule  Refills: 2  Start Date: 8/1/2022, Ongoing   levothyroxine 150 MCG tablet  Commonly known as: SYNTHROID/LEVOTHROID  Used for: Acquired hypothyroidism      Dose: 150 mcg  Take 1 tablet (150 mcg) by mouth daily  Quantity: 90 tablet  Refills: 3  Start Date: 4/17/2017, Ongoing   multivitamin w/minerals  "tablet  Indication: General Health      Dose: 1 tablet  Take 1 tablet by mouth daily  Refills: 0  Start Date: 4/17/2017, Ongoing   pravastatin 40 MG tablet  Commonly known as: PRAVACHOL  Used for: Acute viral myocarditis      Dose: 40 mg  Take 1 tablet (40 mg) by mouth daily  Quantity: 90 tablet  Refills: 3  Start Date: 2/14/2020, Ongoing             Vitals:  Time Subject Sat: 0910   BP (!) 141/89 (BP Location: Left arm, Patient Position: Chair, Cuff Size: Adult Large)   Pulse 70   Temp 97.3  F (36.3  C) (Oral)   Resp 16   Ht 1.803 m (5' 11\")   Wt 108.9 kg (240 lb)   SpO2 95%   BMI 33.47 kg/m         Lifestyle:  Occupation: Business Owner/Construction    Do you have a Bed Partner/Co-Sleeper? Yes   Previous Sleep Study?: No       (If Yes) Initial AHI Score: N/A    Alcohol Use: 3 drinks/week  Smoking History: No      Measurements & Preferences:  Dominant Hand: Left  Preferred Watch Wrist: Right     Left Wrist:  Circumference (mm): 195  Hairiness: C: Thick Hair, Low Density  Tattoos, Moles, Scars? None    Right Wrist:   Circumference (mm): 198   Hairiness: C: Thick Hair, Low Density  Tattoos, Moles, Scars? None    Was data collected?: Yes    ENROLLMENT & DEVICES      Device IDs:  Watch ID: [id number]    Watch Size: 44 mm   Band Size: M/L  Natural Notch: 6   Secure Notch: 6   Watch Setting Worn: 6   Phone ID: [id number]  Nox ID: [id number]     Has the Subject Enrolled in the Study Vivek: Yes   Confirmation of Enrollment?: No   Enrollment Date: 23-JAN-2023  Smartwatch Training Completed? Yes   Smartwatch Training Date: 23-JAN-2023  HSAT Training Completed? Yes   HSAT Training Date: 23-JAN-2023 23-JAN-2023  Oziel Saucedo"

## 2023-01-27 ENCOUNTER — VIRTUAL VISIT (OUTPATIENT)
Dept: RESEARCH | Facility: CLINIC | Age: 53
End: 2023-01-27
Payer: COMMERCIAL

## 2023-01-27 DIAGNOSIS — Z00.6 RESEARCH SUBJECT: Primary | ICD-10-CM

## 2023-01-27 PROCEDURE — 99207 PR NO CHARGE NURSE ONLY: CPT

## 2023-01-27 NOTE — PROGRESS NOTES
[place] HSAT Phone Visit    Study Description: The purpose of this study is to collect sleep data for product research and development. We will compare the performance of the Smartwatch to a medical-grade Home Sleep Test (HST). We hope to learn whether the Smartwatch can be used to track sleep quality at home.      Subject Number:     Study Day: Day 4    [place] Study Subject was called today to review HSAT expectations and requirements prior to their first night.     Are you on your home WiFi and have you been completing your Evening/Morning survey? Yes    Reminders Checklist:   [x] Complete Evening Task prior to HSAT Workflow/Watching the video   [x]Make sure Red light comes on, if not DO NOT ATTEMPT  [x] Secure nasal cannula and finger sensor with medical tape  [x] Nox recording turned off in the morning following HSAT night   [x] Ensure morning survey is completed for participant's data upload    Helpful Hints:  -Wear a T-shirt over the heart monitor   -Double tape device tubing/wires     Adverse Events & Con Med Assessment Performed?   [x] None    (If yes, please generate adverse event report for PI to cosign)      27-JAN-2023    Delmis Shetty RN

## 2023-01-30 ENCOUNTER — ALLIED HEALTH/NURSE VISIT (OUTPATIENT)
Dept: RESEARCH | Facility: CLINIC | Age: 53
End: 2023-01-30
Payer: COMMERCIAL

## 2023-01-30 DIAGNOSIS — Z00.6 EXAMINATION OF PARTICIPANT OR CONTROL IN CLINICAL RESEARCH: Primary | ICD-10-CM

## 2023-01-30 PROCEDURE — 99207 PR NO CHARGE NURSE ONLY: CPT

## 2023-01-30 NOTE — PROGRESS NOTES
. [place] HSAT Kit Return  Study Description: The purpose of this study is to collect sleep data for product research and development. We will compare the performance of the Smartwatch to a medical-grade Home Sleep Test (HST). We hope to learn whether the Smartwatch can be used to track sleep quality at home.      Subject Number:     Study Day: Day 7     Tracking Number: N/A    Compliance Questions:  Did you wear a T-Shirt over the heart monitor? No  Did you double tape device tubing/wires?Yes  Did you turn-off your heart monitor each morning after collection?Yes  Did all equipment function correctly and stay-on throughout night?Yes  Did you see the red light underneath the watch turn-on both nights?Yes      Participant returned HSAT kit with all devices returned. Participant verbalized understanding that if their data is unusable per sponsor, they will conduct one additional set of HSAT recordings. All other questions/concerns addressed.     Adverse Events & Con Med Assessment Performed?   [x]     (If yes, please generate adverse event report for PI to cosign)    30-JAN-2023    Oziel Saucedo

## 2023-02-06 ENCOUNTER — VIRTUAL VISIT (OUTPATIENT)
Dept: RESEARCH | Facility: CLINIC | Age: 53
End: 2023-02-06
Payer: COMMERCIAL

## 2023-02-06 DIAGNOSIS — Z00.6 EXAMINATION OF PARTICIPANT OR CONTROL IN CLINICAL RESEARCH: Primary | ICD-10-CM

## 2023-02-06 PROCEDURE — 99207 PR NO CHARGE NURSE ONLY: CPT

## 2023-02-07 ENCOUNTER — TELEPHONE (OUTPATIENT)
Dept: RESEARCH | Facility: CLINIC | Age: 53
End: 2023-02-07
Payer: COMMERCIAL

## 2023-02-08 ENCOUNTER — MYC MEDICAL ADVICE (OUTPATIENT)
Dept: SLEEP MEDICINE | Facility: CLINIC | Age: 53
End: 2023-02-08
Payer: COMMERCIAL

## 2023-02-08 DIAGNOSIS — G47.33 OSA (OBSTRUCTIVE SLEEP APNEA): Primary | ICD-10-CM

## 2023-02-08 NOTE — TELEPHONE ENCOUNTER
[place] HSAT Results Phone Call  Study Description: The purpose of this study is to collect sleep data for product research and development. We will compare the performance of the Smartwatch to a medical-grade Home Sleep Test (HST). We hope to learn whether the Smartwatch can be used to track sleep quality at home.      Andrew DHRUV Atkinson was called today to review results of Keven Atkinson's Home Sleep Test (HSAT).     They were informed of the significance of the results and they affirmed understanding. Additionally I informed them that these results are available for review by their provider in their chart.  They had no further questions at this time.    Additional Comments: NP discussed severe TIFFANY, 50% of the time during sleep patient oxygen is below 88% and lowest oxygen saturation is 69%- normal not less than 88%. Patient will follow up with Sleep Medicine and his primary. NP noted patient had a sleep medicine consult he cancelled. Patient stated he wanted to do this first. NP sent his PCP Dr. Dela Cruz of these results on 2/17/2023    AHI Score: AHI of 30 or more (Severe TIFFANY)  59.3, 62.2    Results:  NIGHT 1  Was it scorable?: Yes     Channel Presence: Scored and all 6 channels present  Select Missing Channels: N/A     (If applicable)      Analysis Start Date: January 27, 2023   Analysis Duration: 10hr 8min  Analysis Start Time: 9:10 pm        Analysis Stop Time: 7:18 am    Est Total Sleep Time: 10 hr 6 min      NIGHT 1 Scorer 1 Scorer 2   Respiratory Parameters   Apnea + Hypopneas (AH)   Total    59.3 /h   62.2 /h   Supine    81.7 /h   83 /h   Non-Supine   41.1 /h   45.2 /h   Count   599    628        Total Total    Obstructive (OA)    38.9 /h   36.1 /h   Hypopneas    20.2 /h   26 /h   Central Apnea Hypopnea (CA+CH)    0 /h   0 /h   Oxygen Saturation (SpO2)   Oxygen Desaturation Index (MALINDA)    62.0 /h   62.3 /h   Minimum SpO2    69 %   69 %   SpO2 Duration < 88%    51.3 %   51.3 %   Average Desat Drop    12.2  %   12.1 %   Position & Analysis Time   Supine (in TST) Duration    271.7 min   271.7 min     On night only     Henrietta Hutchinson NP

## 2023-02-09 NOTE — TELEPHONE ENCOUNTER
See other mychart message from 2/8/23 with HST attatched and telephone encounter 2/7/23. Please advise on what next steps are for patient. Lelia Jordan, [facility]

## 2023-02-13 ENCOUNTER — VIRTUAL VISIT (OUTPATIENT)
Dept: RESEARCH | Facility: CLINIC | Age: 53
End: 2023-02-13
Payer: COMMERCIAL

## 2023-02-13 DIAGNOSIS — Z00.6 EXAMINATION OF PARTICIPANT OR CONTROL IN CLINICAL RESEARCH: Primary | ICD-10-CM

## 2023-02-13 PROCEDURE — 99207 PR NO CHARGE NURSE ONLY: CPT

## 2023-02-13 NOTE — PROGRESS NOTES
" [place] Study Phone Visit    Study Description: The purpose of this study is to collect sleep data for product research and development. We will compare the performance of the Smartwatch to a medical-grade Home Sleep Test (HST). We hope to learn whether the Smartwatch can be used to track sleep quality at home.      Subject Number:     Study Day: Week 4    [place] Study Subject was called today to:    Review Compliance     Answer subject questions    Deliver study protocol reminders to subject    Reminders Checklist:   [x]  Connected to WiFi  [x]  Completing both morning and evening surveys  [x]  Watch and Phone on  near each other after completed morning survey   [x]  Take devices off before showering/getting them wet  [x]  Make sure to dismiss any update notifications. -Tap \"Not Now\"  [x]  Good HSAT   [x]  Remind them of next appointment with us          Adverse Events & Con Med Assessment Performed?   [x]     (If yes, please generate adverse event report for PI to noel)      13-FEB-2023    Krzysztof Carter    "

## 2023-02-17 DIAGNOSIS — R94.31 NONSPECIFIC ABNORMAL ELECTROCARDIOGRAM (ECG) (EKG): ICD-10-CM

## 2023-02-17 DIAGNOSIS — E78.5 HYPERLIPIDEMIA LDL GOAL <100: ICD-10-CM

## 2023-02-17 DIAGNOSIS — I21.4 NSTEMI (NON-ST ELEVATED MYOCARDIAL INFARCTION) (H): ICD-10-CM

## 2023-02-17 DIAGNOSIS — I40.0 ACUTE VIRAL MYOCARDITIS: ICD-10-CM

## 2023-02-20 ENCOUNTER — VIRTUAL VISIT (OUTPATIENT)
Dept: RESEARCH | Facility: CLINIC | Age: 53
End: 2023-02-20
Payer: COMMERCIAL

## 2023-02-20 DIAGNOSIS — Z00.6 RESEARCH SUBJECT: Primary | ICD-10-CM

## 2023-02-20 PROCEDURE — 99207 PR NO CHARGE NURSE ONLY: CPT

## 2023-02-20 NOTE — PROGRESS NOTES
" [place] Study Phone Visit    Study Description: The purpose of this study is to collect sleep data for product research and development. We will compare the performance of the Smartwatch to a medical-grade Home Sleep Test (HST). We hope to learn whether the Smartwatch can be used to track sleep quality at home.      Subject Number:     Study Day: Week 5    [place] Study Subject was called today to:    Review Compliance     Answer subject questions    Deliver study protocol reminders to subject    Reminders Checklist:   [x]  Connected to WiFi  [x]  Completing both morning and evening surveys  [x]  Watch and Phone on  near each other after completed morning survey   [x]  Take devices off before showering/getting them wet  [x]  Make sure to dismiss any update notifications. -Tap \"Not Now\"  [x]  Good HSAT   [x]  Remind them of next appointment with us     (Applicable during last 4 days)  [x]  NO WATCH WEAR JUST SURVEY     Adverse Events & Con Med Assessment Performed?   [x] None    (If yes, please generate adverse event report for PI to cosign)      20-FEB-2023    Delmis Shetty RN    "

## 2023-02-21 RX ORDER — PRAVASTATIN SODIUM 40 MG
TABLET ORAL
Qty: 90 TABLET | Refills: 3 | Status: SHIPPED | OUTPATIENT
Start: 2023-02-21 | End: 2024-02-06

## 2023-02-21 NOTE — TELEPHONE ENCOUNTER
12/8/2022  [facility] Federico Cristobal MD   Pravastatin Sodium Oral Tablet 40 MG. LDL 9/10/22

## 2023-02-24 ENCOUNTER — ALLIED HEALTH/NURSE VISIT (OUTPATIENT)
Dept: RESEARCH | Facility: CLINIC | Age: 53
End: 2023-02-24
Payer: COMMERCIAL

## 2023-02-24 DIAGNOSIS — Z00.6 EXAMINATION OF PARTICIPANT OR CONTROL IN CLINICAL RESEARCH: Primary | ICD-10-CM

## 2023-02-24 PROCEDURE — 99207 PR NO CHARGE NURSE ONLY: CPT

## 2023-02-24 NOTE — PROGRESS NOTES
[place] Study End Note    Study Description: The purpose of this study is to collect sleep data for product research and development. We will compare the performance of the Smartwatch to a medical-grade Home Sleep Test (HST). We hope to learn whether the Smartwatch can be used to track sleep quality at home.        Study Termination/Completion Date: 24-FEB-2023  Reason for Termination (If Applicable): Completed     Subject returned all study devices today and this completes this study for the subject.    Adverse Events & Con Med Assessment Performed?   [x]      24-FEB-2023    Oziel Saucedo

## 2023-02-27 ENCOUNTER — OFFICE VISIT (OUTPATIENT)
Dept: SLEEP MEDICINE | Facility: CLINIC | Age: 53
End: 2023-02-27
Payer: COMMERCIAL

## 2023-02-27 ENCOUNTER — TELEPHONE (OUTPATIENT)
Dept: RESEARCH | Facility: CLINIC | Age: 53
End: 2023-02-27

## 2023-02-27 DIAGNOSIS — G47.33 OSA (OBSTRUCTIVE SLEEP APNEA): ICD-10-CM

## 2023-02-27 PROCEDURE — G0399 HOME SLEEP TEST/TYPE 3 PORTA: HCPCS | Performed by: OTOLARYNGOLOGY

## 2023-02-27 ASSESSMENT — SLEEP AND FATIGUE QUESTIONNAIRES
HOW LIKELY ARE YOU TO NOD OFF OR FALL ASLEEP IN A CAR, WHILE STOPPED FOR A FEW MINUTES IN TRAFFIC: WOULD NEVER DOZE
HOW LIKELY ARE YOU TO NOD OFF OR FALL ASLEEP WHILE SITTING AND READING: MODERATE CHANCE OF DOZING
HOW LIKELY ARE YOU TO NOD OFF OR FALL ASLEEP WHILE LYING DOWN TO REST IN THE AFTERNOON WHEN CIRCUMSTANCES PERMIT: MODERATE CHANCE OF DOZING
HOW LIKELY ARE YOU TO NOD OFF OR FALL ASLEEP WHILE SITTING QUIETLY AFTER LUNCH WITHOUT ALCOHOL: WOULD NEVER DOZE
HOW LIKELY ARE YOU TO NOD OFF OR FALL ASLEEP WHEN YOU ARE A PASSENGER IN A CAR FOR AN HOUR WITHOUT A BREAK: WOULD NEVER DOZE
HOW LIKELY ARE YOU TO NOD OFF OR FALL ASLEEP WHILE SITTING AND TALKING TO SOMEONE: WOULD NEVER DOZE
HOW LIKELY ARE YOU TO NOD OFF OR FALL ASLEEP WHILE WATCHING TV: HIGH CHANCE OF DOZING
HOW LIKELY ARE YOU TO NOD OFF OR FALL ASLEEP WHILE SITTING INACTIVE IN A PUBLIC PLACE: WOULD NEVER DOZE

## 2023-02-27 NOTE — TELEPHONE ENCOUNTER
[place] HSAT Results Phone Call  Study Description: The purpose of this study is to collect sleep data for product research and development. We will compare the performance of the Smartwatch to a medical-grade Home Sleep Test (HST). We hope to learn whether the Smartwatch can be used to track sleep quality at home.      Andrew DHRUV Atkinson was called today to review results of Keven Atkinson's Home Sleep Test (HSAT).     They were informed of the significance of the results and they affirmed understanding. Additionally I informed them that these results are available for review by their provider in their chart.  They had no further questions at this time.    Additional Comments:  Lowest O2 75%; average 89% <90% 234 and 188 minutes; <88% 188 and 147 minutes; <85% 47 and 45 minutes; snore 35.2-39.8%; HR ; NSR with rare PAC marked 2nd night. Followup with pulmonary/sleep recommended- patient already picked up clinical sleep study to do tonight and return tomorrow. Staff message sent to Dr. Dela Cruz.     AHI Score: AHI of 30 or more (Severe TIFFANY) ; scores 48.0, 49.1, 58.0 and 61.9    Results:  NIGHT 1  Was it scorable?: Yes     Channel Presence: Scored and all 6 channels present  Select Missing Channels: N/A     (If applicable)      Analysis Start Date: 2/12/23   Analysis Duration: 8hr 32min  Analysis Start Time: 10:28 pm       Analysis Stop Time: 7:00 am   Est Total Sleep Time: 8 hr 28 min      NIGHT 1 Scorer 1 Scorer 2   Respiratory Parameters   Apnea + Hypopneas (AH)   Total    48 /h   49.1 /h   Supine    66.3 /h   67.1 /h   Non-Supine   44.9 /h   46 /h   Count   407    416        Total Total    Obstructive (OA)    11.3 /h   21.9 /h   Hypopneas    36.7 /h   27.1 /h   Central Apnea Hypopnea (CA+CH)    0 /h   0 /h   Oxygen Saturation (SpO2)   Oxygen Desaturation Index (MALINDA)    49.2 /h   49.2 /h   Minimum SpO2    77 %   77 %   SpO2 Duration < 88%    37.1 %   37.1 %   Average Desat Drop    8.1 %   8.1 %   Position &  Analysis Time   Supine (in TST) Duration    74.3 min   74.3 min       NIGHT 2  Was it scorable?: Yes (If no, delete the table below)     Channel Presence: Scored and all 6 channels present  Select Missing Channels: N/A     (If applicable)    Analysis Start Date: 2/13/23   Analysis Duration: 7hr 51min  Analysis Start Time: 10:17 pm       Analysis Stop Time: 6:08 am   Est Total Sleep Time: 7hr 49min       NIGHT 2 Scorer 1 Scorer 2   Respiratory Parameters   Apnea + Hypopneas (AH)   Total    58 /h   61.9 /h   Supine    87.9 /h   88.4 /h   Non-Supine   47.4 /h   52.6 /h   Count   454    485        Total Total    Obstructive (OA)    27.5 /h   41.6 /h   Hypopneas    30.3 /h   20.2 /h   Central Apnea Hypopnea (CA+CH)    0 /h   0.1 /h   Oxygen Saturation (SpO2)   Oxygen Desaturation Index (MALINDA)    58.4 /h   58.5 /h   Minimum SpO2    75 %   75 %   SpO2 Duration < 88%    31.3 %   31.3 %   Average Desat Drop    9.2 %   9.1 %   Position & Analysis Time   Supine (in TST) Duration    122.9 min   122.9 min       27-FEB-2023    Allison Bello PA-C

## 2023-02-28 ENCOUNTER — DOCUMENTATION ONLY (OUTPATIENT)
Dept: SLEEP MEDICINE | Facility: CLINIC | Age: 53
End: 2023-02-28
Payer: COMMERCIAL

## 2023-02-28 NOTE — PROGRESS NOTES
This HSAT was performed using a Noxturnal T3 device which recorded snore, sound, movement activity, body position, nasal pressure, oronasal thermal airflow, pulse, oximetry and both chest and abdominal respiratory effort. HSAT data was restricted to the time patient states they were in bed.     HSAT was scored using 1B 4% hypopnea rule.     HST AHI (Non-PAT): 74.7  Snoring was reported as loud.  Time with SpO2 below 89% was 213.9 minutes.   Overall signal quality was good     Pt will follow up with sleep provider to determine appropriate therapy.

## 2023-03-02 ENCOUNTER — OFFICE VISIT (OUTPATIENT)
Dept: FAMILY MEDICINE | Facility: CLINIC | Age: 53
End: 2023-03-02
Payer: COMMERCIAL

## 2023-03-02 VITALS
TEMPERATURE: 98 F | HEART RATE: 65 BPM | WEIGHT: 238.4 LBS | SYSTOLIC BLOOD PRESSURE: 141 MMHG | OXYGEN SATURATION: 96 % | DIASTOLIC BLOOD PRESSURE: 80 MMHG | BODY MASS INDEX: 33.25 KG/M2

## 2023-03-02 DIAGNOSIS — E11.9 TYPE 2 DIABETES MELLITUS WITHOUT COMPLICATION, WITHOUT LONG-TERM CURRENT USE OF INSULIN (H): Primary | ICD-10-CM

## 2023-03-02 DIAGNOSIS — R29.898 SHOULDER WEAKNESS: ICD-10-CM

## 2023-03-02 DIAGNOSIS — G47.33 OSA (OBSTRUCTIVE SLEEP APNEA): ICD-10-CM

## 2023-03-02 DIAGNOSIS — Z12.11 SPECIAL SCREENING FOR MALIGNANT NEOPLASMS, COLON: ICD-10-CM

## 2023-03-02 DIAGNOSIS — I42.2 HYPERTROPHIC CARDIOMYOPATHY (H): ICD-10-CM

## 2023-03-02 DIAGNOSIS — G89.29 CHRONIC RIGHT SHOULDER PAIN: ICD-10-CM

## 2023-03-02 DIAGNOSIS — M25.511 CHRONIC RIGHT SHOULDER PAIN: ICD-10-CM

## 2023-03-02 DIAGNOSIS — I47.29 NSVT (NONSUSTAINED VENTRICULAR TACHYCARDIA) (H): ICD-10-CM

## 2023-03-02 DIAGNOSIS — E03.9 ACQUIRED HYPOTHYROIDISM: ICD-10-CM

## 2023-03-02 LAB
ALBUMIN SERPL-MCNC: 4.3 G/DL (ref 3.4–5)
ALP SERPL-CCNC: 83 U/L (ref 40–150)
ALT SERPL W P-5'-P-CCNC: 69 U/L (ref 0–70)
ANION GAP SERPL CALCULATED.3IONS-SCNC: 2 MMOL/L (ref 3–14)
AST SERPL W P-5'-P-CCNC: 31 U/L (ref 0–45)
BILIRUB SERPL-MCNC: 0.6 MG/DL (ref 0.2–1.3)
BUN SERPL-MCNC: 17 MG/DL (ref 7–30)
CALCIUM SERPL-MCNC: 9.5 MG/DL (ref 8.5–10.1)
CHLORIDE BLD-SCNC: 107 MMOL/L (ref 94–109)
CO2 SERPL-SCNC: 29 MMOL/L (ref 20–32)
CREAT SERPL-MCNC: 0.98 MG/DL (ref 0.52–1.25)
GFR SERPL CREATININE-BSD FRML MDRD: >90 ML/MIN/1.73M2
GLUCOSE BLD-MCNC: 127 MG/DL (ref 70–99)
HBA1C MFR BLD: 7 % (ref 0–5.6)
POTASSIUM BLD-SCNC: 4.2 MMOL/L (ref 3.4–5.3)
PROT SERPL-MCNC: 7.9 G/DL (ref 6.8–8.8)
SODIUM SERPL-SCNC: 138 MMOL/L (ref 133–144)
T4 FREE SERPL-MCNC: 1.24 NG/DL
TSH SERPL DL<=0.005 MIU/L-ACNC: 11.44 MU/L (ref 0.4–4)

## 2023-03-02 PROCEDURE — 36415 COLL VENOUS BLD VENIPUNCTURE: CPT | Performed by: FAMILY MEDICINE

## 2023-03-02 PROCEDURE — 80053 COMPREHEN METABOLIC PANEL: CPT | Performed by: FAMILY MEDICINE

## 2023-03-02 PROCEDURE — 99214 OFFICE O/P EST MOD 30 MIN: CPT | Performed by: FAMILY MEDICINE

## 2023-03-02 PROCEDURE — 84439 ASSAY OF FREE THYROXINE: CPT | Performed by: FAMILY MEDICINE

## 2023-03-02 PROCEDURE — 84443 ASSAY THYROID STIM HORMONE: CPT | Performed by: FAMILY MEDICINE

## 2023-03-02 PROCEDURE — 83036 HEMOGLOBIN GLYCOSYLATED A1C: CPT | Performed by: FAMILY MEDICINE

## 2023-03-02 NOTE — PROGRESS NOTES
Assessment & Plan       ICD-10-CM    1. Type 2 diabetes mellitus without complication, without long-term current use of insulin (H)  E11.9 Comprehensive metabolic panel     Hemoglobin A1c     Comprehensive metabolic panel     Hemoglobin A1c      2. Acquired hypothyroidism  E03.9 TSH with free T4 reflex     TSH with free T4 reflex      3. Shoulder weakness  R29.898 Orthopedic  Referral      4. Chronic right shoulder pain  M25.511 Orthopedic  Referral    G89.29       5. Special screening for malignant neoplasms, colon  Z12.11 COLOGUARD([employer] SCI[employer]ES)      6. Hypertrophic cardiomyopathy (H)  I42.2       7. NSVT (nonsustained ventricular tachycardia)  I47.29       8. TIFFANY (obstructive sleep apnea)  G47.33       recent diagnosis of hypertrophic cardiomyopathy, followed by cardiology, medications optimized.  Sleep study done, severe TIFFANY, plans on cpap  NSVT - stable, followed by cardiology      Review of external notes as documented elsewhere in note         There are no Patient Instructions on file for this visit.    No follow-ups on file.    Wilfredo Hemphill MD  [facility]ANDEL [facility]aca is a 52 year old, presenting for the following health issues:  RECHECK and Diabetes      History of Present Illness       Diabetes:   Keven Atkinson presents for follow up of diabetes.  Keven Atkinson is not checking blood glucose. Keven Atkinson has no concerns regarding Keven Atkinson's diabetes at this time.  Keven Atkinson is not experiencing numbness or burning in feet, excessive thirst, blurry vision, weight changes or redness, sores or blisters on feet. The patient has not had a diabetic eye exam in the last 12 months.         Keven Atkinson eats 2-3 servings of fruits and vegetables daily.Keven Atkinson consumes 1 sweetened beverage(s) daily.Keven Atkinson exercises with enough effort to increase Keven Atkinson's heart rate 20 to 29 minutes per day.  Keven Atkinson exercises  with enough effort to increase Keven Atkinson's heart rate 7 days per week. Keven Atkinson is missing 7 dose(s) of medications per week.     BP Readings from Last 6 Encounters:   03/02/23 (!) 141/80   01/23/23 (!) 141/89   12/08/22 134/87   11/09/22 120/78   11/04/22 118/80   09/12/22 120/72     Wt Readings from Last 4 Encounters:   03/02/23 108.1 kg (238 lb 6.4 oz)   01/23/23 108.9 kg (240 lb)   11/09/22 108.9 kg (240 lb)   11/04/22 109.1 kg (240 lb 9.6 oz)             Review of Systems   Constitutional, HEENT, cardiovascular, pulmonary, gi and gu systems are negative, except as otherwise noted.      Objective    BP (!) 141/80   Pulse 65   Temp 98  F (36.7  C) (Oral)   Wt 108.1 kg (238 lb 6.4 oz)   SpO2 96%   BMI 33.25 kg/m    Body mass index is 33.25 kg/m .  Physical Exam  Constitutional:       General: Keven Atkinson is not in acute distress.     Appearance: Normal appearance. Keven Atkinson is well-developed. Keven Atkinson is not ill-appearing.   HENT:      Head: Normocephalic and atraumatic.      Right Ear: External ear normal.      Left Ear: External ear normal.      Nose: Nose normal.   Eyes:      General: No scleral icterus.     Extraocular Movements: Extraocular movements intact.      Conjunctiva/sclera: Conjunctivae normal.   Cardiovascular:      Rate and Rhythm: Normal rate.   Pulmonary:      Effort: Pulmonary effort is normal.   Musculoskeletal:      Cervical back: Normal range of motion and neck supple.   Skin:     General: Skin is warm and dry.   Neurological:      Mental Status: Keven Atkinson is alert and oriented to person, place, and time.   Psychiatric:         Behavior: Behavior normal.         Thought Content: Thought content normal.         Judgment: Judgment normal.

## 2023-03-03 DIAGNOSIS — E03.9 ACQUIRED HYPOTHYROIDISM: Primary | ICD-10-CM

## 2023-03-03 RX ORDER — LEVOTHYROXINE SODIUM 25 UG/1
12.5 TABLET ORAL DAILY
Qty: 45 TABLET | Refills: 1 | Status: SHIPPED | OUTPATIENT
Start: 2023-03-03 | End: 2023-08-14

## 2023-03-05 NOTE — PROGRESS NOTES
"HOME SLEEP STUDY INTERPRETATION        Patient: Andrew Atkinson  MRN: [id number]  YOB: 1970  Study Date: 2/27/2023  PCP/Referring Provider: Wilfredo Dela Cruz;   Ordering Provider: Chris Holguin PA-C         Indications for Home Study: Andrew Atkinson is a 52 year old adult with a history of snoring, apneas, daytime fatigue, Type II DM and cardiomyopathy with normal EF who presents with symptoms suggestive of obstructive sleep apnea.    Estimated body mass index is 33.25 kg/m  as calculated from the following:    Height as of 1/23/23: 1.803 m (5' 11\").    Weight as of 3/2/23: 108.1 kg (238 lb 6.4 oz).  Total score - [place]: 7 (2/27/2023  8:09 AM)  Total Score: 6 (2/27/2023  8:15 AM)        Data: A full night home sleep study was performed recording the standard physiologic parameters including body position, movement, sound, nasal pressure, thermal oral airflow, chest and abdominal movements with respiratory inductance plethysmography, and oxygen saturation by pulse oximetry. Pulse rate was estimated by oximetry recording. This study was considered adequate based on > 4 hours of quality oximetry and respiratory recording. As specified by the AASM Manual for the Scoring of Sleep and Associated events, version 2.3, Rule VIII.D 1B, 4% oxygen desaturation scoring for hypopneas is used as a standard of care on all home sleep apnea testing.        Analysis Time:  509 minutes        Respiration:   Sleep Associated Hypoxemia: sustained hypoxemia was present. Baseline oxygen saturation was 92%.  Time with saturation less than or equal to 88% was 161.5 minutes. The lowest oxygen saturation was 73%.   Snoring: Snoring was present.  Respiratory events: The home study revealed a presence of 250 obstructive apneas and 12 mixed and central apneas. There were 371 hypopneas resulting in a combined apnea/hypopnea index [AHI] of 74.7 events per hour.  AHI was 83.9 per hour supine, 0 per hour prone, 0 " per hour on left side, and 68.8 per hour on right side.   Pattern: Excluding events noted above, respiratory rate and pattern was Normal.      Position: Percent of time spent: supine - 38.9%, prone - 0%, on left - 0%, on right - 61.1%.      Heart Rate: By pulse oximetry tachycardia was noted.       Assessment:     Severe obstructive sleep apnea.    Sleep associated hypoxemia was present.    Recommendations:    Consider auto-CPAP at 5-20 cmH2O.(consider in lab titration study due to present comorbidities)    Suggest optimizing sleep hygiene and avoiding sleep deprivation.    Weight management.(BMI>30).        Diagnosis Code(s): Obstructive Sleep Apnea G47.33, Hypoxemia G47.36    Shaheen David MD,  March 5, 2023   Diplomate, American Board of Otolaryngology, Sleep Medicine

## 2023-03-06 ENCOUNTER — OFFICE VISIT (OUTPATIENT)
Dept: ORTHOPEDICS | Facility: CLINIC | Age: 53
End: 2023-03-06
Payer: COMMERCIAL

## 2023-03-06 VITALS
HEIGHT: 71 IN | DIASTOLIC BLOOD PRESSURE: 82 MMHG | SYSTOLIC BLOOD PRESSURE: 132 MMHG | WEIGHT: 238 LBS | BODY MASS INDEX: 33.32 KG/M2

## 2023-03-06 DIAGNOSIS — M25.511 CHRONIC RIGHT SHOULDER PAIN: Primary | ICD-10-CM

## 2023-03-06 DIAGNOSIS — G89.29 CHRONIC RIGHT SHOULDER PAIN: Primary | ICD-10-CM

## 2023-03-06 PROCEDURE — 99213 OFFICE O/P EST LOW 20 MIN: CPT | Performed by: PEDIATRICS

## 2023-03-06 ASSESSMENT — PAIN SCALES - GENERAL: PAINLEVEL: EXTREME PAIN (8)

## 2023-03-06 NOTE — PATIENT INSTRUCTIONS
Reviewed nature of the ongoing right shoulder issues.  Unfortunate to hear the lack of benefit from the injection.  Discussed considerations around physical therapy and additional imaging.  Given challenges with home exercises reviewed last time, plan MRI of the right shoulder next.  Plan to contact you with MRI results.    Advanced imaging is done by appointment. Please call [facility] ([facility]/[place]) [phone]  or [facility] ([facility]/[place]) [phone]  to schedule your MRI.     Some insurance companies may require a prior authorization to be completed which can delay the time until you are able to schedule your appointment.       If you are active on [employer], you may have access to your test results before your provider is able to review the study and advise on next steps.      The clinic will call you with results. If you have not heard from the clinic within 2-3 days following your MRI, please contact us at the number listed below.      If you have any further questions for your physician or physician s care team you can contact them thru [employer] or by calling  [phone] and use option 3 to leave a voice message.   Messages received during business hours will be returned same day.

## 2023-03-06 NOTE — PROGRESS NOTES
ASSESSMENT & PLAN    Andrew was seen today for pain.    Diagnoses and all orders for this visit:    Chronic right shoulder pain  -     MR Shoulder Right w/o Contrast; Future      See Patient Instructions  Patient Instructions   Reviewed nature of the ongoing right shoulder issues.  Unfortunate to hear the lack of benefit from the injection.  Discussed considerations around physical therapy and additional imaging.  Given challenges with home exercises reviewed last time, plan MRI of the right shoulder next.  Plan to contact you with MRI results.    Advanced imaging is done by appointment. Please call [facility] ([facility]/[place]) [phone]  or [facility] ([facility]/[place]) [phone]  to schedule your MRI.     Some insurance companies may require a prior authorization to be completed which can delay the time until you are able to schedule your appointment.       If you are active on [employer], you may have access to your test results before your provider is able to review the study and advise on next steps.      The clinic will call you with results. If you have not heard from the clinic within 2-3 days following your MRI, please contact us at the number listed below.      If you have any further questions for your physician or physician s care team you can contact them thru [employer] or by calling  [phone] and use option 3 to leave a voice message.   Messages received during business hours will be returned same day.          Robert Briggs, [facility]    -----  Chief Complaint   Patient presents with     Right Shoulder - Pain       SUBJECTIVE  Andrew Atkinson is a/an 52 year old adult who is seen in follow-up for evaluation of right shoulder.     The patient is seen by themselves.  The patient is Left handed    Onset:  years(s) ago. Reports insidious onset without acute precipitating event.  Location of Pain:  "right anterior, RC insertion  Worsened by: abduction, sleeping  Better with: rest, avoiding use  Treatments tried: ice, heat, Tylenol, ibuprofen, home exercises and corticosteroid injection (most recent date: 9/12/2022) that provided no  relief  Associated symptoms: locking or catching and popping, shooting nerve pain    Orthopedic/Surgical history: NO  Social History/Occupation: construction owner    **  Pain more lateral shoulder, with abduction motion. Flexion, extension not as bad.  Some noise with popping from shoulder, but does not worsen the pain.        REVIEW OF SYSTEMS:  Review of Systems      OBJECTIVE:  /82   Ht 1.803 m (5' 11\")   Wt 108 kg (238 lb)   BMI 33.19 kg/m     General: healthy, alert and in no distress  HEENT: no scleral icterus or conjunctival erythema  Skin: no suspicious lesions or rash. No jaundice.  CV: distal perfusion intact   Resp: normal respiratory effort without conversational dyspnea   Psych: normal mood and affect  Gait: NORMAL  Neuro: Normal light sensory exam of extremity     Right Shoulder exam     ROM:      forward flexion grossly full, to near 180, some pain        abduction ~70-80, limited, pain        internal rotation mild pain       external rotation pain end range     Impingement testing:      positive (+) Hedrick       positive (+) empty can     Skin:      no visible deformities       well perfused       capillary refill brisk     Sensation:      normal sensation over shoulder and upper extremity        RADIOLOGY:  None new. Previous x-rays:    EXAM: XR SHOULDER RIGHT G/E 3 VIEWS  DATE/TIME: 9/12/2022 11:12 AM      INDICATION: Right shoulder pain. Impingement syndrome.  COMPARISON: None.                                                                      IMPRESSION: Normal joint spacing and alignment.  No fracture. Small  enthesophyte at the superior margin of the humerus greater tuberosity.     LELIA RIVAS MD                "

## 2023-03-06 NOTE — LETTER
3/6/2023         RE: Andrew Atkinson  [address] [place] [id number]        Dear Colleague,    Thank you for referring your patient, Andrew Atkinson, to the [facility]. Please see a copy of my visit note below.    ASSESSMENT & PLAN    Andrew was seen today for pain.    Diagnoses and all orders for this visit:    Chronic right shoulder pain  -     MR Shoulder Right w/o Contrast; Future      See Patient Instructions  Patient Instructions   Reviewed nature of the ongoing right shoulder issues.  Unfortunate to hear the lack of benefit from the injection.  Discussed considerations around physical therapy and additional imaging.  Given challenges with home exercises reviewed last time, plan MRI of the right shoulder next.  Plan to contact you with MRI results.    Advanced imaging is done by appointment. Please call [facility] ([facility]/[place]) [phone]  or [facility] ([facility]/[place]) [phone]  to schedule your MRI.     Some insurance companies may require a prior authorization to be completed which can delay the time until you are able to schedule your appointment.       If you are active on [employer], you may have access to your test results before your provider is able to review the study and advise on next steps.      The clinic will call you with results. If you have not heard from the clinic within 2-3 days following your MRI, please contact us at the number listed below.      If you have any further questions for your physician or physician s care team you can contact them thru [employer] or by calling  [phone] and use option 3 to leave a voice message.   Messages received during business hours will be returned same day.          Robert Briggs,   [facility]    -----  Chief Complaint   Patient presents with     Right Shoulder - Pain       SUBJECTIVE  Andrew BARTLETT  "Demetrio is a/an 52 year old adult who is seen in follow-up for evaluation of right shoulder.     The patient is seen by themselves.  The patient is Left handed    Onset:  years(s) ago. Reports insidious onset without acute precipitating event.  Location of Pain: right anterior, RC insertion  Worsened by: abduction, sleeping  Better with: rest, avoiding use  Treatments tried: ice, heat, Tylenol, ibuprofen, home exercises and corticosteroid injection (most recent date: 9/12/2022) that provided no  relief  Associated symptoms: locking or catching and popping, shooting nerve pain    Orthopedic/Surgical history: NO  Social History/Occupation: construction owner    **  Pain more lateral shoulder, with abduction motion. Flexion, extension not as bad.  Some noise with popping from shoulder, but does not worsen the pain.        REVIEW OF SYSTEMS:  Review of Systems      OBJECTIVE:  /82   Ht 1.803 m (5' 11\")   Wt 108 kg (238 lb)   BMI 33.19 kg/m     General: healthy, alert and in no distress  HEENT: no scleral icterus or conjunctival erythema  Skin: no suspicious lesions or rash. No jaundice.  CV: distal perfusion intact   Resp: normal respiratory effort without conversational dyspnea   Psych: normal mood and affect  Gait: NORMAL  Neuro: Normal light sensory exam of extremity     Right Shoulder exam     ROM:      forward flexion grossly full, to near 180, some pain        abduction ~70-80, limited, pain        internal rotation mild pain       external rotation pain end range     Impingement testing:      positive (+) Hedrick       positive (+) empty can     Skin:      no visible deformities       well perfused       capillary refill brisk     Sensation:      normal sensation over shoulder and upper extremity        RADIOLOGY:  None new. Previous x-rays:    EXAM: XR SHOULDER RIGHT G/E 3 VIEWS  DATE/TIME: 9/12/2022 11:12 AM      INDICATION: Right shoulder pain. Impingement syndrome.  COMPARISON: None.                    "                                                   IMPRESSION: Normal joint spacing and alignment.  No fracture. Small  enthesophyte at the superior margin of the humerus greater tuberosity.     LELIA RIVAS MD                    Again, thank you for allowing me to participate in the care of your patient.        Sincerely,        Robert Briggs, DO

## 2023-03-07 ENCOUNTER — ANCILLARY PROCEDURE (OUTPATIENT)
Dept: MRI IMAGING | Facility: CLINIC | Age: 53
End: 2023-03-07
Attending: PEDIATRICS
Payer: COMMERCIAL

## 2023-03-07 DIAGNOSIS — M25.511 CHRONIC RIGHT SHOULDER PAIN: ICD-10-CM

## 2023-03-07 DIAGNOSIS — G89.29 CHRONIC RIGHT SHOULDER PAIN: ICD-10-CM

## 2023-03-07 PROCEDURE — 73221 MRI JOINT UPR EXTREM W/O DYE: CPT | Mod: RT | Performed by: RADIOLOGY

## 2023-03-08 ENCOUNTER — TELEPHONE (OUTPATIENT)
Dept: ORTHOPEDICS | Facility: CLINIC | Age: 53
End: 2023-03-08
Payer: COMMERCIAL

## 2023-03-10 ENCOUNTER — VIRTUAL VISIT (OUTPATIENT)
Dept: SLEEP MEDICINE | Facility: CLINIC | Age: 53
End: 2023-03-10
Payer: COMMERCIAL

## 2023-03-10 DIAGNOSIS — G47.33 OSA (OBSTRUCTIVE SLEEP APNEA): Primary | ICD-10-CM

## 2023-03-10 PROCEDURE — 99213 OFFICE O/P EST LOW 20 MIN: CPT | Mod: VID | Performed by: PHYSICIAN ASSISTANT

## 2023-03-10 ASSESSMENT — SLEEP AND FATIGUE QUESTIONNAIRES
HOW LIKELY ARE YOU TO NOD OFF OR FALL ASLEEP WHILE SITTING QUIETLY AFTER LUNCH WITHOUT ALCOHOL: MODERATE CHANCE OF DOZING
HOW LIKELY ARE YOU TO NOD OFF OR FALL ASLEEP WHILE WATCHING TV: HIGH CHANCE OF DOZING
HOW LIKELY ARE YOU TO NOD OFF OR FALL ASLEEP WHILE SITTING AND READING: SLIGHT CHANCE OF DOZING
HOW LIKELY ARE YOU TO NOD OFF OR FALL ASLEEP IN A CAR, WHILE STOPPED FOR A FEW MINUTES IN TRAFFIC: WOULD NEVER DOZE
HOW LIKELY ARE YOU TO NOD OFF OR FALL ASLEEP WHEN YOU ARE A PASSENGER IN A CAR FOR AN HOUR WITHOUT A BREAK: SLIGHT CHANCE OF DOZING
HOW LIKELY ARE YOU TO NOD OFF OR FALL ASLEEP WHILE SITTING INACTIVE IN A PUBLIC PLACE: WOULD NEVER DOZE
HOW LIKELY ARE YOU TO NOD OFF OR FALL ASLEEP WHILE SITTING AND TALKING TO SOMEONE: WOULD NEVER DOZE
HOW LIKELY ARE YOU TO NOD OFF OR FALL ASLEEP WHILE LYING DOWN TO REST IN THE AFTERNOON WHEN CIRCUMSTANCES PERMIT: MODERATE CHANCE OF DOZING

## 2023-03-24 ENCOUNTER — DOCUMENTATION ONLY (OUTPATIENT)
Dept: SLEEP MEDICINE | Facility: CLINIC | Age: 53
End: 2023-03-24
Payer: COMMERCIAL

## 2023-03-24 DIAGNOSIS — G47.33 OSA (OBSTRUCTIVE SLEEP APNEA): Primary | ICD-10-CM

## 2023-03-24 NOTE — PROGRESS NOTES
Patient was offered choice of vendor and chose [facility].  Patient Andrew Atkinson was set up at [facility] on March 24, 2023. Patient received a Resmed Airsense 11 Pressures were set at 5-20 cm H2O.   Patient s ramp is 5 cm H2O for Off and FLEX/EPR is EPR.  Patient received a Resmed Mask name: AIRFIT F20  Full Face mask size Large, heated tubing and heated humidifier.  Patient has the following compliance requirements: USAGE AND FOLLOW-UP.  Patient has a follow up on 7/26/23 with Chris Pack

## 2023-03-27 ENCOUNTER — DOCUMENTATION ONLY (OUTPATIENT)
Dept: SLEEP MEDICINE | Facility: CLINIC | Age: 53
End: 2023-03-27
Payer: COMMERCIAL

## 2023-03-27 NOTE — PROGRESS NOTES
3 day Sleep therapy management telephone visit    Diagnostic AHI: 74.7  HST    Confirmed with patient at time of call- Yes Patient is still interested in STM service       Subjective measures:  Patient excited what to come from using CPAP.  He is feeling more alert and has more energy.         Objective data     Order Settings for PAP  CPAP min     CPAP max              Device settings from machine CPAP min 5.0     CPAP max 20.0           EPR Setting TWO    RESMED soft response  OFF     Assessment: Nightly usage over four hours      Action plan: Patient to have 14 day STM visit. Patient has a follow up visit scheduled:   no    Replacement device: No  STM ordered by provider: Yes     Total time spent on accessing and  interpreting remote patient PAP therapy data  10 minutes    Total time spent counseling, coaching  and reviewing PAP therapy data with patient  3 minutes    [id number] no

## 2023-04-03 LAB — NONINV COLON CA DNA+OCC BLD SCRN STL QL: POSITIVE

## 2023-04-03 NOTE — PROGRESS NOTES
ASSESSMENT & PLAN    Andrew was seen today for pain.    Diagnoses and all orders for this visit:    Nontraumatic tear of right rotator cuff, unspecified tear extent  -     Physical Therapy Referral; Future    Chronic right shoulder pain  -     Orthopedic  Referral  -     Physical Therapy Referral; Future      He prefers to avoid surgery if possible, plan to start with PT, and will try imaging-guided injection for the cuff.  Questions answered. Discussed signs and symptoms that may indicate more serious issues; the patient was instructed to seek appropriate care if noted. Keven indicates understanding of these issues and agrees with the plan.        See Patient Instructions  Patient Instructions   MRI of the right shoulder shows high-grade rotator cuff tendon tearing, particular supraspinatus.  We discussed considerations around physical therapy, steroid injection, and referral on to see orthopedic surgeon.  Following discussion, plan to get you set up with one of my colleagues for ultrasound-guided steroid injection, given the landmark based injection was not beneficial.  Also plan physical therapy, but certainly you can wait to start PT until after the injection is done.  Went ahead and placed physical therapy referral today.  With the combination of these things, plan to monitor 1 to 2 months to start.  If interested in seeing orthopedic surgery, contact clinic, and we can place a referral.    If you have any further questions for your physician or physician s care team you can contact them thru [employer] or by calling  [phone] and use option 3 to leave a voice message.   Messages received during business hours will be returned same day.          Robert Briggs, [facility] [place]    SUBJECTIVE- Interim History April 3, 2023    No chief complaint on file.      Andrew Atkinson is a 52 year old adult who is seen in f/u up for Data Unavailable. Since last visit on  "3/6/23 patient has gone on vacation and has not reviewed his MRI results. Keven reports no PT due to shoulder pain and lack of ROM. Cortisone injection on 9/12/2022. Keven reports no improvement in ROM and Pain.  - Now ~ 6 months from initial onset    Worsened by: ROM and moving it.  Better with: None  Treatments tried: Tylenol and ibuprofen  Associated symptoms:  no distal numbness or tingling; denies swelling or warmth    The patient is seen by themselves.  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  Images were reviewed with the patient in the office today.  My personal interpretation of the performed imaging: high grade supraspinatus tendon tearing, along with cuff tendinosis.      EXAM: MR Right shoulder without  contrast 3/7/2023 11:53 AM     TECHNIQUE: Multiplanar, multisequence imaging of the right shoulder  were obtained without administration of intravenous or intra-articular  gadolinium contrast using routine protocol.     History: Shoulder pain; Rotator cuff injury; No known/automatically  detected potential contraindications to imaging; Chronic right  shoulder pain; Chronic right shoulder pain      Comparison: Radiographs 9/12/2022     Findings:     ROTATOR CUFF and ASSOCIATED STRUCTURES  Rotator cuff: Supraspinatus tendinosis with high-grade bursal sided  tearing of the anterior to the mid fibers at the footprint.  Infraspinatus tendinosis with low-grade intrasubstance tear of the  superior and neutral fibers at the footprint. Teres minor is " intact.  Subscapularis tendinosis.     Bursa: Mild fluid in the subacromial-subdeltoid bursa.     Musculature: Muscle bulk of rotator cuff is preserved.  Deltoid muscle  bulk is also preserved.  No muscle edema.     Acromioclavicular joint  There are mild degenerative changes of the acromioclavicular joint.  Acromion is type 2 in sagittal morphology.  Coracoacromial ligament is  not thickened.     OSSEOUS STRUCTURES  No fracture, marrow contusion or marrow infiltration.     LONG BICIPITAL TENDON  Tendinosis of the intra-articular long head of the biceps tendon.     GLENOHUMERAL JOINT  Joint fluid: Physiologic amount of joint fluid is present.     Cartilage and subarticular bone:  Thinning of cartilage over the  superior humeral head.     Labrum: Limited assessment on this study with relative lack of joint  distention shows probable posterior superior labral tear.     ANCILLARY FINDINGS:  None                                                                      Impression:     1. Rotator cuff:  *  Supraspinatus tendinosis with high-grade bursal sided tearing of  the anterior through mid fibers at the footprint.   *  Infraspinatus tendinosis with low-grade intrasubstance tear of the  superior and middle fibers at the footprint.   *  Subscapularis tendinosis.  *  Normal rotator cuff muscle bulk.     2. Tendinosis of the intra-articular long head of the biceps tendon.     RUSTY FRYE MD (Joe)            20 minutes spent by me on the date of the encounter doing chart review, history and exam, documentation and further activities per the note

## 2023-04-04 ENCOUNTER — OFFICE VISIT (OUTPATIENT)
Dept: ORTHOPEDICS | Facility: CLINIC | Age: 53
End: 2023-04-04
Payer: COMMERCIAL

## 2023-04-04 VITALS
WEIGHT: 238 LBS | SYSTOLIC BLOOD PRESSURE: 132 MMHG | DIASTOLIC BLOOD PRESSURE: 90 MMHG | HEIGHT: 71 IN | BODY MASS INDEX: 33.32 KG/M2

## 2023-04-04 DIAGNOSIS — G89.29 CHRONIC RIGHT SHOULDER PAIN: ICD-10-CM

## 2023-04-04 DIAGNOSIS — M75.101 NONTRAUMATIC TEAR OF RIGHT ROTATOR CUFF, UNSPECIFIED TEAR EXTENT: Primary | ICD-10-CM

## 2023-04-04 DIAGNOSIS — M25.511 CHRONIC RIGHT SHOULDER PAIN: ICD-10-CM

## 2023-04-04 PROCEDURE — 99213 OFFICE O/P EST LOW 20 MIN: CPT | Performed by: PEDIATRICS

## 2023-04-04 NOTE — LETTER
4/4/2023         RE: Andrew Atkinson  [address] [place] [id number]        Dear Colleague,    Thank you for referring your patient, Andrew tAkinson, to the [facility]. Please see a copy of my visit note below.    ASSESSMENT & PLAN    Andrew was seen today for pain.    Diagnoses and all orders for this visit:    Nontraumatic tear of right rotator cuff, unspecified tear extent  -     Physical Therapy Referral; Future    Chronic right shoulder pain  -     Orthopedic  Referral  -     Physical Therapy Referral; Future      He prefers to avoid surgery if possible, plan to start with PT, and will try imaging-guided injection for the cuff.  Questions answered. Discussed signs and symptoms that may indicate more serious issues; the patient was instructed to seek appropriate care if noted. Keven indicates understanding of these issues and agrees with the plan.        See Patient Instructions  Patient Instructions   MRI of the right shoulder shows high-grade rotator cuff tendon tearing, particular supraspinatus.  We discussed considerations around physical therapy, steroid injection, and referral on to see orthopedic surgeon.  Following discussion, plan to get you set up with one of my colleagues for ultrasound-guided steroid injection, given the landmark based injection was not beneficial.  Also plan physical therapy, but certainly you can wait to start PT until after the injection is done.  Went ahead and placed physical therapy referral today.  With the combination of these things, plan to monitor 1 to 2 months to start.  If interested in seeing orthopedic surgery, contact clinic, and we can place a referral.    If you have any further questions for your physician or physician s care team you can contact them thru [email or web address] or by calling  [phone] and use option 3 to leave a voice message.   Messages received during business hours will be returned same  "day.          [facility]gs[facility]    SUBJECTIVE- Interim History April 3, 2023    No chief complaint on file.      Andrew Atkinson is a 52 year old adult who is seen in f/u up for Data Unavailable. Since last visit on 3/6/23 patient has gone on vacation and has not reviewed his MRI results. Keven reports no PT due to shoulder pain and lack of ROM. Cortisone injection on 9/12/2022. Keven reports no improvement in ROM and Pain.  - Now ~ 6 months from initial onset    Worsened by: ROM and moving it.  Better with: None  Treatments tried: Tylenol and ibuprofen  Associated symptoms:  no distal numbness or tingling; denies swelling or warmth    The patient is seen by themselves.  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  Images were reviewed with the patient in the office today.  My personal interpretation of the performed imaging: high grade supraspinatus tendon tearing, along with cuff tendinosis.      EXAM: MR Right shoulder without  contrast 3/7/2023 11:53 AM     TECHNIQUE: Multiplanar, multisequence imaging of the right shoulder  were obtained without administration of intravenous or intra-articular  gadolinium contrast using routine protocol.     History: Shoulder pain; Rotator cuff injury; No known/automatically  detected potential contraindications to imaging; Chronic right  shoulder pain; Chronic right shoulder pain      Comparison: Radiographs 9/12/2022     Findings:     ROTATOR CUFF and ASSOCIATED " STRUCTURES  Rotator cuff: Supraspinatus tendinosis with high-grade bursal sided  tearing of the anterior to the mid fibers at the footprint.  Infraspinatus tendinosis with low-grade intrasubstance tear of the  superior and neutral fibers at the footprint. Teres minor is intact.  Subscapularis tendinosis.     Bursa: Mild fluid in the subacromial-subdeltoid bursa.     Musculature: Muscle bulk of rotator cuff is preserved.  Deltoid muscle  bulk is also preserved.  No muscle edema.     Acromioclavicular joint  There are mild degenerative changes of the acromioclavicular joint.  Acromion is type 2 in sagittal morphology.  Coracoacromial ligament is  not thickened.     OSSEOUS STRUCTURES  No fracture, marrow contusion or marrow infiltration.     LONG BICIPITAL TENDON  Tendinosis of the intra-articular long head of the biceps tendon.     GLENOHUMERAL JOINT  Joint fluid: Physiologic amount of joint fluid is present.     Cartilage and subarticular bone:  Thinning of cartilage over the  superior humeral head.     Labrum: Limited assessment on this study with relative lack of joint  distention shows probable posterior superior labral tear.     ANCILLARY FINDINGS:  None                                                                      Impression:     1. Rotator cuff:  *  Supraspinatus tendinosis with high-grade bursal sided tearing of  the anterior through mid fibers at the footprint.   *  Infraspinatus tendinosis with low-grade intrasubstance tear of the  superior and middle fibers at the footprint.   *  Subscapularis tendinosis.  *  Normal rotator cuff muscle bulk.     2. Tendinosis of the intra-articular long head of the biceps tendon.     RUSTY FRYE MD (Joe)            20 minutes spent by me on the date of the encounter doing chart review, history and exam, documentation and further activities per the note         Again, thank you for allowing me to participate in the care of your patient.         Sincerely,        Robert Briggs, DO

## 2023-04-04 NOTE — PATIENT INSTRUCTIONS
MRI of the right shoulder shows high-grade rotator cuff tendon tearing, particular supraspinatus.  We discussed considerations around physical therapy, steroid injection, and referral on to see orthopedic surgeon.  Following discussion, plan to get you set up with one of my colleagues for ultrasound-guided steroid injection, given the landmark based injection was not beneficial.  Also plan physical therapy, but certainly you can wait to start PT until after the injection is done.  Went ahead and placed physical therapy referral today.  With the combination of these things, plan to monitor 1 to 2 months to start.  If interested in seeing orthopedic surgery, contact clinic, and we can place a referral.    If you have any further questions for your physician or physician s care team you can contact them thru [email or web address] or by calling  [phone] and use option 3 to leave a voice message.   Messages received during business hours will be returned same day.

## 2023-04-05 DIAGNOSIS — Z12.11 SPECIAL SCREENING FOR MALIGNANT NEOPLASMS, COLON: Primary | ICD-10-CM

## 2023-04-11 ENCOUNTER — DOCUMENTATION ONLY (OUTPATIENT)
Dept: SLEEP MEDICINE | Facility: CLINIC | Age: 53
End: 2023-04-11
Payer: COMMERCIAL

## 2023-04-11 NOTE — PROGRESS NOTES
14  DAY STM VISIT    Diagnostic AHI:  74.7  HST    Message left for patient to call back.      Assessment: Pt meeting objective benchmarks.      Action plan: waiting for patient to return call.  and pt to have 30 day STM visit.      Device type: Auto-CPAP    PAP settings: CPAP min 5.0 cm  H20       CPAP max 20.0 cm  H20      95th% pressure 19.5 cm  H20        RESMED EPR level Setting: TWO    RESMED Soft response setting:  OFF    Mask type:  Nasal Mask    Objective measures: 14 day rolling measures      Compliance  92 %      Leak  2.57  lpm  last  upload      AHI 2.29   last  upload      Average number of minutes 409      Objective measure goal  Compliance   Goal >70%  Leak   Goal < 24 lpm  AHI  Goal < 5  Usage  Goal >240        Total time spent on accessing and interpreting remote patient PAP therapy data  10 minutes    Total time spent counseling, coaching  and reviewing PAP therapy data with patient  1 minutes    [id number]  [id number]  no (3 day STM)

## 2023-04-19 ENCOUNTER — OFFICE VISIT (OUTPATIENT)
Dept: ORTHOPEDICS | Facility: CLINIC | Age: 53
End: 2023-04-19
Payer: COMMERCIAL

## 2023-04-19 DIAGNOSIS — G89.29 CHRONIC RIGHT SHOULDER PAIN: Primary | ICD-10-CM

## 2023-04-19 DIAGNOSIS — M25.511 CHRONIC RIGHT SHOULDER PAIN: Primary | ICD-10-CM

## 2023-04-19 PROCEDURE — 20611 DRAIN/INJ JOINT/BURSA W/US: CPT | Mod: RT | Performed by: FAMILY MEDICINE

## 2023-04-19 RX ORDER — ROPIVACAINE HYDROCHLORIDE 5 MG/ML
3 INJECTION, SOLUTION EPIDURAL; INFILTRATION; PERINEURAL
Status: DISCONTINUED | OUTPATIENT
Start: 2023-04-19 | End: 2023-11-16

## 2023-04-19 RX ORDER — TRIAMCINOLONE ACETONIDE 40 MG/ML
40 INJECTION, SUSPENSION INTRA-ARTICULAR; INTRAMUSCULAR
Status: DISCONTINUED | OUTPATIENT
Start: 2023-04-19 | End: 2023-11-16

## 2023-04-19 RX ADMIN — ROPIVACAINE HYDROCHLORIDE 3 ML: 5 INJECTION, SOLUTION EPIDURAL; INFILTRATION; PERINEURAL at 14:21

## 2023-04-19 RX ADMIN — TRIAMCINOLONE ACETONIDE 40 MG: 40 INJECTION, SUSPENSION INTRA-ARTICULAR; INTRAMUSCULAR at 14:21

## 2023-04-19 NOTE — PROGRESS NOTES
Andrew Atkinson  :  1970  DOS: 2023  MRN: [id number]    Sports Medicine Clinic Procedure    Ultrasound Guided Right ultrasound-guided glenohumeral joint corticosteroid injection    Clinical History: High-grade rotator cuff tendon tearing, particular supraspinatus. [place] cortisone injection on 2022 provided minimal releif.    Diagnosis: No diagnosis found.  Referring Physician: Brigitte  Large Joint Injection/Arthocentesis: R glenohumeral joint    Date/Time: 2023 2:21 PM    Performed by: Robert Regan DO  Authorized by: Robert Regan DO    Indications:  Pain and osteoarthritis  Needle Size:  25 G  Guidance: ultrasound    Approach:  Posterolateral  Location:  Shoulder      Site:  R glenohumeral joint  Medications:  40 mg triamcinolone 40 MG/ML; 3 mL ropivacaine 5 MG/ML  Outcome:  Tolerated well, no immediate complications  Procedure discussed: discussed risks, benefits, and alternatives    Consent Given by:  Patient  Timeout: timeout called immediately prior to procedure    Prep: patient was prepped and draped in usual sterile fashion          Impression:  Successful US guided right shoulder GH joint CSI    Plan:  Follow up as directed by Dr Briggs  Consider US guided repeat CSI to subacromial space if GH joint CSI not effective, reviewed today  Expectations and goals of CSI reviewed  Often 2-3 days for steroid effect, and can take up to two weeks for maximum effect  We discussed modified progressive pain-free activity as tolerated  Do not overuse in first two weeks if feeling better due to concern for vulnerability while steroid is working  Supportive care reviewed  All questions were answered today  Contact us with additional questions or concerns  Signs and sx of concern reviewed        Robert Regan DO, CAQ  Primary Care Sports Medicine  [facility]

## 2023-04-19 NOTE — Clinical Note
2023         RE: Andrew Atkinson  [address] [place] [id number]        Dear Colleague,    Thank you for referring your patient, Andrew Atkinson, to the [facility]. Please see a copy of my visit note below.    Andrew Atkinson  :  1970  DOS: 2023  MRN: [id number]    Sports Medicine Clinic Procedure    Ultrasound Guided {RIGHT/LEFT:[id number]} ***    Clinical History: High-grade rotator cuff tendon tearing, particular supraspinatus. [place] cortisone injection on 2022 provided minimal releif.    Diagnosis: No diagnosis found.  Referring Physician: Brigitte  Large Joint Injection/Arthocentesis: R glenohumeral joint    Date/Time: 2023 2:21 PM    Performed by: Robert Regan DO  Authorized by: Robert Regan DO    Indications:  Pain and osteoarthritis  Needle Size:  25 G  Guidance: ultrasound    Approach:  Posterolateral  Location:  Shoulder      Site:  R glenohumeral joint  Medications:  40 mg triamcinolone 40 MG/ML; 3 mL ropivacaine 5 MG/ML  Outcome:  Tolerated well, no immediate complications  Procedure discussed: discussed risks, benefits, and alternatives    Consent Given by:  Patient  Timeout: timeout called immediately prior to procedure    Prep: patient was prepped and draped in usual sterile fashion          Impression:  Successful ***    Plan:  Follow up ***  Expectations and goals of CSI reviewed  Often 2-3 days for steroid effect, and can take up to two weeks for maximum effect  We discussed modified progressive pain-free activity as tolerated  Do not overuse in first two weeks if feeling better due to concern for vulnerability while steroid is working  Supportive care reviewed  All questions were answered today  Contact us with additional questions or concerns  Signs and sx of concern reviewed        Robert Regan DO, CAQ  Primary Care Sports Medicine  [facility]           Andrew BARTLETT  Demetrio  :  1970  DOS: 2023  MRN: [id number]    Sports Medicine Clinic Procedure    Ultrasound Guided Right ultrasound-guided glenohumeral joint corticosteroid injection    Clinical History: High-grade rotator cuff tendon tearing, particular supraspinatus. [place] cortisone injection on 2022 provided minimal releif.    Diagnosis: No diagnosis found.  Referring Physician: Brigitte  Large Joint Injection/Arthocentesis: R glenohumeral joint    Date/Time: 2023 2:21 PM    Performed by: Robert Regan DO  Authorized by: Robert Regan DO    Indications:  Pain and osteoarthritis  Needle Size:  25 G  Guidance: ultrasound    Approach:  Posterolateral  Location:  Shoulder      Site:  R glenohumeral joint  Medications:  40 mg triamcinolone 40 MG/ML; 3 mL ropivacaine 5 MG/ML  Outcome:  Tolerated well, no immediate complications  Procedure discussed: discussed risks, benefits, and alternatives    Consent Given by:  Patient  Timeout: timeout called immediately prior to procedure    Prep: patient was prepped and draped in usual sterile fashion          Impression:  Successful ***    Plan:  Follow up ***  Expectations and goals of CSI reviewed  Often 2-3 days for steroid effect, and can take up to two weeks for maximum effect  We discussed modified progressive pain-free activity as tolerated  Do not overuse in first two weeks if feeling better due to concern for vulnerability while steroid is working  Supportive care reviewed  All questions were answered today  Contact us with additional questions or concerns  Signs and sx of concern reviewed        Robert Regan DO, NICOLAS  Primary Care Sports Medicine  [facility]             Again, thank you for allowing me to participate in the care of your patient.        Sincerely,        Robert Regan DO

## 2023-05-26 ENCOUNTER — TELEPHONE (OUTPATIENT)
Dept: GASTROENTEROLOGY | Facility: CLINIC | Age: 53
End: 2023-05-26
Payer: COMMERCIAL

## 2023-05-26 NOTE — TELEPHONE ENCOUNTER
Screening Questions  BLUE  KIND OF PREP RED  LOCATION [review exclusion criteria] GREEN  SEDATION TYPE        YES Are you active on mychart?       Wilfredo Dela Cruz MD  Ordering/Referring Provider?        UCARE What type of coverage do you have?      N Have you had a positive covid test in the last 14 days?     33.2 1. BMI  [BMI 40+ - review exclusion criteria& smart-phrase document]    Y  2. Are you able to give consent for your medical care? [IF NO,RN REVIEW]          N  3. Are you taking any prescription pain medications on a routine schedule   (ex narcotics: oxycodone, roxicodone, oxycontin,  and percocet)? [RN Review]        NA  3a. EXTENDED PREP What kind of prescription?     N 4. Do you have any chemical dependencies such as alcohol, street drugs, or methadone?        **If yes 3- 5 , please schedule with MAC sedation.**          IF YES TO ANY 6 - 10 - HOSPITAL SETTING ONLY.     N 6.   Do you need assistance transferring?     N 7.   Have you had a heart or lung transplant?    N 8.   Are you currently on dialysis?   N 9.   Do you use daily home oxygen?   N 10. Do you take nitroglycerin?   10a. NA If yes, how often?      11. Are you currently pregnant?    11a.  If yes, how many weeks? [ Greater than 12 weeks, OR NEEDED]    N 12. Do you have Pulmonary Hypertension? *NEED PAC APPT AT [facility] w/ MAC*     N 13. [review exclusion criteria]  Do you have any implantable devices in your body (pacemaker, defib, LVAD)?    N 14. In the past 6 months, have you had any heart related issues including cardiomyopathy or heart attack?     14a. NA If yes, did it require cardiac stenting if so when?     N 15. Have you had a stroke or Transient ischemic attack (TIA - aka  mini stroke ) within 6 months?      Y TIFFANY SEVERE 16. Do you have mod to severe Obstructive Sleep Apnea?  [Hospital only]    N 17. Do you have SEVERE AND UNCONTROLLED asthma? *NEED PAC APPT AT [facility] w/MAC*     18.Do you take blood  "thinners?  No     19. Do you take any of the following medications?    NPhentermine    NOzempic    NWegovy (Semaglutide)      19a. If yes, \"Hold for 7 days before procedure.  Please consult your prescribing provider if you have questions about holding this medication.\"     N  20. Do you have chronic kidney disease?      N  21. Do you have a diagnosis of diabetes?      N  22. On a regular basis do you go 3-5 days between bowel movements?      23. Preferred [facility] for Pre Prescription      [id number] - [place] [facility] [address] [facility]        - CLOSING REMINDERS -    You will receive a call from a Nurse to review instructions and health history.  This assessment must be completed prior to your procedure.  Failure to complete the Nurse assessment may result in the procedure being cancelled.      On the day of your procedure, please designatean adult(s) who can drive you home stay with you for the next 24 hours. The medicines used in the exam will make you sleepy. You will not be able to drive.      You cannot take public transportation, ride share services, or non-medical taxi service without a responsible caregiver.  Medical transport services are allowed with the requirement that a responsible caregiver will receive you at your destination.  We require that drivers and caregivers are confirmed prior to your procedure.      - SCHEDULING DETAILS -  YES  & TIFFANY Hospital Setting Required & If yes, what is the exclusion?   LONG  Surgeon    7/18/23  Date of Procedure  Lower Endoscopy [Colonoscopy]  Type of Procedure Scheduled  [facility]   MIRALAX GATORADE WITHOUT MAGNEISUM CITRATE Which Colonoscopy Prep was Sent?     MAC Sedation Type     N PAC / Pre-op Required                 "

## 2023-06-17 ENCOUNTER — HEALTH MAINTENANCE LETTER (OUTPATIENT)
Age: 53
End: 2023-06-17

## 2023-07-17 NOTE — H&P
[facility] Anesthesia Pre-op History and Physical    Andrew Atkinson MRN# [id number]   Age: 52 year old YOB: 1970      Date of Surgery: 7/18/2023 [facility]      Date of Exam 7/18/2023 Facility (In hospital)       Home clinic:   Primary care provider: Wilfredo Dela Cruz         Chief Complaint and/or Reason for Procedure:   No chief complaint on file.  Colonoscopy  Positive cologuard       Active problem list:     Patient Active Problem List    Diagnosis Date Noted     Chronic right shoulder pain 03/06/2023     Priority: Medium     TIFFANY (obstructive sleep apnea) 03/02/2023     Priority: Medium     Hypertrophic cardiomyopathy (H) 12/07/2022     Priority: Medium     Benign essential hypertension 12/07/2022     Priority: Medium     Hyperlipidemia, acquired 12/07/2022     Priority: Medium     NSVT (nonsustained ventricular tachycardia) (H) 12/07/2022     Priority: Medium     Spinal stenosis of lumbar region with neurogenic claudication 03/09/2022     Priority: Medium     Arthrodesis status 03/09/2022     Priority: Medium     Diabetes mellitus, type 2 (H) 02/28/2022     Priority: Medium     Umbilical hernia with obstruction 10/09/2020     Priority: Medium     Added automatically from request for surgery [id number]       Acute viral myocarditis 02/14/2020     Priority: Medium     Acquired hypothyroidism 04/18/2017     Priority: Medium            Medications (include herbals and vitamins):   Any Plavix use in the last 7 days? No     Current Facility-Administered Medications   Medication     3 mL ropivacaine (NAROPIN) injection 5 mg/mL     lidocaine 1 % injection 2 mL     triamcinolone (KENALOG-40) injection 40 mg     triamcinolone (KENALOG-40) injection 40 mg     Current Outpatient Medications   Medication Sig     acetaminophen (TYLENOL) 325 MG tablet Take 2 tablets (650 mg) by mouth every 6 hours as needed for mild pain Alternate with ibuprofen so you  are taking one or the other every three hours. For example take tylenol at 5am, then ibuprofen at 8am, then tylenol at 11am, and so on.     diltiazem ER (DILT-XR) 180 MG 24 hr capsule Take 1 capsule (180 mg) by mouth daily     levothyroxine (SYNTHROID/LEVOTHROID) 150 MCG tablet Take 1 tablet (150 mcg) by mouth daily     levothyroxine (SYNTHROID/LEVOTHROID) 25 MCG tablet Take 0.5 tablets (12.5 mcg) by mouth daily With 150mcg tablet     multivitamin w/minerals (THERA-VIT-M) tablet Take 1 tablet by mouth daily     pravastatin (PRAVACHOL) 40 MG tablet TAKE 1 TABLET BY MOUTH ONCE DAILY             Allergies:      Allergies   Allergen Reactions     Morphine Hives     Allergy to Latex? No  Allergy to tape?   No  Intolerances:             Physical Exam:   All vitals have been reviewed  No data found.  No intake/output data recorded.  Lungs:   No increased work of breathing, good air exchange, clear to auscultation bilaterally, no crackles or wheezing     Cardiovascular:   Normal apical impulse, regular rate and rhythm, normal S1 and S2, no S3 or S4, and no murmur noted             Lab / Radiology Results:            Anesthetic risk and/or ASA classification:       Wilfredo Molina MD

## 2023-07-18 ENCOUNTER — ANESTHESIA EVENT (OUTPATIENT)
Dept: GASTROENTEROLOGY | Facility: CLINIC | Age: 53
End: 2023-07-18
Payer: COMMERCIAL

## 2023-07-18 ENCOUNTER — HOSPITAL ENCOUNTER (OUTPATIENT)
Facility: CLINIC | Age: 53
Discharge: HOME | End: 2023-07-18
Attending: INTERNAL MEDICINE | Admitting: INTERNAL MEDICINE
Payer: COMMERCIAL

## 2023-07-18 ENCOUNTER — ANESTHESIA (OUTPATIENT)
Dept: GASTROENTEROLOGY | Facility: CLINIC | Age: 53
End: 2023-07-18
Payer: COMMERCIAL

## 2023-07-18 VITALS
DIASTOLIC BLOOD PRESSURE: 63 MMHG | HEART RATE: 66 BPM | SYSTOLIC BLOOD PRESSURE: 144 MMHG | RESPIRATION RATE: 14 BRPM | OXYGEN SATURATION: 97 % | TEMPERATURE: 98.2 F

## 2023-07-18 LAB — COLONOSCOPY: NORMAL

## 2023-07-18 PROCEDURE — 258N000003 HC RX IP 258 OP 636: Performed by: NURSE ANESTHETIST, CERTIFIED REGISTERED

## 2023-07-18 PROCEDURE — 250N000009 HC RX 250: Performed by: NURSE ANESTHETIST, CERTIFIED REGISTERED

## 2023-07-18 PROCEDURE — 45385 COLONOSCOPY W/LESION REMOVAL: CPT | Mod: PT | Performed by: INTERNAL MEDICINE

## 2023-07-18 PROCEDURE — 88305 TISSUE EXAM BY PATHOLOGIST: CPT | Mod: TC | Performed by: INTERNAL MEDICINE

## 2023-07-18 PROCEDURE — 88305 TISSUE EXAM BY PATHOLOGIST: CPT | Mod: 26 | Performed by: PATHOLOGY

## 2023-07-18 PROCEDURE — 45380 COLONOSCOPY AND BIOPSY: CPT | Performed by: INTERNAL MEDICINE

## 2023-07-18 PROCEDURE — 370N000017 HC ANESTHESIA TECHNICAL FEE, PER MIN: Performed by: INTERNAL MEDICINE

## 2023-07-18 PROCEDURE — 250N000011 HC RX IP 250 OP 636: Performed by: NURSE ANESTHETIST, CERTIFIED REGISTERED

## 2023-07-18 RX ORDER — PROPOFOL 10 MG/ML
INJECTION, EMULSION INTRAVENOUS PRN
Status: DISCONTINUED | OUTPATIENT
Start: 2023-07-18 | End: 2023-07-18

## 2023-07-18 RX ORDER — ONDANSETRON 2 MG/ML
4 INJECTION INTRAMUSCULAR; INTRAVENOUS EVERY 30 MIN PRN
Status: DISCONTINUED | OUTPATIENT
Start: 2023-07-18 | End: 2023-07-18 | Stop reason: HOSPADM

## 2023-07-18 RX ORDER — ACETAMINOPHEN 325 MG/1
975 TABLET ORAL EVERY 6 HOURS PRN
Status: DISCONTINUED | OUTPATIENT
Start: 2023-07-18 | End: 2023-07-18 | Stop reason: HOSPADM

## 2023-07-18 RX ORDER — LIDOCAINE HYDROCHLORIDE 20 MG/ML
INJECTION, SOLUTION INFILTRATION; PERINEURAL PRN
Status: DISCONTINUED | OUTPATIENT
Start: 2023-07-18 | End: 2023-07-18

## 2023-07-18 RX ORDER — SODIUM CHLORIDE, SODIUM LACTATE, POTASSIUM CHLORIDE, CALCIUM CHLORIDE 600; 310; 30; 20 MG/100ML; MG/100ML; MG/100ML; MG/100ML
INJECTION, SOLUTION INTRAVENOUS CONTINUOUS
Status: DISCONTINUED | OUTPATIENT
Start: 2023-07-18 | End: 2023-07-18 | Stop reason: HOSPADM

## 2023-07-18 RX ORDER — PROPOFOL 10 MG/ML
INJECTION, EMULSION INTRAVENOUS CONTINUOUS PRN
Status: DISCONTINUED | OUTPATIENT
Start: 2023-07-18 | End: 2023-07-18

## 2023-07-18 RX ORDER — ONDANSETRON 4 MG/1
4 TABLET, ORALLY DISINTEGRATING ORAL EVERY 30 MIN PRN
Status: DISCONTINUED | OUTPATIENT
Start: 2023-07-18 | End: 2023-07-18 | Stop reason: HOSPADM

## 2023-07-18 RX ADMIN — SODIUM CHLORIDE, POTASSIUM CHLORIDE, SODIUM LACTATE AND CALCIUM CHLORIDE: 600; 310; 30; 20 INJECTION, SOLUTION INTRAVENOUS at 09:23

## 2023-07-18 RX ADMIN — PROPOFOL 100 MG: 10 INJECTION, EMULSION INTRAVENOUS at 09:47

## 2023-07-18 RX ADMIN — LIDOCAINE HYDROCHLORIDE 50 MG: 20 INJECTION, SOLUTION INFILTRATION; PERINEURAL at 09:47

## 2023-07-18 RX ADMIN — PROPOFOL 159 MCG/KG/MIN: 10 INJECTION, EMULSION INTRAVENOUS at 09:47

## 2023-07-18 ASSESSMENT — ACTIVITIES OF DAILY LIVING (ADL): ADLS_ACUITY_SCORE: 35

## 2023-07-18 NOTE — LETTER
"July 24, 2023      Keven BARTLETT Demetrio  [address] [facility] [address]        Dear  Demetrio,    We are writing to inform you of your test results.    Benign polyp removed. A repeat exam in 7 yrs is suggested.    Resulted Orders   Surgical Pathology Exam   Result Value Ref Range    Case Report       Surgical Pathology Report                         Case: [id number]                                  Authorizing Provider:  Wilfredo Molina MD        Collected:           07/18/2023 10:05 AM          Ordering Location:     [facility]          Received:            07/18/2023 10:41 AM                                 [facility] Endoscopy                                                          Pathologist:           Derrick Patel MD                                                           Specimen:    Large Intestine, Colon, Descending, descending colon polyp                                 Final Diagnosis       Colon, descending: Polypectomy:  - Tubular adenoma  - No evidence of high-grade dysplasia or invasive malignancy        Clinical Information       Procedure:  Colonoscopy with polypectomy  Pre-op Diagnosis: Special screening for malignant neoplasms, colon [Z12.11]  Post-op Diagnosis: Z12.11 - Special screening for malignant neoplasms, colon [ICD-10-CM]      Gross Description       A(1). Large Intestine, Colon, Descending, descending colon polyp:  The specimen is received in formalin, labeled with the patient's name, medical record number and other identifying information designated \"descending colon polyp\". It consists of 3 tan soft tissue fragments, 0.2-0.8 cm.  The largest fragment is inked black and sectioned.  Entirely submitted in 1 cassette.  ([facility])      Microscopic Description       The microscopic findings substantiates the final diagnosis.        Performing Labs       The technical component of this testing was completed at [facility] " [facility]      Case Images         If you have any questions or concerns, please call the clinic at the number listed above.       Sincerely,      Wilfredo Molina MD

## 2023-07-18 NOTE — ANESTHESIA PREPROCEDURE EVALUATION
Anesthesia Pre-Procedure Evaluation    Patient: Andrew Atkinson   MRN: [id number] : 1970        Procedure : Procedure(s):  Colonoscopy          Past Medical History:   Diagnosis Date    Medial meniscus tear     TIFFANY (obstructive sleep apnea) 3/2/2023      Past Surgical History:   Procedure Laterality Date    ARTHROSCOPY KNEE WITH MEDIAL MENISCECTOMY Left 2017    Procedure: ARTHROSCOPY KNEE WITH MEDIAL MENISCECTOMY;  Arthroscopic partial medial menisectomy,left knee;  Surgeon: Grant Muñoz MD;  Location: [facility] OR    CV CORONARY ANGIOGRAM N/A 2/15/2020    Procedure: Coronary Angiogram;  Surgeon: Rinku Boyce MD;  Location:  [facility]    LAPAROSCOPIC HERNIORRHAPHY UMBILICAL N/A 2020    Procedure: umbilical hernia repair;  Surgeon: Miguel Ángel Arnold DO;  Location: [facility] OR      Allergies   Allergen Reactions    Morphine Hives      Social History     Tobacco Use    Smoking status: Never    Smokeless tobacco: Never   Substance Use Topics    Alcohol use: Yes     Comment: Occ       Wt Readings from Last 1 Encounters:   23 108 kg (238 lb)        Anesthesia Evaluation   Pt has had prior anesthetic. Type: General.    No history of anesthetic complications       ROS/MED HX  ENT/Pulmonary:     (+) sleep apnea, moderate, uses CPAP,                                     Neurologic:  - neg neurologic ROS     Cardiovascular: Comment: - Hypertrophic cardiomyopathy     - Non-sustained ventricular tachycardia     (+) Dyslipidemia hypertension- -   -  - -                                 Previous cardiac testing   Echo: Date: 2022 Results:  Interpretation Summary  Normal exercise echocardiogram without evidence of inducible wall motion  abnormalities to suggest ischemia.     Target heart rate was achieved. Heart rate response to exercise was normal,  hypertensive blood pressure response. With stress, the left ventricular  ejection fraction increased from 70% to greater than 75-80%  and the left  ventricular size decreased. No regional wall motion abnormalities at rest or  with exercise. LV walls are thickened especially in the apical segments  consistent with known HOCM.  Normal functional capacity. Patient noted lightheadedness at peak exercise.  Study was stopped due to lightheadedness and hypertensive response.  Profound ST segment depression and T wave inversions noted in essentially all  leads except for aVR with mild ST elevation. This is consistent with known  HOCM.    Stress Test:  Date: Results:    ECG Reviewed:  Date: Results:    Cath:  Date: Results:      METS/Exercise Tolerance:     Hematologic:  - neg hematologic  ROS     Musculoskeletal:       GI/Hepatic:     (+)        bowel prep,            Renal/Genitourinary:  - neg Renal ROS     Endo:     (+)  type II DM, Last HgA1c: 7.0, date: 03/02/2023, Not using insulin, - not using insulin pump.    thyroid problem, hypothyroidism,           Psychiatric/Substance Use:  - neg psychiatric ROS     Infectious Disease:       Malignancy:       Other:  - neg other ROS    (+)  , H/O Chronic Pain,         Physical Exam    Airway        Mallampati: I   TM distance: > 3 FB   Neck ROM: full   Mouth opening: > 3 cm    Respiratory Devices and Support         Dental       (+) Minor Abnormalities - some fillings, tiny chips      Cardiovascular   cardiovascular exam normal          Pulmonary   pulmonary exam normal                OUTSIDE LABS:  CBC:   Lab Results   Component Value Date    WBC 3.8 (L) 01/21/2022    WBC 6.7 11/30/2020    HGB 16.3 01/21/2022    HGB 15.8 11/30/2020    HCT 47.2 01/21/2022    HCT 46.2 11/30/2020     01/21/2022     11/30/2020     BMP:   Lab Results   Component Value Date     03/02/2023     09/01/2022    POTASSIUM 4.2 03/02/2023    POTASSIUM 4.1 09/01/2022    CHLORIDE 107 03/02/2023    CHLORIDE 103 09/01/2022    CO2 29 03/02/2023    CO2 27 09/01/2022    BUN 17 03/02/2023    BUN 16 09/01/2022    CR 0.98  03/02/2023    CR 0.98 09/01/2022     (H) 03/02/2023     (H) 09/01/2022     COAGS:   Lab Results   Component Value Date    INR 0.95 01/21/2022     POC: No results found for: BGM, HCG, HCGS  HEPATIC:   Lab Results   Component Value Date    ALBUMIN 4.3 03/02/2023    PROTTOTAL 7.9 03/02/2023    ALT 69 03/02/2023    AST 31 03/02/2023    ALKPHOS 83 03/02/2023    BILITOTAL 0.6 03/02/2023     OTHER:   Lab Results   Component Value Date    A1C 7.0 (H) 03/02/2023    NAPOLEON 9.5 03/02/2023    TSH 11.44 (H) 03/02/2023    T4 1.24 03/02/2023    CRP 3.1 02/14/2020       Anesthesia Plan    ASA Status:  2    NPO Status:  NPO Appropriate    Anesthesia Type: MAC.     - Reason for MAC: immobility needed   Induction: Propofol, Intravenous.   Maintenance: TIVA.        Consents    Anesthesia Plan(s) and associated risks, benefits, and realistic alternatives discussed. Questions answered and patient/representative(s) expressed understanding.     - Discussed: Risks, Benefits and Alternatives for BOTH SEDATION and the PROCEDURE were discussed     - Discussed with:  Patient      - Extended Intubation/Ventilatory Support Discussed: No.      - Patient is DNR/DNI Status: No     Use of blood products discussed: No .     Postoperative Care            Comments:    Other Comments: The risks and benefits of anesthesia, and the alternatives where applicable, have been discussed with the patient, and they wish to proceed.               CHUY Ortega CRNA

## 2023-07-18 NOTE — ANESTHESIA CARE TRANSFER NOTE
Patient: Andrew Atkinson    Procedure: Procedure(s):  Colonoscopy with polypectomy       Diagnosis: Special screening for malignant neoplasms, colon [Z12.11]  Diagnosis Additional Information: No value filed.    Anesthesia Type:   MAC     Note:    Oropharynx: oropharynx clear of all foreign objects and spontaneously breathing  Level of Consciousness: awake  Oxygen Supplementation: room air    Independent Airway: airway patency satisfactory and stable  Dentition: dentition unchanged  Vital Signs Stable: post-procedure vital signs reviewed and stable  Report to RN Given: handoff report given  Patient transferred to: Phase II    Handoff Report: Identifed the Patient, Identified the Reponsible Provider, Reviewed the pertinent medical history, Discussed the surgical course, Reviewed Intra-OP anesthesia mangement and issues during anesthesia, Set expectations for post-procedure period and Allowed opportunity for questions and acknowledgement of understanding      Vitals:  Vitals Value Taken Time   /63 07/18/23 1030   Temp     Pulse 66 07/18/23 1030   Resp 15 07/18/23 1020   SpO2 95 % 07/18/23 1040   Vitals shown include unvalidated device data.    Electronically Signed By: CHUY Ortega CRNA  July 18, 2023  10:58 AM

## 2023-07-18 NOTE — ANESTHESIA POSTPROCEDURE EVALUATION
Patient: Andrew Atkinson    Procedure: Procedure(s):  Colonoscopy with polypectomy       Anesthesia Type:  MAC    Note:  Disposition: Outpatient   Postop Pain Control: Uneventful            Sign Out: Well controlled pain   PONV: No   Neuro/Psych: Uneventful            Sign Out: Acceptable/Baseline neuro status   Airway/Respiratory: Uneventful            Sign Out: Acceptable/Baseline resp. status   CV/Hemodynamics: Uneventful            Sign Out: Acceptable CV status   Other NRE: NONE   DID A NON-ROUTINE EVENT OCCUR? No    Event details/Postop Comments:  Pt was happy with anesthesia care.  No complications.  I will follow up with the pt if needed.           Last vitals:  Vitals Value Taken Time   /63 07/18/23 1030   Temp     Pulse 66 07/18/23 1030   Resp 15 07/18/23 1020   SpO2 95 % 07/18/23 1040   Vitals shown include unvalidated device data.    Electronically Signed By: CHUY Ortega CRNA  July 18, 2023  10:58 AM

## 2023-07-18 NOTE — DISCHARGE INSTRUCTIONS
[facility]    Home Care Following Endoscopy          Activity:  You have just undergone an endoscopic procedure usually performed with conscious sedation.  Do not work or operate machinery (including a car) for at least 12 hours.    I encourage you to walk and attempt to pass this air as soon as possible.    Diet:  Return to the diet you were on before your procedure but eat lightly for the first 12-24 hours.  Drink plenty of water.  Resume any regular medications unless otherwise advised by your physician.  Please begin any new medication prescribed as a result of your procedure as directed by your physician.   If you had any biopsy or polyp removed please refrain from aspirin or aspirin products for 2 days.  If on Coumadin please restart as instructed by your physician.   Pain:  You may take Tylenol as needed for pain.  Expected Recovery:  You can expect some mild abdominal fullness and/or discomfort due to the air used to inflate your intestinal tract.    Call Your Physician if You Have:    After Colonoscopy:  Worsening persisting abdominal pain which is worse with activity.  Fevers (>101 degrees F), chills or shakes.  Passage of continued blood with bowel movements.     Any questions or concerns about your recovery, please call [phone] or after hours [phone] ([phone]) Nurse Advice Line.    Follow-up Care:  You did have polyps/biopsy tissue sample(s) removed.  The polyps/biopsy tissue sample(s) will be sent to pathology.    You should receive letter in your My Chart from Dr. Molina with your results within 1-2 weeks. If you do not participate in My Chart a physical letter will come in the mail in 2-3 weeks.  Please call if you have not received a notification of your results.  If asked to return to clinic please make an appointment 1 week after your procedure.  Call [phone].

## 2023-07-19 LAB
PATH REPORT.COMMENTS IMP SPEC: NORMAL
PATH REPORT.COMMENTS IMP SPEC: NORMAL
PATH REPORT.FINAL DX SPEC: NORMAL
PATH REPORT.GROSS SPEC: NORMAL
PATH REPORT.MICROSCOPIC SPEC OTHER STN: NORMAL
PATH REPORT.RELEVANT HX SPEC: NORMAL
PHOTO IMAGE: NORMAL

## 2023-07-25 NOTE — PROGRESS NOTES
Does Andrew have a CPAP/Bipap?  Yes       Type of mask: Nasal     [facility]: [facility] [phone]    [email or web address]     [place] Sleep Scale:  4    Keven is a 52 year old who is being evaluated via a billable video visit.      How would you like to obtain your AVS? MyChart  If the video visit is dropped, the invitation should be resent by: Text to cell phone: [phone]  Will anyone else be joining your video visit? No              Subjective   Keven is a 52 year old, presenting for the following health issues:  CPAP Follow Up          Objective           Vitals:  No vitals were obtained today due to virtual visit.    Physical Exam   GENERAL: Healthy, alert and no distress  EYES: Eyes grossly normal to inspection.  No discharge or erythema, or obvious scleral/conjunctival abnormalities.  RESP: No audible wheeze, cough, or visible cyanosis.  No visible retractions or increased work of breathing.    SKIN: Visible skin clear. No significant rash, abnormal pigmentation or lesions.  NEURO: Cranial nerves grossly intact.  Mentation and speech appropriate for age.  PSYCH: Mentation appears normal, affect normal/bright, judgement and insight intact, normal speech and appearance well-groomed.                Video-Visit Details    Type of service:  Video Visit     Originating Location (pt. Location): Home    Distant Location (provider location):  On-site  Platform used for Video Visit: [facility]   Sleep Apnea - Follow-up Visit:    Impression/Plan:     Severe Sleep apnea. He is Tolerating PAP well, apnea is well controlled. He has more energy and less fatigue. Daytime symptoms are improved.   Supplies re ordered    Andrew Atkinson will follow up in about 1 year(s).     Fifteen minutes spent with patient, all of which were spent face-to-face counseling, consulting, coordinating plan of care.      CC:  Wilfredo Dela Cruz,         History of Present  Illness:  Chief Complaint   Patient presents with    CPAP Follow Up       Andrew Atkinson presents for follow-up of their severe sleep apnea, managed with CPAP.     No specialty comments available.    Do you use a CPAP Machine at home: Yes  Overall, on a scale of 0-10 how would you rate your CPAP (0 poor, 10 great): 8    What type of mask do you use: Full Face Mask  Is your mask comfortable: No  If not, why: Might need to try a nasel mask.  Seal wakes me up when sleeping due to air leaking.    Is your mask leaking: Yes  If yes, where do you feel it: Around face  How many night per week does the mask leak (0-7): 7    Do you notice snoring with mask on: No  Do you notice gasping arousals with mask on: Yes  Are you having significant oral or nasal dryness: No  Is the pressure setting comfortable: Yes  If not, why:      What is your typical bedtime: 10 pm5  How long does it take you to go to sleep on PAP therapy: 10 mins  What time do you typically get out of bed for the day: 5:30 am  How many hours on average per night are you using PAP therapy: 6  How many hours are you sleeping per night: 6  Do you feel well rested in the morning: Yes      ResMed   CPAP  cmH2O 30 day usage data:  96% of days with > 4 hours of use. 1/30 days with no use.   Average use 361 minutes per day.   95%ile Leak 7.68 L/min.   AHI 0.23 events per hour.     Auto-PAP 5.0 - 20.0 cmH2O 30 day usage data:    96% of days with > 4 hours of use. 1/30 days with no use.   Average use 361 minutes per day.   95%ile Leak 7.68 L/min.   CPAP 95% pressure 14.9 cm.   AHI 0.23 events per hour.        EPWORTH SLEEPINESS SCALE         7/26/2023     8:15 AM    [place] Sleepiness Scale ( ESME [place]  [id number]<br>ESS - [place]/English - Final version - 21 Nov 07 - [facility] [place].)   Sitting and reading Slight chance of dozing   Watching TV Slight chance of dozing   Sitting, inactive in a public place (e.g. a theatre or a meeting) Would never doze   As a passenger  in a car for an hour without a break Would never doze   Lying down to rest in the afternoon when circumstances permit Slight chance of dozing   Sitting and talking to someone Would never doze   Sitting quietly after a lunch without alcohol Slight chance of dozing   In a car, while stopped for a few minutes in traffic Would never doze   [place] Score (MC) 4   [place] Score (Sleep) 4       INSOMNIA SEVERITY INDEX (FERNIE)          7/26/2023     8:10 AM   Insomnia Severity Index (FERNIE)   Difficulty falling asleep 1   Difficulty staying asleep 1   Problems waking up too early 0   How SATISFIED/DISSATISFIED are you with your CURRENT sleep pattern? 1   How NOTICEABLE to others do you think your sleep problem is in terms of impairing the quality of your life? 0   How WORRIED/DISTRESSED are you about your current sleep problem? 0   To what extent do you consider your sleep problem to INTERFERE with your daily functioning (e.g. daytime fatigue, mood, ability to function at work/daily chores, concentration, memory, mood, etc.) CURRENTLY? 1   FERNIE Total Score 4       Guidelines for Scoring/Interpretation:  Total score categories:  0-7 = No clinically significant insomnia   8-14 = Subthreshold insomnia   15-21 = Clinical insomnia (moderate severity)  22-28 = Clinical insomnia (severe)  Used via courtesy of www.myhealth.[place].gov with permission from Nehemias Shafer PhD., [facility]      Past medical/surgical history, family history, social history, medications and allergies were reviewed.        Problem List:  Patient Active Problem List    Diagnosis Date Noted    Chronic right shoulder pain 03/06/2023     Priority: Medium    TIFFANY (obstructive sleep apnea) 03/02/2023     Priority: Medium    Hypertrophic cardiomyopathy (H) 12/07/2022     Priority: Medium    Benign essential hypertension 12/07/2022     Priority: Medium    Hyperlipidemia, acquired 12/07/2022     Priority: Medium    NSVT (nonsustained ventricular tachycardia) (H)  12/07/2022     Priority: Medium    Spinal stenosis of lumbar region with neurogenic claudication 03/09/2022     Priority: Medium    Arthrodesis status 03/09/2022     Priority: Medium    Diabetes mellitus, type 2 (H) 02/28/2022     Priority: Medium    Umbilical hernia with obstruction 10/09/2020     Priority: Medium     Added automatically from request for surgery [id number]      Acute viral myocarditis 02/14/2020     Priority: Medium    Acquired hypothyroidism 04/18/2017     Priority: Medium        There were no vitals taken for this visit.

## 2023-07-26 ENCOUNTER — MYC MEDICAL ADVICE (OUTPATIENT)
Dept: SLEEP MEDICINE | Facility: CLINIC | Age: 53
End: 2023-07-26

## 2023-07-26 ENCOUNTER — VIRTUAL VISIT (OUTPATIENT)
Dept: SLEEP MEDICINE | Facility: CLINIC | Age: 53
End: 2023-07-26
Payer: COMMERCIAL

## 2023-07-26 DIAGNOSIS — G47.33 OSA (OBSTRUCTIVE SLEEP APNEA): Primary | ICD-10-CM

## 2023-07-26 PROCEDURE — 99213 OFFICE O/P EST LOW 20 MIN: CPT | Mod: VID | Performed by: PHYSICIAN ASSISTANT

## 2023-07-26 ASSESSMENT — SLEEP AND FATIGUE QUESTIONNAIRES
HOW LIKELY ARE YOU TO NOD OFF OR FALL ASLEEP WHILE SITTING AND TALKING TO SOMEONE: WOULD NEVER DOZE
HOW LIKELY ARE YOU TO NOD OFF OR FALL ASLEEP WHILE LYING DOWN TO REST IN THE AFTERNOON WHEN CIRCUMSTANCES PERMIT: SLIGHT CHANCE OF DOZING
HOW LIKELY ARE YOU TO NOD OFF OR FALL ASLEEP WHEN YOU ARE A PASSENGER IN A CAR FOR AN HOUR WITHOUT A BREAK: WOULD NEVER DOZE
HOW LIKELY ARE YOU TO NOD OFF OR FALL ASLEEP WHILE SITTING INACTIVE IN A PUBLIC PLACE: WOULD NEVER DOZE
HOW LIKELY ARE YOU TO NOD OFF OR FALL ASLEEP WHILE WATCHING TV: SLIGHT CHANCE OF DOZING
HOW LIKELY ARE YOU TO NOD OFF OR FALL ASLEEP WHILE SITTING QUIETLY AFTER LUNCH WITHOUT ALCOHOL: SLIGHT CHANCE OF DOZING
HOW LIKELY ARE YOU TO NOD OFF OR FALL ASLEEP WHILE SITTING AND READING: SLIGHT CHANCE OF DOZING
HOW LIKELY ARE YOU TO NOD OFF OR FALL ASLEEP IN A CAR, WHILE STOPPED FOR A FEW MINUTES IN TRAFFIC: WOULD NEVER DOZE

## 2023-07-26 NOTE — CONFIDENTIAL NOTE
Writer called [facility] on the patient's behalf and they will contact him to order supplies. Patient was notified of this and instructed to call [facility] if he does not hear from them by tomorrow.    Shoshana HOWELL CMA

## 2023-07-29 DIAGNOSIS — E03.9 ACQUIRED HYPOTHYROIDISM: ICD-10-CM

## 2023-08-01 RX ORDER — LEVOTHYROXINE SODIUM 150 UG/1
TABLET ORAL
Qty: 90 TABLET | Refills: 0 | Status: SHIPPED | OUTPATIENT
Start: 2023-08-01 | End: 2023-10-27

## 2023-08-02 ENCOUNTER — PATIENT OUTREACH (OUTPATIENT)
Dept: CARE COORDINATION | Facility: CLINIC | Age: 53
End: 2023-08-02
Payer: COMMERCIAL

## 2023-08-12 DIAGNOSIS — E03.9 ACQUIRED HYPOTHYROIDISM: ICD-10-CM

## 2023-08-14 RX ORDER — LEVOTHYROXINE SODIUM 25 UG/1
TABLET ORAL
Qty: 45 TABLET | Refills: 0 | Status: SHIPPED | OUTPATIENT
Start: 2023-08-14 | End: 2023-10-27

## 2023-08-16 ENCOUNTER — PATIENT OUTREACH (OUTPATIENT)
Dept: CARE COORDINATION | Facility: CLINIC | Age: 53
End: 2023-08-16
Payer: COMMERCIAL

## 2023-08-18 DIAGNOSIS — I42.2 HYPERTROPHIC CARDIOMYOPATHY (H): ICD-10-CM

## 2023-08-18 RX ORDER — DILTIAZEM HYDROCHLORIDE 180 MG/1
180 CAPSULE, EXTENDED RELEASE ORAL DAILY
Qty: 90 CAPSULE | Refills: 1 | Status: ON HOLD | OUTPATIENT
Start: 2023-08-18 | End: 2023-11-18

## 2023-08-18 NOTE — TELEPHONE ENCOUNTER
Last Clinic Visit: 12/8/2022  [facility]  Scheduled for follow-up 12/14/23

## 2023-09-25 ASSESSMENT — ENCOUNTER SYMPTOMS
PALPITATIONS: 0
CONSTIPATION: 0
SHORTNESS OF BREATH: 0
NERVOUS/ANXIOUS: 0
MYALGIAS: 0
BREAST MASS: 0
DYSURIA: 0
COUGH: 0
DIZZINESS: 0
DIARRHEA: 0
CHILLS: 0
FEVER: 0
ARTHRALGIAS: 1
HEARTBURN: 0
SORE THROAT: 0
HEADACHES: 0
PARESTHESIAS: 0
FREQUENCY: 0
HEMATURIA: 0
EYE PAIN: 0
HEMATOCHEZIA: 0
ABDOMINAL PAIN: 0
NAUSEA: 0
WEAKNESS: 0
JOINT SWELLING: 1

## 2023-09-26 ENCOUNTER — OFFICE VISIT (OUTPATIENT)
Dept: FAMILY MEDICINE | Facility: CLINIC | Age: 53
End: 2023-09-26
Payer: COMMERCIAL

## 2023-09-26 VITALS
TEMPERATURE: 97.8 F | BODY MASS INDEX: 31.97 KG/M2 | DIASTOLIC BLOOD PRESSURE: 86 MMHG | SYSTOLIC BLOOD PRESSURE: 127 MMHG | OXYGEN SATURATION: 97 % | HEART RATE: 55 BPM | WEIGHT: 229.2 LBS

## 2023-09-26 DIAGNOSIS — E11.9 TYPE 2 DIABETES MELLITUS WITHOUT COMPLICATION, WITHOUT LONG-TERM CURRENT USE OF INSULIN (H): ICD-10-CM

## 2023-09-26 DIAGNOSIS — M75.101 NONTRAUMATIC TEAR OF RIGHT ROTATOR CUFF, UNSPECIFIED TEAR EXTENT: ICD-10-CM

## 2023-09-26 DIAGNOSIS — G89.29 CHRONIC PAIN OF RIGHT KNEE: ICD-10-CM

## 2023-09-26 DIAGNOSIS — M25.561 CHRONIC PAIN OF RIGHT KNEE: ICD-10-CM

## 2023-09-26 DIAGNOSIS — Z13.6 ENCOUNTER FOR LIPID SCREENING FOR CARDIOVASCULAR DISEASE: ICD-10-CM

## 2023-09-26 DIAGNOSIS — E03.9 ACQUIRED HYPOTHYROIDISM: ICD-10-CM

## 2023-09-26 DIAGNOSIS — Z12.5 SCREENING FOR PROSTATE CANCER: ICD-10-CM

## 2023-09-26 DIAGNOSIS — Z13.220 ENCOUNTER FOR LIPID SCREENING FOR CARDIOVASCULAR DISEASE: ICD-10-CM

## 2023-09-26 DIAGNOSIS — Z00.00 ROUTINE GENERAL MEDICAL EXAMINATION AT A HEALTH CARE FACILITY: Primary | ICD-10-CM

## 2023-09-26 LAB
CHOLEST SERPL-MCNC: 139 MG/DL
CREAT UR-MCNC: 219 MG/DL
HBA1C MFR BLD: 6.4 % (ref 0–5.6)
HDLC SERPL-MCNC: 32 MG/DL
LDLC SERPL CALC-MCNC: 62 MG/DL
MICROALBUMIN UR-MCNC: 12.7 MG/L
MICROALBUMIN/CREAT UR: 5.8 MG/G CR (ref 0–25)
NONHDLC SERPL-MCNC: 107 MG/DL
PSA SERPL DL<=0.01 NG/ML-MCNC: 1.02 NG/ML (ref 0–3.5)
TRIGL SERPL-MCNC: 223 MG/DL
TSH SERPL DL<=0.005 MIU/L-ACNC: 4.04 UIU/ML (ref 0.3–4.2)

## 2023-09-26 PROCEDURE — 80061 LIPID PANEL: CPT | Performed by: FAMILY MEDICINE

## 2023-09-26 PROCEDURE — 82570 ASSAY OF URINE CREATININE: CPT | Performed by: FAMILY MEDICINE

## 2023-09-26 PROCEDURE — 84443 ASSAY THYROID STIM HORMONE: CPT | Performed by: FAMILY MEDICINE

## 2023-09-26 PROCEDURE — 83036 HEMOGLOBIN GLYCOSYLATED A1C: CPT | Performed by: FAMILY MEDICINE

## 2023-09-26 PROCEDURE — 36415 COLL VENOUS BLD VENIPUNCTURE: CPT | Performed by: FAMILY MEDICINE

## 2023-09-26 PROCEDURE — 99396 PREV VISIT EST AGE 40-64: CPT | Mod: 25 | Performed by: FAMILY MEDICINE

## 2023-09-26 PROCEDURE — 99214 OFFICE O/P EST MOD 30 MIN: CPT | Mod: 25 | Performed by: FAMILY MEDICINE

## 2023-09-26 PROCEDURE — G0103 PSA SCREENING: HCPCS | Performed by: FAMILY MEDICINE

## 2023-09-26 PROCEDURE — 82043 UR ALBUMIN QUANTITATIVE: CPT | Performed by: FAMILY MEDICINE

## 2023-09-26 ASSESSMENT — ENCOUNTER SYMPTOMS
FEVER: 0
WEAKNESS: 0
CONSTIPATION: 0
HEMATURIA: 0
DYSURIA: 0
SORE THROAT: 0
DIARRHEA: 0
COUGH: 0
PALPITATIONS: 0
DIZZINESS: 0
JOINT SWELLING: 1
ARTHRALGIAS: 1
FREQUENCY: 0
NAUSEA: 0
EYE PAIN: 0
SHORTNESS OF BREATH: 0
HEADACHES: 0
CHILLS: 0
NERVOUS/ANXIOUS: 0
MYALGIAS: 0
ABDOMINAL PAIN: 0

## 2023-09-26 NOTE — PATIENT INSTRUCTIONS
Preventive Health Recommendations  Male Ages 50 - 64    Yearly exam:             See your health care provider every year in order to  o   Review health changes.   o   Discuss preventive care.    o   Review your medicines if your doctor has prescribed any.   Have a cholesterol test every 5 years, or more frequently if you are at risk for high cholesterol/heart disease.   Have a diabetes test (fasting glucose) every three years. If you are at risk for diabetes, you should have this test more often.   Have a colonoscopy at age 50, or have a yearly FIT test (stool test). These exams will check for colon cancer.    Talk with your health care provider about whether or not a prostate cancer screening test (PSA) is right for you.  You should be tested each year for STDs (sexually transmitted diseases), if you re at risk.     Shots: Get a flu shot each year. Get a tetanus shot every 10 years.     Nutrition:  Eat at least 5 servings of fruits and vegetables daily.   Eat whole-grain bread, whole-wheat pasta and brown rice instead of white grains and rice.   Get adequate Calcium and Vitamin D.     Lifestyle  Exercise for at least 150 minutes a week (30 minutes a day, 5 days a week). This will help you control your weight and prevent disease.   Limit alcohol to one drink per day.   No smoking.   Wear sunscreen to prevent skin cancer.   See your dentist every six months for an exam and cleaning.   See your eye doctor every 1 to 2 years.      Healthy Lifestyle   Nutrition and healthy eating: The Mediterranean Diet  Ready to switch to a more heart-healthy diet? Here's how to get started with the Mediterranean diet.  By [facility] Staff   If you're looking for a heart-healthy eating plan, the Mediterranean diet might be right for you.  The Mediterranean diet blends the basics of healthy eating with the traditional flavors and cooking methods of the Mediterranean.  Interest in the Mediterranean diet began in the 1960s with the  observation that coronary heart disease caused fewer deaths in Mediterranean countries, such as [place] and [place], than in the [place] and northern [place]. Subsequent studies found that the Mediterranean diet is associated with reduced risk factors for cardiovascular disease.  The Mediterranean diet is one of the healthy eating plans recommended by the Dietary Guidelines for Americans to promote health and prevent chronic disease.  It is also recognized by the World Health Organization as a healthy and sustainable dietary pattern and as an intangible cultural asset by the United National Educational, Scientific and Cultural Organization.  The Mediterranean diet is a way of eating based on the traditional cuisine of countries bordering the [place] Sea. While there is no single definition of the Mediterranean diet, it is typically high in vegetables, fruits, whole grains, beans, nut and seeds, and olive oil.  The main components of Mediterranean diet include:  Daily consumption of vegetables, fruits, whole grains and healthy fats   Weekly intake of fish, poultry, beans and eggs   Moderate portions of dairy products   Limited intake of red meat  Other important elements of the Mediterranean diet are sharing meals with family and friends, enjoying a glass of red wine and being physically active.  The foundation of the Mediterranean diet is vegetables, fruits, herbs, nuts, beans and whole grains. Meals are built around these plant-based foods. Moderate amounts of dairy, poultry and eggs are also central to the Mediterranean Diet, as is seafood. In contrast, red meat is eaten only occasionally.  Healthy fats are a mainstay of the Mediterranean diet. They're eaten instead of less healthy fats, such as saturated and trans fats, which contribute to heart disease.  Olive oil is the primary source of added fat in the Mediterranean diet. Olive oil provides monounsaturated fat, which has been found to lower total cholesterol  "and low-density lipoprotein (LDL or \"bad\") cholesterol levels. Nuts and seeds also contain monounsaturated fat.  Fish are also important in the Mediterranean diet. Fatty fish -- such as mackerel, herring, sardines, albacore tuna, salmon and lake trout -- are rich in omega-3 fatty acids, a type of polyunsaturated fat that may reduce inflammation in the body. Omega-3 fatty acids also help decrease triglycerides, reduce blood clotting, and decrease the risk of stroke and heart failure.  The Mediterranean diet typically allows red wine in moderation. Although alcohol has been associated with a reduced risk of heart disease in some studies, it's by no means risk free. The Dietary Guidelines for Americans caution against beginning to drink or drinking more often on the basis of potential health benefits.  Interested in trying the Mediterranean diet? These tips will help you get started:  Eat more fruits and vegetables. Aim for 7 to 10 servings a day of fruit and vegetables.   Opt for whole grains. Switch to whole-grain bread, cereal and pasta. [place] with other whole grains, such as bulgur and farro.   Use healthy fats. Try olive oil as a replacement for butter when cooking. Instead of putting butter or margarine on bread, try dipping it in flavored olive oil.   Eat more seafood. Eat fish twice a week. Fresh or water-packed tuna, salmon, trout, mackerel and herring are healthy choices. Grilled fish tastes good and requires little cleanup. Avoid deep-fried fish.   Reduce red meat. Substitute fish, poultry or beans for meat. If you eat meat, make sure it's lean and keep portions small.   Enjoy some dairy. Eat low-fat Greek or plain yogurt and small amounts of a variety of cheeses.   Spice it up. Herbs and spices boost flavor and lessen the need for salt.  The Mediterranean diet is a delicious and healthy way to eat. Many people who switch to this style of eating say they'll never eat any other way.    "

## 2023-09-26 NOTE — PROGRESS NOTES
SUBJECTIVE:   CC: Keven is an 52 year old who presents for preventative health visit.       9/26/2023     7:31 AM   Additional Questions   Roomed by Oksana   Accompanied by none         9/26/2023     7:31 AM   Patient Reported Additional Medications   Patient reports taking the following new medications B12       Healthy Habits:     Getting at least 3 servings of Calcium per day:  Yes    Bi-annual eye exam:  Yes    Dental care twice a year:  Yes    Sleep apnea or symptoms of sleep apnea:  Sleep apnea    Diet:  Regular (no restrictions)    Frequency of exercise:  2-3 days/week    Duration of exercise:  15-30 minutes    Taking medications regularly:  Yes    Medication side effects:  None    Additional concerns today:  Yes (Referral for knee and shoulder)    Wt Readings from Last 4 Encounters:   09/26/23 104 kg (229 lb 3.2 oz)   04/04/23 108 kg (238 lb)   03/06/23 108 kg (238 lb)   03/02/23 108.1 kg (238 lb 6.4 oz)     Eye exam 1.5 years ago  Patient has chronic pain of his right knee, suspected secondary to osteoarthritis.  Requesting orthopedics referral.  Patient has chronic pain of right shoulder, history of rotator cuff tear.  History of diabetes, patient has been losing weight, managing with diet.  Recent diagnosis of sleep apnea, wearing CPAP, has been very effective for him.              Social History     Tobacco Use    Smoking status: Never    Smokeless tobacco: Never   Substance Use Topics    Alcohol use: Yes     Comment: Occ              9/25/2023     9:52 AM   Alcohol Use   Prescreen: >3 drinks/day or >7 drinks/week? No          No data to display                Last PSA:   PSA   Date Value Ref Range Status   02/14/2020 1.17 0 - 4 ug/L Final     Comment:     Assay Method:  Chemiluminescence using Siemens Vista analyzer     Prostate Specific Antigen Screen   Date Value Ref Range Status   09/01/2022 1.22 0.00 - 4.00 ug/L Final     Comment:     Referemce Range:  0-4 ug/L       Reviewed orders with patient.  Reviewed health maintenance and updated orders accordingly - Yes  BP Readings from Last 3 Encounters:   09/26/23 127/86   07/18/23 (!) 144/63   04/04/23 (!) 132/90    Wt Readings from Last 3 Encounters:   09/26/23 104 kg (229 lb 3.2 oz)   04/04/23 108 kg (238 lb)   03/06/23 108 kg (238 lb)                    Reviewed and updated as needed this visit by clinical staff    Allergies  Meds              Reviewed and updated as needed this visit by Provider                     Review of Systems   Constitutional:  Negative for chills and fever.   HENT:  Negative for congestion, ear pain, hearing loss and sore throat.    Eyes:  Negative for pain and visual disturbance.   Respiratory:  Negative for cough and shortness of breath.    Cardiovascular:  Negative for chest pain and palpitations.   Gastrointestinal:  Negative for abdominal pain, constipation, diarrhea and nausea.   Genitourinary:  Negative for dysuria, frequency, genital sores, hematuria and urgency.   Musculoskeletal:  Positive for arthralgias and joint swelling. Negative for myalgias.   Skin:  Negative for rash.   Neurological:  Negative for dizziness, weakness and headaches.   Psychiatric/Behavioral:  The patient is not nervous/anxious.          OBJECTIVE:   /86   Pulse 55   Temp 97.8  F (36.6  C) (Oral)   Wt 104 kg (229 lb 3.2 oz)   SpO2 97%   BMI 31.97 kg/m      Physical Exam  Vitals reviewed.   Constitutional:       Appearance: Normal appearance. Keven Atkinson is not ill-appearing.   HENT:      Head: Normocephalic.      Right Ear: Tympanic membrane and ear canal normal.      Left Ear: Tympanic membrane and ear canal normal.      Nose: Nose normal.   Eyes:      Extraocular Movements: Extraocular movements intact.   Cardiovascular:      Rate and Rhythm: Normal rate and regular rhythm.      Heart sounds: Normal heart sounds. No murmur heard.  Pulmonary:      Effort: Pulmonary effort is normal. No respiratory distress.      Breath sounds: Normal  "breath sounds. No wheezing or rales.   Abdominal:      Palpations: Abdomen is soft.   Musculoskeletal:         General: Normal range of motion.      Cervical back: Normal range of motion and neck supple.   Skin:     General: Skin is warm and dry.      Findings: No lesion.   Neurological:      Mental Status: Keven Atkinson is alert and oriented to person, place, and time.   Psychiatric:         Mood and Affect: Mood normal.         Behavior: Behavior normal.         Thought Content: Thought content normal.         Judgment: Judgment normal.               ASSESSMENT/PLAN:       ICD-10-CM    1. Routine general medical examination at a health care facility  Z00.00       2. Type 2 diabetes mellitus without complication, without long-term current use of insulin (H)  E11.9 HEMOGLOBIN A1C     Albumin Random Urine Quantitative with Creat Ratio     HEMOGLOBIN A1C     Albumin Random Urine Quantitative with Creat Ratio      3. Acquired hypothyroidism  E03.9 TSH with free T4 reflex     TSH with free T4 reflex      4. Screening for prostate cancer  Z12.5 Prostate Specific Antigen Screen     Prostate Specific Antigen Screen      5. Encounter for lipid screening for cardiovascular disease  Z13.220 Lipid panel reflex to direct LDL Non-fasting    Z13.6 Lipid panel reflex to direct LDL Non-fasting      6. Nontraumatic tear of right rotator cuff, unspecified tear extent  M75.101 Orthopedic  Referral      7. Chronic pain of right knee  M25.561 Orthopedic  Referral    G89.29           Patient has been advised of split billing requirements and indicates understanding: Yes      COUNSELING:   Reviewed preventive health counseling, as reflected in patient instructions       Regular exercise       Healthy diet/nutrition      BMI:   Estimated body mass index is 31.97 kg/m  as calculated from the following:    Height as of 4/4/23: 1.803 m (5' 11\").    Weight as of this encounter: 104 kg (229 lb 3.2 oz).   Weight management " plan: Discussed healthy diet and exercise guidelines      Keven Atkinson reports that Keven Atkinson has never smoked. Keven Atkinson has never used smokeless tobacco.            Wilfredo Hemphill MD  [facility] submitted by the patient for this visit:  Annual Preventive Visit (Submitted on 9/25/2023)  Chief Complaint: Annual Exam:  Blood in stool: No  heartburn: No  peripheral edema: No  mood changes: No  Skin sensation changes: No  pelvic pain: No  vaginal bleeding: No  vaginal discharge: No  tenderness: No  breast mass: No  breast discharge: No  impotence: No  penile discharge: No

## 2023-10-03 ENCOUNTER — OFFICE VISIT (OUTPATIENT)
Dept: ORTHOPEDICS | Facility: CLINIC | Age: 53
End: 2023-10-03
Payer: COMMERCIAL

## 2023-10-03 VITALS — HEIGHT: 71 IN | BODY MASS INDEX: 32.06 KG/M2 | WEIGHT: 229 LBS

## 2023-10-03 DIAGNOSIS — M25.511 CHRONIC RIGHT SHOULDER PAIN: ICD-10-CM

## 2023-10-03 DIAGNOSIS — M75.101 NONTRAUMATIC TEAR OF RIGHT ROTATOR CUFF, UNSPECIFIED TEAR EXTENT: Primary | ICD-10-CM

## 2023-10-03 DIAGNOSIS — S89.91XD INJURY OF RIGHT KNEE, SUBSEQUENT ENCOUNTER: ICD-10-CM

## 2023-10-03 DIAGNOSIS — G89.29 CHRONIC RIGHT SHOULDER PAIN: ICD-10-CM

## 2023-10-03 PROCEDURE — 99214 OFFICE O/P EST MOD 30 MIN: CPT | Performed by: PEDIATRICS

## 2023-10-03 NOTE — LETTER
10/3/2023         RE: Andrew Atkinson  [address] [address]        Dear Colleague,    Thank you for referring your patient, Andrew Atkinson, to the [facility]. Please see a copy of my visit note below.    ASSESSMENT & PLAN    Andrew was seen today for follow up and follow up.    Diagnoses and all orders for this visit:    Nontraumatic tear of right rotator cuff, unspecified tear extent  -     Orthopedic  Referral; Future    Chronic right shoulder pain  -     Orthopedic  Referral; Future    Injury of right knee, subsequent encounter      He prefers to see ortho surgery next for shoulder, referral placed.  Chronic knee issues at this point also. Plan to obtain the MRI for the right knee that was ordered previously.  Questions answered. Discussed signs and symptoms that may indicate more serious issues; the patient was instructed to seek appropriate care if noted. Keven indicates understanding of these issues and agrees with the plan.      See Patient Instructions  Patient Instructions   With the ongoing right shoulder symptoms from the rotator cuff tearing, we discussed considerations around referral to physical therapy, repeat injection, and referral on to see orthopedic surgery.  Following discussion, referral placed to see orthopedic surgery next.    With the ongoing symptoms of the right knee, including with joint effusion and popping, we discussed proceeding with the MRI.  Previous order for MRI is still open, and we can use that.  Scheduling information is below.  Additionally, we discussed considerations around right knee steroid injection, and continued use of the knee brace for now.    Advanced imaging is done by appointment. Please call [facility] ([facility]/[place]) [phone] , [facility] ([facility]/[place]) [phone] , or [facility] ([facility]  "[place]) [phone]  to schedule your MRI.     Some insurance companies may require a prior authorization to be completed which can delay the time until you are able to schedule your appointment.       If you are active on [employer], you may have access to your test results before your provider is able to review the study and advise on next steps.      The clinic will contact you with results. If you have not heard from the clinic within 2-3 days following your MRI, please contact us at the number listed below.      If you have any further questions for your physician or physician s care team you can contact them thru [employer] or by calling [phone].      [facility]    SUBJECTIVE- Interim History October 3, 2023    No chief complaint on file.      Andrew Atkinson is a 52 year old adult who is seen in f/u up for    Nontraumatic tear of right rotator cuff, unspecified tear extent  Chronic right shoulder pain  Injury of right knee, subsequent encounter. Since last visit on 4/4/23 patient has had no improvement in the shoulder, noting no improvement from the US guided injection done on 4/19/23 with Dr. Regan.  Right knee has increased in pain, noting popping and soreness constantly, saying it feels the same as when he had surgery with Dr. Muñoz in 2017. Is wearing a hinge brace constantly.  Physical Therapy not completed, noting that he does not feel like this would help.  The patient is seen by themselves.  The patient is Left handed      Orthopedic/Surgical history: NO  Social History/Occupation: Construction Company Owner      REVIEW OF SYSTEMS:  Review of Systems    OBJECTIVE:  Ht 1.803 m (5' 11\")   Wt 103.9 kg (229 lb)   BMI 31.94 kg/m         Right shoulder:  Pain with reaching to shoulder level and above  Painful impingement testing    Right knee:  No pain with AROM, but pain with WB  Pain with circumduction      RADIOLOGY:  Final results and " radiologist's interpretation, available in the [facility] health record.  Images were reviewed with the patient in the office today.  My personal interpretation of the performed imaging: supraspinatus partial thickness tendon tearing.        EXAM: MR Right shoulder without  contrast 3/7/2023 11:53 AM     TECHNIQUE: Multiplanar, multisequence imaging of the right shoulder  were obtained without administration of intravenous or intra-articular  gadolinium contrast using routine protocol.     History: Shoulder pain; Rotator cuff injury; No known/automatically  detected potential contraindications to imaging; Chronic right  shoulder pain; Chronic right shoulder pain      Comparison: Radiographs 9/12/2022     Findings:     ROTATOR CUFF and ASSOCIATED STRUCTURES  Rotator cuff: Supraspinatus tendinosis with high-grade bursal sided  tearing of the anterior to the mid fibers at the footprint.  Infraspinatus tendinosis with low-grade intrasubstance tear of the  superior and neutral fibers at the footprint. Teres minor is intact.  Subscapularis tendinosis.     Bursa: Mild fluid in the subacromial-subdeltoid bursa.     Musculature: Muscle bulk of rotator cuff is preserved.  Deltoid muscle  bulk is also preserved.  No muscle edema.     Acromioclavicular joint  There are mild degenerative changes of the acromioclavicular joint.  Acromion is type 2 in sagittal morphology.  Coracoacromial ligament is  not thickened.     OSSEOUS STRUCTURES  No fracture, marrow contusion or marrow infiltration.     LONG BICIPITAL TENDON  Tendinosis of the intra-articular long head of the biceps tendon.     GLENOHUMERAL JOINT  Joint fluid: Physiologic amount of joint fluid is present.     Cartilage and subarticular bone:  Thinning of cartilage over the  superior humeral head.     Labrum: Limited assessment on this study with relative lack of joint  distention shows probable posterior superior labral tear.     ANCILLARY FINDINGS:  None                                                                       Impression:     1. Rotator cuff:  *  Supraspinatus tendinosis with high-grade bursal sided tearing of  the anterior through mid fibers at the footprint.   *  Infraspinatus tendinosis with low-grade intrasubstance tear of the  superior and middle fibers at the footprint.   *  Subscapularis tendinosis.  *  Normal rotator cuff muscle bulk.     2. Tendinosis of the intra-articular long head of the biceps tendon.     RUSTY FRYE MD ([place])             XR KNEE STANDING AP SUNRISE BILAT LAT RIGHT  11/9/2022 9:18 AM      HISTORY: Injury of right knee, initial encounter; pain  COMPARISON: None.                                                                      IMPRESSION: No acute fracture or malalignment. Moderate knee joint  degenerative changes.     JAVIER RODRIGUEZ MD                             Again, thank you for allowing me to participate in the care of your patient.        Sincerely,        Robert Briggs DO

## 2023-10-03 NOTE — PATIENT INSTRUCTIONS
With the ongoing right shoulder symptoms from the rotator cuff tearing, we discussed considerations around referral to physical therapy, repeat injection, and referral on to see orthopedic surgery.  Following discussion, referral placed to see orthopedic surgery next.    With the ongoing symptoms of the right knee, including with joint effusion and popping, we discussed proceeding with the MRI.  Previous order for MRI is still open, and we can use that.  Scheduling information is below.  Additionally, we discussed considerations around right knee steroid injection, and continued use of the knee brace for now.    Advanced imaging is done by appointment. Please call [facility] ([facility]/[place]) [phone] , [facility] ([facility]/[place]) [phone] , or [facility] ([facility]) [phone]  to schedule your MRI.     Some insurance companies may require a prior authorization to be completed which can delay the time until you are able to schedule your appointment.       If you are active on [email or web address], you may have access to your test results before your provider is able to review the study and advise on next steps.      The clinic will contact you with results. If you have not heard from the clinic within 2-3 days following your MRI, please contact us at the number listed below.      If you have any further questions for your physician or physician s care team you can contact them thru [email or web address] or by calling [phone].

## 2023-10-03 NOTE — PROGRESS NOTES
ASSESSMENT & PLAN    Andrew was seen today for follow up and follow up.    Diagnoses and all orders for this visit:    Nontraumatic tear of right rotator cuff, unspecified tear extent  -     Orthopedic  Referral; Future    Chronic right shoulder pain  -     Orthopedic  Referral; Future    Injury of right knee, subsequent encounter      He prefers to see ortho surgery next for shoulder, referral placed.  Chronic knee issues at this point also. Plan to obtain the MRI for the right knee that was ordered previously.  Questions answered. Discussed signs and symptoms that may indicate more serious issues; the patient was instructed to seek appropriate care if noted. Keven indicates understanding of these issues and agrees with the plan.      See Patient Instructions  Patient Instructions   With the ongoing right shoulder symptoms from the rotator cuff tearing, we discussed considerations around referral to physical therapy, repeat injection, and referral on to see orthopedic surgery.  Following discussion, referral placed to see orthopedic surgery next.    With the ongoing symptoms of the right knee, including with joint effusion and popping, we discussed proceeding with the MRI.  Previous order for MRI is still open, and we can use that.  Scheduling information is below.  Additionally, we discussed considerations around right knee steroid injection, and continued use of the knee brace for now.    Advanced imaging is done by appointment. Please call [facility] ([facility]/[place]) [phone] , [facility] ([facility]/[place]) [phone] , or [facility] ([facility]) [phone]  to schedule your MRI.     Some insurance companies may require a prior authorization to be completed which can delay the time until you are able to schedule your appointment.       If you are active on [employer], you may have access to your test  "results before your provider is able to review the study and advise on next steps.      The clinic will contact you with results. If you have not heard from the clinic within 2-3 days following your MRI, please contact us at the number listed below.      If you have any further questions for your physician or physician s care team you can contact them thru [employer] or by calling [phone].      Robert Briggs [facility]    SUBJECTIVE- Interim History October 3, 2023    No chief complaint on file.      Andrew Atkinson is a 52 year old adult who is seen in f/u up for    Nontraumatic tear of right rotator cuff, unspecified tear extent  Chronic right shoulder pain  Injury of right knee, subsequent encounter. Since last visit on 4/4/23 patient has had no improvement in the shoulder, noting no improvement from the US guided injection done on 4/19/23 with Dr. Regan.  Right knee has increased in pain, noting popping and soreness constantly, saying it feels the same as when he had surgery with Dr. Muñoz in 2017. Is wearing a hinge brace constantly.  Physical Therapy not completed, noting that he does not feel like this would help.  The patient is seen by themselves.  The patient is Left handed      Orthopedic/Surgical history: NO  Social History/Occupation: Construction Company Owner      REVIEW OF SYSTEMS:  Review of Systems    OBJECTIVE:  Ht 1.803 m (5' 11\")   Wt 103.9 kg (229 lb)   BMI 31.94 kg/m         Right shoulder:  Pain with reaching to shoulder level and above  Painful impingement testing    Right knee:  No pain with AROM, but pain with WB  Pain with circumduction      RADIOLOGY:  Final results and radiologist's interpretation, available in the [facility] health record.  Images were reviewed with the patient in the office today.  My personal interpretation of the performed imaging: supraspinatus partial thickness tendon tearing.        EXAM: MR Right shoulder without  " contrast 3/7/2023 11:53 AM     TECHNIQUE: Multiplanar, multisequence imaging of the right shoulder  were obtained without administration of intravenous or intra-articular  gadolinium contrast using routine protocol.     History: Shoulder pain; Rotator cuff injury; No known/automatically  detected potential contraindications to imaging; Chronic right  shoulder pain; Chronic right shoulder pain      Comparison: Radiographs 9/12/2022     Findings:     ROTATOR CUFF and ASSOCIATED STRUCTURES  Rotator cuff: Supraspinatus tendinosis with high-grade bursal sided  tearing of the anterior to the mid fibers at the footprint.  Infraspinatus tendinosis with low-grade intrasubstance tear of the  superior and neutral fibers at the footprint. Teres minor is intact.  Subscapularis tendinosis.     Bursa: Mild fluid in the subacromial-subdeltoid bursa.     Musculature: Muscle bulk of rotator cuff is preserved.  Deltoid muscle  bulk is also preserved.  No muscle edema.     Acromioclavicular joint  There are mild degenerative changes of the acromioclavicular joint.  Acromion is type 2 in sagittal morphology.  Coracoacromial ligament is  not thickened.     OSSEOUS STRUCTURES  No fracture, marrow contusion or marrow infiltration.     LONG BICIPITAL TENDON  Tendinosis of the intra-articular long head of the biceps tendon.     GLENOHUMERAL JOINT  Joint fluid: Physiologic amount of joint fluid is present.     Cartilage and subarticular bone:  Thinning of cartilage over the  superior humeral head.     Labrum: Limited assessment on this study with relative lack of joint  distention shows probable posterior superior labral tear.     ANCILLARY FINDINGS:  None                                                                      Impression:     1. Rotator cuff:  *  Supraspinatus tendinosis with high-grade bursal sided tearing of  the anterior through mid fibers at the footprint.   *  Infraspinatus tendinosis with low-grade intrasubstance tear of  the  superior and middle fibers at the footprint.   *  Subscapularis tendinosis.  *  Normal rotator cuff muscle bulk.     2. Tendinosis of the intra-articular long head of the biceps tendon.     RUSTY FRYE MD ([place])             XR KNEE STANDING AP SUNRISE BILAT LAT RIGHT  11/9/2022 9:18 AM      HISTORY: Injury of right knee, initial encounter; pain  COMPARISON: None.                                                                      IMPRESSION: No acute fracture or malalignment. Moderate knee joint  degenerative changes.     JAVIER RODRIGUEZ MD

## 2023-10-04 NOTE — TELEPHONE ENCOUNTER
DIAGNOSIS: Nontraumatic tear of right rotator cuff, unspecified tear extent, Chronic right shoulder pain    APPOINTMENT DATE: 10/12/2023   NOTES STATUS DETAILS   OFFICE NOTE from referring provider Internal 10/03/2023 Dr Briggs  [facility]   OFFICE NOTE from other specialist Internal 04/19/2023 Dr Regan [facility]   DISCHARGE SUMMARY from hospital N/A    DISCHARGE REPORT from the ER N/A    OPERATIVE REPORT N/A    MEDICATION LIST N/A    EMG (for Spine) N/A    IMPLANT RECORD/STICKER N/A    LABS     CBC/DIFF N/A    CULTURES N/A    INJECTIONS DONE IN RADIOLOGY N/A    MRI Internal 03/07/2023 RT shoulder   CT SCAN N/A    XRAYS (IMAGES & REPORTS) Internal 09/12/2022 RT shoulder   TUMOR     PATHOLOGY  Slides & report N/A

## 2023-10-05 NOTE — PROGRESS NOTES
CHIEF COMPLAINT: Right shoulder pain    DIAGNOSIS: Right shoulder rotator cuff tear, AC joint arthropathy    OCCUPATION/SPORT: Construction owner    HPI:   Andrew Atkinson is a very pleasant 52 year old, left-hand dominant adult who presents for evaluation of right shoulder pain.  Symptoms started about a year and a half ago. There was not a precipitating event. The pain is located to the anterior shoulder. Worst pain is rated a 10 of 10, and current pain is rated at 5 of 10. Symptoms are worsened by abduction, sleeping. Symptoms are improved with nothing. Patient has tried right GH injection in June 2023, physical therapy, ice, heat, tylenol, ibuprofen, HEP with no relief. Associated symptoms include popping and clicking, burning, catching. Patient has some radiating down the arm, minimal numbness. No other concerns or complaints at this time.  SANE score R - 70 L - 100    PAST MEDICAL HISTORY:  Past Medical History:   Diagnosis Date    Medial meniscus tear     TIFFANY (obstructive sleep apnea) 3/2/2023       PAST SURGICAL HISTORY:  Past Surgical History:   Procedure Laterality Date    ARTHROSCOPY KNEE WITH MEDIAL MENISCECTOMY Left 5/12/2017    Procedure: ARTHROSCOPY KNEE WITH MEDIAL MENISCECTOMY;  Arthroscopic partial medial menisectomy,left knee;  Surgeon: Grant Muñoz MD;  Location: [facility]    COLONOSCOPY N/A 7/18/2023    Procedure: Colonoscopy with polypectomy;  Surgeon: Wilfredo Molina MD;  Location: [facility]    CV CORONARY ANGIOGRAM N/A 2/15/2020    Procedure: Coronary Angiogram;  Surgeon: Rinku Boyce MD;  Location:  [facility]    LAPAROSCOPIC HERNIORRHAPHY UMBILICAL N/A 12/8/2020    Procedure: umbilical hernia repair;  Surgeon: Miguel Ángel Arnold DO;  Location: [facility]       CURRENT MEDICATIONS:  Current Outpatient Medications   Medication Sig Dispense Refill    acetaminophen (TYLENOL) 325 MG tablet Take 2 tablets (650 mg) by mouth every 6 hours as needed for mild pain Alternate with  ibuprofen so you are taking one or the other every three hours. For example take tylenol at 5am, then ibuprofen at 8am, then tylenol at 11am, and so on. 50 tablet 0    diltiazem ER (DILT-XR) 180 MG 24 hr capsule Take 1 capsule (180 mg) by mouth daily 90 capsule 1    levothyroxine (SYNTHROID/LEVOTHROID) 150 MCG tablet TAKE 1 TABLET BY MOUTH ONCE DAILY 90 tablet 0    levothyroxine (SYNTHROID/LEVOTHROID) 25 MCG tablet TAKE 1/2 TABLET BY MOUTH ONCE DAILY WITH 150MCG TABLET 45 tablet 0    multivitamin w/minerals (THERA-VIT-M) tablet Take 1 tablet by mouth daily      pravastatin (PRAVACHOL) 40 MG tablet TAKE 1 TABLET BY MOUTH ONCE DAILY 90 tablet 3       ALLERGIES:      Allergies   Allergen Reactions    Morphine Hives         FAMILY HISTORY: No pertinent family history, reviewed in EMR.    SOCIAL HISTORY:   Social History     Socioeconomic History    Marital status: Single     Spouse name: Not on file    Number of children: Not on file    Years of education: Not on file    Highest education level: Not on file   Occupational History    Not on file   Tobacco Use    Smoking status: Never    Smokeless tobacco: Never   Substance and Sexual Activity    Alcohol use: Yes     Comment: Occ     Drug use: No    Sexual activity: Yes     Partners: Female   Other Topics Concern    Parent/sibling w/ CABG, MI or angioplasty before 65F 55M? Not Asked   Social History Narrative    Not on file     Social Determinants of Health     Financial Resource Strain: Low Risk  (9/25/2023)    Financial Resource Strain     Within the past 12 months, have you or your family members you live with been unable to get utilities (heat, electricity) when it was really needed?: No   Food Insecurity: Low Risk  (9/25/2023)    Food Insecurity     Within the past 12 months, did you worry that your food would run out before you got money to buy more?: No     Within the past 12 months, did the food you bought just not last and you didn t have money to get more?: No  "  Transportation Needs: Low Risk  (9/25/2023)    Transportation Needs     Within the past 12 months, has lack of transportation kept you from medical appointments, getting your medicines, non-medical meetings or appointments, work, or from getting things that you need?: No   Physical Activity: Not on file   Stress: Not on file   Social Connections: Not on file   Interpersonal Safety: Low Risk  (9/26/2023)    Interpersonal Safety     Do you feel physically and emotionally safe where you currently live?: Yes     Within the past 12 months, have you been hit, slapped, kicked or otherwise physically hurt by someone?: No     Within the past 12 months, have you been humiliated or emotionally abused in other ways by your partner or ex-partner?: No   Housing Stability: Low Risk  (9/25/2023)    Housing Stability     Do you have housing? : Yes     Are you worried about losing your housing?: No       REVIEW OF SYSTEMS: Positive for that noted in past medical history and history of present illness and otherwise reviewed in EMR    PHYSICAL EXAM:  Patient is 5' 11\" and weighs 224 lbs 0 oz Ht 1.803 m (5' 11\")   Wt 101.6 kg (224 lb)   BMI 31.24 kg/m    Body mass index is 31.24 kg/m .   Constitutional: Well-developed, well-nourished, healthy appearing adult.  Skin: Warm, dry   HEENT: Normal  Cardiac: Well perfused extremities, strong 2+ peripheral pulses. No edema.   Pulmonary: Breathing room air    Musculoskeletal:   Right Shoulder:  AROM right shoulder: 170/160/50/T12   AROM left shoulder: 170/160/50/T12   4/5 supraspinatus, 4/5 infraspinatus, 5/5 subscapularis  positive AC joint pain, positive cross body adduction  positive Neer and Hedrick impingement signs  negative belly-press/lift-off  positive Speed's test  negative Apprehension  negative Jerk test  Neurovascular exam and cervical spine exam are normal.     IMAGING: Personally reviewed the prior x-rays and MRI which demonstrate that there is high-grade bursal sided tearing " with near full-thickness tear of the anterior supraspinatus, there is some edema at the AC joint, some edema around the biceps tendon that looks thickened within the groove, no fatty atrophy of the rotator cuff    IMPRESSION: 52 year old-year-old left hand dominant adult, with nontraumatic right shoulder small full-thickness rotator cuff tear with AC joint arthropathy    PLAN:     I discussed with the patient the etiology of their condition. We discussed at length the options as noted above.  I went through the results of the MRI with the patient today.  We discussed that there is evidence of a near full-thickness bursal sided small supraspinatus tear, patient is also tender at the AC joint, may have some biceps involvement as well.  We discussed options including conservative versus surgical intervention.  Conservatively we discussed continued physical therapy and home exercises, over-the-counter analgesics.  As the patient's had 2 cortisone injections I would not recommend a repeat injection.  However given that the patient has failed nonoperative treatment for a year and a half including cortisone, home exercises and physical therapy, over-the-counter analgesics we also discussed surgical treatment options.  We discussed arthroscopy of the shoulder with likely rotator cuff repair, distal clavicle resection, possible biceps tenodesis.  We discussed risk and benefits of surgery as well as the anticipated rehab course.  Given the patient's failure to progress with nonoperative treatment would like to proceed with operative intervention.  This can be done on an elective basis.    After going over these options, Andrew would like to proceed with right shoulder arthroscopy, subacromial decompression, rotator cuff repair, distal clavicle resection and related procedures. We reviewed the risks and benefits of surgery including but not limited to the following: Risk of anesthesia, including death; infection;  nerve/tendon/vessel injury; deep venous thrombosis (DVT), pulmonary embolism (PE); shoulder stiffness, possible need to treat the biceps tendon including but not limited to tenotomy (cutting the tendon) or arthroscopic biceps tenodesis (securing the tendon into a bone socket in the humerus through an arthroscopic incision); rotator cuff repair non-healing or re-tear; hardware- related problems; potential of rotator cuff repair irreparability (not able to repair the torn tendons), potential of the procedure to not alleviate the condition and continued pain; and the potential need for further surgery in the future. We also discussed the possible use of biologic augmentation with a collagen scaffold or an allograft dermal (skin) graft depending on the tissue quality, tear size, and intraoperative determination of healing potential.     After going over these risks, benefits and alternatives Andrew would like to proceed with surgery. All of Keven Atkinson's questions were answered satisfactorily and Keven Atkinson signed the surgical consent form to proceed. All appropriate paperwork was completed and Keven Atkinson will work with our surgical scheduling department to coordinate the surgery.      At the conclusion of the office visit, Andrew verbally acknowledged that I answered all of Keven Atkinson's questions satisfactorily.    Rebecca Meza MD  Orthopedic Surgery Sports Medicine and Shoulder Surgery

## 2023-10-11 ENCOUNTER — HOSPITAL ENCOUNTER (OUTPATIENT)
Dept: MRI IMAGING | Facility: CLINIC | Age: 53
Discharge: HOME | End: 2023-10-11
Attending: PEDIATRICS | Admitting: PEDIATRICS
Payer: COMMERCIAL

## 2023-10-11 DIAGNOSIS — S89.91XA INJURY OF RIGHT KNEE, INITIAL ENCOUNTER: ICD-10-CM

## 2023-10-11 PROCEDURE — 73721 MRI JNT OF LWR EXTRE W/O DYE: CPT | Mod: RT

## 2023-10-12 ENCOUNTER — OFFICE VISIT (OUTPATIENT)
Dept: ORTHOPEDICS | Facility: CLINIC | Age: 53
End: 2023-10-12
Attending: PEDIATRICS
Payer: COMMERCIAL

## 2023-10-12 ENCOUNTER — TELEPHONE (OUTPATIENT)
Dept: CARDIOLOGY | Facility: CLINIC | Age: 53
End: 2023-10-12

## 2023-10-12 ENCOUNTER — PRE VISIT (OUTPATIENT)
Dept: ORTHOPEDICS | Facility: CLINIC | Age: 53
End: 2023-10-12

## 2023-10-12 ENCOUNTER — TELEPHONE (OUTPATIENT)
Dept: ORTHOPEDICS | Facility: CLINIC | Age: 53
End: 2023-10-12

## 2023-10-12 VITALS — HEIGHT: 71 IN | WEIGHT: 224 LBS | BODY MASS INDEX: 31.36 KG/M2

## 2023-10-12 DIAGNOSIS — M25.511 CHRONIC RIGHT SHOULDER PAIN: ICD-10-CM

## 2023-10-12 DIAGNOSIS — G89.29 CHRONIC RIGHT SHOULDER PAIN: ICD-10-CM

## 2023-10-12 DIAGNOSIS — M75.101 NONTRAUMATIC TEAR OF RIGHT ROTATOR CUFF, UNSPECIFIED TEAR EXTENT: Primary | ICD-10-CM

## 2023-10-12 PROCEDURE — 99204 OFFICE O/P NEW MOD 45 MIN: CPT | Performed by: ORTHOPAEDIC SURGERY

## 2023-10-12 NOTE — TELEPHONE ENCOUNTER
Health Call Center    Phone Message    May a detailed message be left on voicemail: yes     Reason for Call: Other: Pt is having surgery and was wondering if he should postpone all cardiology appts until after. Please call pt to discuss. Thank you     Action Taken: Message routed to:  Other: Cardiology    Travel Screening: Not Applicable      Thank you!  [facility]

## 2023-10-12 NOTE — TELEPHONE ENCOUNTER
Procedure: Right arthroscopic rotator cuff repair, distal clavicle resection, subacromial decompression, possible biceps tenodesis vs tenotomy  Facility: [facility]  Length: 120 minutes  Anesthesia: General, Interscalene Block  Post-op appointments needed: 1 weeks provider only, 6 weeks with provider only.  Surgery packet/instructions given to patient?  Yes     Vijay Tamayo RN

## 2023-10-12 NOTE — TELEPHONE ENCOUNTER
Attempted to call patient to gather further information. Left message for patient.    Allison Henderson, RN, BSN  Cardiology RN Care Coordinator   [place]   Phone: [phone]  Fax: [phone] ([place]) [phone] ([place])

## 2023-10-12 NOTE — NURSING NOTE
"Pre-Operative Teaching Flowsheet     Person(s) involved in teaching: Patient     Motivation Level:  Receptive (willing/able to accept information) and asks appropriate questions where applicable: Yes  Any cultural factors/Sikhism beliefs that may influence understanding or compliance? No     Patient demonstrates understanding of the following:  Pre-operative planning, including the necessary appointments and preparation needed prior to surgery: Yes  Which situations necessitate calling provider and whom to contact: Yes  Pain management techniques pre and post op: Yes  Stoplight tool introduced, questions answered, patient expressed understanding: Yes  How, and when, to access community resources: Yes  Discussed appropriate and safe discharge to home: Yes  Patient has a designated  for surgery and \"\" to stay with them after: Yes    Additional Teaching Concerns Addressed:  Post-operative living arrangements and necessary adaptations to living environment.  Instructional Materials Used/Given: Yes, pre-op packet given to patient with additional system forms added as needed depending on type of surgery. Pre-op soap given (if in clinic).     Time spent with patient: 20 minutes.    Vijay Tamayo RN    "

## 2023-10-12 NOTE — LETTER
10/12/2023         RE: Andrew Atkinson  [address] [address]        Dear Colleague,    Thank you for referring your patient, Andrew Atkinson, to the [facility]. Please see a copy of my visit note below.    CHIEF COMPLAINT: Right shoulder pain    DIAGNOSIS: Right shoulder rotator cuff tear, AC joint arthropathy    OCCUPATION/SPORT: Construction owner    HPI:   Andrew Atkinson is a very pleasant 52 year old, left-hand dominant adult who presents for evaluation of right shoulder pain.  Symptoms started about a year and a half ago. There was not a precipitating event. The pain is located to the anterior shoulder. Worst pain is rated a 10 of 10, and current pain is rated at 5 of 10. Symptoms are worsened by abduction, sleeping. Symptoms are improved with nothing. Patient has tried right GH injection in June 2023, physical therapy, ice, heat, tylenol, ibuprofen, HEP with no relief. Associated symptoms include popping and clicking, burning, catching. Patient has some radiating down the arm, minimal numbness. No other concerns or complaints at this time.  SANE score R - 70 L - 100    PAST MEDICAL HISTORY:  Past Medical History:   Diagnosis Date     Medial meniscus tear      TIFFANY (obstructive sleep apnea) 3/2/2023       PAST SURGICAL HISTORY:  Past Surgical History:   Procedure Laterality Date     ARTHROSCOPY KNEE WITH MEDIAL MENISCECTOMY Left 5/12/2017    Procedure: ARTHROSCOPY KNEE WITH MEDIAL MENISCECTOMY;  Arthroscopic partial medial menisectomy,left knee;  Surgeon: Grant Muñoz MD;  Location: [facility]     COLONOSCOPY N/A 7/18/2023    Procedure: Colonoscopy with polypectomy;  Surgeon: Wilfredo Molina MD;  Location:  GI     CV CORONARY ANGIOGRAM N/A 2/15/2020    Procedure: Coronary Angiogram;  Surgeon: Rinku Boyce MD;  Location:  [facility]     LAPAROSCOPIC HERNIORRHAPHY UMBILICAL N/A 12/8/2020    Procedure: umbilical hernia repair;   Surgeon: Miguel Ángel Arnold DO;  Location: [facility]       CURRENT MEDICATIONS:  Current Outpatient Medications   Medication Sig Dispense Refill     acetaminophen (TYLENOL) 325 MG tablet Take 2 tablets (650 mg) by mouth every 6 hours as needed for mild pain Alternate with ibuprofen so you are taking one or the other every three hours. For example take tylenol at 5am, then ibuprofen at 8am, then tylenol at 11am, and so on. 50 tablet 0     diltiazem ER (DILT-XR) 180 MG 24 hr capsule Take 1 capsule (180 mg) by mouth daily 90 capsule 1     levothyroxine (SYNTHROID/LEVOTHROID) 150 MCG tablet TAKE 1 TABLET BY MOUTH ONCE DAILY 90 tablet 0     levothyroxine (SYNTHROID/LEVOTHROID) 25 MCG tablet TAKE 1/2 TABLET BY MOUTH ONCE DAILY WITH 150MCG TABLET 45 tablet 0     multivitamin w/minerals (THERA-VIT-M) tablet Take 1 tablet by mouth daily       pravastatin (PRAVACHOL) 40 MG tablet TAKE 1 TABLET BY MOUTH ONCE DAILY 90 tablet 3       ALLERGIES:      Allergies   Allergen Reactions     Morphine Hives         FAMILY HISTORY: No pertinent family history, reviewed in EMR.    SOCIAL HISTORY:   Social History     Socioeconomic History     Marital status: Single     Spouse name: Not on file     Number of children: Not on file     Years of education: Not on file     Highest education level: Not on file   Occupational History     Not on file   Tobacco Use     Smoking status: Never     Smokeless tobacco: Never   Substance and Sexual Activity     Alcohol use: Yes     Comment: Occ      Drug use: No     Sexual activity: Yes     Partners: Female   Other Topics Concern     Parent/sibling w/ CABG, MI or angioplasty before 65F 55M? Not Asked   Social History Narrative     Not on file     Social Determinants of Health     Financial Resource Strain: Low Risk  (9/25/2023)    Financial Resource Strain      Within the past 12 months, have you or your family members you live with been unable to get utilities (heat, electricity) when it was really needed?:  "No   Food Insecurity: Low Risk  (9/25/2023)    Food Insecurity      Within the past 12 months, did you worry that your food would run out before you got money to buy more?: No      Within the past 12 months, did the food you bought just not last and you didn t have money to get more?: No   Transportation Needs: Low Risk  (9/25/2023)    Transportation Needs      Within the past 12 months, has lack of transportation kept you from medical appointments, getting your medicines, non-medical meetings or appointments, work, or from getting things that you need?: No   Physical Activity: Not on file   Stress: Not on file   Social Connections: Not on file   Interpersonal Safety: Low Risk  (9/26/2023)    Interpersonal Safety      Do you feel physically and emotionally safe where you currently live?: Yes      Within the past 12 months, have you been hit, slapped, kicked or otherwise physically hurt by someone?: No      Within the past 12 months, have you been humiliated or emotionally abused in other ways by your partner or ex-partner?: No   Housing Stability: Low Risk  (9/25/2023)    Housing Stability      Do you have housing? : Yes      Are you worried about losing your housing?: No       REVIEW OF SYSTEMS: Positive for that noted in past medical history and history of present illness and otherwise reviewed in EMR    PHYSICAL EXAM:  Patient is 5' 11\" and weighs 224 lbs 0 oz Ht 1.803 m (5' 11\")   Wt 101.6 kg (224 lb)   BMI 31.24 kg/m    Body mass index is 31.24 kg/m .   Constitutional: Well-developed, well-nourished, healthy appearing adult.  Skin: Warm, dry   HEENT: Normal  Cardiac: Well perfused extremities, strong 2+ peripheral pulses. No edema.   Pulmonary: Breathing room air    Musculoskeletal:   Right Shoulder:  AROM right shoulder: 170/160/50/T12   AROM left shoulder: 170/160/50/T12   4/5 supraspinatus, 4/5 infraspinatus, 5/5 subscapularis  positive AC joint pain, positive cross body adduction  positive Neer and " Hedrick impingement signs  negative belly-press/lift-off  positive Speed's test  negative Apprehension  negative Jerk test  Neurovascular exam and cervical spine exam are normal.     IMAGING: Personally reviewed the prior x-rays and MRI which demonstrate that there is high-grade bursal sided tearing with near full-thickness tear of the anterior supraspinatus, there is some edema at the AC joint, some edema around the biceps tendon that looks thickened within the groove, no fatty atrophy of the rotator cuff    IMPRESSION: 52 year old-year-old left hand dominant adult, with nontraumatic right shoulder small full-thickness rotator cuff tear with AC joint arthropathy    PLAN:     I discussed with the patient the etiology of their condition. We discussed at length the options as noted above.  I went through the results of the MRI with the patient today.  We discussed that there is evidence of a near full-thickness bursal sided small supraspinatus tear, patient is also tender at the AC joint, may have some biceps involvement as well.  We discussed options including conservative versus surgical intervention.  Conservatively we discussed continued physical therapy and home exercises, over-the-counter analgesics.  As the patient's had 2 cortisone injections I would not recommend a repeat injection.  However given that the patient has failed nonoperative treatment for a year and a half including cortisone, home exercises and physical therapy, over-the-counter analgesics we also discussed surgical treatment options.  We discussed arthroscopy of the shoulder with likely rotator cuff repair, distal clavicle resection, possible biceps tenodesis.  We discussed risk and benefits of surgery as well as the anticipated rehab course.  Given the patient's failure to progress with nonoperative treatment would like to proceed with operative intervention.  This can be done on an elective basis.    After going over these options, Andrew  would like to proceed with right shoulder arthroscopy, subacromial decompression, rotator cuff repair, distal clavicle resection and related procedures. We reviewed the risks and benefits of surgery including but not limited to the following: Risk of anesthesia, including death; infection; nerve/tendon/vessel injury; deep venous thrombosis (DVT), pulmonary embolism (PE); shoulder stiffness, possible need to treat the biceps tendon including but not limited to tenotomy (cutting the tendon) or arthroscopic biceps tenodesis (securing the tendon into a bone socket in the humerus through an arthroscopic incision); rotator cuff repair non-healing or re-tear; hardware- related problems; potential of rotator cuff repair irreparability (not able to repair the torn tendons), potential of the procedure to not alleviate the condition and continued pain; and the potential need for further surgery in the future. We also discussed the possible use of biologic augmentation with a collagen scaffold or an allograft dermal (skin) graft depending on the tissue quality, tear size, and intraoperative determination of healing potential.     After going over these risks, benefits and alternatives Andrew would like to proceed with surgery. All of Keven Atkinson's questions were answered satisfactorily and Keven Atkinson signed the surgical consent form to proceed. All appropriate paperwork was completed and Keven Atkinson will work with our surgical scheduling department to coordinate the surgery.      At the conclusion of the office visit, Andrew verbally acknowledged that I answered all of Keven Atkinson's questions satisfactorily.    Rebecca White MD  Orthopedic Surgery Sports Medicine and Shoulder Surgery      Again, thank you for allowing me to participate in the care of your patient.        Sincerely,        REBECCA WHITE MD

## 2023-10-12 NOTE — TELEPHONE ENCOUNTER
Date Scheduled: 11-14-23  Facility: Surgery Locations: [facility]  Surgeon: Dr. Meza   Post-op appointment scheduled:    scheduled?: No  Surgery packet/instructions confirmed received?  Yes  Pre op physical/PAC appointment:   Special Considerations:     Rosanne Maher  Surgery Scheduling Coordinator  Ph: [phone]

## 2023-10-13 NOTE — TELEPHONE ENCOUNTER
Date: 10/13/2023    Time of Call: 1:20 PM     Diagnosis:  Hypertrophic Cardiomyopathy, HTN, HL     [ TORB ] Ordering provider: Dr. Lomeli  Order: Okay for patient to push back cardiac appointments including echo, zio and follow up until after patient feels he has recovered from surgery and feels financially stable. Patient to discuss with surgeon and PCP if cardiac clearance is needed for surgery.      Order received by: Allison Henderson RN     Follow-up/additional notes: Called and relayed to patient that if he wishes to reschedule appointments he can. He stated he will call if he chooses to reschedule. Patient also states that he will reach out to surgeon and PCP if cardiac clearance is needed. Patient to update clinic if he needs to be seen sooner.    Allison Henderson RN, BSN  Cardiology RN Care Coordinator   [place]   Phone: [phone]  Fax: [phone] ([place]) [phone] ([place])

## 2023-10-13 NOTE — TELEPHONE ENCOUNTER
Called and spoke with patient. Patient states that he is having shoulder repair on November 14th. He states that he is currently scheduled for an echocardiogram and zio monitor on 11/9/23. He is then scheduled to see Dr. Lomeli on 12/14/23. Patient is wondering if all appointments can be pushed back.     Discussed surgery with patient. He states that he was not informed that he needs cardiac clearance. Patient has a preop appointment in place for upcoming surgery with PCP.    Patient states that he is feeling good. However with the upcoming surgery he states that financially, he will have a difficult time covering for the imaging and the monitor at this time. Patient would like to postpone until he is in a better financial spot and is done recovering from surgery. Informed patient that provider would be updated to review.     Allison Henderson RN, BSN  Cardiology RN Care Coordinator   [place]   Phone: [phone]  Fax: [phone] ([place]) [phone] ([place])

## 2023-10-20 NOTE — PROGRESS NOTES
ASSESSMENT & PLAN    Keven was seen today for follow up.    Diagnoses and all orders for this visit:    Primary osteoarthritis of right knee  -     (PRE-AUTH REQUEST) 48 mg hylan (SYNVISC ONE) injection 48 mg/6mL-ONCE    Injury of right knee, subsequent encounter      Reviewed options for the knee, he desires trial of Visco next, prior Auth placed.  See below.  Questions answered. Discussed signs and symptoms that may indicate more serious issues; the patient was instructed to seek appropriate care if noted. Keven indicates understanding of these issues and agrees with the plan.    See Patient Instructions  Patient Instructions   We reviewed the right knee MRI findings, demonstrating some medial meniscus tearing, as well as some medial compartment degenerative change, or osteoarthritis.  Given the degree of the arthritic change present, I think that is the better place to focus for now, rather than just on the meniscus; meniscus tearing can certainly happen with other degenerative change.  We discussed considerations around trial of a medial  brace or physical therapy, referral to physical therapy for treatment as well, also injection options (steroid versus Visco supplement), use of anti-inflammatory medications, and referral on to discuss further with orthopedic surgery.  Following discussion, plan to submit prior authorization with insurance for Visco supplement injection, and get you set up with one of my colleagues for imaging guidance for the shot.  If the injection is not approved, we may reconsider other measures, including trial of , trial of physical therapy, possibly steroid injection.  Otherwise, assuming injection is approved and you have it done, plan to monitor course at least 3 to 4 weeks.    If you have any further questions for your physician or physician s care team you can contact them thru iNeed[employer] or by calling [phone].      Robert Briggs [facility]  [facility]    SUBJECTIVE- Interim History October 20, 2023    No chief complaint on file.      Andrew Atkinson is a 52 year old adult who is seen in f/u up for right knee recheck. Since last visit on 10/3/23, patient is here to go over MRI results of right knee. Still having pain, but has not been working as much which seems to help. Icing and taking ibuprofen prn.   - Now ~ 1 year, chronic from initial onset      REVIEW OF SYSTEMS:  Review of Systems    OBJECTIVE:  Pulse 64   Wt 101.6 kg (224 lb)   SpO2 95%   BMI 31.24 kg/m       Right knee:  Effusion essentially resolved.  Grossly full range of motion.  Some medial joint line tenderness present.    RADIOLOGY:  Final results and radiologist's interpretation, available in the [facility] health record.  Images were reviewed with the patient in the office today.  My personal interpretation of the performed imaging: Medial compartment arthrosis, with areas of full-thickness chondral loss medial femoral condyle, as well as some medial meniscus tearing.      Results for orders placed or performed during the hospital encounter of 10/11/23   MR Knee Right w/o Contrast    Narrative    EXAM: MR KNEE RIGHT W/O CONTRAST  LOCATION: [facility]  DATE: 10/11/2023    INDICATION: evaluate for possible derangement, meniscus injury. Pain.  COMPARISON: None.  TECHNIQUE: Unenhanced.    FINDINGS:    MEDIAL COMPARTMENT:   -Meniscus: Partial tearing of the medial meniscus at the junction of the posterior horn/posterior root. Additional intrasubstance signal abnormality and undersurface/free edge irregularity at the body/posterior horn.  -Cartilage: Moderate to high-grade chondral loss along the central weightbearing surface of the medial femoral condyle with underlying subchondral marrow edema.    LATERAL COMPARTMENT:  -Meniscus: Intact.  -Cartilage: No significant cartilage defect.    PATELLOFEMORAL COMPARTMENT:   -Alignment: Patella  midline. No subluxation or tilting.   -Cartilage: No significant cartilage defect.    CRUCIATE LIGAMENTS:   -ACL: Intact.  -PCL: Intact.    COLLATERAL LIGAMENTS:   -Medial collateral ligament: Superficial and deep fibers are intact.  -Lateral collateral ligament: Intact.    POSTEROMEDIAL CORNER:  -Distal semimembranosus tendon is normal.   -Pes anserine tendons are normal. Posteromedial corner complex ligaments are intact.    POSTEROLATERAL CORNER:   -Popliteal tendon is intact. No tendinopathy. Trace fluid in the popliteal tendon sheath.  -Biceps femoris tendon and posterolateral corner complex ligaments are intact.    EXTENSOR MECHANISM:   -Quadriceps tendon: Intact.  -Patellar tendon: Mild proximal patellar tendinopathy.  -Patellofemoral ligaments and retinacula: Intact.    JOINT:   -Moderate joint effusion with synovitis.    BONES:  -No fracture or concerning marrow replacing lesion.    SOFT TISSUES:   -No Baker's cyst.    -Mild prepatellar soft tissue swelling.        Impression    IMPRESSION:  1.  Partial tearing of the medial meniscus at the junction of the posterior horn/posterior root. Additional intrasubstance signal abnormality undersurface/free edge irregularity at the body/posterior horn.  2.  Moderate to high-grade chondral loss along the central weightbearing surface of the medial femoral condyle with underlying subchondral marrow edema.  3.  Mild proximal patellar tendinopathy.  4.  Moderate joint effusion with synovitis.  5.  Mild prepatellar soft tissue swelling.

## 2023-10-21 ENCOUNTER — OFFICE VISIT (OUTPATIENT)
Dept: ORTHOPEDICS | Facility: CLINIC | Age: 53
End: 2023-10-21
Payer: COMMERCIAL

## 2023-10-21 VITALS — WEIGHT: 224 LBS | OXYGEN SATURATION: 95 % | HEART RATE: 64 BPM | BODY MASS INDEX: 31.24 KG/M2

## 2023-10-21 DIAGNOSIS — M17.11 PRIMARY OSTEOARTHRITIS OF RIGHT KNEE: Primary | ICD-10-CM

## 2023-10-21 DIAGNOSIS — S89.91XD INJURY OF RIGHT KNEE, SUBSEQUENT ENCOUNTER: ICD-10-CM

## 2023-10-21 PROCEDURE — 99213 OFFICE O/P EST LOW 20 MIN: CPT | Performed by: PEDIATRICS

## 2023-10-21 NOTE — LETTER
10/21/2023         RE: Andrew Atkinson  [address] [id number]        Dear Colleague,    Thank you for referring your patient, Andrew Atkinson, to the [facility]. Please see a copy of my visit note below.    ASSESSMENT & PLAN    Keven was seen today for follow up.    Diagnoses and all orders for this visit:    Primary osteoarthritis of right knee  -     (PRE-AUTH REQUEST) 48 mg hylan (SYNVISC ONE) injection 48 mg/6mL-ONCE    Injury of right knee, subsequent encounter      Reviewed options for the knee, he desires trial of Visco next, prior Auth placed.  See below.  Questions answered. Discussed signs and symptoms that may indicate more serious issues; the patient was instructed to seek appropriate care if noted. Keven indicates understanding of these issues and agrees with the plan.    See Patient Instructions  Patient Instructions   We reviewed the right knee MRI findings, demonstrating some medial meniscus tearing, as well as some medial compartment degenerative change, or osteoarthritis.  Given the degree of the arthritic change present, I think that is the better place to focus for now, rather than just on the meniscus; meniscus tearing can certainly happen with other degenerative change.  We discussed considerations around trial of a medial  brace or physical therapy, referral to physical therapy for treatment as well, also injection options (steroid versus Visco supplement), use of anti-inflammatory medications, and referral on to discuss further with orthopedic surgery.  Following discussion, plan to submit prior authorization with insurance for Visco supplement injection, and get you set up with one of my colleagues for imaging guidance for the shot.  If the injection is not approved, we may reconsider other measures, including trial of , trial of physical therapy, possibly steroid injection.  Otherwise, assuming injection is  approved and you have it done, plan to monitor course at least 3 to 4 weeks.    If you have any further questions for your physician or physician s care team you can contact them thru [employer] or by calling [phone].      Robert Briggs DO  [facility]    SUBJECTIVE- Interim History October 20, 2023    No chief complaint on file.      Andrew Atkinson is a 52 year old adult who is seen in f/u up for right knee recheck. Since last visit on 10/3/23, patient is here to go over MRI results of right knee. Still having pain, but has not been working as much which seems to help. Icing and taking ibuprofen prn.   - Now ~ 1 year, chronic from initial onset      REVIEW OF SYSTEMS:  Review of Systems    OBJECTIVE:  Pulse 64   Wt 101.6 kg (224 lb)   SpO2 95%   BMI 31.24 kg/m       Right knee:  Effusion essentially resolved.  Grossly full range of motion.  Some medial joint line tenderness present.    RADIOLOGY:  Final results and radiologist's interpretation, available in the [facility] health record.  Images were reviewed with the patient in the office today.  My personal interpretation of the performed imaging: Medial compartment arthrosis, with areas of full-thickness chondral loss medial femoral condyle, as well as some medial meniscus tearing.      Results for orders placed or performed during the hospital encounter of 10/11/23   MR Knee Right w/o Contrast    Narrative    EXAM: MR KNEE RIGHT W/O CONTRAST  LOCATION: [facility]  DATE: 10/11/2023    INDICATION: evaluate for possible derangement, meniscus injury. Pain.  COMPARISON: None.  TECHNIQUE: Unenhanced.    FINDINGS:    MEDIAL COMPARTMENT:   -Meniscus: Partial tearing of the medial meniscus at the junction of the posterior horn/posterior root. Additional intrasubstance signal abnormality and undersurface/free edge irregularity at the body/posterior horn.  -Cartilage: Moderate to high-grade  chondral loss along the central weightbearing surface of the medial femoral condyle with underlying subchondral marrow edema.    LATERAL COMPARTMENT:  -Meniscus: Intact.  -Cartilage: No significant cartilage defect.    PATELLOFEMORAL COMPARTMENT:   -Alignment: Patella midline. No subluxation or tilting.   -Cartilage: No significant cartilage defect.    CRUCIATE LIGAMENTS:   -ACL: Intact.  -PCL: Intact.    COLLATERAL LIGAMENTS:   -Medial collateral ligament: Superficial and deep fibers are intact.  -Lateral collateral ligament: Intact.    POSTEROMEDIAL CORNER:  -Distal semimembranosus tendon is normal.   -Pes anserine tendons are normal. Posteromedial corner complex ligaments are intact.    POSTEROLATERAL CORNER:   -Popliteal tendon is intact. No tendinopathy. Trace fluid in the popliteal tendon sheath.  -Biceps femoris tendon and posterolateral corner complex ligaments are intact.    EXTENSOR MECHANISM:   -Quadriceps tendon: Intact.  -Patellar tendon: Mild proximal patellar tendinopathy.  -Patellofemoral ligaments and retinacula: Intact.    JOINT:   -Moderate joint effusion with synovitis.    BONES:  -No fracture or concerning marrow replacing lesion.    SOFT TISSUES:   -No Baker's cyst.    -Mild prepatellar soft tissue swelling.        Impression    IMPRESSION:  1.  Partial tearing of the medial meniscus at the junction of the posterior horn/posterior root. Additional intrasubstance signal abnormality undersurface/free edge irregularity at the body/posterior horn.  2.  Moderate to high-grade chondral loss along the central weightbearing surface of the medial femoral condyle with underlying subchondral marrow edema.  3.  Mild proximal patellar tendinopathy.  4.  Moderate joint effusion with synovitis.  5.  Mild prepatellar soft tissue swelling.             Again, thank you for allowing me to participate in the care of your patient.        Sincerely,        Robert Briggs DO

## 2023-10-21 NOTE — PATIENT INSTRUCTIONS
We reviewed the right knee MRI findings, demonstrating some medial meniscus tearing, as well as some medial compartment degenerative change, or osteoarthritis.  Given the degree of the arthritic change present, I think that is the better place to focus for now, rather than just on the meniscus; meniscus tearing can certainly happen with other degenerative change.  We discussed considerations around trial of a medial  brace or physical therapy, referral to physical therapy for treatment as well, also injection options (steroid versus Visco supplement), use of anti-inflammatory medications, and referral on to discuss further with orthopedic surgery.  Following discussion, plan to submit prior authorization with insurance for Visco supplement injection, and get you set up with one of my colleagues for imaging guidance for the shot.  If the injection is not approved, we may reconsider other measures, including trial of , trial of physical therapy, possibly steroid injection.  Otherwise, assuming injection is approved and you have it done, plan to monitor course at least 3 to 4 weeks.    If you have any further questions for your physician or physician s care team you can contact them thru [email or web address] or by calling [phone].

## 2023-10-27 ENCOUNTER — OFFICE VISIT (OUTPATIENT)
Dept: FAMILY MEDICINE | Facility: CLINIC | Age: 53
End: 2023-10-27
Payer: COMMERCIAL

## 2023-10-27 VITALS
HEART RATE: 51 BPM | OXYGEN SATURATION: 97 % | DIASTOLIC BLOOD PRESSURE: 74 MMHG | SYSTOLIC BLOOD PRESSURE: 117 MMHG | WEIGHT: 229.6 LBS | BODY MASS INDEX: 32.02 KG/M2 | TEMPERATURE: 97.6 F

## 2023-10-27 DIAGNOSIS — E11.9 TYPE 2 DIABETES MELLITUS WITHOUT COMPLICATION, WITHOUT LONG-TERM CURRENT USE OF INSULIN (H): Primary | ICD-10-CM

## 2023-10-27 DIAGNOSIS — Z01.818 PRE-OP EVALUATION: ICD-10-CM

## 2023-10-27 DIAGNOSIS — Z01.818 PREOP GENERAL PHYSICAL EXAM: ICD-10-CM

## 2023-10-27 DIAGNOSIS — I42.2 HYPERTROPHIC CARDIOMYOPATHY (H): ICD-10-CM

## 2023-10-27 DIAGNOSIS — E03.9 ACQUIRED HYPOTHYROIDISM: ICD-10-CM

## 2023-10-27 DIAGNOSIS — I47.29 NSVT (NONSUSTAINED VENTRICULAR TACHYCARDIA) (H): ICD-10-CM

## 2023-10-27 LAB — HGB BLD-MCNC: 16 G/DL (ref 11.7–17.7)

## 2023-10-27 PROCEDURE — 85018 HEMOGLOBIN: CPT | Performed by: FAMILY MEDICINE

## 2023-10-27 PROCEDURE — 99214 OFFICE O/P EST MOD 30 MIN: CPT | Performed by: FAMILY MEDICINE

## 2023-10-27 PROCEDURE — 93000 ELECTROCARDIOGRAM COMPLETE: CPT | Performed by: FAMILY MEDICINE

## 2023-10-27 PROCEDURE — 36415 COLL VENOUS BLD VENIPUNCTURE: CPT | Performed by: FAMILY MEDICINE

## 2023-10-27 RX ORDER — LEVOTHYROXINE SODIUM 150 UG/1
150 TABLET ORAL DAILY
Qty: 90 TABLET | Refills: 3 | Status: SHIPPED | OUTPATIENT
Start: 2023-10-27 | End: 2024-10-04

## 2023-10-27 RX ORDER — LEVOTHYROXINE SODIUM 25 UG/1
TABLET ORAL
Qty: 90 TABLET | Refills: 3 | Status: SHIPPED | OUTPATIENT
Start: 2023-10-27 | End: 2024-10-04

## 2023-10-27 NOTE — PROGRESS NOTES
[facility]  [address] [place] [address]  [place] [address]  Phone: [phone]  Primary Provider: Wilfredo Chan  Pre-op Performing Provider: WILFREDO CHAN      PREOPERATIVE EVALUATION:  Today's date: 10/27/2023    Keven is a 53 year old adult who presents for a preoperative evaluation.      10/27/2023     1:43 PM   Additional Questions   Roomed by Oksana   Accompanied by none         10/27/2023     1:43 PM   Patient Reported Additional Medications   Patient reports taking the following new medications none       Surgical Information:  Surgery/Procedure: RIGHT ARTHROSCOPY, SHOULDER, WITH 1 OR MORE OF ROTATOR CUFF REPAIR, SUBACROMIAL SPACE DECOMPRESSION with bony acromioplasty, DISTAL CLAVICLE RESECTION, AND possible biceps tenodesis versus TENOTOMY   Surgery Location: [facility]  Surgeon: Dr. Meza  Surgery Date: 11/14/2023  Time of Surgery: 12:50pm  Where patient plans to recover: At home with family  Fax number for surgical facility: Note does not need to be faxed, will be available electronically in Epic.    Assessment & Plan     The proposed surgical procedure is considered INTERMEDIATE risk.      ICD-10-CM    1. Diabetes mellitus, type 2 (H)  E11.9       2. Hypertrophic cardiomyopathy (H)  I42.2 EKG 12-lead complete w/read - Clinics      3. NSVT (nonsustained ventricular tachycardia) (H)  I47.29 EKG 12-lead complete w/read - Clinics      4. Pre-op evaluation  Z01.818 Hemoglobin     Hemoglobin      5. Acquired hypothyroidism  E03.9 levothyroxine (SYNTHROID/LEVOTHROID) 25 MCG tablet     levothyroxine (SYNTHROID/LEVOTHROID) 150 MCG tablet                    - No identified additional risk factors other than previously addressed    Antiplatelet or Anticoagulation Medication Instructions:   - Patient is on no antiplatelet or anticoagulation medications.    Additional Medication Instructions:  Patient is to take all scheduled  medications on the day of surgery    RECOMMENDATION:  APPROVAL GIVEN to proceed with proposed procedure, without further diagnostic evaluation.    Review of external notes as documented elsewhere in note        Subjective       HPI related to upcoming procedure: Patient presents for pre-op evaluation in anticipation for right shoulder arthroscopy        10/27/2023     1:35 PM   Preop Questions   1. Have you ever had a heart attack or stroke? No   2. Have you ever had surgery on your heart or blood vessels, such as a stent placement, a coronary artery bypass, or surgery on an artery in your head, neck, heart, or legs? No   3. Do you have chest pain with activity? No   4. Do you have a history of  heart failure? No   5. Do you currently have a cold, bronchitis or symptoms of other infection? No   6. Do you have a cough, shortness of breath, or wheezing? No   7. Do you or anyone in your family have previous history of blood clots? UNKNOWN -    8. Do you or does anyone in your family have a serious bleeding problem such as prolonged bleeding following surgeries or cuts? UNKNOWN -    9. Have you ever had problems with anemia or been told to take iron pills? No   10. Have you had any abnormal blood loss such as black, tarry or bloody stools, or abnormal vaginal bleeding? No   10. Have you had any abnormal blood loss such as black, tarry or bloody stools? No   11. Have you ever had a blood transfusion? No   12. Are you willing to have a blood transfusion if it is medically needed before, during, or after your surgery? Yes   13. Have you or any of your relatives ever had problems with anesthesia? UNKNOWN -    14. Do you have sleep apnea, excessive snoring or daytime drowsiness? YES -    14a. Do you have a CPAP machine? Yes   15. Do you have any artifical heart valves or other implanted medical devices like a pacemaker, defibrillator, or continuous glucose monitor? No   16. Do you have artificial joints? No   17. Are you  allergic to latex? No   18. Is there any chance that you may be pregnant? No       Health Care Directive:  Patient does not have a Health Care Directive or Living Will: Discussed advance care planning with patient; information given to patient to review.    Preoperative Review of :   reviewed - no record of controlled substances prescribed.      Status of Chronic Conditions:  See problem list for active medical problems.  Problems all longstanding and stable, except as noted/documented.  See ROS for pertinent symptoms related to these conditions.    Review of Systems  Constitutional, neuro, ENT, endocrine, pulmonary, cardiac, gastrointestinal, genitourinary, musculoskeletal, integument and psychiatric systems are negative, except as otherwise noted.    Patient Active Problem List    Diagnosis Date Noted    Nontraumatic tear of right rotator cuff, unspecified tear extent 10/12/2023     Priority: Medium    Chronic right shoulder pain 03/06/2023     Priority: Medium    TIFFANY (obstructive sleep apnea) 03/02/2023     Priority: Medium    Hypertrophic cardiomyopathy (H) 12/07/2022     Priority: Medium    Benign essential hypertension 12/07/2022     Priority: Medium    Hyperlipidemia, acquired 12/07/2022     Priority: Medium    NSVT (nonsustained ventricular tachycardia) (H) 12/07/2022     Priority: Medium    Spinal stenosis of lumbar region with neurogenic claudication 03/09/2022     Priority: Medium    Arthrodesis status 03/09/2022     Priority: Medium    Diabetes mellitus, type 2 (H) 02/28/2022     Priority: Medium    Umbilical hernia with obstruction 10/09/2020     Priority: Medium     Added automatically from request for surgery [id number]      Acute viral myocarditis 02/14/2020     Priority: Medium    Acquired hypothyroidism 04/18/2017     Priority: Medium      Past Medical History:   Diagnosis Date    Medial meniscus tear     TIFFANY (obstructive sleep apnea) 3/2/2023     Past Surgical History:   Procedure Laterality  Date    ARTHROSCOPY KNEE WITH MEDIAL MENISCECTOMY Left 5/12/2017    Procedure: ARTHROSCOPY KNEE WITH MEDIAL MENISCECTOMY;  Arthroscopic partial medial menisectomy,left knee;  Surgeon: Grant Muñoz MD;  Location: [facility] OR    COLONOSCOPY N/A 7/18/2023    Procedure: Colonoscopy with polypectomy;  Surgeon: Wilfredo Molina MD;  Location: [facility] GI    CV CORONARY ANGIOGRAM N/A 2/15/2020    Procedure: Coronary Angiogram;  Surgeon: Rinku Boyce MD;  Location:  [facility]    LAPAROSCOPIC HERNIORRHAPHY UMBILICAL N/A 12/8/2020    Procedure: umbilical hernia repair;  Surgeon: Miguel Ángel Arnold DO;  Location: [facility] OR     Current Outpatient Medications   Medication Sig Dispense Refill    acetaminophen (TYLENOL) 325 MG tablet Take 2 tablets (650 mg) by mouth every 6 hours as needed for mild pain Alternate with ibuprofen so you are taking one or the other every three hours. For example take tylenol at 5am, then ibuprofen at 8am, then tylenol at 11am, and so on. 50 tablet 0    diltiazem ER (DILT-XR) 180 MG 24 hr capsule Take 1 capsule (180 mg) by mouth daily 90 capsule 1    levothyroxine (SYNTHROID/LEVOTHROID) 150 MCG tablet TAKE 1 TABLET BY MOUTH ONCE DAILY 90 tablet 0    levothyroxine (SYNTHROID/LEVOTHROID) 25 MCG tablet TAKE 1/2 TABLET BY MOUTH ONCE DAILY WITH 150MCG TABLET 45 tablet 0    multivitamin w/minerals (THERA-VIT-M) tablet Take 1 tablet by mouth daily      pravastatin (PRAVACHOL) 40 MG tablet TAKE 1 TABLET BY MOUTH ONCE DAILY 90 tablet 3       Allergies   Allergen Reactions    Morphine Hives        Social History     Tobacco Use    Smoking status: Never    Smokeless tobacco: Never   Substance Use Topics    Alcohol use: Yes     Comment: Occ        History   Drug Use No         Objective     /74   Pulse 51   Temp 97.6  F (36.4  C) (Oral)   Wt 104.1 kg (229 lb 9.6 oz)   SpO2 97%   BMI 32.02 kg/m      Physical Exam    GENERAL APPEARANCE: healthy, alert and no distress     EYES: EOMI,   PERRL     HENT: ear canals and TM's normal and nose and mouth without ulcers or lesions     NECK: no adenopathy, no asymmetry, masses, or scars and thyroid normal to palpation     RESP: lungs clear to auscultation - no rales, rhonchi or wheezes     CV: regular rates and rhythm, normal S1 S2, no S3 or S4 and no murmur, click or rub     ABDOMEN:  soft, nontender, no HSM or masses and bowel sounds normal     MS: extremities normal- no gross deformities noted, no evidence of inflammation in joints, FROM in all extremities.     SKIN: no suspicious lesions or rashes     NEURO: Normal strength and tone, sensory exam grossly normal, mentation intact and speech normal     PSYCH: mentation appears normal. and affect normal/bright     LYMPHATICS: No cervical adenopathy    Recent Labs   Lab Test 09/26/23  0840 03/02/23  0955 09/01/22  1256 01/21/22  1104   HGB  --   --   --  16.3   PLT  --   --   --  214   INR  --   --   --  0.95   NA  --  138 138 138   POTASSIUM  --  4.2 4.1 4.1   CR  --  0.98 0.98 0.96   A1C 6.4* 7.0* 6.9* 7.3*        Diagnostics:  No labs were ordered during this visit.   EKG: unchanged from previous tracings, consistent with history of carditis    Revised Cardiac Risk Index (RCRI):  The patient has the following serious cardiovascular risks for perioperative complications:   - No serious cardiac risks = 0 points     RCRI Interpretation: 0 points: Class I (very low risk - 0.4% complication rate)         Signed Electronically by: Wilfredo Hemphill MD  Copy of this evaluation report is provided to requesting physician.

## 2023-11-06 NOTE — PATIENT INSTRUCTIONS
Preparing for Your Surgery  Getting started  A nurse will call you to review your health history and instructions. They will give you an arrival time based on your scheduled surgery time. Please be ready to share:  Your doctor's clinic name and phone number  Your medical, surgical, and anesthesia history  A list of allergies and sensitivities  A list of medicines, including herbal treatments and over-the-counter drugs  Whether the patient has a legal guardian (ask how to send us the papers in advance)  Please tell us if you're pregnant--or if there's any chance you might be pregnant. Some surgeries may injure a fetus (unborn baby), so they require a pregnancy test. Surgeries that are safe for a fetus don't always need a test, and you can choose whether to have one.   If you have a child who's having surgery, please ask for a copy of Preparing for Your Child's Surgery.    Preparing for surgery  Within 10 to 30 days of surgery: Have a pre-op exam (sometimes called an H&P, or History and Physical). This can be done at a clinic or pre-operative center.  If you're having a , you may not need this exam. Talk to your care team.  At your pre-op exam, talk to your care team about all medicines you take. If you need to stop any medicines before surgery, ask when to start taking them again.  We do this for your safety. Many medicines can make you bleed too much during surgery. Some change how well surgery (anesthesia) drugs work.  Call your insurance company to let them know you're having surgery. (If you don't have insurance, call [phone].)  Call your clinic if there's any change in your health. This includes signs of a cold or flu (sore throat, runny nose, cough, rash, fever). It also includes a scrape or scratch near the surgery site.  If you have questions on the day of surgery, call your hospital or surgery center.  Eating and drinking guidelines  For your safety: Unless your surgeon tells you otherwise,  follow the guidelines below.  Eat and drink as usual until 8 hours before you arrive for surgery. After that, no food or milk.  Drink clear liquids until 2 hours before you arrive. These are liquids you can see through, like water, Gatorade, and Propel Water. They also include plain black coffee and tea (no cream or milk), candy, and breath mints. You can spit out gum when you arrive.  If you drink alcohol: Stop drinking it the night before surgery.  If your care team tells you to take medicine on the morning of surgery, it's okay to take it with a sip of water.  Preventing infection  Shower or bathe the night before and morning of your surgery. Follow the instructions your clinic gave you. (If no instructions, use regular soap.)  Don't shave or clip hair near your surgery site. We'll remove the hair if needed.  Don't smoke or vape the morning of surgery. You may chew nicotine gum up to 2 hours before surgery. A nicotine patch is okay.  Note: Some surgeries require you to completely quit smoking and nicotine. Check with your surgeon.  Your care team will make every effort to keep you safe from infection. We will:  Clean our hands often with soap and water (or an alcohol-based hand rub).  Clean the skin at your surgery site with a special soap that kills germs.  Give you a special gown to keep you warm. (Cold raises the risk of infection.)  Wear special hair covers, masks, gowns and gloves during surgery.  Give antibiotic medicine, if prescribed. Not all surgeries need antibiotics.  What to bring on the day of surgery  Photo ID and insurance card  Copy of your health care directive, if you have one  Glasses and hearing aids (bring cases)  You can't wear contacts during surgery  Inhaler and eye drops, if you use them (tell us about these when you arrive)  CPAP machine or breathing device, if you use them  A few personal items, if spending the night  If you have . . .  A pacemaker, ICD (cardiac defibrillator) or other  implant: Bring the ID card.  An implanted stimulator: Bring the remote control.  A legal guardian: Bring a copy of the certified (court-stamped) guardianship papers.  Please remove any jewelry, including body piercings. Leave jewelry and other valuables at home.  If you're going home the day of surgery  You must have a responsible adult drive you home. They should stay with you overnight as well.  If you don't have someone to stay with you, and you aren't safe to go home alone, we may keep you overnight. Insurance often won't pay for this.  After surgery  If it's hard to control your pain or you need more pain medicine, please call your surgeon's office.  Questions?   If you have any questions for your care team, list them here: _________________________________________________________________________________________________________________________________________________________________________ ____________________________________ ____________________________________ ____________________________________  For informational purposes only. Not to replace the advice of your health care provider. Copyright   2003, 2019 [facility]. All rights reserved. Clinically reviewed by Caron Ugarte MD. SMARTworks [id number] - REV 12/22.

## 2023-11-10 ENCOUNTER — ANESTHESIA EVENT (OUTPATIENT)
Dept: SURGERY | Facility: AMBULATORY SURGERY CENTER | Age: 53
End: 2023-11-10
Payer: COMMERCIAL

## 2023-11-13 ASSESSMENT — LIFESTYLE VARIABLES: TOBACCO_USE: 1

## 2023-11-14 ENCOUNTER — MYC MEDICAL ADVICE (OUTPATIENT)
Dept: CARDIOLOGY | Facility: CLINIC | Age: 53
End: 2023-11-14
Payer: COMMERCIAL

## 2023-11-14 ENCOUNTER — TELEPHONE (OUTPATIENT)
Dept: ORTHOPEDICS | Facility: CLINIC | Age: 53
End: 2023-11-14
Payer: COMMERCIAL

## 2023-11-14 ENCOUNTER — ANESTHESIA (OUTPATIENT)
Dept: SURGERY | Facility: AMBULATORY SURGERY CENTER | Age: 53
End: 2023-11-14
Payer: COMMERCIAL

## 2023-11-14 ENCOUNTER — HOSPITAL ENCOUNTER (OUTPATIENT)
Facility: AMBULATORY SURGERY CENTER | Age: 53
Discharge: HOME | End: 2023-11-14
Attending: ORTHOPAEDIC SURGERY
Payer: COMMERCIAL

## 2023-11-14 VITALS
HEART RATE: 52 BPM | TEMPERATURE: 97.6 F | SYSTOLIC BLOOD PRESSURE: 126 MMHG | OXYGEN SATURATION: 95 % | HEIGHT: 71 IN | WEIGHT: 220 LBS | BODY MASS INDEX: 30.8 KG/M2 | RESPIRATION RATE: 16 BRPM | DIASTOLIC BLOOD PRESSURE: 76 MMHG

## 2023-11-14 DIAGNOSIS — I10 BENIGN ESSENTIAL HYPERTENSION: ICD-10-CM

## 2023-11-14 DIAGNOSIS — I21.4 NSTEMI (NON-ST ELEVATED MYOCARDIAL INFARCTION) (H): ICD-10-CM

## 2023-11-14 DIAGNOSIS — M75.101 NONTRAUMATIC TEAR OF RIGHT ROTATOR CUFF, UNSPECIFIED TEAR EXTENT: Primary | ICD-10-CM

## 2023-11-14 DIAGNOSIS — I42.2 HYPERTROPHIC CARDIOMYOPATHY (H): Primary | ICD-10-CM

## 2023-11-14 DIAGNOSIS — I47.29 NSVT (NONSUSTAINED VENTRICULAR TACHYCARDIA) (H): ICD-10-CM

## 2023-11-14 DIAGNOSIS — R00.1 BRADYCARDIA: Primary | ICD-10-CM

## 2023-11-14 PROCEDURE — 93010 ELECTROCARDIOGRAM REPORT: CPT | Performed by: INTERNAL MEDICINE

## 2023-11-14 RX ORDER — CEFAZOLIN SODIUM 2 G/50ML
2 SOLUTION INTRAVENOUS SEE ADMIN INSTRUCTIONS
Status: DISCONTINUED | OUTPATIENT
Start: 2023-11-14 | End: 2023-11-15 | Stop reason: HOSPADM

## 2023-11-14 RX ORDER — SODIUM CHLORIDE, SODIUM LACTATE, POTASSIUM CHLORIDE, CALCIUM CHLORIDE 600; 310; 30; 20 MG/100ML; MG/100ML; MG/100ML; MG/100ML
INJECTION, SOLUTION INTRAVENOUS CONTINUOUS
Status: DISCONTINUED | OUTPATIENT
Start: 2023-11-14 | End: 2023-11-15 | Stop reason: HOSPADM

## 2023-11-14 RX ORDER — ONDANSETRON 2 MG/ML
4 INJECTION INTRAMUSCULAR; INTRAVENOUS EVERY 30 MIN PRN
Status: DISCONTINUED | OUTPATIENT
Start: 2023-11-14 | End: 2023-11-14 | Stop reason: HOSPADM

## 2023-11-14 RX ORDER — FENTANYL CITRATE 50 UG/ML
25 INJECTION, SOLUTION INTRAMUSCULAR; INTRAVENOUS EVERY 5 MIN PRN
Status: DISCONTINUED | OUTPATIENT
Start: 2023-11-14 | End: 2023-11-14 | Stop reason: HOSPADM

## 2023-11-14 RX ORDER — HYDROMORPHONE HYDROCHLORIDE 1 MG/ML
0.2 INJECTION, SOLUTION INTRAMUSCULAR; INTRAVENOUS; SUBCUTANEOUS EVERY 5 MIN PRN
Status: DISCONTINUED | OUTPATIENT
Start: 2023-11-14 | End: 2023-11-14 | Stop reason: HOSPADM

## 2023-11-14 RX ORDER — OXYCODONE HYDROCHLORIDE 5 MG/1
10 TABLET ORAL
Status: DISCONTINUED | OUTPATIENT
Start: 2023-11-14 | End: 2023-11-15 | Stop reason: HOSPADM

## 2023-11-14 RX ORDER — DOCUSATE SODIUM 100 MG/1
100 CAPSULE, LIQUID FILLED ORAL 2 TIMES DAILY
Qty: 30 CAPSULE | Refills: 0 | Status: ON HOLD | OUTPATIENT
Start: 2023-11-14 | End: 2023-11-16

## 2023-11-14 RX ORDER — FENTANYL CITRATE 50 UG/ML
50 INJECTION, SOLUTION INTRAMUSCULAR; INTRAVENOUS EVERY 5 MIN PRN
Status: DISCONTINUED | OUTPATIENT
Start: 2023-11-14 | End: 2023-11-14 | Stop reason: HOSPADM

## 2023-11-14 RX ORDER — ONDANSETRON 2 MG/ML
4 INJECTION INTRAMUSCULAR; INTRAVENOUS EVERY 30 MIN PRN
Status: DISCONTINUED | OUTPATIENT
Start: 2023-11-14 | End: 2023-11-15 | Stop reason: HOSPADM

## 2023-11-14 RX ORDER — HYDROCODONE BITARTRATE AND ACETAMINOPHEN 5; 325 MG/1; MG/1
1 TABLET ORAL EVERY 6 HOURS PRN
Qty: 10 TABLET | Refills: 0 | Status: ON HOLD | OUTPATIENT
Start: 2023-11-14 | End: 2023-11-16

## 2023-11-14 RX ORDER — GLYCOPYRROLATE 0.2 MG/ML
INJECTION, SOLUTION INTRAMUSCULAR; INTRAVENOUS PRN
Status: DISCONTINUED | OUTPATIENT
Start: 2023-11-14 | End: 2023-11-14

## 2023-11-14 RX ORDER — GABAPENTIN 100 MG/1
100 CAPSULE ORAL 3 TIMES DAILY
Qty: 21 CAPSULE | Refills: 0 | Status: ON HOLD | OUTPATIENT
Start: 2023-11-14 | End: 2023-11-16

## 2023-11-14 RX ORDER — ONDANSETRON 4 MG/1
4 TABLET, ORALLY DISINTEGRATING ORAL EVERY 30 MIN PRN
Status: DISCONTINUED | OUTPATIENT
Start: 2023-11-14 | End: 2023-11-15 | Stop reason: HOSPADM

## 2023-11-14 RX ORDER — NALOXONE HYDROCHLORIDE 0.4 MG/ML
0.4 INJECTION, SOLUTION INTRAMUSCULAR; INTRAVENOUS; SUBCUTANEOUS
Status: DISCONTINUED | OUTPATIENT
Start: 2023-11-14 | End: 2023-11-15 | Stop reason: HOSPADM

## 2023-11-14 RX ORDER — ATROPINE SULFATE 0.4 MG/ML
AMPUL (ML) INJECTION PRN
Status: DISCONTINUED | OUTPATIENT
Start: 2023-11-14 | End: 2023-11-14

## 2023-11-14 RX ORDER — ONDANSETRON 4 MG/1
4 TABLET, ORALLY DISINTEGRATING ORAL EVERY 30 MIN PRN
Status: DISCONTINUED | OUTPATIENT
Start: 2023-11-14 | End: 2023-11-14 | Stop reason: HOSPADM

## 2023-11-14 RX ORDER — ONDANSETRON 2 MG/ML
INJECTION INTRAMUSCULAR; INTRAVENOUS PRN
Status: DISCONTINUED | OUTPATIENT
Start: 2023-11-14 | End: 2023-11-14

## 2023-11-14 RX ORDER — LIDOCAINE 40 MG/G
CREAM TOPICAL
Status: DISCONTINUED | OUTPATIENT
Start: 2023-11-14 | End: 2023-11-15 | Stop reason: HOSPADM

## 2023-11-14 RX ORDER — CEFAZOLIN SODIUM 2 G/50ML
2 SOLUTION INTRAVENOUS
Status: DISCONTINUED | OUTPATIENT
Start: 2023-11-14 | End: 2023-11-15 | Stop reason: HOSPADM

## 2023-11-14 RX ORDER — HYDROMORPHONE HYDROCHLORIDE 1 MG/ML
0.4 INJECTION, SOLUTION INTRAMUSCULAR; INTRAVENOUS; SUBCUTANEOUS EVERY 5 MIN PRN
Status: DISCONTINUED | OUTPATIENT
Start: 2023-11-14 | End: 2023-11-14 | Stop reason: HOSPADM

## 2023-11-14 RX ORDER — ONDANSETRON 4 MG/1
4 TABLET, ORALLY DISINTEGRATING ORAL EVERY 8 HOURS PRN
Qty: 10 TABLET | Refills: 0 | Status: ON HOLD | OUTPATIENT
Start: 2023-11-14 | End: 2023-11-16

## 2023-11-14 RX ORDER — ACETAMINOPHEN 325 MG/1
975 TABLET ORAL ONCE
Status: COMPLETED | OUTPATIENT
Start: 2023-11-14 | End: 2023-11-14

## 2023-11-14 RX ORDER — NALOXONE HYDROCHLORIDE 0.4 MG/ML
0.2 INJECTION, SOLUTION INTRAMUSCULAR; INTRAVENOUS; SUBCUTANEOUS
Status: DISCONTINUED | OUTPATIENT
Start: 2023-11-14 | End: 2023-11-15 | Stop reason: HOSPADM

## 2023-11-14 RX ORDER — OXYCODONE HYDROCHLORIDE 5 MG/1
5 TABLET ORAL
Status: DISCONTINUED | OUTPATIENT
Start: 2023-11-14 | End: 2023-11-15 | Stop reason: HOSPADM

## 2023-11-14 RX ORDER — SODIUM CHLORIDE, SODIUM LACTATE, POTASSIUM CHLORIDE, CALCIUM CHLORIDE 600; 310; 30; 20 MG/100ML; MG/100ML; MG/100ML; MG/100ML
INJECTION, SOLUTION INTRAVENOUS CONTINUOUS
Status: DISCONTINUED | OUTPATIENT
Start: 2023-11-14 | End: 2023-11-14 | Stop reason: HOSPADM

## 2023-11-14 RX ORDER — FENTANYL CITRATE 50 UG/ML
25-50 INJECTION, SOLUTION INTRAMUSCULAR; INTRAVENOUS
Status: DISCONTINUED | OUTPATIENT
Start: 2023-11-14 | End: 2023-11-15 | Stop reason: HOSPADM

## 2023-11-14 RX ORDER — BUPIVACAINE HYDROCHLORIDE 5 MG/ML
INJECTION, SOLUTION EPIDURAL; INTRACAUDAL
Status: COMPLETED | OUTPATIENT
Start: 2023-11-14 | End: 2023-11-14

## 2023-11-14 RX ORDER — FLUMAZENIL 0.1 MG/ML
0.2 INJECTION, SOLUTION INTRAVENOUS
Status: DISCONTINUED | OUTPATIENT
Start: 2023-11-14 | End: 2023-11-15 | Stop reason: HOSPADM

## 2023-11-14 RX ORDER — CELECOXIB 100 MG/1
100 CAPSULE ORAL 2 TIMES DAILY
Qty: 30 CAPSULE | Refills: 0 | Status: ON HOLD | OUTPATIENT
Start: 2023-11-14 | End: 2023-11-16

## 2023-11-14 RX ADMIN — FENTANYL CITRATE 50 MCG: 50 INJECTION, SOLUTION INTRAMUSCULAR; INTRAVENOUS at 12:25

## 2023-11-14 RX ADMIN — ACETAMINOPHEN 975 MG: 325 TABLET ORAL at 11:52

## 2023-11-14 RX ADMIN — BUPIVACAINE HYDROCHLORIDE 10 ML: 5 INJECTION, SOLUTION EPIDURAL; INTRACAUDAL at 12:31

## 2023-11-14 RX ADMIN — GLYCOPYRROLATE 0.2 MG: 0.2 INJECTION, SOLUTION INTRAMUSCULAR; INTRAVENOUS at 13:27

## 2023-11-14 RX ADMIN — FENTANYL CITRATE 50 MCG: 50 INJECTION, SOLUTION INTRAMUSCULAR; INTRAVENOUS at 12:21

## 2023-11-14 RX ADMIN — ONDANSETRON 4 MG: 2 INJECTION INTRAMUSCULAR; INTRAVENOUS at 13:22

## 2023-11-14 RX ADMIN — Medication 0.2 MG: at 13:32

## 2023-11-14 ASSESSMENT — COPD QUESTIONNAIRES: COPD: 0

## 2023-11-14 NOTE — OR NURSING
Patient arrived in phase I at 1337 alert and oriented. Denied chest pain or any cardiac related symptoms. Per report, patient's procedure was canceled due to low HR. Patient received 0.2 of Atropine in the OR. HR was within high 40s to low 50s upon arrival to recovery room. 12 lead EKG done and no change noted per anesthesia. Anesthesia ok'd to discharge patient home and schedule appointment with Cardiologist. Prior to discharge, Patient's VS are within normal limits with exception of the low HR. Patient continued to deny symptoms. RN reviewed anesthesia discharge instructions and nerve block with responsible adult including.

## 2023-11-14 NOTE — PROGRESS NOTES
Patient received right side Brachial Plexus nerve block  with Exparel. 100mcg Fentanyl Versed 2mg given. Tolerated procedure well.

## 2023-11-14 NOTE — TELEPHONE ENCOUNTER
Surgery was cancelled for today with Dr. Meza due to low heart rate. Pt did receive block. Pt will need cardiology consult and clearance prior to rescheduling surgery, it is likely he will need to have surgery at [place] ([facility]).     Vijay Tamayo RN

## 2023-11-14 NOTE — ANESTHESIA PROCEDURE NOTES
Brachial plexus Procedure Note    Pre-Procedure   Staff -        Anesthesiologist:  Jen Starks MD       Performed By: anesthesiologist       Location: pre-op       Procedure Start/Stop Times: 11/14/2023 12:31 PM and 11/14/2023 12:44 PM       Pre-Anesthestic Checklist: patient identified, IV checked, site marked, risks and benefits discussed, informed consent, monitors and equipment checked, pre-op evaluation, at physician/surgeon's request and post-op pain management  Timeout:       Correct Patient: Yes        Correct Procedure: Yes        Correct Site: Yes        Correct Position: Yes        Correct Laterality: Yes        Site Marked: Yes  Procedure Documentation  Procedure: Brachial plexus       Laterality: right       Patient Position: sitting       Patient Prep/Sterile Barriers: sterile gloves, mask       Skin prep: Chloraprep (interscalene approach).       Needle Type: short bevel       Needle Gauge: 20.        Needle Length (millimeters): 60        Ultrasound guided       1. Ultrasound was used to identify targeted nerve, plexus, vascular marker, or fascial plane and place a needle adjacent to it in real-time.       2. Ultrasound was used to visualize the spread of anesthetic in close proximity to the above referenced structure.       3. A permanent image is entered into the patient's record.    Assessment/Narrative         The placement was negative for: blood aspirated, painful injection and site bleeding       Paresthesias: No.       Bolus given via. no blood aspirated via catheter.        Secured via.        Insertion/Infusion Method: Single Shot       Complications: none    Medication(s) Administered   Bupivacaine 0.5% PF (Infiltration) - Infiltration   10 mL - 11/14/2023 12:31:00 PM  Bupivacaine liposome (Exparel) 1.3% LA inj susp (Infiltration) - Infiltration   10 mL - 11/14/2023 12:31:00 PM  Medication Administration Time: 11/14/2023 12:31 PM     Comments:  Exparel 133 mg Given       FOR [facility]  "([facility]) ONLY:   Pain Team Contact information: please page the Pain Team Via [employer]. Search \"Pain\". During daytime hours, please page the attending first. At night please page the resident first.      " pattern regular/unlabored/depth regular

## 2023-11-14 NOTE — DISCHARGE INSTRUCTIONS
"[facility]  Home Care Following Anesthesia  For 24 hours after surgery:  Get plenty of rest.  A responsible adult must stay with you for at least 24 hours after you leave the surgery center.  Do not drive or use heavy equipment.  If you have weakness or tingling, don't drive or use heavy equipment until this feeling goes away.   Do not drink alcohol.   Avoid strenuous or risky activities.  Ask for help when climbing stairs.  You may feel lightheaded.  IF so, sit for a few minutes before standing.  Have someone help you get up.   If you have nausea (feel sick to your stomach): Drink only clear liquids such as apple juice, ginger ale, broth or 7-Up.  Rest may also help.  Be sure to drink enough fluids.  Move to a regular diet as you feel able.   You may have a slight fever.  Call the doctor if your fever is over 100 F (37.7 C) (taken under the tongue) or lasts longer than 24 hours.  You may have a dry mouth, a sore throat, muscle aches or trouble sleeping. These should go away after 24 hours.  Do not make important or legal decisions.   It is recommended to avoid smoking.        Today you received an Exparel block to numb the nerves near your surgery site.  This is a block using local anesthetic or \"numbing\" medication injected around the nerves to anesthetize or \"numb\" the area supplied by those nerves.  This block is injected into the muscle layer near your surgical site.  This medication may numb the location where you had surgery up to 72 hours.  If your surgical site is an arm or leg you should be careful with your affected limb, since it is possible to injure your limb without being aware of it due to the numbing.  Until full feeling returns, you should guard against bumping or hitting your limb, and avoid extreme hot or cold temperatures on the skin.  As the block wears off, the feeling will return as a tingling or prickly sensation near your surgical site.  You will " experince more discomfort from your incision as the feeling returns.  You may want to take a pain pill (a narcotic or Tylenol if this was prescribed by your surgeon) when you start to experience mild pain before the pain beomes more severe.  If your pain medications do not control your pain, you should notify your surgeon.    Tips for taking pain medications  To get the best pain relief possible, remember these points:  Take pain medications as directed, before pain becomes severe.  Pain medication can upset your stomach: taking it with food may help.  Constipation is a common side effect of pain medication. Drink plenty of  fluids.  Eat foods high in fiber. Take a stool softener if recommended by your doctor or pharmacist.  Do not drink alcohol, drive or operate machinery while taking pain medications.  Ask about other ways to control pain, such as with heat, ice or relaxation.    Tylenol/Acetaminophen Consumption    If you feel your pain relief is insufficient, you may take Tylenol/Acetaminophen in addition to your narcotic pain medication.   Be careful not to exceed 4,000 mg of Tylenol/Acetaminophen in a 24 hour period from all sources.  If you are taking extra strength Tylenol/acetaminophen (500 mg), the maximum dose is 8 tablets in 24 hours.  If you are taking regular strength acetaminophen (325 mg), the maximum dose is 12 tablets in 24 hours.  Received 975 mg of Tylenol at 11:52 Am today. Next dose is available at 5:52 PM as needed per package instruction.    Call a doctor for any of the following:  Signs of infection (fever, growing tenderness at the surgery site, a large amount of drainage or bleeding, severe pain, foul-smelling drainage, redness, swelling).  It has been over 8 to 10 hours since surgery and you are still not able to urinate (pass water).  Headache for over 24 hours.  Numbness, tingling or weakness the day after surgery (if you had spinal anesthesia).  Signs of Covid-19 infection (temperature  over 100 degrees, shortness of breath, cough, loss of taste/smell, generalized body aches, persistent headache, chills, sore throat, nausea/vomiting/diarrhea)  Your doctor is:       Dr. Rebecca Meza, Orthopaedics: [phone]               Or dial [phone] and ask for the resident on call for:  Orthopaedics  For emergency care, call the:  [facility] [place], [place] Emergency Department: [phone] (TTY for hearing impaired: [phone])

## 2023-11-14 NOTE — ANESTHESIA PREPROCEDURE EVALUATION
Anesthesia Pre-Procedure Evaluation    Patient: Andrew Atkinson   MRN: [id number] : 1970        Procedure : Procedure(s):  RIGHT ARTHROSCOPY, SHOULDER, WITH 1 OR MORE OF ROTATOR CUFF REPAIR, SUBACROMIAL SPACE DECOMPRESSION with bony acromioplasty, DISTAL CLAVICLE RESECTION, AND possible biceps tenodesis versus TENOTOMY          Past Medical History:   Diagnosis Date     Medial meniscus tear      TIFFANY (obstructive sleep apnea) 3/2/2023      Past Surgical History:   Procedure Laterality Date     ARTHROSCOPY KNEE WITH MEDIAL MENISCECTOMY Left 2017    Procedure: ARTHROSCOPY KNEE WITH MEDIAL MENISCECTOMY;  Arthroscopic partial medial menisectomy,left knee;  Surgeon: Grant Muñoz MD;  Location: [facility] OR     COLONOSCOPY N/A 2023    Procedure: Colonoscopy with polypectomy;  Surgeon: Wilfredo Molina MD;  Location: [facility] GI     CV CORONARY ANGIOGRAM N/A 2/15/2020    Procedure: Coronary Angiogram;  Surgeon: Rinku Boyce MD;  Location:  [facility]     LAPAROSCOPIC HERNIORRHAPHY UMBILICAL N/A 2020    Procedure: umbilical hernia repair;  Surgeon: Miguel Ángel Arnold DO;  Location: [facility] OR      Allergies   Allergen Reactions     Morphine Hives      Social History     Tobacco Use     Smoking status: Never     Smokeless tobacco: Never   Substance Use Topics     Alcohol use: Yes     Comment: Occ       Wt Readings from Last 1 Encounters:   10/27/23 104.1 kg (229 lb 9.6 oz)        Anesthesia Evaluation   Pt has had prior anesthetic. Type: General and MAC.        ROS/MED HX  ENT/Pulmonary:     (+) sleep apnea, severe, uses CPAP,              tobacco use, Past use,                   (-) asthma and COPD   Neurologic:  - neg neurologic ROS     Cardiovascular:     (+)  hypertension- -   -  - -                                 Previous cardiac testing   Echo: Date:  Results:     CONCLUSIONS: Hypertrophic cardiomyopathy with maximal wall thickness of 2.1 cm, mild JEET and myocardial  scar  "burden less than 5%. Normal cardiac function, LVEF of 60% and RVEF of 59%.   Stress Test:  Date: Results:    ECG Reviewed:  Date: Results:    Cath:  Date: Results:      METS/Exercise Tolerance: >4 METS    Hematologic:    (-) history of blood clots   Musculoskeletal:   (+)  arthritis,             GI/Hepatic:     (+) GERD, Asymptomatic on medication,                  Renal/Genitourinary:       Endo:     (+)  type II DM,   Not using insulin, - not using insulin pump.    thyroid problem, hypothyroidism,           Psychiatric/Substance Use:       Infectious Disease:  - neg infectious disease ROS     Malignancy:  - neg malignancy ROS     Other:  - neg other ROS             OUTSIDE LABS:  CBC:   Lab Results   Component Value Date    WBC 3.8 (L) 01/21/2022    WBC 6.7 11/30/2020    HGB 16.0 10/27/2023    HGB 16.3 01/21/2022    HCT 47.2 01/21/2022    HCT 46.2 11/30/2020     01/21/2022     11/30/2020     BMP:   Lab Results   Component Value Date     03/02/2023     09/01/2022    POTASSIUM 4.2 03/02/2023    POTASSIUM 4.1 09/01/2022    CHLORIDE 107 03/02/2023    CHLORIDE 103 09/01/2022    CO2 29 03/02/2023    CO2 27 09/01/2022    BUN 17 03/02/2023    BUN 16 09/01/2022    CR 0.98 03/02/2023    CR 0.98 09/01/2022     (H) 03/02/2023     (H) 09/01/2022     COAGS:   Lab Results   Component Value Date    INR 0.95 01/21/2022     POC: No results found for: \"BGM\", \"HCG\", \"HCGS\"  HEPATIC:   Lab Results   Component Value Date    ALBUMIN 4.3 03/02/2023    PROTTOTAL 7.9 03/02/2023    ALT 69 03/02/2023    AST 31 03/02/2023    ALKPHOS 83 03/02/2023    BILITOTAL 0.6 03/02/2023     OTHER:   Lab Results   Component Value Date    A1C 6.4 (H) 09/26/2023    NAPOLEON 9.5 03/02/2023    TSH 4.04 09/26/2023    T4 1.24 03/02/2023    CRP 3.1 02/14/2020       Anesthesia Plan    ASA Status:  3    NPO Status:  NPO Appropriate    Anesthesia Type: General.     - Airway: LMA   Induction: Intravenous.   Maintenance: TIVA.    "     Consents    Anesthesia Plan(s) and associated risks, benefits, and realistic alternatives discussed. Questions answered and patient/representative(s) expressed understanding.     - Discussed: Risks, Benefits and Alternatives for BOTH SEDATION and the PROCEDURE were discussed     - Discussed with:  Patient      - Extended Intubation/Ventilatory Support Discussed: No.      - Patient is DNR/DNI Status: No     Use of blood products discussed: No .     Postoperative Care    Pain management: Oral pain medications, IV analgesics, Peripheral nerve block (Single Shot), Multi-modal analgesia.   PONV prophylaxis: Ondansetron (or other 5HT-3), Dexamethasone or Solumedrol, Background Propofol Infusion     Comments:           H&P reviewed: Unable to attach H&P to encounter due to EHR limitations. H&P Update: appropriate H&P reviewed, patient examined. No interval changes since H&P (within 30 days).         Jen Starks MD

## 2023-11-14 NOTE — ANESTHESIA CARE TRANSFER NOTE
Patient: Andrew Atkinson    Procedure: Procedure(s):  RIGHT ARTHROSCOPY, SHOULDER, WITH 1 OR MORE OF ROTATOR CUFF REPAIR, SUBACROMIAL SPACE DECOMPRESSION with bony acromioplasty, DISTAL CLAVICLE RESECTION, AND possible biceps tenodesis versus TENOTOMY       Diagnosis: Nontraumatic tear of right rotator cuff, unspecified tear extent [M75.101]  Diagnosis Additional Information: No value filed.    Anesthesia Type:   General     Note:    Oropharynx: oropharynx clear of all foreign objects and spontaneously breathing  Level of Consciousness: awake  Oxygen Supplementation: room air    Independent Airway: airway patency satisfactory and stable  Dentition: dentition unchanged  Vital Signs Stable: post-procedure vital signs reviewed and stable  Report to RN Given: handoff report given  Patient transferred to: PACU    Handoff Report: Identifed the Patient, Identified the Reponsible Provider, Reviewed the pertinent medical history, Discussed the surgical course, Reviewed Intra-OP anesthesia mangement and issues during anesthesia, Set expectations for post-procedure period and Allowed opportunity for questions and acknowledgement of understanding      Vitals:  Vitals Value Taken Time   /73 11/14/23 1338   Temp 36.1  C (97  F) 11/14/23 1338   Pulse 47 11/14/23 1346   Resp 22 11/14/23 1346   SpO2 93 % 11/14/23 1346   Vitals shown include unfiled device data.    Electronically Signed By: CHUY Lundberg CRNA  November 14, 2023  1:47 PM

## 2023-11-14 NOTE — ANESTHESIA POSTPROCEDURE EVALUATION
Patient: Andrew Atkinson    Procedure: Procedure(s):  RIGHT ARTHROSCOPY, SHOULDER, WITH 1 OR MORE OF ROTATOR CUFF REPAIR, SUBACROMIAL SPACE DECOMPRESSION with bony acromioplasty, DISTAL CLAVICLE RESECTION, AND possible biceps tenodesis versus TENOTOMY       Anesthesia Type:  General    Note:  Disposition: Outpatient   Postop Pain Control: Uneventful            Sign Out: Well controlled pain   PONV: No   Neuro/Psych: Uneventful            Sign Out: Acceptable/Baseline neuro status   Airway/Respiratory: Uneventful            Sign Out: Acceptable/Baseline resp. status   CV/Hemodynamics:             Events: Arrhythmia   Other NRE:    DID A NON-ROUTINE EVENT OCCUR? YES    Event details/Postop Comments:  Patient after having a block with sedation in preop developed nausea and sweating.  Patient was taken to OR, however prior to induction it was noted that the patients heart rate was 35. Glycopyrrolate 0.2 was given with no effect. 0.2 of atropine was given, with mild increase of heart rate into the 40's. Due to the patients cardiac history and risk of CAD it was deemed reasonable and safer to cancel his surgery in order to be evaluated immediately for possible MI as well as follow up with cardiology for Stress test and management of his slow heart rate. EKG in the PACU did not show any significant changes from his previous EKG. Patient was not complaining of any chest pain, however his heart rate remained in the mid 40's. Patient did receive a nerve block and was counseled on protecting his arm with a sling until the block wears off.       Last vitals:  Vitals Value Taken Time   /73 11/14/23 1338   Temp 36.1  C (97  F) 11/14/23 1338   Pulse 47 11/14/23 1347   Resp 19 11/14/23 1347   SpO2 91 % 11/14/23 1355   Vitals shown include unfiled device data.    Electronically Signed By: Jen Starks MD  November 14, 2023  1:56 PM

## 2023-11-15 LAB
ATRIAL RATE - MUSE: 44 BPM
DIASTOLIC BLOOD PRESSURE - MUSE: NORMAL MMHG
INTERPRETATION ECG - MUSE: NORMAL
P AXIS - MUSE: 22 DEGREES
PR INTERVAL - MUSE: 138 MS
QRS DURATION - MUSE: 118 MS
QT - MUSE: 516 MS
QTC - MUSE: 441 MS
R AXIS - MUSE: 33 DEGREES
SYSTOLIC BLOOD PRESSURE - MUSE: NORMAL MMHG
T AXIS - MUSE: 181 DEGREES
VENTRICULAR RATE- MUSE: 44 BPM

## 2023-11-15 NOTE — TELEPHONE ENCOUNTER
11/15 Called and spoke to patient, echo is currently scheduled.     Vilma rodríguez Procedure   Orthopedics, Podiatry, Sports Medicine, Ent ,Eye , Audiology, Adult Endocrine & Diabetes, Nutrition & Medication Therapy Management Specialties   [facility]

## 2023-11-15 NOTE — TELEPHONE ENCOUNTER
Called and spoke with patient. Patient states that he went for his rotator cuff surgery and his heart rate was at 45. When they preparing to administered anesthesia, his heart rate went down to 30 bpm. Patient states that he felt fine. He was not experiencing any surgery. Surgery was aborted and patient was told to follow up with Cardiology. Patient is hoping to have surgery over winter time. Patient was already scheduled for 12/14/23. Patient is to have echo and zio prior. Appointments not currently scheduled. Patient would like for Dr. Lomeli to review anesthesia report and get thoughts on procedure so that he can reschedule for a new surgery date.    Allison Henderson, RN, BSN  Cardiology RN Care Coordinator   [place]   Phone: [phone]  Fax: [phone] ([place]) [phone] ([place])

## 2023-11-15 NOTE — TELEPHONE ENCOUNTER
Date: 11/15/2023    Time of Call: 10:37 AM     Diagnosis:  Hypertrophic cardiomyopathy, nonsustained VT     [ TORB ] Ordering provider: Dr. Lomeli  Order: Appears to be a vagal episode based on description. Short monitor would provide additional reassurance. Vagal episode can happen in any patient and is not a cardiac contraindication to surgery. Patient should see cardiology to give the okay to proceed.     48 Holter Monitor prior to next available office visit. Patient to be seen by next available provider.     Order received by: Allison Henderson RN     Follow-up/additional notes: Order placed. Dec[employer] message sent to patient.    Allison Henderson RN, BSN  Cardiology RN Care Coordinator   [place]   Phone: [phone]  Fax: [phone] ([place]) [phone] ([place])

## 2023-11-16 ENCOUNTER — HOSPITAL ENCOUNTER (OUTPATIENT)
Facility: CLINIC | Age: 53
Setting detail: OBSERVATION
Discharge: HOME | End: 2023-11-18
Attending: INTERNAL MEDICINE | Admitting: STUDENT IN AN ORGANIZED HEALTH CARE EDUCATION/TRAINING PROGRAM
Payer: COMMERCIAL

## 2023-11-16 ENCOUNTER — APPOINTMENT (OUTPATIENT)
Dept: CARDIOLOGY | Facility: CLINIC | Age: 53
End: 2023-11-16
Attending: INTERNAL MEDICINE
Payer: COMMERCIAL

## 2023-11-16 ENCOUNTER — MYC MEDICAL ADVICE (OUTPATIENT)
Dept: CARDIOLOGY | Facility: CLINIC | Age: 53
End: 2023-11-16

## 2023-11-16 DIAGNOSIS — I21.4 NSTEMI (NON-ST ELEVATED MYOCARDIAL INFARCTION) (H): Primary | ICD-10-CM

## 2023-11-16 LAB
BASE EXCESS BLDV CALC-SCNC: -0.5 MMOL/L (ref -7.7–1.9)
ERYTHROCYTE [DISTWIDTH] IN BLOOD BY AUTOMATED COUNT: 13 % (ref 10–15)
HCO3 BLDV-SCNC: 24 MMOL/L (ref 21–28)
HCT VFR BLD AUTO: 41 % (ref 35–53)
HGB BLD-MCNC: 14.6 G/DL (ref 11.7–17.7)
LVEF ECHO: NORMAL
MCH RBC QN AUTO: 29.5 PG (ref 26.5–33)
MCHC RBC AUTO-ENTMCNC: 35.6 G/DL (ref 31.5–36.5)
MCV RBC AUTO: 83 FL (ref 78–100)
O2/TOTAL GAS SETTING VFR VENT: 21 %
PCO2 BLDV: 38 MM HG (ref 40–50)
PH BLDV: 7.41 [PH] (ref 7.32–7.43)
PLATELET # BLD AUTO: 253 10E3/UL (ref 150–450)
PO2 BLDV: 88 MM HG (ref 25–47)
RBC # BLD AUTO: 4.95 10E6/UL (ref 3.8–5.9)
TROPONIN T SERPL HS-MCNC: 22 NG/L
TROPONIN T SERPL HS-MCNC: 25 NG/L
WBC # BLD AUTO: 8.3 10E3/UL (ref 4–11)

## 2023-11-16 PROCEDURE — 94660 CPAP INITIATION&MGMT: CPT

## 2023-11-16 PROCEDURE — 93306 TTE W/DOPPLER COMPLETE: CPT | Mod: 26 | Performed by: INTERNAL MEDICINE

## 2023-11-16 PROCEDURE — 99223 1ST HOSP IP/OBS HIGH 75: CPT | Mod: 25 | Performed by: INTERNAL MEDICINE

## 2023-11-16 PROCEDURE — 96366 THER/PROPH/DIAG IV INF ADDON: CPT

## 2023-11-16 PROCEDURE — 250N000011 HC RX IP 250 OP 636: Mod: JZ | Performed by: INTERNAL MEDICINE

## 2023-11-16 PROCEDURE — 93010 ELECTROCARDIOGRAM REPORT: CPT | Performed by: INTERNAL MEDICINE

## 2023-11-16 PROCEDURE — 96365 THER/PROPH/DIAG IV INF INIT: CPT

## 2023-11-16 PROCEDURE — 93005 ELECTROCARDIOGRAM TRACING: CPT

## 2023-11-16 PROCEDURE — 999N000208 ECHOCARDIOGRAM COMPLETE

## 2023-11-16 PROCEDURE — 96375 TX/PRO/DX INJ NEW DRUG ADDON: CPT | Mod: 59

## 2023-11-16 PROCEDURE — 999N000157 HC STATISTIC RCP TIME EA 10 MIN

## 2023-11-16 PROCEDURE — 250N000013 HC RX MED GY IP 250 OP 250 PS 637: Performed by: PHYSICIAN ASSISTANT

## 2023-11-16 PROCEDURE — 36415 COLL VENOUS BLD VENIPUNCTURE: CPT | Performed by: INTERNAL MEDICINE

## 2023-11-16 PROCEDURE — 255N000002 HC RX 255 OP 636: Performed by: INTERNAL MEDICINE

## 2023-11-16 PROCEDURE — 85027 COMPLETE CBC AUTOMATED: CPT | Performed by: INTERNAL MEDICINE

## 2023-11-16 PROCEDURE — 84484 ASSAY OF TROPONIN QUANT: CPT | Performed by: INTERNAL MEDICINE

## 2023-11-16 PROCEDURE — 210N000002 HC R&B HEART CARE

## 2023-11-16 PROCEDURE — 99223 1ST HOSP IP/OBS HIGH 75: CPT | Performed by: INTERNAL MEDICINE

## 2023-11-16 PROCEDURE — 82803 BLOOD GASES ANY COMBINATION: CPT | Performed by: INTERNAL MEDICINE

## 2023-11-16 RX ORDER — FUROSEMIDE 10 MG/ML
20 INJECTION INTRAMUSCULAR; INTRAVENOUS ONCE
Status: COMPLETED | OUTPATIENT
Start: 2023-11-16 | End: 2023-11-16

## 2023-11-16 RX ORDER — CALCIUM CARBONATE 500 MG/1
1000 TABLET, CHEWABLE ORAL 4 TIMES DAILY PRN
Status: DISCONTINUED | OUTPATIENT
Start: 2023-11-16 | End: 2023-11-18 | Stop reason: HOSPADM

## 2023-11-16 RX ORDER — ONDANSETRON 4 MG/1
4 TABLET, ORALLY DISINTEGRATING ORAL EVERY 6 HOURS PRN
Status: DISCONTINUED | OUTPATIENT
Start: 2023-11-16 | End: 2023-11-18 | Stop reason: HOSPADM

## 2023-11-16 RX ORDER — HEPARIN SODIUM 10000 [USP'U]/100ML
0-5000 INJECTION, SOLUTION INTRAVENOUS CONTINUOUS
Status: DISCONTINUED | OUTPATIENT
Start: 2023-11-16 | End: 2023-11-16

## 2023-11-16 RX ORDER — ASPIRIN 81 MG/1
81 TABLET ORAL DAILY
Status: DISCONTINUED | OUTPATIENT
Start: 2023-11-17 | End: 2023-11-18 | Stop reason: HOSPADM

## 2023-11-16 RX ORDER — PRAVASTATIN SODIUM 40 MG
40 TABLET ORAL EVERY MORNING
Status: DISCONTINUED | OUTPATIENT
Start: 2023-11-17 | End: 2023-11-18 | Stop reason: HOSPADM

## 2023-11-16 RX ORDER — LIDOCAINE 40 MG/G
CREAM TOPICAL
Status: DISCONTINUED | OUTPATIENT
Start: 2023-11-16 | End: 2023-11-18 | Stop reason: HOSPADM

## 2023-11-16 RX ORDER — ACETAMINOPHEN 325 MG/1
325-650 TABLET ORAL EVERY 4 HOURS PRN
Status: DISCONTINUED | OUTPATIENT
Start: 2023-11-16 | End: 2023-11-18 | Stop reason: HOSPADM

## 2023-11-16 RX ORDER — AMOXICILLIN 250 MG
2 CAPSULE ORAL 2 TIMES DAILY PRN
Status: DISCONTINUED | OUTPATIENT
Start: 2023-11-16 | End: 2023-11-18 | Stop reason: HOSPADM

## 2023-11-16 RX ORDER — ONDANSETRON 2 MG/ML
4 INJECTION INTRAMUSCULAR; INTRAVENOUS EVERY 6 HOURS PRN
Status: DISCONTINUED | OUTPATIENT
Start: 2023-11-16 | End: 2023-11-18 | Stop reason: HOSPADM

## 2023-11-16 RX ORDER — AMOXICILLIN 250 MG
1 CAPSULE ORAL 2 TIMES DAILY PRN
Status: DISCONTINUED | OUTPATIENT
Start: 2023-11-16 | End: 2023-11-18 | Stop reason: HOSPADM

## 2023-11-16 RX ADMIN — ACETAMINOPHEN 650 MG: 325 TABLET, FILM COATED ORAL at 20:19

## 2023-11-16 RX ADMIN — FUROSEMIDE 20 MG: 10 INJECTION, SOLUTION INTRAMUSCULAR; INTRAVENOUS at 16:34

## 2023-11-16 RX ADMIN — Medication 3 MG: at 21:35

## 2023-11-16 RX ADMIN — HUMAN ALBUMIN MICROSPHERES AND PERFLUTREN 9 ML: 10; .22 INJECTION, SOLUTION INTRAVENOUS at 15:04

## 2023-11-16 ASSESSMENT — ACTIVITIES OF DAILY LIVING (ADL)
ADLS_ACUITY_SCORE: 22

## 2023-11-16 ASSESSMENT — COLUMBIA-SUICIDE SEVERITY RATING SCALE - C-SSRS
2. HAVE YOU ACTUALLY HAD ANY THOUGHTS OF KILLING YOURSELF IN THE PAST MONTH?: NO
6. HAVE YOU EVER DONE ANYTHING, STARTED TO DO ANYTHING, OR PREPARED TO DO ANYTHING TO END YOUR LIFE?: NO
1. IN THE PAST MONTH, HAVE YOU WISHED YOU WERE DEAD OR WISHED YOU COULD GO TO SLEEP AND NOT WAKE UP?: NO

## 2023-11-16 NOTE — H&P
"[facility]    History and Physical  Hospitalist       Date of Admission:  11/16/2023    Assessment & Plan   Andrew Atkinson is a 53 year old adult with PMH of HOCM, prediabetes, chronic low back pain s/p L3-4 decompression 2022, hypothyroidism, depression, who presents with dyspnea and elevated troponin.    Acute dyspnea  Atypical chest pain  Elevated troponin, possible NSTEMI  He has a history of chest pain and dyspnea in 2020, with elevated troponins, he underwent a cath in 2020 which showed clean coronary arteries no CAD, diagnosed with myocarditis and HOCM.  Patient presents to [facility] ED with two days of acute dyspnea, can't catch his breath, nonpleuritic, worse with exertion. Denies chest pain but reports substernal/epigastric \"fullness.\" Associated with some pain in his mid upper back. ED workup is notable for BNP 97, negative d-dimer, and initial troponin of 322. EKG shows TWI inferiorly and V3-6, and nondiagnostic ST elevation, it appears unchanged from previous. CXR is normal appearing. Given  in ED.  Cardiology consulted and in room during interview, echo tech also arrived, initial echo images negative for LVOT physiology, no pericardial effusion, normal EF, generally reassuring; full echo read pending.  Overall, the etiology of patient's dyspnea is unclear. It's possible that he may have developed CAD in the intervening 3 years since his last cath. He could have myocarditis again. PE appears less likely given negative d-dimer, no oxygen requirements. He will benefit from further evaluation of his dyspnea.  - Appreciate Cardiology consultation   - Continue heparin drip  - Start ASA daily  - Telemetry, I&O, okay to have diet  - Echo read pending  - Trend troponin x2, check VBG    Relative bradycardia: HR 40s this admission, no heart block on ECG  - Hold PTA diltiazem for now    Prediabetes A1c 6.4% in 9/2023  HLD  Glucose is elevated 184  - Check A1c  - Continue PTA " "pravastatin    Chronic low back pain s/p L3-4 decompression 2022    Hypothyroidism: PTA levothyroxine    Clinically Significant Risk Factors Present on Admission                  # Hypertension: Noted on problem list      # Obesity: Estimated body mass index is 32.3 kg/m  as calculated from the following:    Height as of this encounter: 1.803 m (5' 11\").    Weight as of this encounter: 105.1 kg (231 lb 9.6 oz).               PT/OT: not needed  Diet: ordered  DVT Prophylaxis: heparin drip  [place] Catheter: Not present  Lines: None     Cardiac Monitoring: None  Code Status: Full code  Disposition: Anticipated discharge around 2 days    Krzysztof Ryan MD  Hospitalist Service  [facility]  Securely message with [employer] (more info)  Text page via [employer] Paging/Directory     Primary Care Physician   Wilfredo Hemphill    Chief Complaint   Dyspnea    History is obtained from the patient  Case discussed with ED provider    History of Present Illness   Andrew Atkinson is a 53 year old adult who presents with dyspnea. He reports starting two days ago, that he starting having significant dyspnea. Worse with activity. Not clearly pleuritic. Associated with some back pain behind his shoulders. He can't catch his breath. Symptoms are worse when lying down, he has been sleeping on his side. He is using his CPAP at night. He denies chest pain but endorses a persistent substernal fullness. He denies any cough, fevers, chills, abdominal pain, nausea, leg swelling, constipation, diarrhea.    Past Medical History    I have reviewed this patient's medical history and updated it with pertinent information if needed.   Past Medical History:   Diagnosis Date    Medial meniscus tear     TIFFANY (obstructive sleep apnea) 3/2/2023       Past Surgical History   I have reviewed this patient's surgical history and updated it with pertinent information if needed.  Past Surgical History:   Procedure Laterality Date    " ARTHROSCOPY KNEE WITH MEDIAL MENISCECTOMY Left 5/12/2017    Procedure: ARTHROSCOPY KNEE WITH MEDIAL MENISCECTOMY;  Arthroscopic partial medial menisectomy,left knee;  Surgeon: Grant Muñoz MD;  Location: [facility] OR    COLONOSCOPY N/A 7/18/2023    Procedure: Colonoscopy with polypectomy;  Surgeon: Wilfredo Molina MD;  Location: [facility] GI    CV CORONARY ANGIOGRAM N/A 2/15/2020    Procedure: Coronary Angiogram;  Surgeon: Rinku Boyce MD;  Location:  HEART CARDIAC CATH LAB    LAPAROSCOPIC HERNIORRHAPHY UMBILICAL N/A 12/8/2020    Procedure: umbilical hernia repair;  Surgeon: Miguel Ángel Arnold DO;  Location: [facility] OR       Prior to Admission Medications   Prior to Admission Medications   Prescriptions Last Dose Informant Patient Reported? Taking?   HYDROcodone-acetaminophen (NORCO) 5-325 MG tablet   No No   Sig: Take 1 tablet by mouth every 6 hours as needed for severe pain   celecoxib (CELEBREX) 100 MG capsule   No No   Sig: Take 1 capsule (100 mg) by mouth 2 times daily   diltiazem ER (DILT-XR) 180 MG 24 hr capsule   No No   Sig: Take 1 capsule (180 mg) by mouth daily   docusate sodium (COLACE) 100 MG capsule   No No   Sig: Take 1 capsule (100 mg) by mouth 2 times daily   gabapentin (NEURONTIN) 100 MG capsule   No No   Sig: Take 1 capsule (100 mg) by mouth 3 times daily   levothyroxine (SYNTHROID/LEVOTHROID) 150 MCG tablet   No No   Sig: Take 1 tablet (150 mcg) by mouth daily With 12.5mg tab   levothyroxine (SYNTHROID/LEVOTHROID) 25 MCG tablet   No No   Sig: TAKE 1/2 TABLET BY MOUTH ONCE DAILY WITH 150MCG TABLET   multivitamin w/minerals (THERA-VIT-M) tablet   Yes No   Sig: Take 1 tablet by mouth daily   omeprazole (PRILOSEC) 20 MG DR capsule   No No   Sig: Take 1 capsule (20 mg) by mouth daily   ondansetron (ZOFRAN ODT) 4 MG ODT tab   No No   Sig: Take 1 tablet (4 mg) by mouth every 8 hours as needed for nausea   pravastatin (PRAVACHOL) 40 MG tablet   No No   Sig: TAKE 1 TABLET BY MOUTH ONCE DAILY       Facility-Administered Medications: None     Allergies   Allergies   Allergen Reactions    Morphine Hives     States tolerates Oxy fine       Social History   I have reviewed this patient's social history and updated it with pertinent information if needed. Andrew Atkinson  reports that Keven Atkinson has never smoked. Keven Atkinson has never used smokeless tobacco. Keven Atkinson reports current alcohol use. Keven Atkinson reports that Keven Atkinson does not use drugs.    Family History   I have reviewed this patient's family history and updated it with pertinent information if needed.   Family History   Problem Relation Age of Onset    Unknown/Adopted No family hx of        Review of Systems   The 10 point Review of Systems is negative other than noted in the HPI or here.    Physical Exam   Temp: 97.4  F (36.3  C) Temp src: Oral BP: 123/89 Pulse: (!) 44   Resp: 16        Vital Signs with Ranges  Temp:  [97.4  F (36.3  C)] 97.4  F (36.3  C)  Pulse:  [44] 44  Resp:  [16] 16  BP: (123)/(89) 123/89  231 lbs 9.6 oz    Constitutional: Male in NAD  Eyes: PERRL, nonicteric, normal ocular movements  HEENT: Normocephalic, atraumatic, oral mucosa moist  Respiratory: CTAB, no wheezing or crackles  Cardiovascular: RRR, normal S1/2, no m/r/g  GI: No organomegaly, normoactive bowel sounds, nontender, nondistended  Vascular: Palpable peripheral pulses in upper and lower extremities, no lower extremity pitting edema  Skin: No rashes or scars  Musculoskeletal: Normal strength in UE and LE, moves all extremities  Neurologic: A&Ox3  Psychiatric: Appropriate affect and mood    Data   Data reviewed today:  I personally reviewed labwork, EKG.  No lab results found in last 7 days.    Imaging:  Recent Results (from the past 24 hour(s))   XRAY CHEST PORTABLE    Narrative    EXAM: XR CHEST AP PORT      DATE: 11/16/2023 9:49 AM    CLINICAL DATA: Shortness of Breath, Additional Info:    VIEWS: An AP view of the chest was  obtained.    Findings:    Lines/Tubes: None    Mediastinum: The cardiomediastinal silhouette is within normal limits. The pulmonary vasculature is distinct.    Lungs: Lungs are clear without edema or consolidation.    Pleural Spaces: No evidence of pneumothorax or effusion.    Osseous structures: No acute bony abnormality.    Upper abdomen: Unremarkable    Impression    IMPRESSION:    1.  No active cardiopulmonary process demonstrated.      REPORT SIGNED BY DR. Sesar Rangel

## 2023-11-16 NOTE — TELEPHONE ENCOUNTER
Chart reviewed further by MD/RN and decided patient should be seen by MD to go over test results. No sooner MD appointments available before scheduled appointment with Dr. Lomeli at this time. Added patient to wait list to be seen after testing is completed. Notified patient via [employer].     Latricia Moreno CMA (MA)

## 2023-11-16 NOTE — PROGRESS NOTES
Andrew Atkinson  :  1970  DOS: 2023  MRN: [id number]    Sports Medicine Clinic Procedure    Ultrasound Guided Right Intra-Articular Knee SynviscOne Injection, +/- Aspiration    Clinical History: Here for viscosupplementation trial as previously discussed.  Now pre-authorized.  No interim injury.    Diagnosis:   1. Primary osteoarthritis of right knee      Referring Physician: Dr. Robert Brgigs DO  Large Joint Injection/Arthocentesis: R knee joint    Date/Time: 2023 9:30 AM    Performed by: Robert Regan DO  Authorized by: Robert Regan DO    Indications:  Osteoarthritis  Needle Size:  21 G  Guidance: ultrasound    Approach:  Superolateral  Location:  Knee      Medications:  48 mg hylan 48 MG/6ML  Outcome:  Tolerated well, no immediate complications  Procedure discussed: discussed risks, benefits, and alternatives    Consent Given by:  Patient  Timeout: timeout called immediately prior to procedure    Prep: patient was prepped and draped in usual sterile fashion     Ultrasound images of procedure were permanently stored.        Impression:  Successful Right intra-articular knee SynviscOne injection.    Plan:  Follow up 1 month if no relief, otherwise prn based on relief  Expectations and limitations of synvisc were reviewed in detail  Often 4-6 weeks before full effect may be noticed  Usually covered up to every 6 months by insurance, but does not need to be repeated unless pain returns, at which point we would re-evaluate  Potential use of CSI in future for flares of pain reviewed in detail  Encouraged modified progressive pain-free activity as tolerated  HEP and Supportive care reviewed  All questions were answered today  Contact us with additional questions or concerns  Signs and sx of concern reviewed      Robert Regan DO, CAQ  Primary Care Sports Medicine  [facility]

## 2023-11-16 NOTE — CONSULTS
[facility]    Cardiology Consultation     Date of Admission:  11/16/2023    Review of the result(s) of each unique test - Last ECG echocardiogram CBC BMP.     Assessment & Plan   Andrew Atkinson is a 53 year old adult who was admitted on 11/16/2023.    Non-ST-elevation myocardial infarction  History of hypertrophic cardiomyopathy  Shortness of breath    Plan and recommendations  Etiology the patient's presentation is somewhat unclear at this time.  Per medicine his D-dimer is negative.  His chest x-ray is normal.  There is no concern for infection at this time.  His NT-proBNP is normal.  His troponin is elevated but in the setting of HCM it is also been elevated in the past.  Patient symptoms are not typical for angina and they feel more like shortness of breath rather than angina.  He says he is not able to take a full yawn.    His bedside echocardiogram reveals no pericardial effusion and normal LV function.  Pending formal read.    I recommend continuing intravenous heparin at this time in addition to aspirin.  Hold diltiazem.  Keep on telemetry.    Please cycle troponins.  There is a possibility this could be new CAD.  The patient smokes marijuana.  No drugs.  No cigarette smoking.  He is prediabetic.  If troponins continue to rise, will consider getting a coronary angiogram tomorrow.  Otherwise if symptoms persist, would probably consider coronary CT angio.    We will make those decisions tomorrow morning.    High complexity     TERRA Badillo  Staff Cardiologist  [facility]    History of Present Illness   Andrew Atkinson is a 53 year old adult who presents with chest tightness and difficulty breathing.  This patient follows up at [facility].  He has a history of hypertrophic cardiomyopathy.  No significant high risk features.  He is under medical management with diltiazem.  No significant LVOT obstruction.  3 years ago in 2020 he had a coronary angiogram that  was negative for obstructive coronary artery disease.  He is prediabetic.    The patient presented to [facility] today because since the last couple of days he feels that he is unable to catch his breath.  He is complaining of some central chest heaviness and some pain in the middle of his back.  His D-dimer was negative there but his troponin was elevated.  Having said that he has had troponins elevated in the past as well.  The patient tells me that he was scheduled for a surgery for his shoulder a few days ago but that was canceled because his heart rate was running in the 30s.  The patient was asymptomatic in that regard.  At home he takes diltiazem for that.  In the past he used to be on metoprolol that caused fatigue.    His ECG shows diffuse moderate deep T wave inversions consistent with his diagnosis of hypertrophic cardiomyopathy.  Given these ECG changes and positive troponin and his symptoms, cardiology was consulted.    Past Medical History   Past Medical History:   Diagnosis Date    Medial meniscus tear     TIFFANY (obstructive sleep apnea) 3/2/2023       Past Surgical History   Past Surgical History:   Procedure Laterality Date    ARTHROSCOPY KNEE WITH MEDIAL MENISCECTOMY Left 5/12/2017    Procedure: ARTHROSCOPY KNEE WITH MEDIAL MENISCECTOMY;  Arthroscopic partial medial menisectomy,left knee;  Surgeon: Grant Muñoz MD;  Location: [facility] OR    COLONOSCOPY N/A 7/18/2023    Procedure: Colonoscopy with polypectomy;  Surgeon: Wilfredo Molina MD;  Location: [facility]    CV CORONARY ANGIOGRAM N/A 2/15/2020    Procedure: Coronary Angiogram;  Surgeon: Rinku Boyce MD;  Location:  HEART CARDIAC CATH LAB    LAPAROSCOPIC HERNIORRHAPHY UMBILICAL N/A 12/8/2020    Procedure: umbilical hernia repair;  Surgeon: Miguel Ángel Arnold DO;  Location: [facility] OR       Prior to Admission Medications   Prior to Admission Medications   Prescriptions Last Dose Informant Patient Reported? Taking?   diltiazem ER  (DILT-XR) 180 MG 24 hr capsule 11/16/2023 at AM Self No Yes   Sig: Take 1 capsule (180 mg) by mouth daily   levothyroxine (SYNTHROID/LEVOTHROID) 150 MCG tablet 11/16/2023 at AM Self No Yes   Sig: Take 1 tablet (150 mcg) by mouth daily With 12.5mg tab   levothyroxine (SYNTHROID/LEVOTHROID) 25 MCG tablet 11/16/2023 at AM Self No Yes   Sig: TAKE 1/2 TABLET BY MOUTH ONCE DAILY WITH 150MCG TABLET   multivitamin w/minerals (THERA-VIT-M) tablet 11/16/2023 at AM Self Yes Yes   Sig: Take 1 tablet by mouth daily   pravastatin (PRAVACHOL) 40 MG tablet 11/16/2023 at AM Self No Yes   Sig: TAKE 1 TABLET BY MOUTH ONCE DAILY   Patient taking differently: Take 40 mg by mouth every morning      Facility-Administered Medications: None     Current Facility-Administered Medications   Medication Dose Route Frequency    [START ON 11/17/2023] aspirin  81 mg Oral Daily    furosemide  20 mg Intravenous Once    [START ON 11/17/2023] levothyroxine  162.5 mcg Oral QAM AC    [START ON 11/17/2023] pravastatin  40 mg Oral QAM    sodium chloride (PF)  3 mL Intracatheter Q8H     Current Facility-Administered Medications   Medication Last Rate    heparin 1,200 Units/hr (11/16/23 1354)    - MEDICATION INSTRUCTIONS -       Allergies   Allergies   Allergen Reactions    Morphine Hives     States tolerates Oxy fine       Social History    reports that Keven Atkinson has never smoked. Keven Atkinson has never used smokeless tobacco. Keven Atkinson reports current alcohol use. Keven Atkinson reports that Keven Atkinson does not use drugs.    Family History            Review of Systems   A comprehensive review of system was performed and is negative other than that noted in the HPI or here.     Physical Exam   Vital Signs with Ranges  Temp:  [97.4  F (36.3  C)] 97.4  F (36.3  C)  Pulse:  [44] 44  Resp:  [16] 16  BP: (123)/(89) 123/89  Wt Readings from Last 4 Encounters:   11/16/23 105.1 kg (231 lb 9.6 oz)   11/14/23 99.8 kg (220 lb)   10/27/23 104.1 kg  "(229 lb 9.6 oz)   10/21/23 101.6 kg (224 lb)     No intake/output data recorded.      Vitals: /89 (BP Location: Right arm)   Pulse (!) 44   Temp 97.4  F (36.3  C) (Oral)   Resp 16   Ht 1.803 m (5' 11\")   Wt 105.1 kg (231 lb 9.6 oz)   BMI 32.30 kg/m      Physical Exam:   General - Alert and in no acute distress  Respiratory -clear to auscultation  Cardiovascular -regular heart sounds no murmur rubs or gallops no edema  Gastrointestinal - Non distended  Psych - Appropriate affect     No lab results found in last 7 days.    Invalid input(s): \"TROPONINIES\"    Recent Labs   Lab 11/16/23  1540   WBC 8.3   HGB 14.6   MCV 83        Recent Labs   Lab Test 09/26/23  0840 09/01/22  1256   CHOL 139 142   HDL 32* 36*   LDL 62 77   TRIG 223* 146     Recent Labs   Lab 11/16/23  1540   WBC 8.3   HGB 14.6   HCT 41.0   MCV 83        Recent Labs   Lab 11/16/23  1541   PHV 7.41   PO2V 88*   PCO2V 38*   HCO3V 24     No results for input(s): \"NTBNPI\", \"NTBNP\" in the last 168 hours.  No results for input(s): \"DD\" in the last 168 hours.  No results for input(s): \"SED\", \"CRP\" in the last 168 hours.  Recent Labs   Lab 11/16/23  1540        No results for input(s): \"TSH\" in the last 168 hours.  No results for input(s): \"COLOR\", \"APPEARANCE\", \"URINEGLC\", \"URINEBILI\", \"URINEKETONE\", \"SG\", \"UBLD\", \"URINEPH\", \"PROTEIN\", \"UROBILINOGEN\", \"NITRITE\", \"LEUKEST\", \"RBCU\", \"WBCU\" in the last 168 hours.    Imaging:  Recent Results (from the past 48 hour(s))   XRAY CHEST PORTABLE    Narrative    EXAM: XR CHEST AP PORT      DATE: 11/16/2023 9:49 AM    CLINICAL DATA: Shortness of Breath, Additional Info:    VIEWS: An AP view of the chest was obtained.    Findings:    Lines/Tubes: None    Mediastinum: The cardiomediastinal silhouette is within normal limits. The pulmonary vasculature is distinct.    Lungs: Lungs are clear without edema or consolidation.    Pleural Spaces: No evidence of pneumothorax or effusion.    Osseous " structures: No acute bony abnormality.    Upper abdomen: Unremarkable    Impression    IMPRESSION:    1.  No active cardiopulmonary process demonstrated.      REPORT SIGNED BY DR. Sesar Rangel       Echo:  No results found for this or any previous visit (from the past 4320 hour(s)).      This note was completed in part using dictation via the Dragon voice recognition software. Some word and grammatical errors may occur and must be interpreted in the appropriate clinical context.  If there are any questions pertaining to this issue, please contact me for further clarification.

## 2023-11-16 NOTE — TELEPHONE ENCOUNTER
Provider updated. Plan to monitor and have patient follow up after discharge with Dr. Lomeli.     Allison Henderson, RN, BSN  Cardiology RN Care Coordinator   [place]   Phone: [phone]  Fax: [phone] ([place]) [phone] ([place])

## 2023-11-17 PROBLEM — R06.00 DYSPNEA: Status: ACTIVE | Noted: 2023-11-17

## 2023-11-17 LAB
ANION GAP SERPL CALCULATED.3IONS-SCNC: 11 MMOL/L (ref 7–15)
BUN SERPL-MCNC: 13.5 MG/DL (ref 6–20)
CALCIUM SERPL-MCNC: 9.5 MG/DL (ref 8.6–10)
CHLORIDE SERPL-SCNC: 102 MMOL/L (ref 98–107)
CREAT SERPL-MCNC: 0.87 MG/DL (ref 0.51–1.17)
DEPRECATED HCO3 PLAS-SCNC: 23 MMOL/L (ref 22–29)
EGFRCR SERPLBLD CKD-EPI 2021: >90 ML/MIN/1.73M2
ERYTHROCYTE [DISTWIDTH] IN BLOOD BY AUTOMATED COUNT: 13 % (ref 10–15)
GLUCOSE SERPL-MCNC: 135 MG/DL (ref 70–99)
HBA1C MFR BLD: 6.7 %
HCT VFR BLD AUTO: 44.2 % (ref 35–53)
HGB BLD-MCNC: 14.8 G/DL (ref 11.7–17.7)
MCH RBC QN AUTO: 28.8 PG (ref 26.5–33)
MCHC RBC AUTO-ENTMCNC: 33.5 G/DL (ref 31.5–36.5)
MCV RBC AUTO: 86 FL (ref 78–100)
PLATELET # BLD AUTO: 267 10E3/UL (ref 150–450)
POTASSIUM SERPL-SCNC: 4.3 MMOL/L (ref 3.4–5.3)
RBC # BLD AUTO: 5.13 10E6/UL (ref 3.8–5.9)
SODIUM SERPL-SCNC: 136 MMOL/L (ref 135–145)
WBC # BLD AUTO: 6.4 10E3/UL (ref 4–11)

## 2023-11-17 PROCEDURE — 96376 TX/PRO/DX INJ SAME DRUG ADON: CPT

## 2023-11-17 PROCEDURE — 85027 COMPLETE CBC AUTOMATED: CPT | Performed by: INTERNAL MEDICINE

## 2023-11-17 PROCEDURE — 36415 COLL VENOUS BLD VENIPUNCTURE: CPT | Performed by: INTERNAL MEDICINE

## 2023-11-17 PROCEDURE — 250N000013 HC RX MED GY IP 250 OP 250 PS 637: Performed by: INTERNAL MEDICINE

## 2023-11-17 PROCEDURE — 99233 SBSQ HOSP IP/OBS HIGH 50: CPT | Performed by: INTERNAL MEDICINE

## 2023-11-17 PROCEDURE — 250N000011 HC RX IP 250 OP 636: Mod: JZ | Performed by: INTERNAL MEDICINE

## 2023-11-17 PROCEDURE — 250N000013 HC RX MED GY IP 250 OP 250 PS 637: Performed by: PHYSICIAN ASSISTANT

## 2023-11-17 PROCEDURE — 80048 BASIC METABOLIC PNL TOTAL CA: CPT | Performed by: INTERNAL MEDICINE

## 2023-11-17 PROCEDURE — G0378 HOSPITAL OBSERVATION PER HR: HCPCS

## 2023-11-17 PROCEDURE — 250N000013 HC RX MED GY IP 250 OP 250 PS 637: Performed by: STUDENT IN AN ORGANIZED HEALTH CARE EDUCATION/TRAINING PROGRAM

## 2023-11-17 PROCEDURE — 99232 SBSQ HOSP IP/OBS MODERATE 35: CPT | Performed by: STUDENT IN AN ORGANIZED HEALTH CARE EDUCATION/TRAINING PROGRAM

## 2023-11-17 PROCEDURE — G0379 DIRECT REFER HOSPITAL OBSERV: HCPCS

## 2023-11-17 PROCEDURE — 83036 HEMOGLOBIN GLYCOSYLATED A1C: CPT | Performed by: INTERNAL MEDICINE

## 2023-11-17 RX ORDER — DILTIAZEM HYDROCHLORIDE 90 MG/1
90 CAPSULE, EXTENDED RELEASE ORAL DAILY
Status: DISCONTINUED | OUTPATIENT
Start: 2023-11-17 | End: 2023-11-18 | Stop reason: HOSPADM

## 2023-11-17 RX ORDER — FUROSEMIDE 10 MG/ML
40 INJECTION INTRAMUSCULAR; INTRAVENOUS ONCE
Status: COMPLETED | OUTPATIENT
Start: 2023-11-17 | End: 2023-11-17

## 2023-11-17 RX ADMIN — ASPIRIN 81 MG: 81 TABLET, COATED ORAL at 08:11

## 2023-11-17 RX ADMIN — DILTIAZEM HYDROCHLORIDE 90 MG: 90 CAPSULE, EXTENDED RELEASE ORAL at 18:30

## 2023-11-17 RX ADMIN — LEVOTHYROXINE SODIUM 162.5 MCG: 150 TABLET ORAL at 08:11

## 2023-11-17 RX ADMIN — ACETAMINOPHEN 650 MG: 325 TABLET, FILM COATED ORAL at 18:30

## 2023-11-17 RX ADMIN — PRAVASTATIN SODIUM 40 MG: 40 TABLET ORAL at 08:12

## 2023-11-17 RX ADMIN — FUROSEMIDE 40 MG: 10 INJECTION, SOLUTION INTRAMUSCULAR; INTRAVENOUS at 10:39

## 2023-11-17 RX ADMIN — Medication 3 MG: at 21:33

## 2023-11-17 RX ADMIN — ACETAMINOPHEN 650 MG: 325 TABLET, FILM COATED ORAL at 14:58

## 2023-11-17 ASSESSMENT — ACTIVITIES OF DAILY LIVING (ADL)
ADLS_ACUITY_SCORE: 23
ADLS_ACUITY_SCORE: 22
ADLS_ACUITY_SCORE: 22
ADLS_ACUITY_SCORE: 23
ADLS_ACUITY_SCORE: 22
ADLS_ACUITY_SCORE: 23
ADLS_ACUITY_SCORE: 22
ADLS_ACUITY_SCORE: 23
ADLS_ACUITY_SCORE: 22
ADLS_ACUITY_SCORE: 23
ADLS_ACUITY_SCORE: 23
ADLS_ACUITY_SCORE: 22

## 2023-11-17 NOTE — UTILIZATION REVIEW
"  Admission Status; Secondary Review Determination         Under the authority of the Utilization Management Committee, the utilization review process indicated a secondary review on the above patient.  The review outcome is based on review of the medical records, discussions with staff, and applying clinical experience noted on the date of the review.          (x) Observation Status Appropriate - This patient does not meet hospital inpatient criteria and is placed in observation status. If this patient's primary payer is Medicare and was admitted as an inpatient, Condition Code 44 should be used and patient status changed to \"observation\".     RATIONALE FOR DETERMINATION   53-year-old patient admitted on 11/16/2023 presented with shortness of breath and acute heart failure with preserved EF due to hypertrophic cardiomyopathy. Initial tests were inconclusive, but IV Lasix improved symptoms significantly. Further diagnostic procedures were deferred, and one more IV Lasix dose is planned. The patient may be discharged shortly with outpatient cardiology follow-up and a prescription for 20 mg of Lasix at discharge to manage fluid balance.  The severity of illness, intensity of service provided, expected LOS and risk for adverse outcome make the care appropriate for further observation; however, doesn't meet criteria for hospital inpatient admission. Dr Hadley  notified of this determination.    This document was produced using voice recognition software.      The information on this document is developed by the utilization review team in order for the business office to ensure compliance.  This only denotes the appropriateness of proper admission status and does not reflect the quality of care rendered.         The definitions of Inpatient Status and Observation Status used in making the determination above are those provided in the CMS Coverage Manual, Chapter 1 and Chapter 6, section 70.4.      Sincerely,     SANKET MODI" MD AMINATA    System Medical Director  Utilization Management  [facility].

## 2023-11-17 NOTE — PLAN OF CARE
A&OX4, INFANTE, 2 L oxymask, declined CPAP. Tele -SB with inverted T wave, HR in the 40s.  Up independently, on cardiac diet. Slept on the recliner

## 2023-11-17 NOTE — CONFIDENTIAL NOTE
See other mychart encounter. Patient went to hospital. Plan to have patient follow up with provider after discharge.     Allison Henderson, RN, BSN  Cardiology RN Care Coordinator   [place]   Phone: [phone]  Fax: [phone] ([place]) [phone] ([place])

## 2023-11-17 NOTE — PROVIDER NOTIFICATION
Pt asked for one Tylenol for minor back soreness and requested PRN order 1-2 Tylenol 325 mg as needed. Nat Cardona was texted via [employer] for order.    2006: Tylenol 325-650 mg every 4 hrs PRN ordered by MARIE Cardona.

## 2023-11-17 NOTE — PROVIDER NOTIFICATION
Pt is asking for Melatonin for sleep, worried that he will not be able to fall asleep tonight. Takes OTC Melatonin at home occasionally for insomnia. Dr Chanel texted via [employer] for Melatonin order.    1830: Melatonin 3 mg at HS PRN ordered by MARIE Chanel.

## 2023-11-17 NOTE — TELEPHONE ENCOUNTER
Patient currently in hospital. Plan to monitor and have patient follow up with Dr. Lomeli after discharge.    Allison Henderson, RN, BSN  Cardiology RN Care Coordinator   [place]   Phone: [phone]  Fax: [phone] ([place]) [phone] ([place])

## 2023-11-17 NOTE — PLAN OF CARE
Goal Outcome Evaluation:    VSS. Monitor remains Sinus rhythm. Pt. Denies chest pain. Tylenol given for right shoulder pain. Pt. Diuresing well after IV Lasix. Continue to monitor. Plan for probable discharge to home tomorrow.

## 2023-11-17 NOTE — PROGRESS NOTES
Assessment and Plan:   Andrew Atkinson is a 53 year old adult who was admitted on 11/16/2023.    Shortness of breath  Acute heart failure with preserved EF  Hypertrophic cardiomyopathy  Minimal nonspecific troponin elevation    Plan and recommendations  The patient presented with somewhat atypical symptoms of shortness of breath yesterday.  No angina.  His D-dimer was negative chest x-ray was normal and his NT-proBNP level was normal.  His troponin here was normal/borderline.  A few years ago his coronary angiogram has not shown any CAD.  Echocardiogram showed normal LV function without pericardial effusion.    Yesterday etiology of his symptoms is unclear.  However given persistent symptoms despite clinical evidence of volume overload, we gave him 20 mg of IV Lasix.  He diuresed well with good weight loss.  He has significant improvement in symptoms this morning.    The patient this morning was able to lay flat comfortably in bed.  We were initially planning on CT angiography however given the fact that his symptoms are significantly better, and his prior angiography was negative, we will hold off on this given significant symptomatic improvement.    I recommend 1 more dose of IV Lasix today.  I do anticipate the patient should be able to go home later today or tomorrow with outpatient follow-up with his primary cardiology team.  Would recommend 20 mg of Lasix at discharge.  We would want to make sure that we do not overdo diuresis with his history of HCM.        Interval History:     No significant overnight issues.  Patient is feeling significantly better after 20 mg of IV Lasix yesterday.  His breathing is better.  He has significant diuresis and his weight is down by about 4 pounds.          Medications:      aspirin  81 mg Oral Daily    furosemide  40 mg Intravenous Once    levothyroxine  162.5 mcg Oral QAM AC    pravastatin  40 mg Oral QAM    sodium chloride (PF)  3 mL Intracatheter Q8H             "Physical Exam:   Patient Vitals for the past 24 hrs:   BP Temp Temp src Pulse Resp SpO2 Height Weight   11/17/23 0800 -- -- -- -- -- 97 % -- --   11/17/23 0723 110/76 97.9  F (36.6  C) Oral 53 16 -- -- --   11/17/23 0616 -- -- -- -- -- -- -- 103.3 kg (227 lb 12.8 oz)   11/17/23 0606 108/85 97.6  F (36.4  C) Oral -- 16 97 % -- --   11/16/23 2348 122/79 -- -- 58 -- 100 % -- --   11/16/23 2347 122/79 97.6  F (36.4  C) Oral (!) 48 15 98 % -- --   11/16/23 2159 -- -- -- -- -- 97 % -- --   11/16/23 2130 -- -- -- -- -- 93 % -- --   11/16/23 2105 -- -- -- -- 18 94 % -- --   11/16/23 1932 117/74 98  F (36.7  C) Oral 55 18 95 % -- --   11/16/23 1700 116/74 97.5  F (36.4  C) Oral 51 18 96 % -- --   11/16/23 1338 123/89 97.4  F (36.3  C) Oral (!) 44 16 -- -- --   11/16/23 1300 -- -- -- -- -- -- 1.803 m (5' 11\") 105.1 kg (231 lb 9.6 oz)     Vitals:    11/16/23 1300 11/17/23 0616   Weight: 105.1 kg (231 lb 9.6 oz) 103.3 kg (227 lb 12.8 oz)         Intake/Output Summary (Last 24 hours) at 11/17/2023 0950  Last data filed at 11/17/2023 0723  Gross per 24 hour   Intake 480 ml   Output 3765 ml   Net -3285 ml       11/12 0700 - 11/17 0659  In: 480 [P.O.:480]  Out: 3265 [Urine:3265]  Net: -2181    Exam:  General alert oriented x3 no acute distress  Respirations clear to auscultation  Cardiovascular regular heart sounds no edema  Abdomen nondistended   Psych appropriate affect         Data:   LABS (Last four results)  CMP  Recent Labs   Lab 11/17/23  0636      POTASSIUM 4.3   CHLORIDE 102   CO2 23   ANIONGAP 11   *   BUN 13.5   CR 0.87   GFRESTIMATED >90   NAPOLEON 9.5     CBC  Recent Labs   Lab 11/17/23  0636 11/16/23  1540   WBC 6.4 8.3   RBC 5.13 4.95   HGB 14.8 14.6   HCT 44.2 41.0   MCV 86 83   MCH 28.8 29.5   MCHC 33.5 35.6   RDW 13.0 13.0    253     INRNo lab results found in last 7 days.  TROPONINS   Lab Results   Component Value Date    TROPI 0.202 () 02/14/2020    TROPI 0.211 () 02/14/2020    TROPI 0.227 " () 02/14/2020                                                                                                            TERRA Badillo, [facility]  Cardiology, [facility]    This note was completed in part using dictation via the Dragon voice recognition software. Although reviewed after completion, some word and grammatical errors may occur and must be interpreted in the appropriate clinical context.  If there are any questions pertaining to this issue, please contact me for further clarification or information.

## 2023-11-17 NOTE — PLAN OF CARE
Goal Outcome Evaluation: Pt is A&Ox4, Tylenol 650 mg given for mild back pain, VSS except HR in 40-50s, on tele SB, SOB and could not tolerate CPAP - on simple mask 3L with humidifier. Up independently, trop negative - Heparin gtt stopped by cardiology. Melatonin given for sleep - pt sleeping in recliner, not able to lay flan. Plan for possible CT angio.      Plan of Care Reviewed With: patient    Overall Patient Progress: no changeOverall Patient Progress: no change

## 2023-11-17 NOTE — PROGRESS NOTES
"[facility]    Medicine Progress Note - Hospitalist Service    Date of Admission:  11/16/2023    Assessment & Plan      Andrew Atkinson is a 53 year old adult with PMH of HOCM, prediabetes, chronic low back pain s/p L3-4 decompression 2022, hypothyroidism, depression, who presents with dyspnea and elevated troponin.         # Acute dyspnea  Atypical chest pain  Elevated troponin,    He has a history of chest pain and dyspnea in 2020, with elevated troponins, he underwent a cath in 2020 which showed clean coronary arteries no CAD, diagnosed with myocarditis and HOCM.   Patient presents to [place] ED with two days of acute dyspnea, can't catch his breath, nonpleuritic, worse with exertion. Denies chest pain but reports substernal/epigastric \"fullness.\"   ED workup is notable for BNP 97, negative d-dimer, and initial troponin of 322. EKG shows TWI inferiorly and V3-6, and nondiagnostic ST elevation, it appears unchanged from previous. CXR is normal appearing. Given  in ED   - Chest x-ray was within normal limit repeat troponins remain mildly elevated.  Patient had previous coronary angiogram which did not show any obstructive coronary artery disease  Echo shows normal left ventricular function without pericardial effusion  -Was started on heparin drip and diuresed with Lasix cardiology was consulted.  -Cardiology was consulted  -Patient was continued on IV diuresis for 1 day  -Significant improvement in his shortness of breath and feels well.  Able to lay flat.  -Cardiology recommending 20 mg Lasix at discharge  -# Relative bradycardia: HR 40s this admission, no heart block on ECG  -Echocardiogram shows normal systolic function.  Patient did tell me that he had episodes of bradycardia with his heart rates in the 40s to the 50s.  He recently had a nerve block for rotator cuff tear repair during which his heart rates were low and the procedure was not done  Patient takes Cardizem 180 " "mg daily extended release  -Discussed with  from cardiology about the low heart rates.  Dr. Urbina recommended restarting his Cardizem at 90 mg dose and will observe the patient overnight  -Heparin discontinued on 11/17/23  -Patient will follow up with his outpatient cardiologist as an outpatient  -Avoid overdiuresis with a history of HOCM  # Prediabetes A1c 6.4% in 9/2023  HLD  Glucose is elevated 184  - Check A1c  - Continue PTA pravastatin     Chronic low back pain s/p L3-4 decompression 2022     Hypothyroidism: PTA levothyroxine      Prediabetes A1c 6.4% in 9/2023  HLD  Glucose is elevated 184  - Check A1c  - Continue PTA pravastatin     Chronic low back pain s/p L3-4 decompression 2022     Hypothyroidism: PTA levothyroxine    Diet: Low Saturated Fat Na <2400 mg    DVT Prophylaxis: Pneumatic Compression Devices  [place] Catheter: Not present  Lines: None     Cardiac Monitoring: ACTIVE order. Indication: AMI (NSTEMI/ STEMI) (48 hours)  Code Status: Full Code      Clinically Significant Risk Factors                  # Hypertension: Noted on problem list       # DMII: A1C = 6.7 % (Ref range: <5.7 %) within past 6 months, PRESENT ON ADMISSION  # Obesity: Estimated body mass index is 31.77 kg/m  as calculated from the following:    Height as of this encounter: 1.803 m (5' 11\").    Weight as of this encounter: 103.3 kg (227 lb 12.8 oz)., PRESENT ON ADMISSION            Disposition Plan      Expected Discharge Date: 11/18/2023                    Tonja Hadley MD  Hospitalist Service  [facility]  Securely message with Flor[employer] (more info)  Text page via [employer] Paging/Directory   ______________________________________________________________________    Interval History     Patient seen and examined at bedside.  Denies any shortness of breath currently feels a lot better since admission.    Has been having bradycardia with heart rates in the 40s to the 50s    Denies any " dizziness    Physical Exam   Vital Signs: Temp: 97.6  F (36.4  C) Temp src: Axillary BP: 110/76 Pulse: 56   Resp: 16 SpO2: 96 % O2 Device: None (Room air) Oxygen Delivery: 3 LPM  Weight: 227 lbs 12.8 oz    Physical Exam  Cardiovascular:      Rate and Rhythm: Normal rate and regular rhythm.      Heart sounds: Normal heart sounds.   Pulmonary:      Effort: No respiratory distress.      Breath sounds: Normal breath sounds. No wheezing.   Abdominal:      General: There is no distension.      Palpations: Abdomen is soft.      Tenderness: There is no abdominal tenderness.          Medical Decision Making             Data     I have personally reviewed the following data over the past 24 hrs:    6.4  \   14.8   / 267     136 102 13.5 /  135 (H)   4.3 23 0.87 \     Trop: 25 (H) BNP: N/A     TSH: N/A T4: N/A A1C: 6.7 (H)       Imaging results reviewed over the past 24 hrs:   No results found for this or any previous visit (from the past 24 hour(s)).

## 2023-11-18 VITALS
SYSTOLIC BLOOD PRESSURE: 114 MMHG | HEIGHT: 71 IN | WEIGHT: 224.8 LBS | RESPIRATION RATE: 17 BRPM | DIASTOLIC BLOOD PRESSURE: 69 MMHG | HEART RATE: 64 BPM | OXYGEN SATURATION: 98 % | TEMPERATURE: 97.9 F | BODY MASS INDEX: 31.47 KG/M2

## 2023-11-18 LAB — POTASSIUM SERPL-SCNC: 4.3 MMOL/L (ref 3.4–5.3)

## 2023-11-18 PROCEDURE — 250N000013 HC RX MED GY IP 250 OP 250 PS 637: Performed by: INTERNAL MEDICINE

## 2023-11-18 PROCEDURE — 99232 SBSQ HOSP IP/OBS MODERATE 35: CPT | Performed by: INTERNAL MEDICINE

## 2023-11-18 PROCEDURE — 84132 ASSAY OF SERUM POTASSIUM: CPT | Performed by: STUDENT IN AN ORGANIZED HEALTH CARE EDUCATION/TRAINING PROGRAM

## 2023-11-18 PROCEDURE — 99239 HOSP IP/OBS DSCHRG MGMT >30: CPT | Performed by: HOSPITALIST

## 2023-11-18 PROCEDURE — 250N000013 HC RX MED GY IP 250 OP 250 PS 637: Performed by: PHYSICIAN ASSISTANT

## 2023-11-18 PROCEDURE — 250N000013 HC RX MED GY IP 250 OP 250 PS 637: Performed by: STUDENT IN AN ORGANIZED HEALTH CARE EDUCATION/TRAINING PROGRAM

## 2023-11-18 PROCEDURE — G0378 HOSPITAL OBSERVATION PER HR: HCPCS

## 2023-11-18 PROCEDURE — 36415 COLL VENOUS BLD VENIPUNCTURE: CPT | Performed by: STUDENT IN AN ORGANIZED HEALTH CARE EDUCATION/TRAINING PROGRAM

## 2023-11-18 RX ORDER — DILTIAZEM HYDROCHLORIDE 90 MG/1
90 CAPSULE, EXTENDED RELEASE ORAL DAILY
Qty: 30 CAPSULE | Refills: 0 | Status: SHIPPED | OUTPATIENT
Start: 2023-11-19 | End: 2023-12-14

## 2023-11-18 RX ORDER — ASPIRIN 81 MG/1
81 TABLET ORAL DAILY
Qty: 30 TABLET | Refills: 0 | Status: SHIPPED | OUTPATIENT
Start: 2023-11-19

## 2023-11-18 RX ORDER — FUROSEMIDE 20 MG
20 TABLET ORAL DAILY
Status: DISCONTINUED | OUTPATIENT
Start: 2023-11-18 | End: 2023-11-18 | Stop reason: HOSPADM

## 2023-11-18 RX ORDER — FUROSEMIDE 20 MG
20 TABLET ORAL DAILY
Qty: 30 TABLET | Refills: 0 | Status: SHIPPED | OUTPATIENT
Start: 2023-11-19 | End: 2023-12-14

## 2023-11-18 RX ADMIN — ASPIRIN 81 MG: 81 TABLET, COATED ORAL at 08:30

## 2023-11-18 RX ADMIN — DILTIAZEM HYDROCHLORIDE 90 MG: 90 CAPSULE, EXTENDED RELEASE ORAL at 08:29

## 2023-11-18 RX ADMIN — PRAVASTATIN SODIUM 40 MG: 40 TABLET ORAL at 08:29

## 2023-11-18 RX ADMIN — LEVOTHYROXINE SODIUM 162.5 MCG: 150 TABLET ORAL at 08:29

## 2023-11-18 RX ADMIN — ACETAMINOPHEN 650 MG: 325 TABLET, FILM COATED ORAL at 06:31

## 2023-11-18 RX ADMIN — FUROSEMIDE 20 MG: 20 TABLET ORAL at 08:38

## 2023-11-18 ASSESSMENT — ACTIVITIES OF DAILY LIVING (ADL)
ADLS_ACUITY_SCORE: 23

## 2023-11-18 NOTE — PLAN OF CARE
Pleasant gentlemen. Pt aox4, ind, RA, and full code. Tele SR T-wave inversion. Pt denies chest pain and SOB. Dilt lowered to 90 mgs. IV lasix.   Plan: Discharge tomorrow and switching to PO lasix tomorrow.

## 2023-11-18 NOTE — PROGRESS NOTES
Pt here with acute dyspnea elevated troponin. A&Ox4. Independent. VSS. Tele SB/SR. Tolerating cardiac diet, however will benefit on nutritionist consult as pt showed interest and asking question related to cardiac diet.  Takes pills whole. Up independently. Rates pain 2/10 on right shoulder.Tylenol and Ice had been helpful. Pt scoring green on the Aggression Stop Light Tool. Plan to Discharge tomorrow if no acute symptoms overnight.

## 2023-11-18 NOTE — PROGRESS NOTES
[facility]    Cardiology Consultation - Progress Note     Date of Admission:  11/16/2023  Date of Service: 11/18/23    Reason for Consult   Reason for consult:     History of Present Illness   Andrew Atkinson is a 53 year old adult who was admitted on 11/16/2023 for dyspnea. Medical comorbidities include hypertrophic cardiomyopathy (apical variant, no obstruction), hyperlipidemia, hypothyroidism on replacement therapy, prediabetes.    Interval events: Yesterday the patient received furosemide 40 mg IV x1 for ongoing diuresis with continued improvement of his symptoms.  Vital stable overnight.  This a.m., the patient reports feeling well.  He denies any chest pain, dyspnea, lightheadedness, palpitations.  He is eager to return home.    I note that in the setting of his increased troponin there was question of evaluating coronary artery disease as a possible etiology of his symptoms, however given his significant improvement symptom wise on diuresis and the lack of coronary artery disease on prior angiogram some years ago with CT coronary angiogram was deferred.    Assessment & Plan   #1  Myocardial injury  #2  Hypertrophic cardiomyopathy  #3  Dyspnea  #4  Prediabetes  #5  Hyperlipidemia    Overall, Mr. Atkinson symptomatically appears significantly improved.  His heart rates have been stable on reduced dose extended release diltiazem 90 mg daily.  On discussion with the patient's history of present illness, his symptoms do sound suspicious for dyspnea secondary to volume overload.  Fortunately he is doing better on diuresis.  I do think he would benefit from ongoing diuretic in the outpatient setting, for which it would be reasonable to start furosemide 20 mg daily.  I did discuss with the patient that he should reach out to his primary team if after discharge he develops any lightheadedness, dyspnea, chest discomfort or syncopal episodes that may be suspicious for outflow tract obstruction; I  note his most recent TTE did not show any obstruction.    I think continuing the reduced dose of diltiazem is reasonable for now.    From a cardiac standpoint, I do think that the patient is eligible for discharge today.    I will send a message to his primary cardiology team regarding his care.  The patient wishes to still pursue right shoulder surgery, which was planned for this prior week, before the new year; I will pass along these wishes.      Plan:  Furosemide 20 mg PO daily  Diltiazem extended release 90 mg daily  Outpatient cardiology follow-up    Yuan Yo MD    Primary Care Physician   Wilfredo Hemphill    Patient Active Problem List   Diagnosis    Acquired hypothyroidism    Acute viral myocarditis    Umbilical hernia with obstruction    Diabetes mellitus, type 2 (H)    Spinal stenosis of lumbar region with neurogenic claudication    Arthrodesis status    Hypertrophic cardiomyopathy (H)    Benign essential hypertension    Hyperlipidemia, acquired    NSVT (nonsustained ventricular tachycardia) (H)    TIFFANY (obstructive sleep apnea)    Chronic right shoulder pain    Nontraumatic tear of right rotator cuff, unspecified tear extent    NSTEMI (non-ST elevated myocardial infarction) (H)    Dyspnea       Past Medical History   I have reviewed this patient's medical history and updated it with pertinent information if needed.   Past Medical History:   Diagnosis Date    Medial meniscus tear     TIFFANY (obstructive sleep apnea) 3/2/2023       Past Surgical History   I have reviewed this patient's surgical history and updated it with pertinent information if needed.  Past Surgical History:   Procedure Laterality Date    ARTHROSCOPY KNEE WITH MEDIAL MENISCECTOMY Left 5/12/2017    Procedure: ARTHROSCOPY KNEE WITH MEDIAL MENISCECTOMY;  Arthroscopic partial medial menisectomy,left knee;  Surgeon: Grant Muñoz MD;  Location: [facility] OR    COLONOSCOPY N/A 7/18/2023    Procedure: Colonoscopy with polypectomy;  Surgeon:  Wilfredo Molina MD;  Location: [facility] GI    CV CORONARY ANGIOGRAM N/A 2/15/2020    Procedure: Coronary Angiogram;  Surgeon: Rinku Boyce MD;  Location:  HEART CARDIAC CATH LAB    LAPAROSCOPIC HERNIORRHAPHY UMBILICAL N/A 12/8/2020    Procedure: umbilical hernia repair;  Surgeon: Miguel Ángel Arnold DO;  Location: [facility] OR       Prior to Admission Medications   Prior to Admission Medications   Prescriptions Last Dose Informant Patient Reported? Taking?   diltiazem ER (DILT-XR) 180 MG 24 hr capsule 11/16/2023 at AM Self No Yes   Sig: Take 1 capsule (180 mg) by mouth daily   levothyroxine (SYNTHROID/LEVOTHROID) 150 MCG tablet 11/16/2023 at AM Self No Yes   Sig: Take 1 tablet (150 mcg) by mouth daily With 12.5mg tab   levothyroxine (SYNTHROID/LEVOTHROID) 25 MCG tablet 11/16/2023 at AM Self No Yes   Sig: TAKE 1/2 TABLET BY MOUTH ONCE DAILY WITH 150MCG TABLET   multivitamin w/minerals (THERA-VIT-M) tablet 11/16/2023 at AM Self Yes Yes   Sig: Take 1 tablet by mouth daily   pravastatin (PRAVACHOL) 40 MG tablet 11/16/2023 at AM Self No Yes   Sig: TAKE 1 TABLET BY MOUTH ONCE DAILY   Patient taking differently: Take 40 mg by mouth every morning      Facility-Administered Medications: None     Current Facility-Administered Medications   Medication Dose Route Frequency    aspirin  81 mg Oral Daily    diltiazem ER  90 mg Oral Daily    levothyroxine  162.5 mcg Oral QAM AC    pravastatin  40 mg Oral QAM    sodium chloride (PF)  3 mL Intracatheter Q8H     Current Facility-Administered Medications   Medication Last Rate    - MEDICATION INSTRUCTIONS -       Allergies   Allergies   Allergen Reactions    Morphine Hives     States tolerates Oxy fine       Social History    reports that Keven Atkinson has never smoked. Keven CHALO Atkinson has never used smokeless tobacco. Keven Atkinson reports current alcohol use. Keven Atkinson reports that Keven DHRUVBetty Atkinson does not use drugs.    Family History   Family History   Problem Relation Age  "of Onset    Unknown/Adopted No family hx of        Review of Systems   The comprehensive Review of Systems is negative other than noted in the HPI or here.     Physical Exam   Vital Signs with Ranges  Temp:  [97.6  F (36.4  C)-97.9  F (36.6  C)] 97.9  F (36.6  C)  Pulse:  [53-58] 54  Resp:  [16] 16  BP: (104-114)/(74-85) 104/74  SpO2:  [95 %-97 %] 95 %  Wt Readings from Last 4 Encounters:   11/18/23 102 kg (224 lb 12.8 oz)   11/14/23 99.8 kg (220 lb)   10/27/23 104.1 kg (229 lb 9.6 oz)   10/21/23 101.6 kg (224 lb)     I/O last 3 completed shifts:  In: 1200 [P.O.:1200]  Out: 4300 [Urine:4300]      Vitals: /74   Pulse 54   Temp 97.9  F (36.6  C) (Oral)   Resp 16   Ht 1.803 m (5' 11\")   Wt 102 kg (224 lb 12.8 oz)   SpO2 95%   BMI 31.35 kg/m      General: Alert, oriented, in no acute distress  HEENT: PERRLA, mucous membranes moist  Neck: Normal JVP  CV: Regular rate and rhythm without murmur  Respiratory: Breathing on room air comfortably  Neuro: No focal deficits appreciated  Extremities: No peripheral edema bilaterally    Recent Labs   Lab 11/17/23  0636 11/16/23  1540   WBC 6.4 8.3   HGB 14.8 14.6   MCV 86 83    253     --    POTASSIUM 4.3  --    CHLORIDE 102  --    CO2 23  --    BUN 13.5  --    CR 0.87  --    GFRESTIMATED >90  --    ANIONGAP 11  --    NAPOLEON 9.5  --    *  --      Recent Labs   Lab Test 09/26/23  0840 09/01/22  1256   CHOL 139 142   HDL 32* 36*   LDL 62 77   TRIG 223* 146     Recent Labs   Lab 11/17/23  0636 11/16/23  1540   WBC 6.4 8.3   HGB 14.8 14.6   HCT 44.2 41.0   MCV 86 83    253     Recent Labs   Lab 11/16/23  1541   PHV 7.41   PO2V 88*   PCO2V 38*   HCO3V 24     No results for input(s): \"NTBNPI\", \"NTBNP\" in the last 168 hours.  No results for input(s): \"DD\" in the last 168 hours.  No results for input(s): \"SED\", \"CRP\" in the last 168 hours.  Recent Labs   Lab 11/17/23  0636 11/16/23  1540    253     No results for input(s): \"TSH\" in the last 168 " "hours.  No results for input(s): \"COLOR\", \"APPEARANCE\", \"URINEGLC\", \"URINEBILI\", \"URINEKETONE\", \"SG\", \"UBLD\", \"URINEPH\", \"PROTEIN\", \"UROBILINOGEN\", \"NITRITE\", \"LEUKEST\", \"RBCU\", \"WBCU\" in the last 168 hours.    Imaging:  Recent Results (from the past 48 hour(s))   XRAY CHEST PORTABLE    Narrative    EXAM: XR CHEST AP PORT      DATE: 2023 9:49 AM    CLINICAL DATA: Shortness of Breath, Additional Info:    VIEWS: An AP view of the chest was obtained.    Findings:    Lines/Tubes: None    Mediastinum: The cardiomediastinal silhouette is within normal limits. The pulmonary vasculature is distinct.    Lungs: Lungs are clear without edema or consolidation.    Pleural Spaces: No evidence of pneumothorax or effusion.    Osseous structures: No acute bony abnormality.    Upper abdomen: Unremarkable    Impression    IMPRESSION:    1.  No active cardiopulmonary process demonstrated.      REPORT SIGNED BY DR. Sesar Rangel   Echocardiogram Complete   Result Value    LVEF  >70%    Narrative    [id number]  [id number]  [id number]  [id number]^CHANTELLE^JAYANT     [facility]  Echocardiography Laboratory  [address] [place] [address]     Name: KIERRA TONG  MRN: [id number]  : 1970  Study Date: 2023 02:44 PM  Age: 53 yrs  Gender: Male  Patient Location: [facility]  Reason For Study: Dilated, Restrictive or HCM  Ordering Physician: JAYANT LOCKHART  Referring Physician: JUDY CONNORS  Performed By: Capri John     BSA: 2.2 m2  Height: 71 in  Weight: 231 lb  HR: 57  BP: 123/89 mmHg  ______________________________________________________________________________  Procedure  Complete Portable Echo Adult. Optison (NDC #6160-0006) given intravenously.  ______________________________________________________________________________  Interpretation Summary     The echo findings are consistent with hypertrophic cardiomyopathy.  Severe apical hypertrophy  Left ventricular hypertrophy: asymmetric with no LVOT " obstruction  Hyperdynamic left ventricular function  There is trace aortic regurgitation.     Imaging is technically challenging but there appears to be no signficant  change since 2/3/2022  ______________________________________________________________________________  Left Ventricle  The left ventricle is normal in size. The echo findings are consistent with  hypertrophic cardiomyopathy. Severe apical hypertrophy. Left ventricular  hypertrophy: asymmetric with no LVOT obstruction. Hyperdynamic left  ventricular function. The visual ejection fraction is >70%. Left ventricular  diastolic function is indeterminate. No regional wall motion abnormalities  noted. There is no thrombus seen in the left ventricle.     Right Ventricle  The right ventricle is normal in structure, function and size.     Atria  Normal left atrial size. Right atrial size is normal. There is no atrial shunt  seen. The left atrial appendage is not well visualized.     Mitral Valve  The mitral valve leaflets appear normal. There is no evidence of stenosis,  fluttering, or prolapse. There is no mitral regurgitation noted. There is no  mitral valve stenosis.     Tricuspid Valve  Normal tricuspid valve. No tricuspid regurgitation. There is no tricuspid  stenosis.     Aortic Valve  The aortic valve is trileaflet. There is trace aortic regurgitation. No aortic  stenosis is present.     Pulmonic Valve  The pulmonic valve is not well seen, but is grossly normal. There is no  pulmonic valvular regurgitation. There is no pulmonic valvular stenosis.     Vessels  The aortic root is normal size. Normal size ascending aorta.     Pericardium  The pericardium appears normal. There is no pleural effusion.     Rhythm  Sinus rhythm was noted.  ______________________________________________________________________________  MMode/2D Measurements & Calculations  IVSd: 1.3 cm     LVIDd: 5.0 cm  LVIDs: 3.2 cm  LVPWd: 1.4 cm  FS: 35.4 %  LV mass(C)d: 278.2 grams  LV  mass(C)dI: 124.1 grams/m2  Ao root diam: 4.3 cm  LA dimension: 3.9 cm  asc Aorta Diam: 4.0 cm  LA/Ao: 0.91  Ao root diam index Ht(cm/m): 2.4  Ao root diam index BSA (cm/m2): 1.9  Asc Ao diam index BSA (cm/m2): 1.8  Asc Ao diam index Ht(cm/m): 2.2  RWT: 0.56  TAPSE: 2.0 cm     Doppler Measurements & Calculations  MV E max venus: 105.0 cm/sec  MV A max venus: 36.1 cm/sec  MV E/A: 2.9  MV dec slope: 414.6 cm/sec2  MV dec time: 0.25 sec     PA acc time: 0.11 sec  E/E' av.9  Lateral E/e': 10.4  Medial E/e': 13.3     ______________________________________________________________________________  Report approved by: Dr. Wilfredo Talley 2023 04:27 PM               Medical Decision Making       45 MINUTES SPENT BY ME on the date of service doing chart review, history, exam, documentation & further activities per the note.

## 2023-11-18 NOTE — PROGRESS NOTES
Discharge note:   Pt discharged today per order. AVS reviewed with the patient. Pt educated on new medications and handout given. Pt discharging home with son.     DL AKERS RN on 11/18/2023 at 11:30 AM

## 2023-11-18 NOTE — DISCHARGE SUMMARY
"Discharge Summary    Andrew Atkinson MRN# [id number]   YOB: 1970 Age: 53 year old     Date of Admission:  11/16/2023  Date of Discharge:  11/18/2023  Admitting Physician:  Tonja Hadley MD  Discharge Physician:  Stevan Delong MD Pager [phone] Office [phone]  Discharging Service:  [facility] Hospitalist Service     Primary Provider: Wilfredo Dela Cruz          Discharge Diagnosis:     See problem-oriented hospital course for diagnoses addressed during this hospital stay.             Discharge Disposition:     Discharged to home          Condition on Discharge:     Discharge condition: Stable   Discharge vitals: Blood pressure 114/69, pulse 64, temperature 97.9  F (36.6  C), temperature source Oral, resp. rate 17, height 1.803 m (5' 11\"), weight 102 kg (224 lb 12.8 oz), SpO2 98%.   Code status on discharge: Full Code          Discharge Medications:        Review of your medicines        START taking        Dose / Directions   aspirin 81 MG EC tablet  Used for: NSTEMI (non-ST elevated myocardial infarction) (H)      Dose: 81 mg  Start taking on: November 19, 2023  Take 1 tablet (81 mg) by mouth daily  Quantity: 30 tablet  Refills: 0     diltiazem ER 90 MG 12 hr capsule  Commonly known as: CARDIZEM SR  Used for: NSTEMI (non-ST elevated myocardial infarction) (H)  Replaces: diltiazem  MG 24 hr capsule      Dose: 90 mg  Start taking on: November 19, 2023  Take 1 capsule (90 mg) by mouth daily  Quantity: 30 capsule  Refills: 0     furosemide 20 MG tablet  Commonly known as: LASIX  Used for: NSTEMI (non-ST elevated myocardial infarction) (H)      Dose: 20 mg  Start taking on: November 19, 2023  Take 1 tablet (20 mg) by mouth daily  Quantity: 30 tablet  Refills: 0            CONTINUE these medicines which may have CHANGED, or have new prescriptions. If we are uncertain of the size of tablets/capsules you have at home, strength may be listed as something that might have " changed.        Dose / Directions   pravastatin 40 MG tablet  Commonly known as: PRAVACHOL  This may have changed: when to take this  Used for: Acute viral myocarditis, NSTEMI (non-ST elevated myocardial infarction) (H), Nonspecific abnormal electrocardiogram (ECG) (EKG), Hyperlipidemia LDL goal <100      TAKE 1 TABLET BY MOUTH ONCE DAILY  Quantity: 90 tablet  Refills: 3            CONTINUE these medicines which have NOT CHANGED        Dose / Directions   * levothyroxine 25 MCG tablet  Commonly known as: SYNTHROID/LEVOTHROID  Used for: Acquired hypothyroidism      TAKE 1/2 TABLET BY MOUTH ONCE DAILY WITH 150MCG TABLET  Quantity: 90 tablet  Refills: 3     * levothyroxine 150 MCG tablet  Commonly known as: SYNTHROID/LEVOTHROID  Used for: Acquired hypothyroidism      Dose: 150 mcg  Take 1 tablet (150 mcg) by mouth daily With 12.5mg tab  Quantity: 90 tablet  Refills: 3     multivitamin w/minerals tablet      Dose: 1 tablet  Take 1 tablet by mouth daily  Refills: 0           * This list has 2 medication(s) that are the same as other medications prescribed for you. Read the directions carefully, and ask your doctor or other care provider to review them with you.                STOP taking      diltiazem  MG 24 hr capsule  Commonly known as: DILT-XR  Replaced by: diltiazem ER 90 MG 12 hr capsule                  Where to get your medicines        These medications were sent to [facility] [address] [address]  [address] [address]      Phone: [phone]   aspirin 81 MG EC tablet  diltiazem ER 90 MG 12 hr capsule  furosemide 20 MG tablet                Consultations:     Cardiology.             Brief Hx and Hospital Course:     Andrew Atkinson is a 53 year old adult with PMH of HOCM, prediabetes, chronic low back pain s/p L3-4 decompression 2022, hypothyroidism, depression, who presents with dyspnea and elevated troponin.            Acute dyspnea  Atypical  "chest pain  Elevated troponin,   Relative bradycardia:   He has a history of chest pain and dyspnea in 2020, with elevated troponins, he underwent a cath in 2020 which showed clean coronary arteries no CAD, diagnosed with myocarditis and HOCM.   Patient presents to [facility] ED with two days of acute dyspnea, can't catch his breath, nonpleuritic, worse with exertion. Denies chest pain but reports substernal/epigastric \"fullness.\"   ED workup is notable for BNP 97, negative d-dimer, and initial troponin of 322. EKG shows TWI inferiorly and V3-6, and nondiagnostic ST elevation, it appears unchanged from previous. CXR is normal appearing. Given  in ED   - Chest x-ray was within normal limit repeat troponins remain mildly elevated.  Patient had previous coronary angiogram which did not show any obstructive coronary artery disease  Echo shows normal left ventricular function without pericardial effusion  -Was started on heparin drip and diuresed with Lasix cardiology was consulted.  -Cardiology was consulted    HR 40s this admission, no heart block on ECG.  Patient did tell me that he had episodes of bradycardia with his heart rates in the 40s to the 50s.  He recently had a nerve block for rotator cuff tear repair during which his heart rates were low and the procedure was not done  Patient takes Cardizem 180 mg daily extended release  Discussed with  from cardiology about the low heart rates.  Dr. Urbina recommended restarting his Cardizem at 90 mg dose.  Heparin discontinued on 11/17/23  Asymptomatic this am.  - Lasix 20 mg daily per cards.  - Cardizem SR 90 mg daily.  -Patient will follow up with his outpatient cardiologist as an outpatient  -Avoid overdiuresis with a history of HOCM       Chronic low back pain s/p L3-4 decompression 2022     Hypothyroidism: PTA levothyroxine        DM A1c 6.4% in 9/2023. 6.7 now.  HLD   -Diet/exercise.  - Referral to nutrion.  - Continue PTA pravastatin  - F/U with " PCP for further w/unit(s) and plan.    D/w pt.     Chronic low back pain s/p L3-4 decompression 2022     Hypothyroidism: PTA levothyroxine     Pt is being discharged home in stable conditions.          DISCHARGE EXAM:   Temp:  [97.6  F (36.4  C)-97.9  F (36.6  C)] 97.9  F (36.6  C)  Pulse:  [54-65] 64  Resp:  [16-17] 17  BP: (104-123)/(69-85) 114/69  SpO2:  [95 %-98 %] 98 %  Wt Readings from Last 5 Encounters:   11/18/23 102 kg (224 lb 12.8 oz)   11/14/23 99.8 kg (220 lb)   10/27/23 104.1 kg (229 lb 9.6 oz)   10/21/23 101.6 kg (224 lb)   10/12/23 101.6 kg (224 lb)     Constitutional: Awake, alert, cooperative, no apparent distress      Lungs: Clear to auscultation bilaterally, no crackles or wheezing    Cardiovascular: Regular rate and rhythm, normal S1 and S2, and no murmur noted   Abdomen: Normal bowel sounds, soft, non-distended, non-tender   Skin: No rashes, no cyanosis, no edema   Other:           Pertinent Tests and Procedures:     Per chart.             Pending Results:     None.          Discharge Instructions and Follow-Up:     Discharge diet: Orders Placed This Encounter      Low Saturated Fat Na <2400 mg      Diet     Discharge activity: Activity as tolerated   Discharge follow-up: Follow up with primary care provider, cardiology.   Outpatient therapy: None    Home Care agency: None    Supplies and equipment: None   Lines and drains: None    Wound care: None   Other instructions: None      More than 30 minutes were required for coordination of care for this discharge.         Procedures / Labs / Imaging:     Results for orders placed or performed during the hospital encounter of 11/16/23   Troponin T, High Sensitivity     Status: Normal   Result Value Ref Range    Troponin T, High Sensitivity 22 <=22 ng/L   CBC with platelets     Status: Normal   Result Value Ref Range    WBC Count 8.3 4.0 - 11.0 10e3/uL    RBC Count 4.95 3.80 - 5.90 10e6/uL    Hemoglobin 14.6 11.7 - 17.7 g/dL    Hematocrit 41.0 35.0 -  "53.0 %    MCV 83 78 - 100 fL    MCH 29.5 26.5 - 33.0 pg    MCHC 35.6 31.5 - 36.5 g/dL    RDW 13.0 10.0 - 15.0 %    Platelet Count 253 150 - 450 10e3/uL    Narrative    The generation of reference intervals for this test is currently based on binary male or female sex. If the electronic health record information indicates another gender identity or if Legal Sex is recorded as \"Unknown\", both male and female reference intervals are provided where applicable, and should be considered according to the individual's appropriate clinical context.   Blood gas venous     Status: Abnormal   Result Value Ref Range    pH Venous 7.41 7.32 - 7.43    pCO2 Venous 38 (L) 40 - 50 mm Hg    pO2 Venous 88 (H) 25 - 47 mm Hg    Bicarbonate Venous 24 21 - 28 mmol/L    Base Excess/Deficit -0.5 -7.7 - 1.9 mmol/L    FIO2 21    Troponin T, High Sensitivity     Status: Abnormal   Result Value Ref Range    Troponin T, High Sensitivity 25 (H) <=22 ng/L   Basic metabolic panel     Status: Abnormal   Result Value Ref Range    Sodium 136 135 - 145 mmol/L    Potassium 4.3 3.4 - 5.3 mmol/L    Chloride 102 98 - 107 mmol/L    Carbon Dioxide (CO2) 23 22 - 29 mmol/L    Anion Gap 11 7 - 15 mmol/L    Urea Nitrogen 13.5 6.0 - 20.0 mg/dL    Creatinine 0.87 0.51 - 1.17 mg/dL    GFR Estimate >90 >60 mL/min/1.73m2    Calcium 9.5 8.6 - 10.0 mg/dL    Glucose 135 (H) 70 - 99 mg/dL    Narrative    The generation of reference intervals for this test is currently based on binary male or female sex. If the electronic health record information indicates another gender identity or if Legal Sex is recorded as \"Unknown\", both male and female reference intervals are provided where applicable, and should be considered according to the individual's appropriate clinical context.   CBC with platelets     Status: Normal   Result Value Ref Range    WBC Count 6.4 4.0 - 11.0 10e3/uL    RBC Count 5.13 3.80 - 5.90 10e6/uL    Hemoglobin 14.8 11.7 - 17.7 g/dL    Hematocrit 44.2 35.0 - " "53.0 %    MCV 86 78 - 100 fL    MCH 28.8 26.5 - 33.0 pg    MCHC 33.5 31.5 - 36.5 g/dL    RDW 13.0 10.0 - 15.0 %    Platelet Count 267 150 - 450 10e3/uL    Narrative    The generation of reference intervals for this test is currently based on binary male or female sex. If the electronic health record information indicates another gender identity or if Legal Sex is recorded as \"Unknown\", both male and female reference intervals are provided where applicable, and should be considered according to the individual's appropriate clinical context.   Hemoglobin A1c     Status: Abnormal   Result Value Ref Range    Hemoglobin A1C 6.7 (H) <5.7 %   Potassium     Status: Normal   Result Value Ref Range    Potassium 4.3 3.4 - 5.3 mmol/L   EKG 12-lead, tracing only     Status: None (Preliminary result)   Result Value Ref Range    Systolic Blood Pressure  mmHg    Diastolic Blood Pressure  mmHg    Ventricular Rate 41 BPM    Atrial Rate 41 BPM    CT Interval 138 ms    QRS Duration 116 ms     ms    QTc 427 ms    P Axis 43 degrees    R AXIS 75 degrees    T Axis 254 degrees    Interpretation ECG       Sinus bradycardia  RSR' or QR pattern in V1 suggests right ventricular conduction delay  Left ventricular hypertrophy with QRS widening and repolarization abnormality  Abnormal ECG  When compared with ECG of 2023 14:06,  Criteria for Lateral infarct are no longer Present  ST now depressed in Inferior leads  Inverted T waves have replaced nonspecific T wave abnormality in Inferior leads     Echocardiogram Complete     Status: None   Result Value Ref Range    LVEF  >70%     Narrative    [id number]  [id number]  [id number]  [id number]^CHANTELLE^KABREAV     [facility]  Echocardiography Laboratory  [address] [place] [address]     Name: KIERRA TONG  MRN: [id number]  : 1970  Study Date: 2023 02:44 PM  Age: 53 yrs  Gender: Male  Patient Location: [facility]  Reason For Study: Dilated, Restrictive or " HCM  Ordering Physician: JAYANT LOCKHART  Referring Physician: JUDY CONNORS  Performed By: Capri John     BSA: 2.2 m2  Height: 71 in  Weight: 231 lb  HR: 57  BP: 123/89 mmHg  ______________________________________________________________________________  Procedure  Complete Portable Echo Adult. Optison (NDC #0523-4292) given intravenously.  ______________________________________________________________________________  Interpretation Summary     The echo findings are consistent with hypertrophic cardiomyopathy.  Severe apical hypertrophy  Left ventricular hypertrophy: asymmetric with no LVOT obstruction  Hyperdynamic left ventricular function  There is trace aortic regurgitation.     Imaging is technically challenging but there appears to be no signficant  change since 2/3/2022  ______________________________________________________________________________  Left Ventricle  The left ventricle is normal in size. The echo findings are consistent with  hypertrophic cardiomyopathy. Severe apical hypertrophy. Left ventricular  hypertrophy: asymmetric with no LVOT obstruction. Hyperdynamic left  ventricular function. The visual ejection fraction is >70%. Left ventricular  diastolic function is indeterminate. No regional wall motion abnormalities  noted. There is no thrombus seen in the left ventricle.     Right Ventricle  The right ventricle is normal in structure, function and size.     Atria  Normal left atrial size. Right atrial size is normal. There is no atrial shunt  seen. The left atrial appendage is not well visualized.     Mitral Valve  The mitral valve leaflets appear normal. There is no evidence of stenosis,  fluttering, or prolapse. There is no mitral regurgitation noted. There is no  mitral valve stenosis.     Tricuspid Valve  Normal tricuspid valve. No tricuspid regurgitation. There is no tricuspid  stenosis.     Aortic Valve  The aortic valve is trileaflet. There is trace aortic regurgitation. No  aortic  stenosis is present.     Pulmonic Valve  The pulmonic valve is not well seen, but is grossly normal. There is no  pulmonic valvular regurgitation. There is no pulmonic valvular stenosis.     Vessels  The aortic root is normal size. Normal size ascending aorta.     Pericardium  The pericardium appears normal. There is no pleural effusion.     Rhythm  Sinus rhythm was noted.  ______________________________________________________________________________  MMode/2D Measurements & Calculations  IVSd: 1.3 cm     LVIDd: 5.0 cm  LVIDs: 3.2 cm  LVPWd: 1.4 cm  FS: 35.4 %  LV mass(C)d: 278.2 grams  LV mass(C)dI: 124.1 grams/m2  Ao root diam: 4.3 cm  LA dimension: 3.9 cm  asc Aorta Diam: 4.0 cm  LA/Ao: 0.91  Ao root diam index Ht(cm/m): 2.4  Ao root diam index BSA (cm/m2): 1.9  Asc Ao diam index BSA (cm/m2): 1.8  Asc Ao diam index Ht(cm/m): 2.2  RWT: 0.56  TAPSE: 2.0 cm     Doppler Measurements & Calculations  MV E max venus: 105.0 cm/sec  MV A max venus: 36.1 cm/sec  MV E/A: 2.9  MV dec slope: 414.6 cm/sec2  MV dec time: 0.25 sec     PA acc time: 0.11 sec  E/E' av.9  Lateral E/e': 10.4  Medial E/e': 13.3     ______________________________________________________________________________  Report approved by: Dr. Wilfredo Talley 2023 04:27 PM

## 2023-11-20 LAB
ATRIAL RATE - MUSE: 41 BPM
DIASTOLIC BLOOD PRESSURE - MUSE: NORMAL MMHG
INTERPRETATION ECG - MUSE: NORMAL
P AXIS - MUSE: 43 DEGREES
PR INTERVAL - MUSE: 138 MS
QRS DURATION - MUSE: 116 MS
QT - MUSE: 518 MS
QTC - MUSE: 427 MS
R AXIS - MUSE: 75 DEGREES
SYSTOLIC BLOOD PRESSURE - MUSE: NORMAL MMHG
T AXIS - MUSE: 254 DEGREES
VENTRICULAR RATE- MUSE: 41 BPM

## 2023-11-21 NOTE — TELEPHONE ENCOUNTER
Date: 11/21/2023    Time of Call: 10:14 AM     Diagnosis: Hypertrophic cardiomyopathy, nonsustained VT     [ TORB ] Ordering provider: Dr. Lomeli  Order: Patient to complete 48 holter monitor. Order already in place. Echo done at hospital on 11/16     Order received by: Allison Henderson RN     Follow-up/additional notes: Shipu message sent to patient.    Allison Henderson RN, BSN  Cardiology RN Care Coordinator   [place]   Phone: [phone]  Fax: [phone] ([facility] [phone] ([place])

## 2023-11-22 ENCOUNTER — ANCILLARY PROCEDURE (OUTPATIENT)
Dept: CARDIOLOGY | Facility: CLINIC | Age: 53
End: 2023-11-22
Attending: INTERNAL MEDICINE
Payer: COMMERCIAL

## 2023-11-22 DIAGNOSIS — I47.29 NSVT (NONSUSTAINED VENTRICULAR TACHYCARDIA) (H): ICD-10-CM

## 2023-11-22 DIAGNOSIS — I42.2 HYPERTROPHIC CARDIOMYOPATHY (H): ICD-10-CM

## 2023-11-22 DIAGNOSIS — I21.4 NSTEMI (NON-ST ELEVATED MYOCARDIAL INFARCTION) (H): ICD-10-CM

## 2023-11-22 DIAGNOSIS — I10 BENIGN ESSENTIAL HYPERTENSION: ICD-10-CM

## 2023-11-22 PROCEDURE — 93224 XTRNL ECG REC UP TO 48 HRS: CPT | Performed by: INTERNAL MEDICINE

## 2023-11-22 NOTE — PATIENT INSTRUCTIONS
The patient was educated on the purpose and function of a 48 hour Holter monitor as well as when and where to return the monitor.  After the patient demonstrated an understanding, monitor s/n 0649 was applied to the patient.  Monitor should be returned to:  [facility]  [address] [place] [address]  [phone]

## 2023-11-28 ENCOUNTER — DOCUMENTATION ONLY (OUTPATIENT)
Dept: CARDIOLOGY | Facility: CLINIC | Age: 53
End: 2023-11-28
Payer: COMMERCIAL

## 2023-11-28 NOTE — PROGRESS NOTES
Reviewed results of recent testing:    TTE 11/16/23 (my read):   Known HCM with prominent apical hypertrophy and hyperdynamic LV function, LVEF=65-70%. No significant valvular abnormaliites. No resting LVOT obstruction.  No significant change from baseline images on 6/20/22 stres echocardiogram or 6/3/22 CMR.    Holter monitor 11/15/23:  No significant arrhythmias, specifically no nonsustained/sustained VT and no atrial fibrillation.    Federico Lomeli MD  Cardiology

## 2023-11-28 NOTE — TELEPHONE ENCOUNTER
Date: 11/28/2023    Time of Call: 10:30 AM     Diagnosis:  Hypertrophic cardiomyopathy, nonsustained VT     [ TORB ] Ordering provider: Dr. Lomeli  Order: Echocardiogram is unchanged from his previous echo and CMR and ziopatch did not show any concerning rhythms. No changes needed at this time.      Order received by: Allison Henderson RN     Follow-up/additional notes: Anaergia message sent to patient.    Allison Henderson RN, BSN  Cardiology RN Care Coordinator   [place]   Phone: [phone]  Fax: [phone] ([place]) [phone] ([place])

## 2023-11-29 ENCOUNTER — OFFICE VISIT (OUTPATIENT)
Dept: ORTHOPEDICS | Facility: CLINIC | Age: 53
End: 2023-11-29
Payer: COMMERCIAL

## 2023-11-29 DIAGNOSIS — M17.11 PRIMARY OSTEOARTHRITIS OF RIGHT KNEE: Primary | ICD-10-CM

## 2023-11-29 PROCEDURE — 20611 DRAIN/INJ JOINT/BURSA W/US: CPT | Mod: RT | Performed by: FAMILY MEDICINE

## 2023-11-29 NOTE — LETTER
2023         RE: Andrew Atkinson  [address] [address]        Dear Colleague,    Thank you for referring your patient, Andrew Atkinson, to the [facility]. Please see a copy of my visit note below.    Andrew Atkinson  :  1970  DOS: 2023  MRN: [id number]    Sports Medicine Clinic Procedure    Ultrasound Guided Right Intra-Articular Knee SynviscOne Injection, +/- Aspiration    Clinical History: Here for viscosupplementation trial as previously discussed.  Now pre-authorized.  No interim injury.    Diagnosis:   1. Primary osteoarthritis of right knee      Referring Physician: Dr. Robert Briggs DO  Large Joint Injection/Arthocentesis: R knee joint    Date/Time: 2023 9:30 AM    Performed by: Robert Regan DO  Authorized by: Robert Regan DO    Indications:  Osteoarthritis  Needle Size:  21 G  Guidance: ultrasound    Approach:  Superolateral  Location:  Knee      Medications:  48 mg hylan 48 MG/6ML  Outcome:  Tolerated well, no immediate complications  Procedure discussed: discussed risks, benefits, and alternatives    Consent Given by:  Patient  Timeout: timeout called immediately prior to procedure    Prep: patient was prepped and draped in usual sterile fashion     Ultrasound images of procedure were permanently stored.        Impression:  Successful Right intra-articular knee SynviscOne injection.    Plan:  Follow up 1 month if no relief, otherwise prn based on relief  Expectations and limitations of synvisc were reviewed in detail  Often 4-6 weeks before full effect may be noticed  Usually covered up to every 6 months by insurance, but does not need to be repeated unless pain returns, at which point we would re-evaluate  Potential use of CSI in future for flares of pain reviewed in detail  Encouraged modified progressive pain-free activity as tolerated  HEP and Supportive care reviewed  All questions were  answered today  Contact us with additional questions or concerns  Signs and sx of concern reviewed      Robert Regan DO, CAQ  Primary Care Sports Medicine  [facility]       Again, thank you for allowing me to participate in the care of your patient.        Sincerely,        Robert Regan DO

## 2023-11-30 NOTE — TELEPHONE ENCOUNTER
Patient saw [email or web address] message.  No questions and no response at this time.    Allison Henderson, RN, BSN  Cardiology RN Care Coordinator   [place]   Phone: [phone]  Fax: [phone] ([place]) [phone] ([place])

## 2023-12-14 ENCOUNTER — OFFICE VISIT (OUTPATIENT)
Dept: CARDIOLOGY | Facility: CLINIC | Age: 53
End: 2023-12-14
Payer: COMMERCIAL

## 2023-12-14 VITALS
OXYGEN SATURATION: 95 % | HEART RATE: 50 BPM | WEIGHT: 229 LBS | DIASTOLIC BLOOD PRESSURE: 69 MMHG | SYSTOLIC BLOOD PRESSURE: 110 MMHG | BODY MASS INDEX: 31.94 KG/M2

## 2023-12-14 DIAGNOSIS — I42.2 HYPERTROPHIC CARDIOMYOPATHY (H): Primary | ICD-10-CM

## 2023-12-14 DIAGNOSIS — I21.4 NSTEMI (NON-ST ELEVATED MYOCARDIAL INFARCTION) (H): ICD-10-CM

## 2023-12-14 DIAGNOSIS — R00.1 BRADYCARDIA: ICD-10-CM

## 2023-12-14 DIAGNOSIS — I50.32 CHRONIC DIASTOLIC HEART FAILURE (H): ICD-10-CM

## 2023-12-14 DIAGNOSIS — I10 BENIGN ESSENTIAL HYPERTENSION: ICD-10-CM

## 2023-12-14 DIAGNOSIS — I47.29 NSVT (NONSUSTAINED VENTRICULAR TACHYCARDIA) (H): ICD-10-CM

## 2023-12-14 DIAGNOSIS — G47.33 OSA (OBSTRUCTIVE SLEEP APNEA): ICD-10-CM

## 2023-12-14 LAB
ANION GAP SERPL CALCULATED.3IONS-SCNC: 11 MMOL/L (ref 7–15)
BUN SERPL-MCNC: 17.6 MG/DL (ref 6–20)
CALCIUM SERPL-MCNC: 10.5 MG/DL (ref 8.6–10)
CHLORIDE SERPL-SCNC: 101 MMOL/L (ref 98–107)
CREAT SERPL-MCNC: 1.01 MG/DL (ref 0.51–1.17)
DEPRECATED HCO3 PLAS-SCNC: 26 MMOL/L (ref 22–29)
EGFRCR SERPLBLD CKD-EPI 2021: 88 ML/MIN/1.73M2
GLUCOSE SERPL-MCNC: 172 MG/DL (ref 70–99)
POTASSIUM SERPL-SCNC: 4 MMOL/L (ref 3.4–5.3)
SODIUM SERPL-SCNC: 138 MMOL/L (ref 135–145)

## 2023-12-14 PROCEDURE — 99215 OFFICE O/P EST HI 40 MIN: CPT | Performed by: INTERNAL MEDICINE

## 2023-12-14 PROCEDURE — 36415 COLL VENOUS BLD VENIPUNCTURE: CPT | Performed by: INTERNAL MEDICINE

## 2023-12-14 PROCEDURE — 80048 BASIC METABOLIC PNL TOTAL CA: CPT | Performed by: INTERNAL MEDICINE

## 2023-12-14 RX ORDER — FUROSEMIDE 20 MG
20 TABLET ORAL DAILY
Qty: 90 TABLET | Refills: 3 | Status: SHIPPED | OUTPATIENT
Start: 2023-12-14 | End: 2024-03-21

## 2023-12-14 RX ORDER — DILTIAZEM HYDROCHLORIDE EXTENDED-RELEASE TABLETS 120 MG/1
120 TABLET, EXTENDED RELEASE ORAL DAILY
Qty: 90 TABLET | Refills: 3 | Status: SHIPPED | OUTPATIENT
Start: 2023-12-14

## 2023-12-14 NOTE — PATIENT INSTRUCTIONS
Take your medicines every day, as directed     Changes made today:  Lab today  Change Diltiazem to 120 mg 1 tablet by mouth daily  Continue lasix 20 mg daily  Okay for surgery  Please HOLD lasix before surgery  Recommendation for anti-nausea medication for sugery  Follow up in 1 year with echo and 14 day zio prior       Cardiology Care Coordinators:      Allison MODI RN     Cardiology Rooming staff:  Alice VALIENTE CNA    Phone  [phone]      Fax [phone]    To Contact us     During Business Hours:  [phone]     If you are needing refills please contact your pharmacy.     For urgent after hour care please call the [facility] at [phone] or the [facility] at [phone] and ask to speak to the cardiologist on call.            HOW TO CHECK YOUR BLOOD PRESSURE AT HOME:     Avoid eating, smoking, and exercising for at least 30 minutes before taking a reading.     Be sure you have taken your BP medication at least 2-3 hours before you check it.      Sit quietly for 10 minutes before a reading.      Sit in a chair with your feet flat on the floor. Rest your  arm on a table so that the arm cuff is at the same level as your heart.     Remain still during the reading.  Record your blood pressure and pulse in a log and bring to your next appointment.       [employer] allows you to communicate directly with your heart team through secure messaging.  [employer] can be accessed any time on your phone, computer, or tablet.  If you need assistance, we'd be happy to help!             Keep your Heart Appointments:     Follow up in 1 year with echo and 14 day zio monitor  prior

## 2023-12-14 NOTE — PROGRESS NOTES
Chief complaint: HCM, establish care    HPI:   Andrew Atkinson is a 53 year old adult with history of HTN and HL referred for evaluation and management of recently diagnosed HCM.     He has previously been followed by my partners Ashish Larson and Bishnu.  He established care with Dr. Larson (4/29/20 note reviewed) and subsequently with Dr. Goetz for follow-up after an illness 2/2020.  At that time, he had been visiting [place] and attended an oxygen parlor, after which he developed viral upper respiratory symptoms and dyspnea.  He reported the symptoms to his primary care physician obtained troponin, which was elevated at 0.2.  He was sent to the emergency department where he was found to have diffuse T wave inversions on ECG.  Coronary angiogram was performed and showed normal coronary arteries.  He was discharged home with a working diagnosis of myocarditis and advised to have a cardiac MRI.  Dr. oGetz ordered a cardiac MRI 5/3/22 for follow up of what had been suspected to be viral myocarditis (see below), which revealed HCM prompting referral. He was last seen by Dr. Goetz 5/12/22 (notes reviewed.)    Prior to his illness 2/2020 he had been relatively active, but his illness and also back pain and subsequent surgery have limited his activity. He had back surgery in March which limited his activity, but he has been walking his dog without difficulty. He reports limited exercise capacity.    Of note, he has been observed repeatedly stopping breathing while sleeping and also snoring loudly and reports fatigue and daytime sleepiness.    He denies any chest pain, dyspnea at rest or exertional dyspnea above baseline, PND, orthopnea, peripheral edema, palpitations, lightheadedness or syncope.     His family history is not well-known to him as his parents gave him up when he was a young child, but he does know that his mother has HCM (but she is unwilling to share any further information about this.) He has 2  sisters (who both live in [place]) and 1 brother; to the best of his knowledge, none of them has HCM. He has 2 sons, ages 18 and 20, both well. No known family history of sudden death.     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 His is a never-smoker.    12/08/22:  He reports feeling well since his last visit. He is active doing yardwork on his large property, taking his dogs to the dog park every other day, and working on cars but does not follow a formal exercise program.     12/14/23:  Since his last visit, he was scheduled for a shoulder surgery which was canceled due to asymptomatic bradycardia with reported HR of 35 prior to induction during a block. Keven states that he had been retching and vomiting and cold sweating. Surgery was canceled due to asymptomatic sinus bradycardia in the setting of a nerve block and emesis.     He was also briefly admitted to [facility] with dyspnea/orthopnea and received several doses of 20 mg IV furosemide. With improvement of symptoms. He also feels CPAP has improved his sleep and breathing at night. He was started on furosemide 20 mg daily. He is tolerating this well. Diltiazem was also reduced to 90 mg daily (of the 12h form, for unclear reasons.) Home weights have been stable and symptoms have not recurred.     He denies any chest pain, dyspnea at rest or with exertion, palpitations, PND, orthopnea, peripheral edema, lightheadedness, or syncope.     PAST MEDICAL HISTORY:  HTN  HL  TIFFANY on NIPPV  Hypothyroidism    CURRENT MEDICATIONS:  Current Outpatient Medications   Medication Sig Dispense Refill    aspirin 81 MG EC tablet Take 1 tablet (81 mg) by mouth daily 30 tablet 0    diltiazem ER (CARDIZEM SR) 90 MG 12 hr capsule Take 1 capsule (90 mg) by mouth daily 30 capsule 0    furosemide (LASIX) 20 MG tablet Take 1 tablet (20 mg) by mouth daily 30 tablet 0    levothyroxine (SYNTHROID/LEVOTHROID) 150 MCG tablet Take 1 tablet (150 mcg) by  mouth daily With 12.5mg tab 90 tablet 3    levothyroxine (SYNTHROID/LEVOTHROID) 25 MCG tablet TAKE 1/2 TABLET BY MOUTH ONCE DAILY WITH 150MCG TABLET 90 tablet 3    multivitamin w/minerals (THERA-VIT-M) tablet Take 1 tablet by mouth daily      pravastatin (PRAVACHOL) 40 MG tablet TAKE 1 TABLET BY MOUTH ONCE DAILY (Patient taking differently: Take 40 mg by mouth every morning) 90 tablet 3       ALLERGIES:     Allergies   Allergen Reactions    Morphine Hives     States tolerates Oxy fine       FAMILY HISTORY:  His family history is not well-known to him as his parents gave him up when he was a young child, but he does know that his mother has HCM (but she is unwilling to share any further information about this.) He has 2 sisters (who both live in [place]) and 1 brother; to the best of his knowledge, none of them has HCM. He has 2 sons, ages 18 and 20, both well. No known family history of sudden death.       SOCIAL HISTORY:  Social History     Tobacco Use    Smoking status: Never    Smokeless tobacco: Never   Substance Use Topics    Alcohol use: Yes     Comment: Occ     Drug use: No       ROS:   A comprehensive 14 point review of systems is negative other than as mentioned in HPI.    Exam:  /69 (BP Location: Right arm, Patient Position: Sitting, Cuff Size: Adult Large)   Pulse 50   Wt 103.9 kg (229 lb)   SpO2 95%   BMI 31.94 kg/m    GENERAL APPEARANCE: healthy, alert and no distress  EYES: no icterus, no xanthelasmas  ENT: normal palate, mucosa moist, no central cyanosis  NECK: JVP not elevated  RESPIRATORY: lungs clear to auscultation - no rales, rhonchi or wheezes, no use of accessory muscles, no retractions, respirations are unlabored, normal respiratory rate  CARDIOVASCULAR: regular rhythm, normal S1 with physiologic split S2, no S3 or S4 and no murmur, click or rub.  GI: soft, non tender, bowel sounds normal,no abdominal bruits  EXTREMITIES: no edema, no bruits  NEURO: alert and oriented to  person/place/time, normal speech, gait and affect  VASC: Radial, dorsalis pedis and posterior tibialis pulses 2+ bilaterally.  SKIN: no ecchymoses, no rashes.  PSYCH: cooperative, affect appropriate.     Labs:  Reviewed.   Genetic testing 8/23/22: Andrew does NOT carry a mutation in the 30 genes analyzed    Testing/Procedures:  CPX/exercise echocardiogram (HCM protocol) 6/20/22:  Exercise 17:49 on a Modified Rolando protocol  MVO2 27.2 mL/kg/min (81% predicted, class I, 7.8 METS) VE/VCO2 slope 31.88 RER 1.07  Blunted HR and hypertensive BP response  No detectable resting or Valsalva LVOT gradient. Minimal latent LVOT obstruction, maximal post-exercise LVOT gradient 31 mmHg.     CMR 5/3/22:  HCM with asymmetric septal hypertrophy predominantly involving the basal-mid inferoseptum (max thickness 2.1 cm mid inferoseptum); there is also apical hypertrophy. Minimal patchy midmyocardial LGE consistent with HCM, LGE burden<5%. Minimal JEET without septal contact.     I personally visualized and interpreted:  14-day ZioPatch 6/23/22-7/7/22:   4 asymptomatic runs of nonsustained VT, longest 13 beats.   Other findings--baseline sinus rhythm (average VR 80 bpm, range ) with rare (<1%) PACs and PVCs and 4 asymptomatic nonsustained runs of SVT (in addition to VT noted above.)    TTE 11/16/23 (my read):   Known HCM with prominent apical hypertrophy and hyperdynamic LV function, LVEF=65-70%. No significant valvular abnormaliites. No resting LVOT obstruction.  No significant change from baseline images on 6/20/22 stres echocardiogram or 6/3/22 CMR.    Holter monitor 11/15/23:  No significant arrhythmias, specifically no nonsustained/sustained VT and no atrial fibrillation. Average VR 69 bpm, range 50-99.    Assessment and Plan:   #Preoperative evaluation for shoulder surgery  #Asymptomatic sinus bradycardia during nerve block and vomiting  This episode was almost certainly vagally-mediated and does not merit any further  workup. Furthermore, asymptomatic hemodynamically-stable sinus bradycardia does not represent a cardiac contraindication to shoulder surgery. Lastly, diltiazem has been decreased and so baseline HR should be slightly higher in any case (though once again this is immaterial for asymptomatic sinus bradycardia.)   -Holter monitor shows normal average HR and no concerning bradyarrhythmias or tachyarrhythmias   -no further cardiac workup or changes to therapy indicated prior to surgery   -OK to proceed with shoulder surgery as planned from cardiac perspective    -would hold loop diuretic 1 day prior to surgery and resume postoperatively   -recommend pre-treatment with anti-emetics to minimize likelihood of vagal episodes related to nausea/vomiting    #Hypertrophic cardiomyopathy cardiomyopathy with minimal latent LVOT obstruction (post-exercise LVOT gradient 31 mmHg), newly-diagnosed  #Nonsustained VT  The patient was counseled at length regarding the nature of hypertrophic cardiomyopathy including its genetic nature, its natural history, and potential complications.     We discussed the potential complications of HCM, including outflow obstruction, arrhythmias, and heart failure.  He has minimal latent LVOT obstruction (PG 31 mmHg post-exercise) without significant symptoms and excellent exercise capacity on CPX. The patient was counseled regarding behavioral interventions to avoid worsening outflow obstruction, in particular avoiding dehydration and minimizing diuretics and peripheral vasodilators. Low-dose beta blocker was started.     Keven has only 1 risk factor for SCD, nonsustained VT (4 runs of up to 13 beats on 14-day ZioPatch 6/23/22-7/7/22)-- this is at most a class IIb indication for primary prevention ICD. Will continue surveillance. Otherwise, he has only mild LGE burden on his cardiac MRI (<5%). Maximal LV wall thickness is 2.1 cm. He also has no known family history of SCD (though his FHx is not well  ascertained.) He has no history of unexplained syncope. LVEF is normal. He has no LV aneurysm. walker Baca's 6/2022 CPX is excellent and I suspect his extremely mild functional limitation is largely related to musculoskeletal limitations and deconditioning. We discussed the activity recommendations for hypertrophic cardiomyopathy, and in particular that moderate intensity aerobic exercise is encouraged and that only the highest intensity competitive sports with start-stop activity (e.g. basketball and ice hockey) are felt to be contraindicated.      Genetic testing was done 8/2022 and was negative. I have counseled Keven that in the absence of an identified mutation that would allow cascade testing of his family members, that all first-degree relatives should undergo clinical screening.    -transition diltiazem to diltiazem CD (24h) 120 mg daily (currently on 12-hour diltiazem 90 mg prescribed daily for unclear reasons)-- this is still a reduction from his previous dose  -seen by Dr. Kaufman 8/31/22: at most a class IIb indication for ICD, will continue surveillance for now  -Genetic testing 8/23/22: Andrew does NOT carry a mutation in the 30 genes analyzed  -14-day ZioPatch in 1 year  -TTE in 1 year  -follow up in 1 year    #Chronic HFpEF due to HCM, newly-diagnosed 11/2023:  -continue furosemide 20 mg daily  -BMP today      #HTN, controlled: continue present therapy  #HL, controlled: continue present therapy    #TIFFANY on NIPPV: continue NIPPV. He has been adherent with this.     #Chronically-elevated troponin: Discussed that this is common in HCM and at his baseline level of troponin without ischemic symptoms does not merit further workup.      The patient states understanding and is agreeable with plan.   Total time spent Dec 14, 2023: 41 minutes    Federico Lomeli MD  Cardiology

## 2023-12-14 NOTE — NURSING NOTE
Andrew Atkinson's goals for this visit include:   Chief Complaint   Patient presents with    Follow Up     Keven Atkinson requests these members of Keven Atkinson's care team be copied on today's visit information: yes    PCP: Wilfredo Dela Cruz    Referring Provider:  Federico Lomeli MD  [id number] [place] [address]    /69 (BP Location: Right arm, Patient Position: Sitting, Cuff Size: Adult Large)   Pulse 50   Wt 103.9 kg (229 lb)   SpO2 95%   BMI 31.94 kg/m      Do you need any medication refills at today's visit? no    SHIELA Crenshaw  Adult Endocrinology  [facility]

## 2023-12-15 NOTE — ADDENDUM NOTE
Addended by: SYDNEY SERRATO on: 12/15/2023 09:24 AM     Modules accepted: Orders    
RANGE OF MOTION LIMITED/TENDERNESS

## 2023-12-18 NOTE — PROGRESS NOTES
CHIEF COMPLAINT: Right shoulder pain    DIAGNOSIS: Right shoulder rotator cuff tear, AC joint arthropathy    OCCUPATION/SPORT: Construction owner    HPI:   Andrew Atkinson is a very pleasant 52 year old, left-hand dominant adult who presents for evaluation of right shoulder pain. Patient met with Dr. Lomeli, his cardiologist on 12/14/23.  Was cleared by cardiology after having a Holter monitor.  Was given a recommendation for some antinausea medication to have before the block.  Does note that 2 days after surgery had to go to the emergency room because of shortness of breath and laying down.  Wanting to discuss doing surgery.   SANE score R - 60-65 L - 100    PAST MEDICAL HISTORY:  Past Medical History:   Diagnosis Date    Medial meniscus tear     TIFFANY (obstructive sleep apnea) 3/2/2023       PAST SURGICAL HISTORY:  Past Surgical History:   Procedure Laterality Date    ARTHROSCOPY KNEE WITH MEDIAL MENISCECTOMY Left 5/12/2017    Procedure: ARTHROSCOPY KNEE WITH MEDIAL MENISCECTOMY;  Arthroscopic partial medial menisectomy,left knee;  Surgeon: Grant Muñoz MD;  Location: [facility] OR    COLONOSCOPY N/A 7/18/2023    Procedure: Colonoscopy with polypectomy;  Surgeon: Wilfredo Molina MD;  Location:  GI    CV CORONARY ANGIOGRAM N/A 2/15/2020    Procedure: Coronary Angiogram;  Surgeon: Rinku Boyce MD;  Location:  HE[facility] CARDI[facility] CATH LAB    LAPAROSCOPIC HERNIORRHAPHY UMBILICAL N/A 12/8/2020    Procedure: umbilical hernia repair;  Surgeon: Miguel Ángel Arnold DO;  Location: [facility] OR       CURRENT MEDICATIONS:  Current Outpatient Medications   Medication Sig Dispense Refill    aspirin 81 MG EC tablet Take 1 tablet (81 mg) by mouth daily 30 tablet 0    diltiazem ER (CARDIAZEM LA) 120 MG 24 hr tablet Take 1 tablet (120 mg) by mouth daily 90 tablet 3    furosemide (LASIX) 20 MG tablet Take 1 tablet (20 mg) by mouth daily 90 tablet 3    levothyroxine (SYNTHROID/LEVOTHROID) 150 MCG tablet Take 1 tablet (150 mcg) by  mouth daily With 12.5mg tab 90 tablet 3    levothyroxine (SYNTHROID/LEVOTHROID) 25 MCG tablet TAKE 1/2 TABLET BY MOUTH ONCE DAILY WITH 150MCG TABLET 90 tablet 3    multivitamin w/minerals (THERA-VIT-M) tablet Take 1 tablet by mouth daily      pravastatin (PRAVACHOL) 40 MG tablet TAKE 1 TABLET BY MOUTH ONCE DAILY (Patient taking differently: Take 40 mg by mouth every morning) 90 tablet 3       ALLERGIES:      Allergies   Allergen Reactions    Morphine Hives     States tolerates Oxy fine         FAMILY HISTORY: No pertinent family history, reviewed in EMR.    SOCIAL HISTORY:   Social History     Socioeconomic History    Marital status: Single     Spouse name: Not on file    Number of children: Not on file    Years of education: Not on file    Highest education level: Not on file   Occupational History    Not on file   Tobacco Use    Smoking status: Never    Smokeless tobacco: Never   Substance and Sexual Activity    Alcohol use: Yes     Comment: Occ     Drug use: No    Sexual activity: Yes     Partners: Female   Other Topics Concern    Parent/sibling w/ CABG, MI or angioplasty before 65F 55M? Not Asked   Social History Narrative    Not on file     Social Determinants of Health     Financial Resource Strain: Low Risk  (9/25/2023)    Financial Resource Strain     Within the past 12 months, have you or your family members you live with been unable to get utilities (heat, electricity) when it was really needed?: No   Food Insecurity: Low Risk  (9/25/2023)    Food Insecurity     Within the past 12 months, did you worry that your food would run out before you got money to buy more?: No     Within the past 12 months, did the food you bought just not last and you didn t have money to get more?: No   Transportation Needs: Low Risk  (9/25/2023)    Transportation Needs     Within the past 12 months, has lack of transportation kept you from medical appointments, getting your medicines, non-medical meetings or appointments, work,  "or from getting things that you need?: No   Physical Activity: Not on file   Stress: Not on file   Social Connections: Not on file   Interpersonal Safety: Low Risk  (9/26/2023)    Interpersonal Safety     Do you feel physically and emotionally safe where you currently live?: Yes     Within the past 12 months, have you been hit, slapped, kicked or otherwise physically hurt by someone?: No     Within the past 12 months, have you been humiliated or emotionally abused in other ways by your partner or ex-partner?: No   Housing Stability: Low Risk  (9/25/2023)    Housing Stability     Do you have housing? : Yes     Are you worried about losing your housing?: No       REVIEW OF SYSTEMS: Positive for that noted in past medical history and history of present illness and otherwise reviewed in EMR    PHYSICAL EXAM:  Patient is 5' 11\" and weighs 227 lbs 0 oz Ht 1.803 m (5' 11\")   Wt 103 kg (227 lb)   BMI 31.66 kg/m    Body mass index is 31.66 kg/m .   Constitutional: Well-developed, well-nourished, healthy appearing adult.  Skin: Warm, dry   HEENT: Normal  Cardiac: Well perfused extremities, strong 2+ peripheral pulses. No edema.   Pulmonary: Breathing room air    Musculoskeletal:   Right Shoulder:  AROM right shoulder: 170/160/50/T12   AROM left shoulder: 170/160/50/T12   4/5 supraspinatus, 4/5 infraspinatus, 5/5 subscapularis  No AC joint pain, no cross body adduction  positive Neer and Hedrick impingement signs  negative belly-press/lift-off  positive Speed's test  negative Apprehension  negative Jerk test  Neurovascular exam and cervical spine exam are normal.     IMAGING: Personally reviewed the prior x-rays and MRI which demonstrate that there is high-grade bursal sided tearing with near full-thickness tear of the anterior supraspinatus, there is some edema at the AC joint, some edema around the biceps tendon that looks thickened within the groove, no fatty atrophy of the rotator cuff    IMPRESSION: 52 year old-year-old " left hand dominant adult, with nontraumatic right shoulder small full-thickness rotator cuff tear with biceps tendinitis    PLAN:     I discussed with the patient the etiology of their condition. We discussed at length the options as noted above.  Patient was previously scheduled for surgery and was canceled on the operating room table prior to start of the surgery due to bradycardia.  Has now been cleared by cardiology with recommendations given for preop medication.  Patient also notes significant shortness of breath after the block was done with inability to lay flat requiring an emergency room visit, we discussed that this could be from phrenic irritation from the nerve block.  We talked about options going forward including conservative versus surgical intervention.  Again patient could try conservative treatment with physical therapy or home exercises, over-the-counter analgesics, has had 2 previous cortisone injections.  Alternatively we discussed going forward with surgical intervention in the form of a right shoulder arthroscopy, rotator cuff repair, biceps tenodesis, subacromial decompression.  We talked about the distal clavicle.  On my previous examination patient has been noting pain over the distal clavicle at this point has no pain over the distal clavicle and is not noting any pain with cross body adduction.  We discussed that we can eliminate this portion of the operation with the risk being that the patient has pain in the future which may necessitate revision surgery.  Patient does not wish to have the distal clavicle resection done and is agreeable to this plan.  We again discussed the risk and benefits of surgery as well as the anticipated rehab course.  Patient is going to see the PAC clinic prior to surgery.    After going over these options, Andrew would like to proceed with right shoulder arthroscopy, subacromial decompression, rotator cuff repair and related procedures. We reviewed the  risks and benefits of surgery including but not limited to the following: Risk of anesthesia, including death; infection; nerve/tendon/vessel injury; deep venous thrombosis (DVT), pulmonary embolism (PE); shoulder stiffness, possible need to treat the biceps tendon including but not limited to tenotomy (cutting the tendon) or arthroscopic biceps tenodesis (securing the tendon into a bone socket in the humerus through an arthroscopic incision), possible need to address the acromioclavicular joint (AC joint); rotator cuff repair non-healing or re-tear; hardware- related problems; potential of rotator cuff repair irreparability (not able to repair the torn tendons), potential of the procedure to not alleviate the condition and continued pain; and the potential need for further surgery in the future. We also discussed the possible use of biologic augmentation with a collagen scaffold or an allograft dermal (skin) graft depending on the tissue quality, tear size, and intraoperative determination of healing potential.     After going over these risks, benefits and alternatives Andrew would like to proceed with surgery. All of Keven Atkinson's questions were answered satisfactorily and Keven Atkinson signed the surgical consent form to proceed. All appropriate paperwork was completed and Keven Atkinson will work with our surgical scheduling department to coordinate the surgery.      At the conclusion of the office visit, Andrew verbally acknowledged that I answered all of Keven Atkinson's questions satisfactorily.    Rebecca Meza MD  Orthopedic Surgery Sports Medicine and Shoulder Surgery

## 2023-12-28 ENCOUNTER — OFFICE VISIT (OUTPATIENT)
Dept: ORTHOPEDICS | Facility: CLINIC | Age: 53
End: 2023-12-28
Payer: COMMERCIAL

## 2023-12-28 ENCOUNTER — TELEPHONE (OUTPATIENT)
Dept: ORTHOPEDICS | Facility: CLINIC | Age: 53
End: 2023-12-28

## 2023-12-28 VITALS — HEIGHT: 71 IN | WEIGHT: 227 LBS | BODY MASS INDEX: 31.78 KG/M2

## 2023-12-28 DIAGNOSIS — M75.101 NONTRAUMATIC TEAR OF RIGHT ROTATOR CUFF, UNSPECIFIED TEAR EXTENT: Primary | ICD-10-CM

## 2023-12-28 DIAGNOSIS — Z98.890 STATUS POST RIGHT ROTATOR CUFF REPAIR: Primary | ICD-10-CM

## 2023-12-28 PROCEDURE — 99214 OFFICE O/P EST MOD 30 MIN: CPT | Performed by: ORTHOPAEDIC SURGERY

## 2023-12-28 NOTE — LETTER
12/28/2023         RE: Andrew Atkinson  [address] [facility] [id number]        Dear Colleague,    Thank you for referring your patient, Andrew Atkinson, to the [facility]. Please see a copy of my visit note below.    CHIEF COMPLAINT: Right shoulder pain    DIAGNOSIS: Right shoulder rotator cuff tear, AC joint arthropathy    OCCUPATION/SPORT: Construction owner    HPI:   Andrew Atkinson is a very pleasant 52 year old, left-hand dominant adult who presents for evaluation of right shoulder pain. Patient met with Dr. Lomeli, his cardiologist on 12/14/23.  Was cleared by cardiology after having a Holter monitor.  Was given a recommendation for some antinausea medication to have before the block.  Does note that 2 days after surgery had to go to the emergency room because of shortness of breath and laying down.  Wanting to discuss doing surgery.   SANE score R - 60-65 L - 100    PAST MEDICAL HISTORY:  Past Medical History:   Diagnosis Date     Medial meniscus tear      TIFFANY (obstructive sleep apnea) 3/2/2023       PAST SURGICAL HISTORY:  Past Surgical History:   Procedure Laterality Date     ARTHROSCOPY KNEE WITH MEDIAL MENISCECTOMY Left 5/12/2017    Procedure: ARTHROSCOPY KNEE WITH MEDIAL MENISCECTOMY;  Arthroscopic partial medial menisectomy,left knee;  Surgeon: Grant Muñoz MD;  Location: [facility] OR     COLONOSCOPY N/A 7/18/2023    Procedure: Colonoscopy with polypectomy;  Surgeon: Wilfredo Molina MD;  Location: [facility]     CV CORONARY ANGIOGRAM N/A 2/15/2020    Procedure: Coronary Angiogram;  Surgeon: Rinku Boyce MD;  Location:  [facility]     LAPAROSCOPIC HERNIORRHAPHY UMBILICAL N/A 12/8/2020    Procedure: umbilical hernia repair;  Surgeon: Miguel Ángel Arnold DO;  Location: [facility] OR       CURRENT MEDICATIONS:  Current Outpatient Medications   Medication Sig Dispense Refill     aspirin 81 MG EC tablet Take 1 tablet (81 mg) by mouth daily 30 tablet 0      diltiazem ER (CARDIAZEM LA) 120 MG 24 hr tablet Take 1 tablet (120 mg) by mouth daily 90 tablet 3     furosemide (LASIX) 20 MG tablet Take 1 tablet (20 mg) by mouth daily 90 tablet 3     levothyroxine (SYNTHROID/LEVOTHROID) 150 MCG tablet Take 1 tablet (150 mcg) by mouth daily With 12.5mg tab 90 tablet 3     levothyroxine (SYNTHROID/LEVOTHROID) 25 MCG tablet TAKE 1/2 TABLET BY MOUTH ONCE DAILY WITH 150MCG TABLET 90 tablet 3     multivitamin w/minerals (THERA-VIT-M) tablet Take 1 tablet by mouth daily       pravastatin (PRAVACHOL) 40 MG tablet TAKE 1 TABLET BY MOUTH ONCE DAILY (Patient taking differently: Take 40 mg by mouth every morning) 90 tablet 3       ALLERGIES:      Allergies   Allergen Reactions     Morphine Hives     States tolerates Oxy fine         FAMILY HISTORY: No pertinent family history, reviewed in EMR.    SOCIAL HISTORY:   Social History     Socioeconomic History     Marital status: Single     Spouse name: Not on file     Number of children: Not on file     Years of education: Not on file     Highest education level: Not on file   Occupational History     Not on file   Tobacco Use     Smoking status: Never     Smokeless tobacco: Never   Substance and Sexual Activity     Alcohol use: Yes     Comment: Occ      Drug use: No     Sexual activity: Yes     Partners: Female   Other Topics Concern     Parent/sibling w/ CABG, MI or angioplasty before 65F 55M? Not Asked   Social History Narrative     Not on file     Social Determinants of Health     Financial Resource Strain: Low Risk  (9/25/2023)    Financial Resource Strain      Within the past 12 months, have you or your family members you live with been unable to get utilities (heat, electricity) when it was really needed?: No   Food Insecurity: Low Risk  (9/25/2023)    Food Insecurity      Within the past 12 months, did you worry that your food would run out before you got money to buy more?: No      Within the past 12 months, did the food you bought  "just not last and you didn t have money to get more?: No   Transportation Needs: Low Risk  (9/25/2023)    Transportation Needs      Within the past 12 months, has lack of transportation kept you from medical appointments, getting your medicines, non-medical meetings or appointments, work, or from getting things that you need?: No   Physical Activity: Not on file   Stress: Not on file   Social Connections: Not on file   Interpersonal Safety: Low Risk  (9/26/2023)    Interpersonal Safety      Do you feel physically and emotionally safe where you currently live?: Yes      Within the past 12 months, have you been hit, slapped, kicked or otherwise physically hurt by someone?: No      Within the past 12 months, have you been humiliated or emotionally abused in other ways by your partner or ex-partner?: No   Housing Stability: Low Risk  (9/25/2023)    Housing Stability      Do you have housing? : Yes      Are you worried about losing your housing?: No       REVIEW OF SYSTEMS: Positive for that noted in past medical history and history of present illness and otherwise reviewed in EMR    PHYSICAL EXAM:  Patient is 5' 11\" and weighs 227 lbs 0 oz Ht 1.803 m (5' 11\")   Wt 103 kg (227 lb)   BMI 31.66 kg/m    Body mass index is 31.66 kg/m .   Constitutional: Well-developed, well-nourished, healthy appearing adult.  Skin: Warm, dry   HEENT: Normal  Cardiac: Well perfused extremities, strong 2+ peripheral pulses. No edema.   Pulmonary: Breathing room air    Musculoskeletal:   Right Shoulder:  AROM right shoulder: 170/160/50/T12   AROM left shoulder: 170/160/50/T12   4/5 supraspinatus, 4/5 infraspinatus, 5/5 subscapularis  No AC joint pain, no cross body adduction  positive Neer and Hedrick impingement signs  negative belly-press/lift-off  positive Speed's test  negative Apprehension  negative Jerk test  Neurovascular exam and cervical spine exam are normal.     IMAGING: Personally reviewed the prior x-rays and MRI which " demonstrate that there is high-grade bursal sided tearing with near full-thickness tear of the anterior supraspinatus, there is some edema at the AC joint, some edema around the biceps tendon that looks thickened within the groove, no fatty atrophy of the rotator cuff    IMPRESSION: 52 year old-year-old left hand dominant adult, with nontraumatic right shoulder small full-thickness rotator cuff tear with biceps tendinitis    PLAN:     I discussed with the patient the etiology of their condition. We discussed at length the options as noted above.  Patient was previously scheduled for surgery and was canceled on the operating room table prior to start of the surgery due to bradycardia.  Has now been cleared by cardiology with recommendations given for preop medication.  Patient also notes significant shortness of breath after the block was done with inability to lay flat requiring an emergency room visit, we discussed that this could be from phrenic irritation from the nerve block.  We talked about options going forward including conservative versus surgical intervention.  Again patient could try conservative treatment with physical therapy or home exercises, over-the-counter analgesics, has had 2 previous cortisone injections.  Alternatively we discussed going forward with surgical intervention in the form of a right shoulder arthroscopy, rotator cuff repair, biceps tenodesis, subacromial decompression.  We talked about the distal clavicle.  On my previous examination patient has been noting pain over the distal clavicle at this point has no pain over the distal clavicle and is not noting any pain with cross body adduction.  We discussed that we can eliminate this portion of the operation with the risk being that the patient has pain in the future which may necessitate revision surgery.  Patient does not wish to have the distal clavicle resection done and is agreeable to this plan.  We again discussed the risk and  benefits of surgery as well as the anticipated rehab course.  Patient is going to see the PAC clinic prior to surgery.    After going over these options, Andrew would like to proceed with right shoulder arthroscopy, subacromial decompression, rotator cuff repair and related procedures. We reviewed the risks and benefits of surgery including but not limited to the following: Risk of anesthesia, including death; infection; nerve/tendon/vessel injury; deep venous thrombosis (DVT), pulmonary embolism (PE); shoulder stiffness, possible need to treat the biceps tendon including but not limited to tenotomy (cutting the tendon) or arthroscopic biceps tenodesis (securing the tendon into a bone socket in the humerus through an arthroscopic incision), possible need to address the acromioclavicular joint (AC joint); rotator cuff repair non-healing or re-tear; hardware- related problems; potential of rotator cuff repair irreparability (not able to repair the torn tendons), potential of the procedure to not alleviate the condition and continued pain; and the potential need for further surgery in the future. We also discussed the possible use of biologic augmentation with a collagen scaffold or an allograft dermal (skin) graft depending on the tissue quality, tear size, and intraoperative determination of healing potential.     After going over these risks, benefits and alternatives Andrew would like to proceed with surgery. All of Keven Atkinson's questions were answered satisfactorily and Keven Atkinson signed the surgical consent form to proceed. All appropriate paperwork was completed and Keven Atkinson will work with our surgical scheduling department to coordinate the surgery.      At the conclusion of the office visit, Andrew verbally acknowledged that I answered all of Keven Atkinson's questions satisfactorily.    Rebecca Meza MD  Orthopedic Surgery Sports Medicine and Shoulder Surgery      Again, thank you for  allowing me to participate in the care of your patient.        Sincerely,        SUSANA WHITE MD

## 2023-12-28 NOTE — TELEPHONE ENCOUNTER
Date Scheduled: 2-13-24  Facility: Surgery Locations: [facility]  Surgeon: Dr. Meza   Post-op appointment scheduled: 2/22 & 3/28   scheduled?: No  Surgery packet/instructions confirmed received?  Yes  Pre op physical/PAC appointment: 1/15/24 PAC  Special Considerations:     Rosanne Maher  Surgery Scheduling Coordinator  Ph: [phone]

## 2023-12-29 NOTE — TELEPHONE ENCOUNTER
FUTURE VISIT INFORMATION      SURGERY INFORMATION:  Date: 2/13/24  [facility]  Surgeon:  Rebecca Meza MD   Anesthesia Type:  General with block  Procedure: ARTHROSCOPY, SHOULDER, WITH 1 OR MORE OF ROTATOR CUFF REPAIR, SUBACROMIAL SPACE DECOMPRESSION, AND possible biceps tenodesis versus TENOTOMY   Consult: ov 12/18/23    RECORDS REQUESTED FROM:       Primary Care Provider: Wilfredo Dela Cruz MD - [facility]    Pertinent Medical History: TIFFANY, acute viral myocarditis, hypertrophic cardiomyopathy, NSTEMI    Most recent EKG+ Tracing:- 12/14/23    Most recent ECHO: 12/14/23    Most recent Cardiac Stress Test: 6/20/22    Most recent Sleep Study:   2/27/23

## 2024-01-04 ENCOUNTER — TRANSFERRED RECORDS (OUTPATIENT)
Dept: MULTI SPECIALTY CLINIC | Facility: CLINIC | Age: 54
End: 2024-01-04

## 2024-01-04 LAB — RETINOPATHY: NORMAL

## 2024-01-15 ENCOUNTER — ANESTHESIA EVENT (OUTPATIENT)
Dept: SURGERY | Facility: AMBULATORY SURGERY CENTER | Age: 54
End: 2024-01-15
Payer: COMMERCIAL

## 2024-01-15 ENCOUNTER — PRE VISIT (OUTPATIENT)
Dept: SURGERY | Facility: CLINIC | Age: 54
End: 2024-01-15

## 2024-01-15 ENCOUNTER — VIRTUAL VISIT (OUTPATIENT)
Dept: SURGERY | Facility: CLINIC | Age: 54
End: 2024-01-15
Payer: COMMERCIAL

## 2024-01-15 DIAGNOSIS — Z01.818 PRE-OP EVALUATION: Primary | ICD-10-CM

## 2024-01-15 PROCEDURE — 99204 OFFICE O/P NEW MOD 45 MIN: CPT | Mod: 95 | Performed by: PHYSICIAN ASSISTANT

## 2024-01-15 RX ORDER — CHLORAL HYDRATE 500 MG
2 CAPSULE ORAL EVERY MORNING
COMMUNITY

## 2024-01-15 ASSESSMENT — PAIN SCALES - GENERAL: PAINLEVEL: MILD PAIN (3)

## 2024-01-15 ASSESSMENT — ENCOUNTER SYMPTOMS: SEIZURES: 0

## 2024-01-15 ASSESSMENT — LIFESTYLE VARIABLES: TOBACCO_USE: 0

## 2024-01-15 NOTE — PATIENT INSTRUCTIONS
Preparing for Your Surgery      Name:  Andrew Atkinson   MRN:  [id number]   :  1970   Today's Date:  1/15/2024       Arriving for surgery:  Surgery date:  24  Arrival time:  11:15 am      Please come to:   [facility] [address] [address]     Parking is available in front of the [facility] building from 5:30AM to 8:00PM.  -  Proceed to the 5th floor to check into the Ambulatory Surgery Center.              >> There will be patient concierges on the 1st and 5th floor, for assistance or an escort, if you would like.              >> Please call [phone] with any questions.    What can I eat or drink?  -  You may eat and drink normally up to 8 hours prior to arrival time.   -  You may have clear liquids until 2 hours prior to arrival time.     Examples of clear liquids:  Water  Clear broth  Juices (apple, white grape, white cranberry  and cider) without pulp  Noncarbonated, powder based beverages  (lemonade and Dusty-Aid)  Sodas (Sprite, 7-Up, ginger ale and seltzer)  Coffee or tea (without milk or cream)  Gatorade    -  No Alcohol or cannabis products for at least 24 hours before surgery.     Which medicines can I take?    Hold Aspirin for 7 days before surgery.   Hold Multivitamins for 7 days before surgery.  Hold Supplements (fish oil and magnesium powder) for 7 days before surgery.  Hold Ibuprofen (Advil, Motrin) for 1 day(s) before surgery--unless otherwise directed by surgeon.  Hold Naproxen (Aleve) for 4 days before surgery.    -  DO NOT take these medications the day of surgery:  Furosemide (lasix).    -  PLEASE TAKE these medications the day of surgery:  Tylenol if needed; take all other scheduled morning medications.    How do I prepare myself?  - Please take 2 showers (one the night prior to surgery and one the morning of surgery) using Scrubcare or Hibiclens soap.    Use this soap only from the neck  to your toes.     Leave the soap on your skin for one minute--then rinse thoroughly.      You may use your own shampoo and conditioner. No other hair products.   - Please remove all jewelry and body piercings.  - No lotions, deodorants or fragrance.  - No makeup or fingernail polish.   - Bring your ID and insurance card.    -If you use a CPAP machine, please bring the CPAP machine, tubing, and mask to hospital.    -If you have a Deep Brain Stimulator, Spinal Cord Stimulator, or any Neuro Stimulator device---you must bring the remote control to the hospital.      ALL PATIENTS GOING HOME THE SAME DAY OF SURGERY ARE REQUIRED TO HAVE A RESPONSIBLE ADULT TO DRIVE AND BE IN ATTENDANCE WITH THEM FOR 24 HOURS FOLLOWING SURGERY.    Covid testing policy as of 12/06/2022  Your surgeon will notify and schedule you for a COVID test if one is needed before surgery--please direct any questions or COVID symptoms to your surgeon      Questions or Concerns:    - For any questions regarding the day of surgery or your hospital stay, please contact the Pre Admission Nursing Office at [phone].       - If you have health changes between today and your surgery, please call your surgeon.       - For questions after surgery, please call your surgeons office.           Current Visitor Guidelines    You may have 2 visitors in the pre op area.    Visiting hours: 8 a.m. to 8:30 p.m.    Patients confirmed or suspected to have symptoms of COVID 19 or flu:     No visitors allowed for adult patients.   Children (under age 18) can have 1 named visitor.     People who are sick or showing symptoms of COVID 19 or flu:    Are not allowed to visit patients--we can only make exceptions in special situations.       Please follow these guidelines for your visit:          Please maintain social distance          Masking is optional--however at times you may be asked to wear a mask for the safety of yourself and others     Clean your hands with alcohol  hand . Do this when you arrive at and leave the building and patient room,    And again after you touch your mask or anything in the room.     Go directly to and from the room you are visiting.     Stay in the patient s room during your visit. Limit going to other places in the hospital as much as possible     Leave bags and jackets at home or in the car.     For everyone s health, please don t come and go during your visit. That includes for smoking   during your visit.

## 2024-01-15 NOTE — PROGRESS NOTES
Keven is a 53 year old who is being evaluated via a billable video visit.      How would you like to obtain your AVS? MyChart  If the video visit is dropped, the invitation should be resent by: Text to cell phone: [phone]          HPI               Review of Systems               Physical Exam

## 2024-01-15 NOTE — H&P
Pre-Operative H & P     CC:  Preoperative exam to assess for increased cardiopulmonary risk while undergoing surgery and anesthesia.    Date of Encounter: 1/15/2024  Primary Care Physician:  Wilfredo Dela Cruz     Reason for visit:   Encounter Diagnosis   Name Primary?    Pre-op evaluation Yes       HPI  Andrew Atkinson is a 53 year old adult who presents for pre-operative H & P in preparation for  Procedure Information       Case: [id number] Date/Time: 02/13/24 1245    Procedure: ARTHROSCOPY, SHOULDER, WITH 1 OR MORE OF ROTATOR CUFF REPAIR, SUBACROMIAL SPACE DECOMPRESSION, AND possible biceps tenodesis versus TENOTOMY (Right: Shoulder)    Anesthesia type: General with Block    Diagnosis: Nontraumatic tear of right rotator cuff, unspecified tear extent [M75.101]    Pre-op diagnosis: Nontraumatic tear of right rotator cuff, unspecified tear extent [M75.101]    Location: [place] [address] / [facility]    Providers: Rebecca Meza MD            Patient is being evaluated for comorbid conditions of hypertrophic cardiomyopathy, h/o NSTEMI, dyslipidemia, TIFFANY, hypothyroid, diabetes    Mr. Atkinsno has a history of right shoulder pain. Workup revealed a nontraumatic right shoulder small full-thickness rotator cuff tear. He was scheduled for the above procedure 11/14/23, but surgery was delayed due to asymptomatic bradycardia after nerve block with associated nausea. He was seen by Dr. Lomeli for cardiac optimization and is now rescheduled for surgery as above.     History is obtained from the patient and chart review    Hx of abnormal bleeding or anti-platelet use: ASA 81 mg      Past Medical History  Past Medical History:   Diagnosis Date    Medial meniscus tear     TIFFANY (obstructive sleep apnea) 3/2/2023       Past Surgical History  Past Surgical History:   Procedure Laterality Date    APPENDECTOMY      ARTHROSCOPY KNEE WITH MEDIAL MENISCECTOMY Left 05/12/2017     Procedure: ARTHROSCOPY KNEE WITH MEDIAL MENISCECTOMY;  Arthroscopic partial medial menisectomy,left knee;  Surgeon: Grant Muñoz MD;  Location: [facility] OR    COLONOSCOPY N/A 07/18/2023    Procedure: Colonoscopy with polypectomy;  Surgeon: Wilfredo Molina MD;  Location: [facility]    CV CORONARY ANGIOGRAM N/A 02/15/2020    Procedure: Coronary Angiogram;  Surgeon: Rinku Boyce MD;  Location:  [facility]ARD[facility]ATH LAB    LAPAROSCOPIC HERNIORRHAPHY UMBILICAL N/A 12/08/2020    Procedure: umbilical hernia repair;  Surgeon: Miguel Ángel Arnold DO;  Location:  OR    ORTHOPEDIC SURGERY      3 low back proceudres       Prior to Admission Medications  Current Outpatient Medications   Medication Sig Dispense Refill    aspirin 81 MG EC tablet Take 1 tablet (81 mg) by mouth daily (Patient taking differently: Take 81 mg by mouth every morning) 30 tablet 0    diltiazem ER (CARDIAZEM LA) 120 MG 24 hr tablet Take 1 tablet (120 mg) by mouth daily (Patient taking differently: Take 120 mg by mouth every morning) 90 tablet 3    fish oil-omega-3 fatty acids 1000 MG capsule Take 2 g by mouth every morning      furosemide (LASIX) 20 MG tablet Take 1 tablet (20 mg) by mouth daily (Patient taking differently: Take 20 mg by mouth every morning) 90 tablet 3    levothyroxine (SYNTHROID/LEVOTHROID) 150 MCG tablet Take 1 tablet (150 mcg) by mouth daily With 12.5mg tab (Patient taking differently: Take 150 mcg by mouth every morning With 12.5mg tab) 90 tablet 3    levothyroxine (SYNTHROID/LEVOTHROID) 25 MCG tablet TAKE 1/2 TABLET BY MOUTH ONCE DAILY WITH 150MCG TABLET (Patient taking differently: Take 12.5 mcg by mouth every morning TAKE 1/2 TABLET BY MOUTH ONCE DAILY WITH 150MCG TABLET) 90 tablet 3    Magnesium Oxide POWD Take 1 Scoop by mouth every morning      multivitamin w/minerals (THERA-VIT-M) tablet Take 1 tablet by mouth every morning      pravastatin (PRAVACHOL) 40 MG tablet TAKE 1 TABLET BY MOUTH ONCE DAILY (Patient taking  differently: Take 40 mg by mouth every morning) 90 tablet 3    UNABLE TO FIND Take 1 tablet by mouth every morning MEDICATION NAME: Organic Mushrooms, Lions fauzia turkey tail         Allergies  Allergies   Allergen Reactions    Morphine Hives     States tolerates Oxy fine       Social History  Social History     Socioeconomic History    Marital status: Single     Spouse name: Not on file    Number of children: Not on file    Years of education: Not on file    Highest education level: Not on file   Occupational History    Not on file   Tobacco Use    Smoking status: Former     Types: Cigarettes    Smokeless tobacco: Never    Tobacco comments:     Quit tobacco in 2007   Substance and Sexual Activity    Alcohol use: Yes     Alcohol/week: 3.0 - 4.0 standard drinks of alcohol     Types: 3 - 4 Standard drinks or equivalent per week     Comment: 3-4 drinks aweek    Drug use: Yes     Types: Marijuana     Comment: 2x/ week    Sexual activity: Yes     Partners: Female   Other Topics Concern    Parent/sibling w/ CABG, MI or angioplasty before 65F 55M? Not Asked   Social History Narrative    Not on file     Social Determinants of Health     Financial Resource Strain: Low Risk  (9/25/2023)    Financial Resource Strain     Within the past 12 months, have you or your family members you live with been unable to get utilities (heat, electricity) when it was really needed?: No   Food Insecurity: Low Risk  (9/25/2023)    Food Insecurity     Within the past 12 months, did you worry that your food would run out before you got money to buy more?: No     Within the past 12 months, did the food you bought just not last and you didn t have money to get more?: No   Transportation Needs: Low Risk  (9/25/2023)    Transportation Needs     Within the past 12 months, has lack of transportation kept you from medical appointments, getting your medicines, non-medical meetings or appointments, work, or from getting things that you need?: No   Physical  Activity: Not on file   Stress: Not on file   Social Connections: Not on file   Interpersonal Safety: Low Risk  (9/26/2023)    Interpersonal Safety     Do you feel physically and emotionally safe where you currently live?: Yes     Within the past 12 months, have you been hit, slapped, kicked or otherwise physically hurt by someone?: No     Within the past 12 months, have you been humiliated or emotionally abused in other ways by your partner or ex-partner?: No   Housing Stability: Low Risk  (9/25/2023)    Housing Stability     Do you have housing? : Yes     Are you worried about losing your housing?: No       Family History  Family History   Problem Relation Age of Onset    Unknown/Adopted No family hx of        Review of Systems  The complete review of systems is negative other than noted in the HPI or here.   Anesthesia Evaluation            ROS/MED HX  ENT/Pulmonary:     (+) sleep apnea, severe, uses CPAP,                                   (-) tobacco use   Neurologic:  - neg neurologic ROS  (-) no seizures and no CVA   Cardiovascular: Comment: Cardiomyopathy     (+)  hypertension- -   -  - -   Taking blood thinners                              Previous cardiac testing   Echo: Date: 11/26/23 Results:  Interpretation Summary     The echo findings are consistent with hypertrophic cardiomyopathy.  Severe apical hypertrophy  Left ventricular hypertrophy: asymmetric with no LVOT obstruction  Hyperdynamic left ventricular function  There is trace aortic regurgitation.     Imaging is technically challenging but there appears to be no signficant  change since 2/3/2022  Stress Test:  Date: Results:    ECG Reviewed:  Date: 11/2023 Results:  Sinus bradycardia   RSR' or QR pattern in V1 suggests right ventricular conduction delay   Left ventricular hypertrophy with QRS widening and repolarization abnormality   Abnormal ECG   Cath:  Date: Results:      METS/Exercise Tolerance: 4 - Raking leaves, gardening Comment: Walking  dogs, denies exertional symptoms    Hematologic:  - neg hematologic  ROS  (-) history of blood clots and history of blood transfusion   Musculoskeletal: Comment: Rotator cuff tear       GI/Hepatic:  - neg GI/hepatic ROS  (-) GERD   Renal/Genitourinary:  - neg Renal ROS     Endo:     (+)  type II DM, Last HgA1c: 6.7, date: 11/2023,      thyroid problem, hypothyroidism,        (-) chronic steroid usage   Psychiatric/Substance Use:  - neg psychiatric ROS  (-) psychiatric history   Infectious Disease:  - neg infectious disease ROS     Malignancy:  - neg malignancy ROS     Other:            Virtual visit -  No vitals were obtained    Physical Exam  Constitutional: Awake, alert, cooperative, no apparent distress, and appears stated age.  HENT: Normocephalic  Respiratory: non labored breathing   Neurologic: Awake, alert, oriented to name, place and time.   Neuropsychiatric: Calm, cooperative. Normal affect.      Prior Labs/Diagnostic Studies   All labs and imaging personally reviewed     EKG/ stress test - if available please see in ROS above   Echo result w/o MOPS: Interpretation Summary The echo findings are consistent with hypertrophic cardiomyopathy.Severe apical hypertrophyLeft ventricular hypertrophy: asymmetric with no LVOT obstructionHyperdynamic left ventricular functionThere is trace aortic regurgitation. Imaging is technically challenging but there appears to be no signficantchange since 2/3/2022           No data to display                  The patient's records and results personally reviewed by this provider.     Outside records reviewed from: Care Everywhere      Assessment    Andrew Atkinson is a 53 year old adult seen as a PAC referral for risk assessment and optimization for anesthesia.    Plan/Recommendations  Pt will be optimized for the proposed procedure.  See below for details on the assessment, risk, and preoperative recommendations    NEUROLOGY  - No history of TIA, CVA or seizure  -Post Op  "delirium risk factors:  No risk identified    ENT  - No current airway concerns.  Will need to be reassessed day of surgery.  Mallampati: Unable to assess  TM: Unable to assess    CARDIAC  -HCM, bradycardia   -Keven experienced bradycardia after nerve block with associated vomiting in preparation for the above procedure in November. Of note, he reports he developed difficulty breathing the next evening and then was seen in the ED for further evaluation- clear lung and stable vitals, EKG stable, normal BNP and CXR, troponin at 300. He was seen by Dr. Lomeli outpatient with following recommendations:  \"#Asymptomatic sinus bradycardia during nerve block and vomiting  This episode was almost certainly vagally-mediated and does not merit any further workup. Furthermore, asymptomatic hemodynamically-stable sinus bradycardia does not represent a cardiac contraindication to shoulder surgery. Lastly, diltiazem has been decreased and so baseline HR should be slightly higher in any case (though once again this is immaterial for asymptomatic sinus bradycardia.)   -Holter monitor shows normal average HR and no concerning bradyarrhythmias or tachyarrhythmias   -no further cardiac workup or changes to therapy indicated prior to surgery   -OK to proceed with shoulder surgery as planned from cardiac perspective    -would hold loop diuretic 1 day prior to surgery and resume postoperatively   -recommend pre-treatment with anti-emetics to minimize likelihood of vagal episodes related to nausea/vomiting\"  - METS (Metabolic Equivalents)  Patient performs 4 or more METS exercise without symptoms            Total Score: 0      RCRI-Very low risk: Class 1 0.4% complication rate            Total Score: 0        PULMONARY  - Obstructive Sleep Apnea  TIFFANY with home CPAP.  Patient will be instructed to bring their home CPAP device to the hospital with them.  - Denies asthma or inhaler use  - Tobacco History    History   Smoking Status    " "Former    Types: Cigarettes   Smokeless Tobacco    Never       GI  PONV Medium Risk  Total Score: 2           1 AN PONV: Patient is not a current smoker    1 AN PONV: Intended Post Op Opioids        /RENAL  - Baseline Creatinine 1.01    ENDOCRINE    - BMI: Estimated body mass index is 31.66 kg/m  as calculated from the following:    Height as of 12/28/23: 1.803 m (5' 11\").    Weight as of 12/28/23: 103 kg (227 lb).  Obesity (BMI >30)  - Diabetes  Hemoglobin A1C (%)   Date Value   11/17/2023 6.7 (H)   03/10/2020 5.9 (H)     Diabetes Mellitus, Type 2, non-insulin dependent.  Recommend close monitoring of the patient's blood glucose levels throughout the perioperative period.    HEME  VTE Low Risk 0.5%            Total Score: 2    VTE: Male      - Platelet disfunction second to Aspirin (Alex, many others)    MSK  -rotator cuff injury. Above procedure planned     Different anesthesia methods/types have been discussed with the patient, but they are aware that the final plan will be decided by the assigned anesthesia provider on the date of service.     Patient was discussed with Dr Hernandez. Alert to Keven's upcoming procedure sent to Dr. Lester.     The patient is optimized for their procedure. AVS with information on surgery time/arrival time, meds and NPO status given by nursing staff. No further diagnostic testing indicated.    Please refer to the physical examination documented by the anesthesiologist in the anesthesia record on the day of surgery.    Video-Visit Details    Type of service:  Video Visit    Provider received verbal consent for a Video Visit from the patient? Yes     Originating Location (pt. Location): Home    Distant Location (provider location):  Off-site  Mode of Communication:  Video Conference via Patient Home Monitoring  On the day of service:     Prep time: 10 minutes  Visit time: 19 minutes  Documentation time: 15 minutes  ------------------------------------------  Total time: 44 minutes      Michelle MODI" ROLA Fuller  Preoperative Assessment Center  [facility]  [facility]  Phone: [phone]  Fax: [phone]

## 2024-01-17 DIAGNOSIS — I42.2 HYPERTROPHIC CARDIOMYOPATHY (H): Primary | ICD-10-CM

## 2024-02-03 DIAGNOSIS — I21.4 NSTEMI (NON-ST ELEVATED MYOCARDIAL INFARCTION) (H): ICD-10-CM

## 2024-02-03 DIAGNOSIS — E78.5 HYPERLIPIDEMIA LDL GOAL <100: ICD-10-CM

## 2024-02-03 DIAGNOSIS — I40.0 ACUTE VIRAL MYOCARDITIS: ICD-10-CM

## 2024-02-03 DIAGNOSIS — R94.31 NONSPECIFIC ABNORMAL ELECTROCARDIOGRAM (ECG) (EKG): ICD-10-CM

## 2024-02-06 RX ORDER — PRAVASTATIN SODIUM 40 MG
40 TABLET ORAL EVERY MORNING
Qty: 30 TABLET | Refills: 0 | Status: SHIPPED | OUTPATIENT
Start: 2024-02-06 | End: 2024-04-08

## 2024-02-07 NOTE — TELEPHONE ENCOUNTER
pravastatin (PRAVACHOL) 40 MG tablet 90 tablet 3 2/21/2023    Last Office Visit : 12/14/2023  [facility] Federico Carrizales MD     Future Office visit:  12/12/2024     ALT recheck due 3/2024, refilled x 30 days with reminder to schedule lab appt    Recent Labs   Lab Test 09/26/23  0840 09/01/22  1256   CHOL 139 142   HDL 32* 36*   LDL 62 77   TRIG 223* 146    Liver Function Studies -   Recent Labs   Lab Test 03/02/23  0955   PROTTOTAL 7.9   ALBUMIN 4.3   BILITOTAL 0.6   ALKPHOS 83   AST 31   ALT 69

## 2024-02-12 RX ORDER — ACETAMINOPHEN 500 MG
1000 TABLET ORAL ONCE
Status: CANCELLED | OUTPATIENT
Start: 2024-02-12 | End: 2024-02-12

## 2024-02-12 ASSESSMENT — ENCOUNTER SYMPTOMS: SEIZURES: 0

## 2024-02-12 ASSESSMENT — LIFESTYLE VARIABLES: TOBACCO_USE: 1

## 2024-02-12 NOTE — ANESTHESIA PREPROCEDURE EVALUATION
Anesthesia Pre-Procedure Evaluation    Patient: Andrew Atkinson   MRN: [id number] : 1970        Procedure : Procedure(s):  ARTHROSCOPY, SHOULDER, WITH 1 OR MORE OF ROTATOR CUFF REPAIR, SUBACROMIAL SPACE DECOMPRESSION, AND possible biceps tenodesis versus TENOTOMY          Past Medical History:   Diagnosis Date    Medial meniscus tear     TIFFANY (obstructive sleep apnea) 3/2/2023      Past Surgical History:   Procedure Laterality Date    APPENDECTOMY      ARTHROSCOPY KNEE WITH MEDIAL MENISCECTOMY Left 2017    Procedure: ARTHROSCOPY KNEE WITH MEDIAL MENISCECTOMY;  Arthroscopic partial medial menisectomy,left knee;  Surgeon: Grant Muñoz MD;  Location: [facility] OR    COLONOSCOPY N/A 2023    Procedure: Colonoscopy with polypectomy;  Surgeon: Wilfredo Molina MD;  Location:  GI    CV CORONARY ANGIOGRAM N/A 02/15/2020    Procedure: Coronary Angiogram;  Surgeon: Rinku Boyce MD;  Location:  HEART CARDIAC CATH LAB    LAPAROSCOPIC HERNIORRHAPHY UMBILICAL N/A 2020    Procedure: umbilical hernia repair;  Surgeon: Miguel Ángel Arnold DO;  Location: [facility] OR    ORTHOPEDIC SURGERY      3 low back proceudres      Allergies   Allergen Reactions    Morphine Hives     States tolerates Oxy fine      Social History     Tobacco Use    Smoking status: Former     Types: Cigarettes    Smokeless tobacco: Never    Tobacco comments:     Quit tobacco in    Substance Use Topics    Alcohol use: Yes     Alcohol/week: 3.0 - 4.0 standard drinks of alcohol     Types: 3 - 4 Standard drinks or equivalent per week     Comment: 3-4 drinks aweek      Wt Readings from Last 1 Encounters:   23 103 kg (227 lb)        Anesthesia Evaluation   Pt has had prior anesthetic.     No history of anesthetic complications       ROS/MED HX  ENT/Pulmonary:     (+) sleep apnea, severe, uses CPAP,              tobacco use, Past use,                       Neurologic:  - neg neurologic ROS  (-) no seizures and no CVA    Cardiovascular: Comment: Cardiomyopathy     (+)  hypertension- -   -  - -   Taking blood thinners   CHF                           Previous cardiac testing   Echo: Date: 11/26/23 Results:  Interpretation Summary     The echo findings are consistent with hypertrophic cardiomyopathy.  Severe apical hypertrophy  Left ventricular hypertrophy: asymmetric with no LVOT obstruction  Hyperdynamic left ventricular function  There is trace aortic regurgitation.     Imaging is technically challenging but there appears to be no signficant  change since 2/3/2022  Stress Test:  Date: Results:    ECG Reviewed:  Date: 11/2023 Results:  Sinus bradycardia   RSR' or QR pattern in V1 suggests right ventricular conduction delay   Left ventricular hypertrophy with QRS widening and repolarization abnormality   Abnormal ECG   Cath:  Date: Results:      METS/Exercise Tolerance: 4 - Raking leaves, gardening Comment: Walking dogs, denies exertional symptoms    Hematologic:  - neg hematologic  ROS  (-) history of blood clots and history of blood transfusion   Musculoskeletal: Comment: Rotator cuff tear       GI/Hepatic:  - neg GI/hepatic ROS  (-) GERD   Renal/Genitourinary:  - neg Renal ROS     Endo:     (+)  type II DM, Last HgA1c: 6.7, date: 11/2023,      thyroid problem, hypothyroidism,        (-) chronic steroid usage   Psychiatric/Substance Use:  - neg psychiatric ROS  (-) psychiatric history   Infectious Disease:  - neg infectious disease ROS     Malignancy:  - neg malignancy ROS     Other:            Physical Exam    Airway        Mallampati: II   TM distance: > 3 FB   Neck ROM: full   Mouth opening: > 3 cm    Respiratory Devices and Support         Dental       (+) Minor Abnormalities - some fillings, tiny chips      Cardiovascular   cardiovascular exam normal          Pulmonary   pulmonary exam normal                OUTSIDE LABS:  CBC:   Lab Results   Component Value Date    WBC 6.4 11/17/2023    WBC 8.3 11/16/2023    HGB 14.8  "11/17/2023    HGB 14.6 11/16/2023    HCT 44.2 11/17/2023    HCT 41.0 11/16/2023     11/17/2023     11/16/2023     BMP:   Lab Results   Component Value Date     12/14/2023     11/17/2023    POTASSIUM 4.0 12/14/2023    POTASSIUM 4.3 11/18/2023    CHLORIDE 101 12/14/2023    CHLORIDE 102 11/17/2023    CO2 26 12/14/2023    CO2 23 11/17/2023    BUN 17.6 12/14/2023    BUN 13.5 11/17/2023    CR 1.01 12/14/2023    CR 0.87 11/17/2023     (H) 12/14/2023     (H) 11/17/2023     COAGS:   Lab Results   Component Value Date    INR 0.95 01/21/2022     POC: No results found for: \"BGM\", \"HCG\", \"HCGS\"  HEPATIC:   Lab Results   Component Value Date    ALBUMIN 4.3 03/02/2023    PROTTOTAL 7.9 03/02/2023    ALT 69 03/02/2023    AST 31 03/02/2023    ALKPHOS 83 03/02/2023    BILITOTAL 0.6 03/02/2023     OTHER:   Lab Results   Component Value Date    A1C 6.7 (H) 11/17/2023    NAPOLEON 10.5 (H) 12/14/2023    TSH 4.04 09/26/2023    T4 1.24 03/02/2023    CRP 3.1 02/14/2020       Anesthesia Plan    ASA Status:  3    NPO Status:  NPO Appropriate    Anesthesia Type: General.     - Airway: LMA   Induction: Intravenous.   Maintenance: Balanced.        Consents    Anesthesia Plan(s) and associated risks, benefits, and realistic alternatives discussed. Questions answered and patient/representative(s) expressed understanding.     - Discussed:     - Discussed with:  Patient      - Extended Intubation/Ventilatory Support Discussed: No.      - Patient is DNR/DNI Status: No     Use of blood products discussed: No .     Postoperative Care    Pain management: IV analgesics, Oral pain medications, Peripheral nerve block (Single Shot), Multi-modal analgesia.   PONV prophylaxis: Ondansetron (or other 5HT-3), Dexamethasone or Solumedrol, Background Propofol Infusion     Comments:               Donnie Noe MD    I have reviewed the pertinent notes and labs in the chart from the past 30 days and (re)examined the " patient.  Any updates or changes from those notes are reflected in this note.              # DMII: A1C = 6.7 % (Ref range: <5.7 %) within past 6 months

## 2024-02-13 ENCOUNTER — HOSPITAL ENCOUNTER (OUTPATIENT)
Facility: AMBULATORY SURGERY CENTER | Age: 54
Discharge: HOME | End: 2024-02-13
Attending: ORTHOPAEDIC SURGERY
Payer: COMMERCIAL

## 2024-02-13 ENCOUNTER — ANESTHESIA (OUTPATIENT)
Dept: SURGERY | Facility: AMBULATORY SURGERY CENTER | Age: 54
End: 2024-02-13
Payer: COMMERCIAL

## 2024-02-13 VITALS
WEIGHT: 230 LBS | OXYGEN SATURATION: 97 % | SYSTOLIC BLOOD PRESSURE: 113 MMHG | DIASTOLIC BLOOD PRESSURE: 70 MMHG | TEMPERATURE: 97 F | RESPIRATION RATE: 16 BRPM | BODY MASS INDEX: 32.2 KG/M2 | HEIGHT: 71 IN | HEART RATE: 58 BPM

## 2024-02-13 DIAGNOSIS — M75.101 NONTRAUMATIC TEAR OF RIGHT ROTATOR CUFF, UNSPECIFIED TEAR EXTENT: Primary | ICD-10-CM

## 2024-02-13 LAB — GLUCOSE BLDC GLUCOMTR-MCNC: 165 MG/DL (ref 70–99)

## 2024-02-13 PROCEDURE — 82962 GLUCOSE BLOOD TEST: CPT | Performed by: PATHOLOGY

## 2024-02-13 PROCEDURE — 29828 SHO ARTHRS SRG BICP TENODSIS: CPT | Mod: RT

## 2024-02-13 PROCEDURE — 29827 SHO ARTHRS SRG RT8TR CUF RPR: CPT | Mod: RT | Performed by: ORTHOPAEDIC SURGERY

## 2024-02-13 PROCEDURE — C9290 INJ, BUPIVACAINE LIPOSOME: HCPCS

## 2024-02-13 PROCEDURE — 29828 SHO ARTHRS SRG BICP TENODSIS: CPT | Mod: 51 | Performed by: ORTHOPAEDIC SURGERY

## 2024-02-13 PROCEDURE — 29827 SHO ARTHRS SRG RT8TR CUF RPR: CPT | Mod: RT

## 2024-02-13 DEVICE — IMPLANTABLE DEVICE: Type: IMPLANTABLE DEVICE | Site: SHOULDER | Status: FUNCTIONAL

## 2024-02-13 RX ORDER — GLYCOPYRROLATE 0.2 MG/ML
INJECTION, SOLUTION INTRAMUSCULAR; INTRAVENOUS PRN
Status: DISCONTINUED | OUTPATIENT
Start: 2024-02-13 | End: 2024-02-13

## 2024-02-13 RX ORDER — OXYCODONE HYDROCHLORIDE 5 MG/1
5 TABLET ORAL
Status: DISCONTINUED | OUTPATIENT
Start: 2024-02-13 | End: 2024-02-14 | Stop reason: HOSPADM

## 2024-02-13 RX ORDER — ALBUTEROL SULFATE 0.83 MG/ML
2.5 SOLUTION RESPIRATORY (INHALATION) EVERY 4 HOURS PRN
Status: DISCONTINUED | OUTPATIENT
Start: 2024-02-13 | End: 2024-02-14 | Stop reason: HOSPADM

## 2024-02-13 RX ORDER — CEFAZOLIN SODIUM 2 G/50ML
2 SOLUTION INTRAVENOUS
Status: COMPLETED | OUTPATIENT
Start: 2024-02-13 | End: 2024-02-13

## 2024-02-13 RX ORDER — PROPOFOL 10 MG/ML
INJECTION, EMULSION INTRAVENOUS CONTINUOUS PRN
Status: DISCONTINUED | OUTPATIENT
Start: 2024-02-13 | End: 2024-02-13

## 2024-02-13 RX ORDER — NALOXONE HYDROCHLORIDE 0.4 MG/ML
0.2 INJECTION, SOLUTION INTRAMUSCULAR; INTRAVENOUS; SUBCUTANEOUS
Status: DISCONTINUED | OUTPATIENT
Start: 2024-02-13 | End: 2024-02-14 | Stop reason: HOSPADM

## 2024-02-13 RX ORDER — DOCUSATE SODIUM 100 MG/1
100 CAPSULE, LIQUID FILLED ORAL 2 TIMES DAILY
Qty: 30 CAPSULE | Refills: 0 | Status: SHIPPED | OUTPATIENT
Start: 2024-02-13 | End: 2024-05-22

## 2024-02-13 RX ORDER — HYDROCODONE BITARTRATE AND ACETAMINOPHEN 5; 325 MG/1; MG/1
1 TABLET ORAL EVERY 6 HOURS PRN
Qty: 10 TABLET | Refills: 0 | Status: SHIPPED | OUTPATIENT
Start: 2024-02-13 | End: 2024-02-16

## 2024-02-13 RX ORDER — CEFAZOLIN SODIUM 2 G/50ML
2 SOLUTION INTRAVENOUS SEE ADMIN INSTRUCTIONS
Status: DISCONTINUED | OUTPATIENT
Start: 2024-02-13 | End: 2024-02-14 | Stop reason: HOSPADM

## 2024-02-13 RX ORDER — SODIUM CHLORIDE, SODIUM LACTATE, POTASSIUM CHLORIDE, CALCIUM CHLORIDE 600; 310; 30; 20 MG/100ML; MG/100ML; MG/100ML; MG/100ML
INJECTION, SOLUTION INTRAVENOUS CONTINUOUS
Status: DISCONTINUED | OUTPATIENT
Start: 2024-02-13 | End: 2024-02-14 | Stop reason: HOSPADM

## 2024-02-13 RX ORDER — FENTANYL CITRATE 50 UG/ML
INJECTION, SOLUTION INTRAMUSCULAR; INTRAVENOUS PRN
Status: DISCONTINUED | OUTPATIENT
Start: 2024-02-13 | End: 2024-02-13

## 2024-02-13 RX ORDER — GABAPENTIN 100 MG/1
100 CAPSULE ORAL 3 TIMES DAILY
Qty: 21 CAPSULE | Refills: 0 | Status: SHIPPED | OUTPATIENT
Start: 2024-02-13 | End: 2024-05-28

## 2024-02-13 RX ORDER — ONDANSETRON 4 MG/1
4 TABLET, ORALLY DISINTEGRATING ORAL EVERY 8 HOURS PRN
Qty: 10 TABLET | Refills: 0 | Status: SHIPPED | OUTPATIENT
Start: 2024-02-13 | End: 2024-05-22

## 2024-02-13 RX ORDER — FLUMAZENIL 0.1 MG/ML
0.2 INJECTION, SOLUTION INTRAVENOUS
Status: DISCONTINUED | OUTPATIENT
Start: 2024-02-13 | End: 2024-02-14 | Stop reason: HOSPADM

## 2024-02-13 RX ORDER — HYDROMORPHONE HYDROCHLORIDE 1 MG/ML
0.4 INJECTION, SOLUTION INTRAMUSCULAR; INTRAVENOUS; SUBCUTANEOUS EVERY 5 MIN PRN
Status: DISCONTINUED | OUTPATIENT
Start: 2024-02-13 | End: 2024-02-14 | Stop reason: HOSPADM

## 2024-02-13 RX ORDER — ACETAMINOPHEN 500 MG
1000 TABLET ORAL ONCE
Status: COMPLETED | OUTPATIENT
Start: 2024-02-13 | End: 2024-02-13

## 2024-02-13 RX ORDER — ONDANSETRON 2 MG/ML
4 INJECTION INTRAMUSCULAR; INTRAVENOUS EVERY 30 MIN PRN
Status: DISCONTINUED | OUTPATIENT
Start: 2024-02-13 | End: 2024-02-14 | Stop reason: HOSPADM

## 2024-02-13 RX ORDER — OXYCODONE HYDROCHLORIDE 5 MG/1
10 TABLET ORAL
Status: DISCONTINUED | OUTPATIENT
Start: 2024-02-13 | End: 2024-02-14 | Stop reason: HOSPADM

## 2024-02-13 RX ORDER — KETAMINE HYDROCHLORIDE 10 MG/ML
INJECTION INTRAMUSCULAR; INTRAVENOUS PRN
Status: DISCONTINUED | OUTPATIENT
Start: 2024-02-13 | End: 2024-02-13

## 2024-02-13 RX ORDER — FENTANYL CITRATE 50 UG/ML
25-50 INJECTION, SOLUTION INTRAMUSCULAR; INTRAVENOUS
Status: DISCONTINUED | OUTPATIENT
Start: 2024-02-13 | End: 2024-02-14 | Stop reason: HOSPADM

## 2024-02-13 RX ORDER — NALOXONE HYDROCHLORIDE 0.4 MG/ML
0.4 INJECTION, SOLUTION INTRAMUSCULAR; INTRAVENOUS; SUBCUTANEOUS
Status: DISCONTINUED | OUTPATIENT
Start: 2024-02-13 | End: 2024-02-14 | Stop reason: HOSPADM

## 2024-02-13 RX ORDER — FENTANYL CITRATE 50 UG/ML
50 INJECTION, SOLUTION INTRAMUSCULAR; INTRAVENOUS EVERY 5 MIN PRN
Status: DISCONTINUED | OUTPATIENT
Start: 2024-02-13 | End: 2024-02-14 | Stop reason: HOSPADM

## 2024-02-13 RX ORDER — CELECOXIB 100 MG/1
100 CAPSULE ORAL 2 TIMES DAILY
Qty: 30 CAPSULE | Refills: 0 | Status: SHIPPED | OUTPATIENT
Start: 2024-02-13 | End: 2024-05-22

## 2024-02-13 RX ORDER — HYDROMORPHONE HYDROCHLORIDE 1 MG/ML
0.2 INJECTION, SOLUTION INTRAMUSCULAR; INTRAVENOUS; SUBCUTANEOUS EVERY 5 MIN PRN
Status: DISCONTINUED | OUTPATIENT
Start: 2024-02-13 | End: 2024-02-14 | Stop reason: HOSPADM

## 2024-02-13 RX ORDER — BUPIVACAINE HYDROCHLORIDE 2.5 MG/ML
INJECTION, SOLUTION EPIDURAL; INFILTRATION; INTRACAUDAL
Status: COMPLETED | OUTPATIENT
Start: 2024-02-13 | End: 2024-02-13

## 2024-02-13 RX ORDER — HYDRALAZINE HYDROCHLORIDE 20 MG/ML
2.5-5 INJECTION INTRAMUSCULAR; INTRAVENOUS EVERY 10 MIN PRN
Status: DISCONTINUED | OUTPATIENT
Start: 2024-02-13 | End: 2024-02-14 | Stop reason: HOSPADM

## 2024-02-13 RX ORDER — LABETALOL HYDROCHLORIDE 5 MG/ML
10 INJECTION, SOLUTION INTRAVENOUS
Status: DISCONTINUED | OUTPATIENT
Start: 2024-02-13 | End: 2024-02-14 | Stop reason: HOSPADM

## 2024-02-13 RX ORDER — LIDOCAINE 40 MG/G
CREAM TOPICAL
Status: DISCONTINUED | OUTPATIENT
Start: 2024-02-13 | End: 2024-02-14 | Stop reason: HOSPADM

## 2024-02-13 RX ORDER — FENTANYL CITRATE 50 UG/ML
25 INJECTION, SOLUTION INTRAMUSCULAR; INTRAVENOUS EVERY 5 MIN PRN
Status: DISCONTINUED | OUTPATIENT
Start: 2024-02-13 | End: 2024-02-14 | Stop reason: HOSPADM

## 2024-02-13 RX ORDER — LIDOCAINE HYDROCHLORIDE 20 MG/ML
INJECTION, SOLUTION INFILTRATION; PERINEURAL PRN
Status: DISCONTINUED | OUTPATIENT
Start: 2024-02-13 | End: 2024-02-13

## 2024-02-13 RX ORDER — PROPOFOL 10 MG/ML
INJECTION, EMULSION INTRAVENOUS PRN
Status: DISCONTINUED | OUTPATIENT
Start: 2024-02-13 | End: 2024-02-13

## 2024-02-13 RX ORDER — DIMENHYDRINATE 50 MG/ML
25 INJECTION, SOLUTION INTRAMUSCULAR; INTRAVENOUS
Status: DISCONTINUED | OUTPATIENT
Start: 2024-02-13 | End: 2024-02-14 | Stop reason: HOSPADM

## 2024-02-13 RX ORDER — ONDANSETRON 4 MG/1
4 TABLET, ORALLY DISINTEGRATING ORAL EVERY 30 MIN PRN
Status: DISCONTINUED | OUTPATIENT
Start: 2024-02-13 | End: 2024-02-14 | Stop reason: HOSPADM

## 2024-02-13 RX ADMIN — FENTANYL CITRATE 25 MCG: 50 INJECTION, SOLUTION INTRAMUSCULAR; INTRAVENOUS at 11:51

## 2024-02-13 RX ADMIN — LIDOCAINE HYDROCHLORIDE 60 MG: 20 INJECTION, SOLUTION INFILTRATION; PERINEURAL at 09:54

## 2024-02-13 RX ADMIN — GLYCOPYRROLATE 0.2 MG: 0.2 INJECTION, SOLUTION INTRAMUSCULAR; INTRAVENOUS at 09:46

## 2024-02-13 RX ADMIN — FENTANYL CITRATE 25 MCG: 50 INJECTION, SOLUTION INTRAMUSCULAR; INTRAVENOUS at 11:42

## 2024-02-13 RX ADMIN — FENTANYL CITRATE 50 MCG: 50 INJECTION, SOLUTION INTRAMUSCULAR; INTRAVENOUS at 10:50

## 2024-02-13 RX ADMIN — KETAMINE HYDROCHLORIDE 30 MG: 10 INJECTION INTRAMUSCULAR; INTRAVENOUS at 10:44

## 2024-02-13 RX ADMIN — CEFAZOLIN SODIUM 2 G: 2 SOLUTION INTRAVENOUS at 09:46

## 2024-02-13 RX ADMIN — PROPOFOL 200 MG: 10 INJECTION, EMULSION INTRAVENOUS at 09:56

## 2024-02-13 RX ADMIN — Medication 50 MG: at 10:01

## 2024-02-13 RX ADMIN — Medication 100 MCG: at 10:59

## 2024-02-13 RX ADMIN — Medication 100 MCG: at 10:18

## 2024-02-13 RX ADMIN — PROPOFOL 150 MCG/KG/MIN: 10 INJECTION, EMULSION INTRAVENOUS at 09:53

## 2024-02-13 RX ADMIN — PROPOFOL 100 MCG/KG/MIN: 10 INJECTION, EMULSION INTRAVENOUS at 11:11

## 2024-02-13 RX ADMIN — FENTANYL CITRATE 50 MCG: 50 INJECTION, SOLUTION INTRAMUSCULAR; INTRAVENOUS at 09:26

## 2024-02-13 RX ADMIN — BUPIVACAINE HYDROCHLORIDE 10 ML: 2.5 INJECTION, SOLUTION EPIDURAL; INFILTRATION; INTRACAUDAL at 09:17

## 2024-02-13 RX ADMIN — SODIUM CHLORIDE, SODIUM LACTATE, POTASSIUM CHLORIDE, CALCIUM CHLORIDE: 600; 310; 30; 20 INJECTION, SOLUTION INTRAVENOUS at 08:53

## 2024-02-13 RX ADMIN — Medication 975 MG: at 08:42

## 2024-02-13 RX ADMIN — PROPOFOL 125 MCG/KG/MIN: 10 INJECTION, EMULSION INTRAVENOUS at 10:28

## 2024-02-13 NOTE — ANESTHESIA PROCEDURE NOTES
Airway       Patient location during procedure: OR       Procedure Start/Stop Times: 2/13/2024 9:56 AM  Staff -        Performed By: CRNAIndications and Patient Condition       Indications for airway management: magalis-procedural       Induction type:intravenous       Mask difficulty assessment: 2 - vent by mask + OA or adjuvant +/- NMBA    Final Airway Details       Final airway type: endotracheal airway       Successful airway: ETT - single and Oral  Endotracheal Airway Details        ETT size (mm): 8.0       Successful intubation technique: video laryngoscopy       VL Blade Size: Glidescope 4       Grade View of Cords: 1       Adjucts: stylet       Position: Left       Measured from: gums/teeth       Secured at (cm): 23       Bite block used: None    Post intubation assessment        Placement verified by: capnometry and equal breath sounds        Number of attempts at approach: 1       Number of other approaches attempted: 1 (Attempted iGel size 5. Unable to adequately ventilate though. Convert to ETT)       Secured with: tape       Ease of procedure: easy       Dentition: Intact and Unchanged    Medication(s) Administered   Medication Administration Time: 2/13/2024 9:56 AM

## 2024-02-13 NOTE — ANESTHESIA CARE TRANSFER NOTE
Patient: Andrew Atkinson    Procedure: Procedure(s):  ARTHROSCOPY, SHOULDER, WITH ROTATOR CUFF REPAIR, SUBACROMIAL SPACE DECOMPRESSION, AND biceps tenodesis       Diagnosis: Nontraumatic tear of right rotator cuff, unspecified tear extent [M75.101]  Diagnosis Additional Information: No value filed.    Anesthesia Type:   General     Note:    Oropharynx: oropharynx clear of all foreign objects and spontaneously breathing  Level of Consciousness: awake  Oxygen Supplementation: face mask  Level of Supplemental Oxygen (L/min / FiO2): 6  Independent Airway: airway patency satisfactory and stable  Dentition: dentition unchanged  Vital Signs Stable: post-procedure vital signs reviewed and stable  Report to RN Given: handoff report given  Patient transferred to: PACU  Comments: Uneventful transport   Report to RN  Exchanging well; color natl  Pt responds appropriately to command  IV patent  Lips/teeth/dentition as preop status  Questions answered      Handoff Report: Identifed the Patient, Identified the Reponsible Provider, Reviewed the pertinent medical history, Discussed the surgical course, Reviewed Intra-OP anesthesia mangement and issues during anesthesia, Set expectations for post-procedure period and Allowed opportunity for questions and acknowledgement of understanding      Vitals:  Vitals Value Taken Time   /60 02/13/24 1214   Temp     Pulse 61 02/13/24 1217   Resp 13 02/13/24 1217   SpO2 94 % 02/13/24 1217   Vitals shown include unfiled device data.    Electronically Signed By: CHUY GREGORY CRNA  February 13, 2024  12:18 PM

## 2024-02-13 NOTE — ANESTHESIA PROCEDURE NOTES
"Other Procedure Note    Pre-Procedure   Staff -        Anesthesiologist:  Merna Brunson MD       Resident/Fellow: Donnie Noe MD       Performed By: resident       Location: pre-op       Pre-Anesthestic Checklist: patient identified, IV checked, site marked, risks and benefits discussed, informed consent, monitors and equipment checked, pre-op evaluation, at physician/surgeon's request and post-op pain management  Timeout:       Correct Patient: Yes        Correct Procedure: Yes        Correct Site: Yes        Correct Position: Yes        Correct Laterality: Yes        Site Marked: Yes  Procedure Documentation  Procedure: Other       Diagnosis: INTERSCALENE       Laterality: right       Patient Position: supine       Patient Prep/Sterile Barriers: sterile gloves, mask       Skin prep: Chloraprep       Needle Type: short bevel       Needle Gauge: 21.        Needle Length (millimeters): 100        Ultrasound guided       1. Ultrasound was used to identify targeted nerve, plexus, vascular marker, or fascial plane and place a needle adjacent to it in real-time.       2. Ultrasound was used to visualize the spread of anesthetic in close proximity to the above referenced structure.       3. A permanent image is entered into the patient's record.    Assessment/Narrative         The placement was negative for: blood aspirated and site bleeding       Paresthesias: No.       Bolus given via needle..        Secured via.        Insertion/Infusion Method: Single Shot       Complications: none    Medication(s) Administered   Bupivacaine 0.25% PF (Infiltration) - Infiltration   10 mL - 2/13/2024 9:17:00 AM  Bupivacaine liposome (Exparel) 1.3% LA inj susp (Infiltration) - Infiltration   10 mL - 2/13/2024 9:17:00 AM    FOR [facility] ([facility]) ONLY:   Pain Team Contact information: please page the Pain Team Via [employer]. Search \"Pain\". During daytime hours, please page the attending first. At night please page the "  first.

## 2024-02-13 NOTE — OR NURSING
Patient presents to PACU following right shoulder surgery.  Sedated but alert to voice.  Reports some mild shortness of breath.  He is on an OxyPlus mask at 15 L/min and is 94%. HR in the low 50's and occasional high 40s.  Has ST depression which was noted in Pre-op block as well as in surgery.  BP high 90's systolic to 100 with diastolic in the 60s.   Hx of CAD, NSTEMI, cardiomyopathy, and severe TIFFANY.  Anesthesiologist Dr. Smith updated on patient status and will assess patient.  No new orders at this time and will continue to monitor.

## 2024-02-13 NOTE — OR NURSING
"Dr. Noe (Anesthesiology) came to assess patient in PACU.  Patient has been very sedated but is now improving.  He continues to need O2 but has weaned to nasal cannula at 2L/min.  Still has the sensation of feeling SOB or \"breathless\".  Using IS for pulmonary toileting wean alert enough.  HR has been stable on the high 40s to low 50s BPM range.  No ectopy.  BP has been softer between 96-1-6 systolic.  Patient denies lightheadedness, dizziness, CP.  Per MD ok to transition to Phase 2 recovery.  Will continue to monitor.  "

## 2024-02-13 NOTE — OP NOTE
PATIENT NAME: Andrew Atkinson  1970  [id number]     DATE: February 13, 2024    PREOPERATIVE DIAGNOSES:   1. Right shoulder rotator cuff tear  2. Right shoulder subacromial bursitis  3. Right shoulder long head biceps disease.    POSTOPERATIVE DIAGNOSES:   1. Right shoulder rotator cuff tear  2. Right shoulder subacromial bursitis  3. Right shoulder long head biceps disease.     PROCEDURES:   1. Right shoulder arthroscopic rotator cuff repair.   2. Right shoulder arthroscopic glenohumeral debridement, extensive  3. Right shoulder subacromial bursectomy   4. Right shoulder arthroscopic biceps tenodesis    STAFF SURGEON: Rebecca Meza MD     ASSISTANT: Shabnam Mcrae PA-C     The assistance of Shabnam mcrae was necessitated by the complexity of the case and need for an assistant including instrument management in repair of this tear.  No suitably qualified resident or fellow was available for assistance    ANESTHESIA: General endotracheal anesthesia with regional block.     ESTIMATED BLOOD LOSS: 5 mL.     COMPLICATIONS: None.     BRIEF PATIENT HISTORY: Andrew Atkinson is a 53 year old adult I have seen in clinic. Please refer to that documentation. Keven Atkinson has an MRI which demonstrated a full thickness tear involving the supraspinatus as well as significant tendinopathy of the biceps tendon. The patient has had nonoperative management including physical therapy and prior corticosteroid injection. Keven Atkinson has not had adequate lasting relief of pain and is at this time having lifestyle limiting symptoms. Keven Atkinson wishes at this time to proceed with surgical intervention. We had discussed the risks and benefits of both non-operative and surgical management. We discussed the expected postoperative restrictions. We discussed the risks of surgery including failure of the cuff to heal or risk of retear, risk of being no better or even worse if Keven Atkinson  became stiff, hardware related  complications, infected, had an injury to the nerves or arteries which power the arm or hand or had a reaction to anesthesia. We discussed the postoperative rehabilitation course. The patient wished to proceed with surgery and informed consent was completed.    DESCRIPTION OF PROCEDURE: The patient was identified in the preoperative area and the correct Right shoulder was marked for surgery. The patient was provided an interscalene block by our Anesthesia colleagues and was taken to the operating room where general endotracheal anesthesia was induced. The patient was moved to the operating table in the beachchair position with all bony prominences well padded. The head was placed in a head lu with the neck in neutral position. The Right upper extremity was prepped and draped in the usual sterile fashion. A timeout was held in accordance with hospital policy, confirming correct patient, side, site, procedure and administration of IV antibiotics prior to incision.  Examination under anesthesia was performed which revealed full symmetric range of motion with no instability.      I initiated shoulder arthroscopy through a posterior viewing portal. I created an anterior working portal under direct visualization. I performed a diagnostic arthroscopy. There was significant fraying of the anterior labrum, this was debrided and found to be unstable with the biceps tendon anchor.  The biceps tendon was then taking placed spinal needles within the biceps groove and cut from its insertion point on the superior labrum.  The superior labrum was debrided with use of an ArthroCare.  There is no evidence of a subscapularis tear.  There was tearing of the articular surface of the supraspinatus.  There is no significant chondromalacia of the glenoid or the humeral head.  There is no chondral loose bodies..     The trocar and cannula were then redirected to the subacromial space.  The arthroscope and inflow were inserted.  A  anterolateral portal was established after localizing the subacromial space with a spinal needle.  There was significant bursal inflammation and thickening of the subacromial space.  Using both a shaver and an ArthroCare the bursa and tissue beneath the acromion were removed.  The coracoacromial ligament was divided.  The arthroscope was then switched to the lateral portal and  The shaver was then used to complete the bursectomy removing all of the thickened bursal tissue over the rotator cuff including posterior lateral gutters.    A spinal needle was localized into the subacromial space overlying the bicipital groove.  An accessory anterior portal was established.  An ArthroCare was used to open the bicipital groove.  The biceps tendon was grasped through the anterior portal.  Working to the accessory anterior portal a  2.6 mm Arthrex all suture fiber tack button was placed at the bicipital groove.  1 limb of the suture from this anchor was then passed around the biceps tendon in a lasso fashion and an additional limb was passed through the biceps tendon and through the loop to secure this down.  The appropriate sutures were then passed to secure the biceps tendon down to the button.   This nicely secured the biceps tendon into the bicipital groove.  The residual tendon material was excised proximally.    The rotator cuff was then inspected.  There was evidence of a full-thickness tear involving the supraspinatus off the the lateral margin.  There was no significant retraction of the rotator cuff tendon fibers.  The overall quality of the tendon tissue is robust without any significant degeneration or fraying.  A posterior lateral portal was established for viewing.  Working through the anterior portal the cuff is prepared for repair.  The greater tuberosity was lightly decorticated to bleeding bony surface.  We then inserted 2 Arthrex Fibertak knotless all suture anchors into the greater tuberosity lateral to the  articular margin at the site of the tear.  Sutures from these anchors were passed through the rotator cuff with a Smith and Nephew firstpass device along with appropriate shuttling sutures.  The cuff was held in a reduced position and the repair sutures were tensioned down.  Next the repair sutures as well as 1 fiber tape from each anchor were brought down laterally for double row repair.  2 4.75 mm Arthrex bio swivel lock anchors were then placed in the lateral greater tuberosity for completion of a transosseous equivalent double row repair.  This nicely reapproximated the tendon to the greater tuberosity.  Final debridement was performed.    All instruments were removed from the shoulder and then portals were closed with interrupted 3-0 Monocryl. Steri-Strips and a soft sterile dressing were applied. The arm was placed into an abduction sling and the patient was extubated and transported to the recovery room in stable condition. There were no apparent intraoperative complications.  Sponge and needle count were correct at the end of the case.  I spoke to the patient's family in the waiting room.      Rebecca Meza MD

## 2024-02-13 NOTE — DISCHARGE INSTRUCTIONS
ROTATOR CUFF REPAIR     POST OP INSTRUCTIONS  Rebecca Meza MD  [phone]   of Orthopedic Surgery  [facility], [facility]      ABOUT YOUR SURGERY  With this surgery, we took your torn rotator cuff tendon and repaired it to its attachment site at the ball (humeral head) of your shoulder. With time, the tendon will heal back to the bone and function to again provide stabilization and motion at your shoulder joint. A biologic patch may have been added over the top of your repair to help augment the healing response.    POST OP OFFICE VISIT  You should have an office visit scheduled with Dr. Meza within 7-10 days after your surgery. If you do not have this appointment please call [phone] to set up an appointment.      MEDICATIONS  You will receive prescriptions for pain medication (and other applicable medications) on the day of your surgery if you have not already had it arranged to  from the office or sent to your pharmacy ahead of time.   The goal of the multimodal pain regimen is to minimize the amount of narcotic (opioid) medications that you require for postoperative pain control - if possible, try and take the non-narcotic pain medications instead of the narcotic medication unless you feel you need it. You should discuss these medications with your primary care doctor to make sure there are no interactions with any other medications that you are taking, and that your kidneys and/or liver are healthy enough for their use.  Unless otherwise specified, you will be prescribed Hydrocodone-Acetaminophen 5-325 mg (Norco; #10 total) for pain. You may take 1 or 2 tablets every 4-6 hours as needed.   You will also be prescribed Celecoxib 100 mg twice daily (Celebrex; #30 total) for pain as needed. This should be your option for pain control if the pain is not so bad as to require the narcotic medication(s).  You will also be prescribed Gabapentin 100 mg every  8 hours (Neurontin; #21 total) to be taken as scheduled. This medication affects chemicals and nerves in the body and is another component of the multi-modal pain regimen. Use of this medication will hopefully reduce comsumption of the narcotic pain medications. Its use can contribute to drowsiness, sleepiness, or dizziness in some patients.    Once you are off the Norco and Celebrex, or immediately after surgery, you may start taking Tylenol and Motrin. Unless you have a medical condition that prevents the use of Tylenol or Motrin, you can take 400 mg of Motrin and 650 mg of Tylenol TOGETHER or in a staggered fashion every 6 hours as needed. Do NOT take Norco and Tylenol together (as Norco contains tylenol), and do not take Celebrex and Motrin together.  You will also receive a prescription for a nausea medicine called Ondansetron 4 mg ODT (Zofran; #10 total). You may take one tablet every 8 hours as needed for nausea.  You are strongly encouraged to take a stool softener and/or laxative while taking narcotic pain medicine. You will be prescribed Colace 100 mg (#30 total), a stool softener, which can be taken twice daily for constipation as needed.  Finally, you will receive a prescription for Prilosec 20 mg (Nexium; #14 total) which we recommend to take every morning in order to combat the occurrence of heartburn while on the medications above in the postoperative period.  If you are having pain beyond what is expected from surgery and you need to speak to someone from our office, please do not wait until after 4 PM on weekdays or over the weekend, as we will not be able to address it.  It is hospital policy that we do not refill narcotic pain medicine after hours or on weekends.  Please call [phone] for further instructions.    ANTICOAGULATION (BLOOD THINNERS)  You will receive a prescription for Aspirin 81 mg (#28 total) to be taken twice daily for prophylaxis against blood clots unless you have a  contraindication to receiving this.  Ambulation, foot/hand pumps, and movement of the surgical site within the confines of the weightbearing and motion restrictions below are additional ways to reduce the occurrence of blood clots after surgery.    DRESSING CHANGES, WOUND CARE, AND BATHING  You will leave the operating room with a bulky, compressive dressing over your surgical incision. At 24 hours after surgery, this can be removed and replaced with waterproof band-aids. You may notice a small amount of clear or reddish drainage in the center of the waterproof bandage - this is normal. However, if you saturate through the waterproof bandage and it looses its seal over the surgical site, please replace accordingly until you are seen for your initial postoperative visit.    Please keep your wounds clean and dry. You may shower with waterproof bandage over the wound, but you should not bathe or soak your surgical site. Please do not apply any lotions, creams, or ointments directly to the incisions until 30 days after surgery. After your sutures are removed at your first post op visit, you may get your incisions wet and they no longer need to be covered.  AVOID STEAM ROOMS, SWIMMING POOLS, AND TUBS FOR A FULL 4 WEEKS AFTER THE DATE OF YOUR SURGERY TO AVOID INFECTION.    SWELLING / INFLAMMATION CONTROL  Icing the surgical is very important following surgery. In most cases you will be offered a Polar Care unit to use after surgery.  This unit is a cooler which circulates cold water through a cuff. This will be provided to you by [employer] if you wish to obtain one.  This device may be left on the surgical site for extended periods of time. There is a rental fee to use this device, or it may be purchased directly. For any questions regarding this equipment please call [facility] directly at [phone].  If you choose to use regular ice packs, please limit icing to 20 minute sessions every 2-3 hours at most to avoid any skin  problems.   Icing should be continued for the first several weeks following surgery.   In addition to icing, compression with a support/wrap and elevation of the affected limb above the level of your heart will promote good circulation and reduce both swelling and pain.  It is normal to have swelling and/or bruising around your incisions, or about the surgical extremity after surgery. This will gradually resolve after surgery.  PROLONGED FEVER OVER 102 DEGREES, THICK DRAINAGE, CHEST PAIN, SHORTNESS OF BREATH, OR CALF PAIN SHOULD BE REPORTED IMMEDIATELY.  PLEASE CALL US [phone] IF YOU EXPERIENCE THESE SYMPTOMS.     WEIGHTBEARING STATUS / IMMOBILIZATION  You should not bear any weight with your surgical extremity while it is in the sling, and until you are instructed as part of the postoperative rehabilitation protocol.    REHABILITATION / PHYSICAL THERAPY  Physical therapy and sling instructions will be different depending on the type of rotator cuff surgery you have had. Sometimes, Dr. Meza will use a Rotator Cuff Patch in addition to repairing the rotator cuff. This decision is sometimes made during surgery and may change your rehabilitation. You will be told by Dr. Meza or a member of her  team which kind of surgery you have had in the recovery area, and if/how it may change your rehabilitation.   In most cases, formal physical therapy will be started at 1 weeks post op - for some tear types this will be delayed until several weeks after your surgery; the therapy can ultimately be done at any facility you like and clearance to begin PT will be provided when appropriate  You must wear the sling for 4-6 weeks. It is ok to remove the sling while sitting with the arm supported on a pillow. While the sling is off it is ok to move the elbow, wrist, and hand. It is normal to develop bruising and swelling of the upper arm, just above the sling. This can be improved by removing the sling more frequently. However, if  you are up walking around or outside your house, you should have the sling on. You should also wear the sling at night for sleep for 4-6 weeks.   The priority following this surgery is allowing the cuff to heal. In order to do this and minimize stiffness, Dr. Meza may recommend that you begin home exercises with passive shoulder motion only (pendulums), elbow/wrist range of motion, and  strengthening until you are seen at your first postoperative appointment; however, there are some degrees of rotator cuff tear for which Dr. Meza will not want you doing any shoulder range of motion (you will be informed accordingly either before or after surgery).       DRIVING AFTER SURGERY  The ability for someone to resume driving after surgery is seldom a medical question, but more often a legal question.  Driving with any form of a brace on may be interpreted as driving while impaired.  It is the responsibility of all licensed drivers to drive safely at all times no matter what their permanent or temporary impairment may be.      WORK AFTER SURGERY  Discussions of return to work will depend on the type of work that you perform, and the immediacy of needs to return. This discussion will be had preoperatively and at each visit postoperatively in order to help you, the patient, to get back to what you need to be doing in a timely fashion, while not compromising your surgical outcome.     DIET AFTER SURGERY  A balanced, healthy diet high in proteins is most valuable for the body to utilize during the healing process after surgery. There are otherwise no formal restrictions on your diet after surgery.     SLEEPING TIPS  Unfortunately, it is often difficult to get quality sleep following shoulder surgery of any kind. Many patients do better in a recliner or propped up on pillows.  It may be best to take pain medicine shortly before bed, as these medicines typically have a sedating effect. If you still have to wear a sling,  sometimes removing the pillow that is attached may help significantly. It is attached with velcro. Gunslinger braces should not be modified.    OTHER COMMENTS  We recommend to abstain from alcohol or tobacco use in the postoperative period. This is for general health reasons, as combination with the prescribed medications can be dangerous, but also to prevent adversely affecting the body's healing response to your surgery.    If you have questions and need to speak to the nurse, please send us a message via [facility], or contact us at our triage center at [phone]    If you have an emergency after hours or on the weekend call the physician on call at [phone] or [phone]     [facility]    Home Care Following Anesthesia  For 24 hours after surgery:  Get plenty of rest.  A responsible adult must stay with you for at least 24 hours after you leave the surgery center.  Do not drive or use heavy equipment.  If you have weakness or tingling, don't drive or use heavy equipment until this feeling goes away.   Do not drink alcohol.   Avoid strenuous or risky activities.  Ask for help when climbing stairs.  You may feel lightheaded.  IF so, sit for a few minutes before standing.  Have someone help you get up.   If you have nausea (feel sick to your stomach): Drink only clear liquids such as apple juice, ginger ale, broth or 7-Up.  Rest may also help.  Be sure to drink enough fluids.  Move to a regular diet as you feel able.   You may have a slight fever.  Call the doctor if your fever is over 100 F (37.7 C) (taken under the tongue) or lasts longer than 24 hours.  You may have a dry mouth, a sore throat, muscle aches or trouble sleeping. These should go away after 24 hours.  Do not make important or legal decisions.   It is recommended to avoid smoking.        Today you received an Exparel block to numb the nerves near your surgery site.  This is a block using local anesthetic or  "\"numbing\" medication injected around the nerves to anesthetize or \"numb\" the area supplied by those nerves.  This block is injected into the muscle layer near your surgical site.  This medication may numb the location where you had surgery up to 72 hours.  If your surgical site is an arm or leg you should be careful with your affected limb, since it is possible to injure your limb without being aware of it due to the numbing.  Until full feeling returns, you should guard against bumping or hitting your limb, and avoid extreme hot or cold temperatures on the skin.  As the block wears off, the feeling will return as a tingling or prickly sensation near your surgical site.  You will experince more discomfort from your incision as the feeling returns.  You may want to take a pain pill (a narcotic or Tylenol if this was prescribed by your surgeon) when you start to experience mild pain before the pain beomes more severe.  If your pain medications do not control your pain, you should notify your surgeon.    Tips for taking pain medications  To get the best pain relief possible, remember these points:  Take pain medications as directed, before pain becomes severe.  Pain medication can upset your stomach: taking it with food may help.  Constipation is a common side effect of pain medication. Drink plenty of  fluids.  Eat foods high in fiber. Take a stool softener if recommended by your doctor or pharmacist.  Do not drink alcohol, drive or operate machinery while taking pain medications.  Ask about other ways to control pain, such as with heat, ice or relaxation.    Tylenol/Acetaminophen Consumption    If you feel your pain relief is insufficient, you may take Tylenol/Acetaminophen in addition to your narcotic pain medication.   Be careful not to exceed 4,000 mg of Tylenol/Acetaminophen in a 24 hour period from all sources.  If you are taking extra strength Tylenol/acetaminophen (500 mg), the maximum dose is 8 tablets in 24 " hours.  If you are taking regular strength acetaminophen (325 mg), the maximum dose is 12 tablets in 24 hours.  You took a dose of Tylenol (Acetaminophen) 975 mg at 8:40 AM.  You can take your next dose of Tylenol at 2:40 PM.      Call a doctor for any of the following:  Signs of infection (fever, growing tenderness at the surgery site, a large amount of drainage or bleeding, severe pain, foul-smelling drainage, redness, swelling).  It has been over 8 to 10 hours since surgery and you are still not able to urinate (pass water).  Headache for over 24 hours.  Signs of Covid-19 infection (temperature over 100 degrees, shortness of breath, cough, loss of taste/smell, generalized body aches, persistent headache, chills, sore throat, nausea/vomiting/diarrhea)  Your doctor is:       Dr. Rebecca Meza, Orthopaedics: [phone]               Or dial [phone] and ask for the resident on call for:  Orthopaedics  For emergency care, call the:  [facility] Emergency Department: [phone] (TTY for hearing impaired: [phone])

## 2024-02-14 NOTE — PROGRESS NOTES
CHIEF COMPLAINT: Status post right shoulder arthroscopic rotator cuff repair, extensive glenohumeral debridement, subacromial bursectomy, biceps tenodesis      DATE OF SURGERY: 2/13/24    HISTORY OF PRESENT ILLNESS: Keven is an extremely pleasant 53 year-old male who is 1 week status post the above procedure. Patient notes pain control has been going well. Patient notes bleeding with bowel movements immediately. Patient has been taking stool softener and is not having normal bowel movements yet.  SANE R - 0 L - 100    EXAM:  Pleasant adult 53 year old in no distress.  Respirations even and unlabored.  Right upper extremity: Incisions clean, dry, and intact. No erythema. No drainage. Sens intact Ax/Musc/Med/Rad/Uln nerves. Motor intact EPL, FPL, and Intrinsics. Shoulder range of motion not tested due to postop restrictions.      ASSESSMENT:  1.  1 week status post above procedure    PLAN:  I reviewed the intraop findings.  We went through the arthroscopic images today.  We discussed the plan for rehabilitation.  I assisted in changing the patient's physical therapy as he was inappropriately scheduled to not start for over a week.  I also sent a message to the patient's primary care physician about the blood in stools and encouraged the patient to call primary care about it.  I discussed need to keep the sling on at all times except for bathing and dressing or home exercises.  Patient was encouraged to perform active range of motion exercises about the hand wrist and elbow.  We discussed a step down regimen for pain medication to over the counter medications. The patient will start physical therapy.  I will see the patient back in 6 weeks.      Rebecca Meza  Orthopedic Surgery Sports Medicine and Shoulder Surgery

## 2024-02-22 ENCOUNTER — OFFICE VISIT (OUTPATIENT)
Dept: ORTHOPEDICS | Facility: CLINIC | Age: 54
End: 2024-02-22
Payer: COMMERCIAL

## 2024-02-22 ENCOUNTER — THERAPY VISIT (OUTPATIENT)
Dept: PHYSICAL THERAPY | Facility: CLINIC | Age: 54
End: 2024-02-22
Payer: COMMERCIAL

## 2024-02-22 DIAGNOSIS — Z98.890 STATUS POST RIGHT ROTATOR CUFF REPAIR: Primary | ICD-10-CM

## 2024-02-22 DIAGNOSIS — Z98.890 S/P RIGHT ROTATOR CUFF REPAIR: Primary | ICD-10-CM

## 2024-02-22 PROCEDURE — 97161 PT EVAL LOW COMPLEX 20 MIN: CPT | Mod: GP

## 2024-02-22 PROCEDURE — 99024 POSTOP FOLLOW-UP VISIT: CPT | Performed by: ORTHOPAEDIC SURGERY

## 2024-02-22 PROCEDURE — 97110 THERAPEUTIC EXERCISES: CPT | Mod: GP

## 2024-02-22 NOTE — LETTER
2/22/2024         RE: Andrew Atkinson  [address] [address]        Dear Colleague,    Thank you for referring your patient, Andrew Atkinson, to the [facility]. Please see a copy of my visit note below.    CHIEF COMPLAINT: Status post right shoulder arthroscopic rotator cuff repair, extensive glenohumeral debridement, subacromial bursectomy, biceps tenodesis      DATE OF SURGERY: 2/13/24    HISTORY OF PRESENT ILLNESS: Keven is an extremely pleasant 53 year-old male who is 1 week status post the above procedure. Patient notes pain control has been going well. Patient notes bleeding with bowel movements immediately. Patient has been taking stool softener and is not having normal bowel movements yet.  SANE R - 0 L - 100    EXAM:  Pleasant adult 53 year old in no distress.  Respirations even and unlabored.  Right upper extremity: Incisions clean, dry, and intact. No erythema. No drainage. Sens intact Ax/Musc/Med/Rad/Uln nerves. Motor intact EPL, FPL, and Intrinsics. Shoulder range of motion not tested due to postop restrictions.      ASSESSMENT:  1.  1 week status post above procedure    PLAN:  I reviewed the intraop findings.  We went through the arthroscopic images today.  We discussed the plan for rehabilitation.  I assisted in changing the patient's physical therapy as he was inappropriately scheduled to not start for over a week.  I also sent a message to the patient's primary care physician about the blood in stools and encouraged the patient to call primary care about it.  I discussed need to keep the sling on at all times except for bathing and dressing or home exercises.  Patient was encouraged to perform active range of motion exercises about the hand wrist and elbow.  We discussed a step down regimen for pain medication to over the counter medications. The patient will start physical therapy.  I will see the patient back in 6 weeks.      Rebecca PRAKASH  [facility]  Orthopedic Surgery Sports Medicine and Shoulder Surgery      Again, thank you for allowing me to participate in the care of your patient.        Sincerely,        SUSANA WHITE MD

## 2024-02-22 NOTE — PROGRESS NOTES
PHYSICAL THERAPY EVALUATION  Type of Visit: Evaluation    See electronic medical record for Abuse and Falls Screening details.    Subjective       Presenting condition or subjective complaint:    Date of onset: 02/13/24    Relevant medical history:     Dates & types of surgery:      Prior diagnostic imaging/testing results:       Prior therapy history for the same diagnosis, illness or injury:        Prior Level of Function  Transfers:   Ambulation:   ADL:   IADL:     Living Environment  Social support:     Type of home:     Stairs to enter the home:         Ramp:     Stairs inside the home:         Help at home:    Equipment owned:       Employment:      Hobbies/Interests:      Patient goals for therapy:      Subjective Summary: Patient reports he had a right rotator cuff repair with right bicep tenodesis on 2/13/2024.  He reports that his shoulder has felt pretty good so far, but he has a pain in his anterior shoulder and proximal biceps that potentially could be coming from his neck or from his biceps tenodesis according to Dr. Meza.  He has a history of back surgeries so sleeping has been uncomfortable. His sling is especially uncomfortable as it's pulling hard on his neck.    Pain assessment: Location: Right Shoulder Pain /Rating: Pain present but no number rating provided     Objective   SHOULDER EVALUATION  PAIN: Pain is Exacerbated By: Sleeping, The sling pushing on his neck  Pain is Relieved By: rest  Pain Progression: Unchanged  INTEGUMENTARY (edema, incisions):   POSTURE:   GAIT:   Weightbearing Status:   Assistive Device(s):   Gait Deviations:   BALANCE/PROPRIOCEPTION:   WEIGHTBEARING ALIGNMENT:   ROM:  Shoulder not assessed today with focus on sling use and on the distal joints.  Elbow flexion passive WNL and painfree in the shoulder, Elbow Extension Passive WNL (no signs of contracture) and painfree in the shoulder.  All wrist motions WNL and painfree in the shoulder.    STRENGTH:  Inappropriate at this  time.  FLEXIBILITY:   SPECIAL TESTS:   PALPATION:   JOINT MOBILITY:   CERVICAL SCREEN:  Cervical Flexion: Full and painfree,  Cervical Extension: Moderate Limitation but painfree,  Cervical Left Side Bend: Mild limitation and painfree, Cervical right side bend: Mild limitation and painfree, Cervical Rotation: Equal and mildly limited bilaterally, but painfree.   Adjusted his sling which was far too tight on his neck and causing neck pain.  He was more comfortable and happy with the adjustment.    Assessment & Plan   CLINICAL IMPRESSIONS  Medical Diagnosis: S/p Large Right RC Repair with Biceps Tenodesis    Treatment Diagnosis: Right Rotator Cuff Repair with Biceps tenodesis   Impression/Assessment: Patient is a 53 year old adult with Right Shoulder Repair complaints.  The following significant findings have been identified: Pain, Decreased ROM/flexibility, Decreased joint mobility, Decreased strength, and Impaired muscle performance. These impairments interfere with their ability to perform self care tasks, work tasks, recreational activities, household chores, driving , household mobility, and community mobility as compared to previous level of function.     Clinical Decision Making (Complexity):  Clinical Presentation: Stable/Uncomplicated  Clinical Presentation Rationale: based on medical and personal factors listed in PT evaluation  Clinical Decision Making (Complexity): Low complexity    PLAN OF CARE  Treatment Interventions:  Interventions: Gait Training, Manual Therapy, Neuromuscular Re-education, Therapeutic Activity, Therapeutic Exercise, Self-Care/Home Management    Long Term Goals     PT Goal 1  Goal Identifier: PROM  Goal Description: Patient will have PROM beyond 90 degrees into flexion, beyond 70 degrees into abduction and beyond 20 degrees going into ER at 6 weeks and will tolerate transition to AAROM without pain  Rationale: to maximize safety and independence with performance of ADLs and functional  tasks;to maximize safety and independence within the home;to maximize safety and independence within the community;to maximize safety and independence with self cares;to maximize safety and independence with transportation  Goal Progress: Restricted to sling and PROM only  Target Date: 03/30/24  PT Goal 2  Goal Identifier: AROM  Goal Description: Patient will have AROM to Full range to allow for reaching overhead (Over 140 degrees flexoin and abduction) without pain worse than 2/10 in the right shoulder to permit for progression to strength training  Rationale: to maximize safety and independence with performance of ADLs and functional tasks;to maximize safety and independence within the home;to maximize safety and independence within the community;to maximize safety and independence with transportation;to maximize safety and independence with self cares  Goal Progress: Restricted to sling and PROM only  Target Date: 05/16/24      Frequency of Treatment: 2x a week starting a 4th week  Duration of Treatment: 6 months    Recommended Referrals to Other Professionals:   Education Assessment:        Risks and benefits of evaluation/treatment have been explained.   Patient/Family/caregiver agrees with Plan of Care.     Evaluation Time:     PT Eval, Low Complexity Minutes (17022): 13       Signing Clinician: Yuan Romeor PT

## 2024-03-04 ENCOUNTER — THERAPY VISIT (OUTPATIENT)
Dept: PHYSICAL THERAPY | Facility: CLINIC | Age: 54
End: 2024-03-04
Payer: COMMERCIAL

## 2024-03-04 DIAGNOSIS — Z98.890 S/P RIGHT ROTATOR CUFF REPAIR: Primary | ICD-10-CM

## 2024-03-04 PROCEDURE — 97110 THERAPEUTIC EXERCISES: CPT | Mod: GP

## 2024-03-21 ENCOUNTER — THERAPY VISIT (OUTPATIENT)
Dept: PHYSICAL THERAPY | Facility: CLINIC | Age: 54
End: 2024-03-21
Payer: COMMERCIAL

## 2024-03-21 ENCOUNTER — OFFICE VISIT (OUTPATIENT)
Dept: FAMILY MEDICINE | Facility: CLINIC | Age: 54
End: 2024-03-21
Payer: COMMERCIAL

## 2024-03-21 VITALS
OXYGEN SATURATION: 97 % | HEIGHT: 71 IN | SYSTOLIC BLOOD PRESSURE: 132 MMHG | DIASTOLIC BLOOD PRESSURE: 77 MMHG | TEMPERATURE: 98.5 F | WEIGHT: 243.4 LBS | RESPIRATION RATE: 16 BRPM | BODY MASS INDEX: 34.07 KG/M2 | HEART RATE: 70 BPM

## 2024-03-21 DIAGNOSIS — Z13.6 ENCOUNTER FOR LIPID SCREENING FOR CARDIOVASCULAR DISEASE: ICD-10-CM

## 2024-03-21 DIAGNOSIS — E11.9 TYPE 2 DIABETES MELLITUS WITHOUT COMPLICATION, WITHOUT LONG-TERM CURRENT USE OF INSULIN (H): Primary | ICD-10-CM

## 2024-03-21 DIAGNOSIS — Z13.220 ENCOUNTER FOR LIPID SCREENING FOR CARDIOVASCULAR DISEASE: ICD-10-CM

## 2024-03-21 DIAGNOSIS — Z98.890 S/P RIGHT ROTATOR CUFF REPAIR: Primary | ICD-10-CM

## 2024-03-21 DIAGNOSIS — E03.9 ACQUIRED HYPOTHYROIDISM: ICD-10-CM

## 2024-03-21 DIAGNOSIS — Z12.5 SCREENING FOR PROSTATE CANCER: ICD-10-CM

## 2024-03-21 LAB
ALBUMIN SERPL BCG-MCNC: 4.6 G/DL (ref 3.5–5.2)
ALP SERPL-CCNC: 83 U/L (ref 40–150)
ALT SERPL W P-5'-P-CCNC: 84 U/L (ref 0–70)
ANION GAP SERPL CALCULATED.3IONS-SCNC: 11 MMOL/L (ref 7–15)
AST SERPL W P-5'-P-CCNC: 54 U/L (ref 0–45)
BILIRUB SERPL-MCNC: 0.3 MG/DL
BUN SERPL-MCNC: 15.1 MG/DL (ref 6–20)
CALCIUM SERPL-MCNC: 9.6 MG/DL (ref 8.6–10)
CHLORIDE SERPL-SCNC: 101 MMOL/L (ref 98–107)
CHOLEST SERPL-MCNC: 154 MG/DL
CREAT SERPL-MCNC: 1 MG/DL (ref 0.51–1.17)
DEPRECATED HCO3 PLAS-SCNC: 26 MMOL/L (ref 22–29)
EGFRCR SERPLBLD CKD-EPI 2021: 89 ML/MIN/1.73M2
FASTING STATUS PATIENT QL REPORTED: YES
GLUCOSE SERPL-MCNC: 119 MG/DL (ref 70–99)
HBA1C MFR BLD: 6.8 % (ref 0–5.6)
HDLC SERPL-MCNC: 34 MG/DL
LDLC SERPL CALC-MCNC: 88 MG/DL
NONHDLC SERPL-MCNC: 120 MG/DL
POTASSIUM SERPL-SCNC: 4.4 MMOL/L (ref 3.4–5.3)
PROT SERPL-MCNC: 7.6 G/DL (ref 6.4–8.3)
SODIUM SERPL-SCNC: 138 MMOL/L (ref 135–145)
T4 FREE SERPL-MCNC: 1.19 NG/DL (ref 0.9–1.7)
TRIGL SERPL-MCNC: 160 MG/DL
TSH SERPL DL<=0.005 MIU/L-ACNC: 12.4 UIU/ML (ref 0.3–4.2)

## 2024-03-21 PROCEDURE — 80053 COMPREHEN METABOLIC PANEL: CPT | Performed by: FAMILY MEDICINE

## 2024-03-21 PROCEDURE — 83036 HEMOGLOBIN GLYCOSYLATED A1C: CPT | Performed by: FAMILY MEDICINE

## 2024-03-21 PROCEDURE — 36415 COLL VENOUS BLD VENIPUNCTURE: CPT | Performed by: FAMILY MEDICINE

## 2024-03-21 PROCEDURE — 99214 OFFICE O/P EST MOD 30 MIN: CPT | Mod: 24 | Performed by: FAMILY MEDICINE

## 2024-03-21 PROCEDURE — 97110 THERAPEUTIC EXERCISES: CPT | Mod: GP

## 2024-03-21 PROCEDURE — 84443 ASSAY THYROID STIM HORMONE: CPT | Performed by: FAMILY MEDICINE

## 2024-03-21 PROCEDURE — 80061 LIPID PANEL: CPT | Performed by: FAMILY MEDICINE

## 2024-03-21 PROCEDURE — 84439 ASSAY OF FREE THYROXINE: CPT | Performed by: FAMILY MEDICINE

## 2024-03-21 PROCEDURE — 97140 MANUAL THERAPY 1/> REGIONS: CPT | Mod: GP

## 2024-03-21 NOTE — PROGRESS NOTES
"  Assessment & Plan       ICD-10-CM    1. Type 2 diabetes mellitus without complication, without long-term current use of insulin (H)  E11.9 Comprehensive metabolic panel     Hemoglobin A1c     Comprehensive metabolic panel     Hemoglobin A1c      2. Acquired hypothyroidism  E03.9 TSH with free T4 reflex     TSH with free T4 reflex      3. Encounter for lipid screening for cardiovascular disease  Z13.220 Lipid panel reflex to direct LDL Fasting    Z13.6 Lipid panel reflex to direct LDL Fasting      4. Screening for prostate cancer  Z12.5             Review of external notes as documented elsewhere in note        BMI  Estimated body mass index is 33.97 kg/m  as calculated from the following:    Height as of this encounter: 1.803 m (5' 10.98\").    Weight as of this encounter: 110.4 kg (243 lb 6.4 oz).   Weight management plan: Discussed healthy diet and exercise guidelines      There are no Patient Instructions on file for this visit.    Felecia Baca is a 53 year old, presenting for the following health issues:  Rectal Problem (Blood in stool)      3/21/2024    10:16 AM   Additional Questions   Roomed by Oksana   Accompanied by none         3/21/2024    10:16 AM   Patient Reported Additional Medications   Patient reports taking the following new medications none     Via the Health Maintenance questionnaire, the patient has reported the following services have been completed -Eye Exam, this information has been sent to the abstraction team.  History of Present Illness       Reason for visit:  Lab work and bloody bowel movements    Keven Atkinson eats 2-3 servings of fruits and vegetables daily.Keven Atkinson consumes 1 sweetened beverage(s) daily.Keven Atkinson exercises with enough effort to increase Keven Atkinson's heart rate 20 to 29 minutes per day.  Keven Atkinson exercises with enough effort to increase Keven Atkinson's heart rate 7 days per week.   Keven Atkinson is taking medications regularly.   " "    Right shoulder injection  Shortness of breath 2 days later - unknown cause  ER - started on lasix  Discussed with ortho, known side effect due to effect on phrenic nerve  Patient is wondering if actually needs to be on lasix given that it was started during ER visit for shortness of breath.  Would like to reconsider pravastatin as well                    Review of Systems  Constitutional, HEENT, cardiovascular, pulmonary, gi and gu systems are negative, except as otherwise noted.      Objective    /77   Pulse 70   Temp 98.5  F (36.9  C) (Temporal)   Resp 16   Ht 1.803 m (5' 10.98\")   Wt 110.4 kg (243 lb 6.4 oz)   SpO2 97%   BMI 33.97 kg/m    Body mass index is 33.97 kg/m .  Physical Exam  Constitutional:       General: Keven Atkinson is not in acute distress.     Appearance: Normal appearance. Keven Atkinson is well-developed. Keven Atkinson is not ill-appearing.   HENT:      Head: Normocephalic and atraumatic.      Right Ear: External ear normal.      Left Ear: External ear normal.      Nose: Nose normal.   Eyes:      General: No scleral icterus.     Extraocular Movements: Extraocular movements intact.      Conjunctiva/sclera: Conjunctivae normal.   Cardiovascular:      Rate and Rhythm: Normal rate.   Pulmonary:      Effort: Pulmonary effort is normal.   Musculoskeletal:      Cervical back: Normal range of motion and neck supple.   Skin:     General: Skin is warm and dry.   Neurological:      Mental Status: Keven Atkinson is alert and oriented to person, place, and time.   Psychiatric:         Behavior: Behavior normal.         Thought Content: Thought content normal.         Judgment: Judgment normal.                    Signed Electronically by: Wilfredo Hemphill MD    "

## 2024-03-21 NOTE — PROGRESS NOTES
CHIEF COMPLAINT: Status post right shoulder arthroscopic rotator cuff repair, extensive glenohumeral debridement, subacromial bursectomy, biceps tenodesis      DATE OF SURGERY: 2/13/24    HISTORY OF PRESENT ILLNESS: Keven is an extremely pleasant 53 year-old male who is 6 week status post the above procedure. Doing physical therapy and it's going well. Bicep is slightly tight but physical therapy is working on that along with weaning from sling. SANE R - 35-40 L - 100    EXAM:  Pleasant adult 53 year old in no distress.  Respirations even and unlabored.  Right upper extremity: Incisions healed. No erythema. No drainage. Sens intact Ax/Musc/Med/Rad/Uln nerves. Motor intact EPL, FPL, and Intrinsics.  Passive forward elevation to 90 degrees    ASSESSMENT:  1.  6 week status post above procedure    PLAN:  I discussed with the patient that things are going well today.  We discussed weaning out of the sling completely and following postop restrictions.  We discussed activating 0forward elevation and real-life when doing it in physical therapy.  We discussed continue to ice and use over-the-counter analgesics.  Patient is going to continue with physical therapy.  Follow-up in 6 to 8 weeks time.      Rebecca Meza  Orthopedic Surgery Sports Medicine and Shoulder Surgery

## 2024-03-21 NOTE — PATIENT INSTRUCTIONS
Keven    It was a pleasure seeing you in clinic today.  Here's the plan:    Lasix - can stop this and monitor symptoms.  Discuss further with Dr. Gregory  Pravastatin - statin is recommended for cardioprotection due to diabetes history.  Check cholesterol today, potentially decrease dose.  Diabetes - check A1c today, diet low in processed carbohydrates, high in fiber, regular exercise/weight loss.  Consider treatment if A1c is @ 7 or above, otherwise lifestyle changes.    Let me know if you have questions.    Wilfredo Hemphill MD

## 2024-03-25 ENCOUNTER — THERAPY VISIT (OUTPATIENT)
Dept: PHYSICAL THERAPY | Facility: CLINIC | Age: 54
End: 2024-03-25
Payer: COMMERCIAL

## 2024-03-25 DIAGNOSIS — Z98.890 S/P RIGHT ROTATOR CUFF REPAIR: Primary | ICD-10-CM

## 2024-03-25 PROCEDURE — 97110 THERAPEUTIC EXERCISES: CPT | Mod: GP

## 2024-03-25 NOTE — PROGRESS NOTES
03/25/24 0500   Appointment Info   Signing clinician's name / credentials Yuna Romero, PT, DPT, CSCS, CLT   Total/Authorized Visits E&T   Visits Used 4   Medical Diagnosis S/p Large Right RC Repair with Biceps Tenodesis   PT Tx Diagnosis Right Rotator Cuff Repair with Biceps tenodesis   Precautions/Limitations PROM only 1-6 weeks.  AAROM 6-8 weeks SLOW and Cautious.  No resisted Elbow Flexion resisted or isometrics until 8 weeks.  Then no resisted shoulder motions x12 weeks. Follow Meza Protocol.   Progress Note/Certification   Onset of illness/injury or Date of Surgery 02/13/24   Therapy Frequency 2x a week starting a 4th week   Predicted Duration 6 months   Progress Note Due Date 05/16/24   Progress Note Completed Date 02/22/24   GOALS   PT Goals 2   PT Goal 1   Goal Identifier PROM   Goal Description Patient will have PROM beyond 90 degrees into flexion, beyond 70 degrees into abduction and beyond 20 degrees going into ER at 6 weeks and will tolerate transition to AAROM without pain   Rationale to maximize safety and independence with performance of ADLs and functional tasks;to maximize safety and independence within the home;to maximize safety and independence within the community;to maximize safety and independence with self cares;to maximize safety and independence with transportation   Goal Progress ROM has improved to 108 degrees flexion, 85 degrees abduction and 41 degrees external rotation.  We have initiated Active Assistive range of motion which is well tolerated.   Target Date 03/30/24   Date Met 03/25/24   PT Goal 2   Goal Identifier AROM   Goal Description Patient will have AROM to Full range to allow for reaching overhead (Over 140 degrees flexoin and abduction) without pain worse than 2/10 in the right shoulder to permit for progression to strength training   Rationale to maximize safety and independence with performance of ADLs and functional tasks;to maximize safety and independence within the  home;to maximize safety and independence within the community;to maximize safety and independence with transportation;to maximize safety and independence with self cares   Goal Progress We have initated active assistive range of motion and will be ready to transition to AROM once AAROM improves to more than 160 degrees AND once we are beyond the 8 week gwen.   Target Date 05/16/24   Subjective Report   Subjective Report Reports his shoulder is moderatley stiff with improvement following stretching program. Able to tolerate taking off the sling for 2-3 hours/day.   Objective Measures   Objective Measures Objective Measure 1;Objective Measure 2   Objective Measure 1   Objective Measure Shoulder pain   Details 2/10 at rest   Objective Measure 2   Objective Measure Shoulder AAROM Measurement (Taken at the end of the therapy session after it's been stretched out)   Details Dowel Flexion: 108 degrees, Dowel Abduction: 85 degrees, Dowel ER: 41 degrees, Dowel extension 50 degrees   Treatment Interventions (PT)   Interventions Therapeutic Procedure/Exercise;Manual Therapy   Therapeutic Procedure/Exercise   Therapeutic Procedures: strength, endurance, ROM, flexibility minutes (64736) 40   Ther Proc 1 Shoulder Passive Range of Motion Flexion,  Abduction, and External Rotation   Ther Proc 1 - Details x4 minutes each direction (Flexion, Abduction, ER)   PTRx Ther Proc 2 Scapular Retraction/Depression   PTRx Ther Proc 2 - Details Reviewed   PTRx Ther Proc 3 Wand Shoulder Flexion in Standing   PTRx Ther Proc 3 - Details 2x15 without pain   PTRx Ther Proc 4 Wand Shoulder Abduction Standing   PTRx Ther Proc 4 - Details 2x15 without pain   PTRx Ther Proc 5 Wand Shoulder External Rotation in Standing   PTRx Ther Proc 5 - Details 2x15 without pain   PTRx Ther Proc 6 Wand Shoulder Extension Standing   PTRx Ther Proc 6 - Details 2x15 without pain (no pain)   PTRx Ther Proc 7 Standing Passive Shoulder Flexion   PTRx Ther Proc 7 - Details  2x15 without pain   Skilled Intervention Progression of HEP to AAROM   Patient Response/Progress Will perform at home.   PTRx Ther Proc 8 Periscapular Table Slide Flexion   PTRx Ther Proc 8 - Details 1x20   PTRx Ther Proc 9 Periscapular Table Slide Abduction   PTRx Ther Proc 9 - Details 1x20   PTRx Ther Proc 10 Passive Shoulder External Rotation   PTRx Ther Proc 10 - Details 1x20   Plan   Home program See PTRX   Updates to plan of care Continue per Derrick protocol   Plan for next session Repeat same HEP which is prioritizing AAROM, but add pulleys next session.  Also progress protocol based on Dr. Meza's recommendations   Total Session Time   Timed Code Treatment Minutes 40   Total Treatment Time (sum of timed and untimed services) 40           PLAN  Continue therapy per current plan of care.  ROM has improved to 108 degrees flexion, 85 degrees abduction and 41 degrees external rotation.  We have initiated Active Assistive range of motion which is well tolerated.  He is doing his HEP 3x a day with no significant pain or symptoms and feels his shoulder loosening nicely with his HEP. We have initated active assistive range of motion and will be ready to transition to AROM once AAROM improves to more than 160 degrees AND once we are beyond the 8 week gwen.  We have also advised him to gradually wean from his sling starting with approximately 1 hour a day of time out of the sling in sitting and gradually increasing to every few days.  He is now out of his sling in a sitting position for about 3 hours a day without side effects, pain or concern.    Beginning/End Dates of Progress Note Reporting Period:  02/22/24 to 03/25/2024    Referring Provider:  Rebecca Meza MD

## 2024-03-28 ENCOUNTER — THERAPY VISIT (OUTPATIENT)
Dept: PHYSICAL THERAPY | Facility: CLINIC | Age: 54
End: 2024-03-28
Payer: COMMERCIAL

## 2024-03-28 ENCOUNTER — OFFICE VISIT (OUTPATIENT)
Dept: ORTHOPEDICS | Facility: CLINIC | Age: 54
End: 2024-03-28
Payer: COMMERCIAL

## 2024-03-28 DIAGNOSIS — Z98.890 S/P RIGHT ROTATOR CUFF REPAIR: Primary | ICD-10-CM

## 2024-03-28 DIAGNOSIS — R79.89 ELEVATED LFTS: Primary | ICD-10-CM

## 2024-03-28 DIAGNOSIS — Z98.890 STATUS POST RIGHT ROTATOR CUFF REPAIR: Primary | ICD-10-CM

## 2024-03-28 PROCEDURE — 97110 THERAPEUTIC EXERCISES: CPT | Mod: GP

## 2024-03-28 PROCEDURE — 99024 POSTOP FOLLOW-UP VISIT: CPT | Performed by: ORTHOPAEDIC SURGERY

## 2024-03-28 NOTE — PATIENT INSTRUCTIONS
Thanks for coming today.  [facility]  [address] [place] [address]    To schedule future appointments in Ortho Clinic, you may call [phone].    To schedule ordered imaging or an injection ordered by your provider:  Call [facility] Injection scheduling line: [phone]    MyChart available online at:  [email or web address]    Please call if any further questions or concerns ([phone]).  Clinic hours 8 am to 5 pm.    Return to clinic (call) if symptoms worsen or fail to improve.

## 2024-03-28 NOTE — LETTER
3/28/2024         RE: Andrew Atkinson  [address] [facility] [address]        Dear Colleague,    Thank you for referring your patient, Andrew Atkinson, to the [facility]. Please see a copy of my visit note below.    CHIEF COMPLAINT: Status post right shoulder arthroscopic rotator cuff repair, extensive glenohumeral debridement, subacromial bursectomy, biceps tenodesis      DATE OF SURGERY: 2/13/24    HISTORY OF PRESENT ILLNESS: Keven is an extremely pleasant 53 year-old male who is 6 week status post the above procedure. Doing physical therapy and it's going well. Bicep is slightly tight but physical therapy is working on that along with weaning from sling. SANE R - 35-40 L - 100    EXAM:  Pleasant adult 53 year old in no distress.  Respirations even and unlabored.  Right upper extremity: Incisions healed. No erythema. No drainage. Sens intact Ax/Musc/Med/Rad/Uln nerves. Motor intact EPL, FPL, and Intrinsics.  Passive forward elevation to 90 degrees    ASSESSMENT:  1.  6 week status post above procedure    PLAN:  I discussed with the patient that things are going well today.  We discussed weaning out of the sling completely and following postop restrictions.  We discussed activating 0forward elevation and real-life when doing it in physical therapy.  We discussed continue to ice and use over-the-counter analgesics.  Patient is going to continue with physical therapy.  Follow-up in 6 to 8 weeks time.      Rebecca White  Orthopedic Surgery Sports Medicine and Shoulder Surgery      Again, thank you for allowing me to participate in the care of your patient.        Sincerely,        REBECCA WHITE MD

## 2024-04-02 DIAGNOSIS — R94.31 NONSPECIFIC ABNORMAL ELECTROCARDIOGRAM (ECG) (EKG): ICD-10-CM

## 2024-04-02 DIAGNOSIS — I40.0 ACUTE VIRAL MYOCARDITIS: ICD-10-CM

## 2024-04-02 DIAGNOSIS — I21.4 NSTEMI (NON-ST ELEVATED MYOCARDIAL INFARCTION) (H): ICD-10-CM

## 2024-04-02 DIAGNOSIS — E78.5 HYPERLIPIDEMIA LDL GOAL <100: ICD-10-CM

## 2024-04-03 ENCOUNTER — THERAPY VISIT (OUTPATIENT)
Dept: PHYSICAL THERAPY | Facility: CLINIC | Age: 54
End: 2024-04-03
Payer: COMMERCIAL

## 2024-04-03 DIAGNOSIS — Z98.890 S/P RIGHT ROTATOR CUFF REPAIR: Primary | ICD-10-CM

## 2024-04-03 PROCEDURE — 97110 THERAPEUTIC EXERCISES: CPT | Mod: GP

## 2024-04-05 ENCOUNTER — THERAPY VISIT (OUTPATIENT)
Dept: PHYSICAL THERAPY | Facility: CLINIC | Age: 54
End: 2024-04-05
Payer: COMMERCIAL

## 2024-04-05 DIAGNOSIS — Z98.890 S/P RIGHT ROTATOR CUFF REPAIR: Primary | ICD-10-CM

## 2024-04-05 PROCEDURE — 97110 THERAPEUTIC EXERCISES: CPT | Mod: GP

## 2024-04-08 ENCOUNTER — THERAPY VISIT (OUTPATIENT)
Dept: PHYSICAL THERAPY | Facility: CLINIC | Age: 54
End: 2024-04-08
Payer: COMMERCIAL

## 2024-04-08 ENCOUNTER — TELEPHONE (OUTPATIENT)
Dept: CARDIOLOGY | Facility: CLINIC | Age: 54
End: 2024-04-08

## 2024-04-08 DIAGNOSIS — Z98.890 S/P RIGHT ROTATOR CUFF REPAIR: Primary | ICD-10-CM

## 2024-04-08 PROCEDURE — 97110 THERAPEUTIC EXERCISES: CPT | Mod: GP

## 2024-04-08 RX ORDER — PRAVASTATIN SODIUM 40 MG
40 TABLET ORAL DAILY
Qty: 90 TABLET | Refills: 0 | Status: SHIPPED | OUTPATIENT
Start: 2024-04-08 | End: 2024-07-01

## 2024-04-08 NOTE — TELEPHONE ENCOUNTER
[facility] Call Center    Phone Message    May a detailed message be left on voicemail: yes     Reason for Call: Medication Refill Request    Has the patient contacted the pharmacy for the refill? Yes   Name of medication being requested: Pravastatin 40mg  Provider who prescribed the medication: Dr. Lomeli  Pharmacy: [facility]   Date medication is needed: 04/08/2024   Pt has an appointment coming up but only has 1 tablet left and needs a refill asap as he request this medication a week ago with no luck    Action Taken: Other: Cardio    Travel Screening: Not Applicable

## 2024-04-08 NOTE — TELEPHONE ENCOUNTER
Refill sent for 90 days. Pt to arrange lab (ordered under pcp) for repeat liver testing. Pt should complete- if remains elevated- would consider an alternative medication    Halley Luu RN

## 2024-04-11 ENCOUNTER — THERAPY VISIT (OUTPATIENT)
Dept: PHYSICAL THERAPY | Facility: CLINIC | Age: 54
End: 2024-04-11
Payer: COMMERCIAL

## 2024-04-11 DIAGNOSIS — Z98.890 S/P RIGHT ROTATOR CUFF REPAIR: Primary | ICD-10-CM

## 2024-04-11 PROCEDURE — 97110 THERAPEUTIC EXERCISES: CPT | Mod: GP

## 2024-04-17 ENCOUNTER — THERAPY VISIT (OUTPATIENT)
Dept: PHYSICAL THERAPY | Facility: CLINIC | Age: 54
End: 2024-04-17
Payer: COMMERCIAL

## 2024-04-17 DIAGNOSIS — Z98.890 S/P RIGHT ROTATOR CUFF REPAIR: Primary | ICD-10-CM

## 2024-04-17 PROCEDURE — 97110 THERAPEUTIC EXERCISES: CPT | Mod: GP

## 2024-04-19 ENCOUNTER — THERAPY VISIT (OUTPATIENT)
Dept: PHYSICAL THERAPY | Facility: CLINIC | Age: 54
End: 2024-04-19
Payer: COMMERCIAL

## 2024-04-19 DIAGNOSIS — Z98.890 S/P RIGHT ROTATOR CUFF REPAIR: Primary | ICD-10-CM

## 2024-04-19 PROCEDURE — 97110 THERAPEUTIC EXERCISES: CPT | Mod: GP

## 2024-04-25 ENCOUNTER — THERAPY VISIT (OUTPATIENT)
Dept: PHYSICAL THERAPY | Facility: CLINIC | Age: 54
End: 2024-04-25
Payer: COMMERCIAL

## 2024-04-25 DIAGNOSIS — Z98.890 S/P RIGHT ROTATOR CUFF REPAIR: Primary | ICD-10-CM

## 2024-04-25 PROCEDURE — 97110 THERAPEUTIC EXERCISES: CPT | Mod: GP

## 2024-05-02 ENCOUNTER — THERAPY VISIT (OUTPATIENT)
Dept: PHYSICAL THERAPY | Facility: CLINIC | Age: 54
End: 2024-05-02
Payer: COMMERCIAL

## 2024-05-02 DIAGNOSIS — Z98.890 S/P RIGHT ROTATOR CUFF REPAIR: Primary | ICD-10-CM

## 2024-05-02 PROCEDURE — 97110 THERAPEUTIC EXERCISES: CPT | Mod: GP

## 2024-05-02 NOTE — PROGRESS NOTES
CHIEF COMPLAINT: Status post right shoulder arthroscopic rotator cuff repair, extensive glenohumeral debridement, subacromial bursectomy, biceps tenodesis      DATE OF SURGERY: 2/13/24    HISTORY OF PRESENT ILLNESS: Keven is an extremely pleasant 53 year-old male who is 12 week status post the above procedure. Patient is doing physical therapy and it's going well. He feels he is slowly improving.  RONEN R - 67-70- L - 100    EXAM:  Pleasant adult 53 year old in no distress.  Respirations even and unlabored.  Right upper extremity: Incisions healed. No erythema. No drainage. Sens intact Ax/Musc/Med/Rad/Uln nerves. Motor intact EPL, FPL, and Intrinsics.    Active elevation to 150 degrees compared to Condron 60 on the contralateral side, abduction to 150 compared to 160 on the contralateral side, external rotation 50 compared to 60 on the contralateral side.  Internal rotation to T12    ASSESSMENT:  1. 12 week status post above procedure    PLAN:  I discussed with the patient that things are going well .  We discussed starting to strengthen, patient has a little bit of biceps pain and we discussed that this may continue to improve with physical therapy, we discussed potential ultrasound-guided biceps tendon sheath injection in the future if needed.  Patient is can continue with physical therapy and home exercises.  I reassured the patient that things can continue to improve over the next year.  Patient can follow-up in 3 months if needed.      Rebecca Meza  Orthopedic Surgery Sports Medicine and Shoulder Surgery

## 2024-05-02 NOTE — PROGRESS NOTES
05/02/24 0500   Appointment Info   Signing clinician's name / credentials Yuan Romero, PT, DPT, CSCS, CLT and Luca Jeter, SPT   Total/Authorized Visits E&T   Visits Used 13   Medical Diagnosis S/p Large Right RC Repair with Biceps Tenodesis   PT Tx Diagnosis Right Rotator Cuff Repair with Biceps tenodesis   Precautions/Limitations PROM only 1-6 weeks.  AAROM 6-8 weeks SLOW and Cautious.  No resisted Elbow Flexion resisted or isometrics until 8 weeks.  Then no resisted shoulder motions x12 weeks. Follow Meza Protocol.   Quick Adds Student Supervision   Progress Note/Certification   Onset of illness/injury or Date of Surgery 02/13/24   Therapy Frequency 2x a week starting a 4th week   Predicted Duration 6 months   Progress Note Due Date 05/16/24   Progress Note Completed Date 02/22/24   GOALS   PT Goals 2   PT Goal 1   Goal Identifier PROM   Goal Description Patient will have PROM beyond 90 degrees into flexion, beyond 70 degrees into abduction and beyond 20 degrees going into ER at 6 weeks and will tolerate transition to AAROM without pain   Rationale to maximize safety and independence with performance of ADLs and functional tasks;to maximize safety and independence within the home;to maximize safety and independence within the community;to maximize safety and independence with self cares;to maximize safety and independence with transportation   Goal Progress ROM has improved to 141 degrees flexion, 119 degrees abduction and 53 degrees external rotation.  We have initiated Active Assistive range of motion which is well tolerated.   Target Date 03/30/24   Date Met 03/25/24   PT Goal 2   Goal Identifier AROM   Goal Description Patient will have AROM to Full range to allow for reaching overhead (Over 140 degrees flexoin and abduction) without pain worse than 2/10 in the right shoulder to permit for progression to strength training   Rationale to maximize safety and independence with performance of ADLs and  functional tasks;to maximize safety and independence within the home;to maximize safety and independence within the community;to maximize safety and independence with transportation;to maximize safety and independence with self cares   Goal Progress Able to complete acitve flexion in supine at 18 degrees incline with limited ROM   Target Date 05/16/24   Subjective Report   Subjective Report He reports his shoulder is still sore and still feels ok.  All soreness is in the anterior shoulder near the biceps again.   Objective Measures   Objective Measures Objective Measure 1;Objective Measure 2;Objective Measure 3;Objective Measure 4   Objective Measure 1   Objective Measure Shoulder pain   Details 2/10   Objective Measure 2   Objective Measure Shoulder AAROM in Standing with Dowel   Details Dowel Flexion: 149 degrees, Dowel Abduction: 143 degrees, Dowel Extension: 73 degrees, Dowel ER: 55 degrees   Objective Measure 3   Objective Measure Shoulder AROM in Standing   Details Flexion: 136 degrees, Abduction: 110, Shoulder External: 5   Objective Measure 4   Objective Measure SPADI   Details 63 (48.46%)   Treatment Interventions (PT)   Interventions Therapeutic Procedure/Exercise;Manual Therapy   Therapeutic Procedure/Exercise   Therapeutic Procedures: strength, endurance, ROM, flexibility minutes (62564) 40   Therapeutic Procedures Ther Proc 2;Ther Proc 3   PTRx Ther Proc 1 Wand Shoulder Flexion in Standing   PTRx Ther Proc 1 - Details 2x10 max AAROM of 149   PTRx Ther Proc 2 Wand Shoulder Abduction Standing   PTRx Ther Proc 2 - Details 2x10 with Max AAROM of    PTRx Ther Proc 3 Wand Shoulder External Rotation in Standing   PTRx Ther Proc 3 - Details 2x10 with best AAROM of 55 degrees   PTRx Ther Proc 4 Wand Shoulder Extension Standing   PTRx Ther Proc 4 - Details 2x10 with Best AAROM of 73 degrees   PTRx Ther Proc 5 Pulley Shoulder Flexion   PTRx Ther Proc 5 - Details 1x5 minutes for mobility   PTRx Ther Proc 6 Standing  "Passive Shoulder Flexion   PTRx Ther Proc 6 - Details 1x20   PTRx Ther Proc 7 Wall Climb   PTRx Ther Proc 7 - Details 1x15 with 2 second holds   PTRx Ther Proc 8 Four Corner Stretch External Rotation at Side   PTRx Ther Proc 8 - Details 1x15 with 2 second holds   PTRx Ther Proc 9 Shoulder Flexion   PTRx Ther Proc 9 - Details 1x15   PTRx Ther Proc 10 Shoulder Abduction   PTRx Ther Proc 10 - Details 1x15   PTRx Ther Proc 11 Shoulder Theraband Rows   PTRx Ther Proc 11 - Details 1x30 with Red Band   PTRx Ther Proc 12 Shoulder Extension in Standing   PTRx Ther Proc 12 - Details 1x15   PTRx Ther Proc 13 Prone Shoulder Extension   PTRx Ther Proc 13 - Details 1x15   PTRx Ther Proc 14 Shoulder External Rotation Sidelying   PTRx Ther Proc 14 - Details 1x15   Skilled Intervention AAROM for mobility and AROM for initial early rotator cuff recruitment   Patient Response/Progress To end therapy, he didn't feel \"Sore\" but rather that his muscles got a good work out.  He tolerated it all well.   Plan   Home program See PTRX   Updates to plan of care Continue POC   Plan for next session Continue with AAROM to increase ROM until he is truely at full ROM.  Then utilize standing AROM for gentle progressive strengthening of the rotator cuff.   Total Session Time   Timed Code Treatment Minutes 40   Total Treatment Time (sum of timed and untimed services) 40         PLAN  Continue therapy per current plan of care.  Keven is doing really well.  Keven still has some end range stiffness and isn't quite to full Range of Motion, but he is close and we anticipate he will get there in a few weeks.  He tolerates standing Active Range of Motion well and will soon be ready to initiate low level weighted exercises in a couple weeks once Dr. Meza approves him for weighted exercise.  He is currently at 11 weeks post op.    Beginning/End Dates of Progress Note Reporting Period:  02/22/24 to 05/02/2024    Referring Provider:  Rebecca Meza MD    "

## 2024-05-09 ENCOUNTER — THERAPY VISIT (OUTPATIENT)
Dept: PHYSICAL THERAPY | Facility: CLINIC | Age: 54
End: 2024-05-09
Payer: COMMERCIAL

## 2024-05-09 ENCOUNTER — OFFICE VISIT (OUTPATIENT)
Dept: ORTHOPEDICS | Facility: CLINIC | Age: 54
End: 2024-05-09
Payer: COMMERCIAL

## 2024-05-09 DIAGNOSIS — Z98.890 STATUS POST RIGHT ROTATOR CUFF REPAIR: Primary | ICD-10-CM

## 2024-05-09 DIAGNOSIS — Z98.890 S/P RIGHT ROTATOR CUFF REPAIR: Primary | ICD-10-CM

## 2024-05-09 PROCEDURE — 99024 POSTOP FOLLOW-UP VISIT: CPT | Performed by: ORTHOPAEDIC SURGERY

## 2024-05-09 PROCEDURE — 97110 THERAPEUTIC EXERCISES: CPT | Mod: GP

## 2024-05-09 PROCEDURE — 97140 MANUAL THERAPY 1/> REGIONS: CPT | Mod: GP

## 2024-05-09 NOTE — LETTER
5/9/2024         RE: Andrew Atkinson  [address] [facility] [address]        Dear Colleague,    Thank you for referring your patient, Andrew Atkinson, to the [facility]. Please see a copy of my visit note below.    CHIEF COMPLAINT: Status post right shoulder arthroscopic rotator cuff repair, extensive glenohumeral debridement, subacromial bursectomy, biceps tenodesis      DATE OF SURGERY: 2/13/24    HISTORY OF PRESENT ILLNESS: Keven is an extremely pleasant 53 year-old male who is 12 week status post the above procedure. Patient is doing physical therapy and it's going well. He feels he is slowly improving.  [facility] R - 67-70- L - 100    EXAM:  Pleasant adult 53 year old in no distress.  Respirations even and unlabored.  Right upper extremity: Incisions healed. No erythema. No drainage. Sens intact Ax/Musc/Med/Rad/Uln nerves. Motor intact EPL, FPL, and Intrinsics.    Active elevation to 150 degrees compared to Condron 60 on the contralateral side, abduction to 150 compared to 160 on the contralateral side, external rotation 50 compared to 60 on the contralateral side.  Internal rotation to T12    ASSESSMENT:  1. 12 week status post above procedure    PLAN:  I discussed with the patient that things are going well .  We discussed starting to strengthen, patient has a little bit of biceps pain and we discussed that this may continue to improve with physical therapy, we discussed potential ultrasound-guided biceps tendon sheath injection in the future if needed.  Patient is can continue with physical therapy and home exercises.  I reassured the patient that things can continue to improve over the next year.  Patient can follow-up in 3 months if needed.      Rebecca White  Orthopedic Surgery Sports Medicine and Shoulder Surgery      Again, thank you for allowing me to participate in the care of your patient.        Sincerely,        REBECCA WHITE MD

## 2024-05-15 ENCOUNTER — PATIENT OUTREACH (OUTPATIENT)
Dept: CARDIOLOGY | Facility: CLINIC | Age: 54
End: 2024-05-15
Payer: COMMERCIAL

## 2024-05-15 NOTE — TELEPHONE ENCOUNTER
RN call to patient who states he is winded quicker than he normally is and would like to have an assessment in clinic.     Pt states this has been going on for 3- 4 months and feels like he cannot walk the same distance without being winded as previously, and cannot perform sexual intercourse without becoming easily winded, and with increased heart rates. He is mostly concerned with stamina. Pt states he had rotator cuff surgery on feb 13 and so has not been as active since then. Pt states he is typically an active person, and so would like to rule our cardiac concerns vs aging/less activity.     Pt denies other symptoms including swelling. He states he takes his BP at home and says normal for him    Pt offered appt tomorrow in [place] with Dr Levine, which pt declined with job responsibility. Pt agreeable to 5/28 appt and states he is not concerned with time frame to appt- not an acute change.     Pt will see Brenda Cadena in      Halley Luu RN

## 2024-05-16 ENCOUNTER — THERAPY VISIT (OUTPATIENT)
Dept: PHYSICAL THERAPY | Facility: CLINIC | Age: 54
End: 2024-05-16
Payer: COMMERCIAL

## 2024-05-16 DIAGNOSIS — Z98.890 S/P RIGHT ROTATOR CUFF REPAIR: Primary | ICD-10-CM

## 2024-05-16 PROCEDURE — 97110 THERAPEUTIC EXERCISES: CPT | Mod: GP

## 2024-05-16 PROCEDURE — 97140 MANUAL THERAPY 1/> REGIONS: CPT | Mod: GP

## 2024-05-16 PROCEDURE — 97035 APP MDLTY 1+ULTRASOUND EA 15: CPT | Mod: GP

## 2024-05-21 NOTE — PROGRESS NOTES
"  [facility] Cardiology   Cardiology Clinic Note      HPI:     Andrew Atkinson is a pleasant 53 year old adult with medical history pertinent for HTN, HLD and HCM. He presents to cardiology clinic for dyspnea work up.    Patient originally established care with cardiology in 2020 after experiencing URI symptoms, found to have positive trops and diffuse T wave inversions on ECG. Angiogram showed normal coronary arteries. cMRI was ordered on suspicion of viral myocarditis. This MRI revealed HCM diagonsis. He was last seen in cardiology by Dr. Lomeli on 12/14/23. At that time, he was experiencing asymptomatic bradycardia. He had also had recent admissions for dyspnea that was responsive to Lasix.     Patient called cardiology clinic 5/15/24 and reported a 3-4 month history of worsening dyspnea. He feels as thought he can't walk as far without becoming winded and has noticed a significant decrease in his stamina. He also feels his HR is much higher with activity. He describes feeling like he has a hard time catching his breath with any type of exertion, including walking a couple hundred feet, and he occasionally feels a chest pressure that resolves with rest. He occasionally feels discomfort in his arm that he describes feeling like he \"hit his funny bone\".    He reports a decrease in his level of activity overall since February of this year due to undergoing a rotator cuff surgery on 2/13/24. He denies edema and reports his blood pressures have been within range. The last 2 days, he checked his blood pressure after exerting himself and found it was near 178/96. BP today in clinic 115/76.     Today in clinic, he denies dizziness, syncope, or lower extremity edema.         PAST MEDICAL HISTORY:  Past Medical History:   Diagnosis Date    Medial meniscus tear     TIFFANY (obstructive sleep apnea) 3/2/2023       FAMILY HISTORY:  Family History   Problem Relation Age of Onset    Unknown/Adopted No family hx of        SOCIAL " HISTORY:  Social History     Socioeconomic History    Marital status: Single   Tobacco Use    Smoking status: Former     Types: Cigarettes    Smokeless tobacco: Never    Tobacco comments:     Quit tobacco in 2007   Substance and Sexual Activity    Alcohol use: Yes     Alcohol/week: 3.0 - 4.0 standard drinks of alcohol     Types: 3 - 4 Standard drinks or equivalent per week     Comment: 3-4 drinks aweek    Drug use: Yes     Types: Marijuana     Comment: 2x/ week    Sexual activity: Yes     Partners: Female     Social Determinants of Health     Financial Resource Strain: Low Risk  (9/25/2023)    Financial Resource Strain     Within the past 12 months, have you or your family members you live with been unable to get utilities (heat, electricity) when it was really needed?: No   Food Insecurity: Low Risk  (9/25/2023)    Food Insecurity     Within the past 12 months, did you worry that your food would run out before you got money to buy more?: No     Within the past 12 months, did the food you bought just not last and you didn t have money to get more?: No   Transportation Needs: Low Risk  (9/25/2023)    Transportation Needs     Within the past 12 months, has lack of transportation kept you from medical appointments, getting your medicines, non-medical meetings or appointments, work, or from getting things that you need?: No   Interpersonal Safety: Low Risk  (9/26/2023)    Interpersonal Safety     Do you feel physically and emotionally safe where you currently live?: Yes     Within the past 12 months, have you been hit, slapped, kicked or otherwise physically hurt by someone?: No     Within the past 12 months, have you been humiliated or emotionally abused in other ways by your partner or ex-partner?: No   Housing Stability: Low Risk  (9/25/2023)    Housing Stability     Do you have housing? : Yes     Are you worried about losing your housing?: No       CURRENT MEDICATIONS:  Current Outpatient Medications   Medication  "Sig Dispense Refill    aspirin 81 MG EC tablet Take 1 tablet (81 mg) by mouth daily (Patient taking differently: Take 81 mg by mouth every morning) 30 tablet 0    diltiazem ER (CARDIAZEM LA) 120 MG 24 hr tablet Take 1 tablet (120 mg) by mouth daily (Patient taking differently: Take 120 mg by mouth every morning) 90 tablet 3    levothyroxine (SYNTHROID/LEVOTHROID) 150 MCG tablet Take 1 tablet (150 mcg) by mouth daily With 12.5mg tab (Patient taking differently: Take 150 mcg by mouth every morning With 12.5mg tab) 90 tablet 3    levothyroxine (SYNTHROID/LEVOTHROID) 25 MCG tablet TAKE 1/2 TABLET BY MOUTH ONCE DAILY WITH 150MCG TABLET (Patient taking differently: Take 12.5 mcg by mouth every morning TAKE 1/2 TABLET BY MOUTH ONCE DAILY WITH 150MCG TABLET) 90 tablet 3    multivitamin w/minerals (THERA-VIT-M) tablet Take 1 tablet by mouth every morning      pravastatin (PRAVACHOL) 40 MG tablet TAKE 1 TABLET BY MOUTH ONCE DAILY 90 tablet 0    fish oil-omega-3 fatty acids 1000 MG capsule Take 2 g by mouth every morning (Patient not taking: Reported on 5/28/2024)      gabapentin (NEURONTIN) 100 MG capsule Take 1 capsule (100 mg) by mouth 3 times daily 21 capsule 0    Magnesium Oxide POWD Take 1 Scoop by mouth every morning (Patient not taking: Reported on 5/28/2024)      UNABLE TO FIND Take 1 tablet by mouth every morning MEDICATION NAME: Organic Mushrooms, Lions fauzia turkey tail (Patient not taking: Reported on 5/28/2024)       Current Facility-Administered Medications   Medication Dose Route Frequency Provider Last Rate Last Admin    hylan (SYNVISC ONE) injection 48 mg  48 mg   Robert Regan DO   48 mg at 11/29/23 0930       ROS:   Refer to HPI    EXAM:  /76 (BP Location: Right arm, Patient Position: Sitting, Cuff Size: Adult Large)   Pulse 59   Ht 1.803 m (5' 10.98\")   Wt 109.9 kg (242 lb 3.2 oz)   SpO2 96%   BMI 33.80 kg/m    GENERAL: Appears comfortable, in no acute distress.   HEENT: Eye " symmetrical, no discharge or icterus bilaterally. Mucous membranes moist and without lesions.  CV: RRR, +S1S2, no murmur, rub, or gallop  RESPIRATORY: Respirations regular, even, and unlabored. Lungs CTA throughout.   GI: Soft and non distended    EXTREMITIES: no peripheral edema.   NEUROLOGIC: Alert and oriented x 3. No focal deficits.   MUSCULOSKELETAL: No joint swelling or tenderness.   SKIN: No jaundice. No rashes or lesions.     Labs, reviewed with patient in clinic today:  CBC RESULTS:  Lab Results   Component Value Date    WBC 6.4 11/17/2023    WBC 6.7 11/30/2020    RBC 5.13 11/17/2023    RBC 5.55 11/30/2020    HGB 14.8 11/17/2023    HGB 15.8 11/30/2020    HCT 44.2 11/17/2023    HCT 46.2 11/30/2020    MCV 86 11/17/2023    MCV 83 11/30/2020    MCH 28.8 11/17/2023    MCH 28.5 11/30/2020    MCHC 33.5 11/17/2023    MCHC 34.2 11/30/2020    RDW 13.0 11/17/2023    RDW 13.4 11/30/2020     11/17/2023     11/30/2020       CMP RESULTS:  Lab Results   Component Value Date     03/21/2024     02/15/2020    POTASSIUM 4.4 03/21/2024    POTASSIUM 4.2 03/02/2023    POTASSIUM 4.0 11/30/2020    CHLORIDE 101 03/21/2024    CHLORIDE 107 03/02/2023    CHLORIDE 108 02/15/2020    CO2 26 03/21/2024    CO2 29 03/02/2023    CO2 23 02/15/2020    ANIONGAP 11 03/21/2024    ANIONGAP 2 (L) 03/02/2023    ANIONGAP 7 02/15/2020     (H) 03/21/2024     (H) 02/13/2024     (H) 03/02/2023     (H) 02/15/2020    BUN 15.1 03/21/2024    BUN 17 03/02/2023    BUN 16 02/15/2020    CR 1.00 03/21/2024    CR 0.96 11/30/2020    GFRESTIMATED 89 03/21/2024    GFRESTIMATED >90 11/30/2020    GFRESTBLACK >90 11/30/2020    NAPOLEON 9.6 03/21/2024    NAPOLEON 8.8 02/15/2020    BILITOTAL 0.3 03/21/2024    BILITOTAL 0.4 02/14/2020    ALBUMIN 4.6 03/21/2024    ALBUMIN 4.3 03/02/2023    ALBUMIN 4.2 02/14/2020    ALKPHOS 83 03/21/2024    ALKPHOS 75 02/14/2020    ALT 84 (H) 03/21/2024    ALT 83 (H) 02/14/2020    AST 54 (H)  "03/21/2024    AST 42 02/14/2020        INR RESULTS:  Lab Results   Component Value Date    INR 0.95 01/21/2022    INR 1.09 02/15/2020       No results found for: \"MAG\"  Lab Results   Component Value Date    NTBNPI 154 02/14/2020     No results found for: \"NTBNP\"    LIPIDS:  Lab Results   Component Value Date    CHOL 154 03/21/2024    CHOL 172 02/14/2020     Lab Results   Component Value Date    HDL 34 03/21/2024    HDL 26 02/14/2020     Lab Results   Component Value Date    LDL 88 03/21/2024    LDL 78 02/14/2020     Lab Results   Component Value Date    TRIG 160 03/21/2024    TRIG 339 02/14/2020       Assessment and Plan:     Demetrio is a 53 year old adult with a PMH of HTN, HLD and HCM      #Hypertrophic cardiomyopathy cardiomyopathy with minimal latent LVOT obstruction (post-exercise LVOT gradient 31 mmHg),  #Chronic HFpEF due to HCM  # Exercise Intolerance   Patient with complaints of INFANTE, chest tightness, and exercise intolerance. He reports this has been ongoing for the last several months.   Keven's 6/2022 CPX is excellent and I suspect his extremely mild functional limitation is largely related to musculoskeletal limitations and deconditioning. Genetic testing was done 8/2022 and was negative. Patient was seen by Dr. Kaufman 8/31/22: at most a class IIb indication for ICD, will continue surveillance for now.   - Genetic testing 8/23/22: Andrew does NOT carry a mutation in the 30 genes analyzed  - Stress Echocardiogram ordered         #HTN, controlled: continue present therapy    #HLD  Most recent lipid panel 3/21/24: , HDL 34, LDL 88, and   - Counseled on lifestyle/diet changes to improve triglycerides  - Continue Pravastatin 40mg daily; consider higher intensity if TG remain out of range       Follow up:  2-3 months pending stress echo results   Chart review time today: 15 minutes  Visit time today: 20 minutes  Total time spent today: 35 minutes        Brenda Cadena PA-C  General Cardiology "   05/28/24

## 2024-05-28 ENCOUNTER — OFFICE VISIT (OUTPATIENT)
Dept: CARDIOLOGY | Facility: CLINIC | Age: 54
End: 2024-05-28
Payer: COMMERCIAL

## 2024-05-28 VITALS
DIASTOLIC BLOOD PRESSURE: 76 MMHG | HEART RATE: 59 BPM | OXYGEN SATURATION: 96 % | WEIGHT: 242.2 LBS | HEIGHT: 71 IN | BODY MASS INDEX: 33.91 KG/M2 | SYSTOLIC BLOOD PRESSURE: 115 MMHG

## 2024-05-28 DIAGNOSIS — R68.89 EXERCISE INTOLERANCE: Primary | ICD-10-CM

## 2024-05-28 PROCEDURE — 99214 OFFICE O/P EST MOD 30 MIN: CPT

## 2024-05-28 ASSESSMENT — PAIN SCALES - GENERAL: PAINLEVEL: NO PAIN (0)

## 2024-05-28 NOTE — PATIENT INSTRUCTIONS
Take your medicines every day, as directed     Changes made today:  No medication changes   Complete a stress echocardiogram        Cardiology Care Coordinators:      Radha MODI RN     Cardiology Rooming staff:  Falguni COLUNGA CNA    Phone  [phone]      Fax [phone]    To Contact us     During Business Hours:  [phone]     If you are needing refills please contact your pharmacy.     For urgent after hour care please call the [facility] at [phone] or the [facility] at [phone] and ask to speak to the cardiologist on call.            HOW TO CHECK YOUR BLOOD PRESSURE AT HOME:     Avoid eating, smoking, and exercising for at least 30 minutes before taking a reading.     Be sure you have taken your BP medication at least 2-3 hours before you check it.      Sit quietly for 10 minutes before a reading.      Sit in a chair with your feet flat on the floor. Rest your  arm on a table so that the arm cuff is at the same level as your heart.     Remain still during the reading.  Record your blood pressure and pulse in a log and bring to your next appointment.       Use ex[employer]ulin allows you to communicate directly with your heart team through secure messaging.  Straight Up English can be accessed any time on your phone, computer, or tablet.  If you need assistance, we'd be happy to help!             Keep your Heart Appointments:     Follow-up in 2-3 months

## 2024-05-28 NOTE — NURSING NOTE
Cardiac Testing:   -Stress echocardiogram    Discussed purpose, preparation, procedure and when to expect results reported back to the patient. Patient demonstrated understanding of this information and agreed to call with further questions or concerns.      Med Reconcile: Reviewed and verified all current medications with the patient. The updated medication list was printed and given to the patient./No medication changes.     Return Appointment  -Follow-up in 2-3 months. Pt will call back to schedule based on test results.

## 2024-05-28 NOTE — NURSING NOTE
"Chief Complaint   Patient presents with    Follow Up     Return general cardiology for symptoms, SOB, fatigue, pain in left armpit upon exertion       Initial /76 (BP Location: Right arm, Patient Position: Sitting, Cuff Size: Adult Large)   Pulse 59   Ht 1.803 m (5' 10.98\")   Wt 109.9 kg (242 lb 3.2 oz)   SpO2 96%   BMI 33.80 kg/m   Estimated body mass index is 33.8 kg/m  as calculated from the following:    Height as of this encounter: 1.803 m (5' 10.98\").    Weight as of this encounter: 109.9 kg (242 lb 3.2 oz)..  BP completed using cuff size: large    Falguni Swain, EMT  "

## 2024-05-31 ENCOUNTER — THERAPY VISIT (OUTPATIENT)
Dept: PHYSICAL THERAPY | Facility: CLINIC | Age: 54
End: 2024-05-31
Payer: COMMERCIAL

## 2024-05-31 DIAGNOSIS — Z98.890 S/P RIGHT ROTATOR CUFF REPAIR: Primary | ICD-10-CM

## 2024-05-31 PROCEDURE — 97110 THERAPEUTIC EXERCISES: CPT | Mod: GP

## 2024-06-10 ENCOUNTER — HOSPITAL ENCOUNTER (OUTPATIENT)
Dept: CARDIOLOGY | Facility: CLINIC | Age: 54
Discharge: HOME | End: 2024-06-10
Payer: COMMERCIAL

## 2024-06-10 DIAGNOSIS — R68.89 EXERCISE INTOLERANCE: ICD-10-CM

## 2024-06-10 PROCEDURE — 93325 DOPPLER ECHO COLOR FLOW MAPG: CPT | Mod: 26 | Performed by: INTERNAL MEDICINE

## 2024-06-10 PROCEDURE — 93016 CV STRESS TEST SUPVJ ONLY: CPT | Performed by: INTERNAL MEDICINE

## 2024-06-10 PROCEDURE — 93350 STRESS TTE ONLY: CPT | Mod: 26 | Performed by: INTERNAL MEDICINE

## 2024-06-10 PROCEDURE — 93018 CV STRESS TEST I&R ONLY: CPT | Performed by: INTERNAL MEDICINE

## 2024-06-10 PROCEDURE — 255N000002 HC RX 255 OP 636

## 2024-06-10 PROCEDURE — 93321 DOPPLER ECHO F-UP/LMTD STD: CPT | Mod: 26 | Performed by: INTERNAL MEDICINE

## 2024-06-10 PROCEDURE — C8928 TTE W OR W/O FOL W/CON,STRES: HCPCS

## 2024-06-10 RX ADMIN — PERFLUTREN 6 ML: 6.52 INJECTION, SUSPENSION INTRAVENOUS at 10:26

## 2024-06-27 ENCOUNTER — THERAPY VISIT (OUTPATIENT)
Dept: PHYSICAL THERAPY | Facility: CLINIC | Age: 54
End: 2024-06-27
Payer: COMMERCIAL

## 2024-06-27 DIAGNOSIS — Z98.890 S/P RIGHT ROTATOR CUFF REPAIR: Primary | ICD-10-CM

## 2024-06-27 PROCEDURE — 97110 THERAPEUTIC EXERCISES: CPT | Mod: GP

## 2024-07-01 ENCOUNTER — MYC REFILL (OUTPATIENT)
Dept: CARDIOLOGY | Facility: CLINIC | Age: 54
End: 2024-07-01
Payer: COMMERCIAL

## 2024-07-01 DIAGNOSIS — I40.0 ACUTE VIRAL MYOCARDITIS: ICD-10-CM

## 2024-07-01 DIAGNOSIS — E78.5 HYPERLIPIDEMIA LDL GOAL <100: ICD-10-CM

## 2024-07-01 DIAGNOSIS — R94.31 NONSPECIFIC ABNORMAL ELECTROCARDIOGRAM (ECG) (EKG): ICD-10-CM

## 2024-07-01 DIAGNOSIS — I21.4 NSTEMI (NON-ST ELEVATED MYOCARDIAL INFARCTION) (H): ICD-10-CM

## 2024-07-03 RX ORDER — PRAVASTATIN SODIUM 40 MG
40 TABLET ORAL DAILY
Qty: 90 TABLET | Refills: 2 | Status: SHIPPED | OUTPATIENT
Start: 2024-07-03

## 2024-07-03 NOTE — TELEPHONE ENCOUNTER
LVD:  5/28/2024  [facility] Brenda Dela Cruz PA-C  Physician Assistant - Medical     LDL Cholesterol Calculated   Date Value Ref Range Status   03/21/2024 88 <=100 mg/dL Final   02/14/2020 78 <100 mg/dL Final     Comment:     Desirable:       <100 mg/dl       Refilled per protocol.

## 2024-08-08 NOTE — PROGRESS NOTES
06/27/24 0500   Appointment Info   Signing clinician's name / credentials Yuan Romero, PT, DPT, CSCS, CLT   Total/Authorized Visits E&T   Visits Used 17   Medical Diagnosis S/p Large Right RC Repair with Biceps Tenodesis   PT Tx Diagnosis Right Rotator Cuff Repair with Biceps tenodesis   Precautions/Limitations PROM only 1-6 weeks.  AAROM 6-8 weeks SLOW and Cautious.  No resisted Elbow Flexion resisted or isometrics until 8 weeks.  Then no resisted shoulder motions x12 weeks. Follow Meza Protocol.   Progress Note/Certification   Onset of illness/injury or Date of Surgery 02/13/24   Therapy Frequency 2x a week starting a 4th week   Predicted Duration 6 months   Progress Note Due Date 05/16/24   Progress Note Completed Date 02/22/24   GOALS   PT Goals 2   PT Goal 1   Goal Identifier PROM   Goal Description Patient will have PROM beyond 90 degrees into flexion, beyond 70 degrees into abduction and beyond 20 degrees going into ER at 6 weeks and will tolerate transition to AAROM without pain   Rationale to maximize safety and independence with performance of ADLs and functional tasks;to maximize safety and independence within the home;to maximize safety and independence within the community;to maximize safety and independence with self cares;to maximize safety and independence with transportation   Goal Progress ROM has improved to 141 degrees flexion, 119 degrees abduction and 53 degrees external rotation.  We have initiated Active Assistive range of motion which is well tolerated.   Target Date 03/30/24   Date Met 03/25/24   PT Goal 2   Goal Identifier AROM   Goal Description Patient will have AROM to Full range to allow for reaching overhead (Over 140 degrees flexoin and abduction) without pain worse than 2/10 in the right shoulder to permit for progression to strength training   Rationale to maximize safety and independence with performance of ADLs and functional tasks;to maximize safety and independence within  the home;to maximize safety and independence within the community;to maximize safety and independence with transportation;to maximize safety and independence with self cares   Goal Progress Flexion: 155, Abduction: 153 ER: 60   Target Date 05/16/24   Subjective Report   Subjective Report He reports stength and exercise is going well other than abduction which is still difficult.  However he was using 3 lbs which is far too much, did get sick so took a week off.   Objective Measures   Objective Measures Objective Measure 1;Objective Measure 2;Objective Measure 3   Objective Measure 1   Objective Measure Shoulder pain   Details 0/10   Objective Measure 3   Objective Measure Shoulder AROM in Standing   Details Flexion: 155, Abduction: 153 ER: 60   Treatment Interventions (PT)   Interventions Therapeutic Procedure/Exercise;Manual Therapy   Therapeutic Procedure/Exercise   Therapeutic Procedures: strength, endurance, ROM, flexibility minutes (42678) 35   PTRx Ther Proc 1 Wand Shoulder Flexion in Standing   PTRx Ther Proc 1 - Details 1x15   PTRx Ther Proc 2 Wand Shoulder Abduction Standing   PTRx Ther Proc 2 - Details 1x15   PTRx Ther Proc 3 Wand Shoulder External Rotation in Standing   PTRx Ther Proc 3 - Details 1x15   PTRx Ther Proc 4 Wand Shoulder Extension Standing   PTRx Ther Proc 4 - Details 1x15   PTRx Ther Proc 5 Standing Passive Shoulder Flexion   PTRx Ther Proc 5 - Details 1x15   PTRx Ther Proc 6 Wall Climb   PTRx Ther Proc 6 - Details 1x15 with 2 second holds.   PTRx Ther Proc 7 Shoulder Theraband Rows   PTRx Ther Proc 7 - Details 1x30 with Black Band   PTRx Ther Proc 8 Shoulder Theraband Low Row/Pulldown   PTRx Ther Proc 8 - Details Reviewed   PTRx Ther Proc 9 Shoulder Flexion   PTRx Ther Proc 9 - Details 1x30 only to 90 with 2 lb DBs   PTRx Ther Proc 10 Shoulder Abduction   PTRx Ther Proc 10 - Details 1x15 with 1 lb DBs  then 1x15 with 2 lbs   PTRx Ther Proc 11 Shoulder Extension in Standing   PTRx Ther Proc  11 - Details 1x30 with 2 lb DBs   PTRx Ther Proc 12 Shoulder External Rotation Sidelying   PTRx Ther Proc 12 - Details 1x30 with 2 lb DBs   PTRx Ther Proc 13 Serratus Punch with Dumbbells   PTRx Ther Proc 13 - Details 1x30 with 2 lb DB   PTRx Ther Proc 14 Bicep Curls with Dumbbells   PTRx Ther Proc 14 - Details Reviewed   PTRx Ther Proc 15 Supine Elbow Extension   PTRx Ther Proc 15 - Details Reviewed   PTRx Ther Proc 16 Serratus Punch with Dumbbells   PTRx Ther Proc 16 - Details 1x30 with 1 lb DBs bilaterally   PTRx Ther Proc 17 Bicep Curls with Dumbbells   PTRx Ther Proc 17 - Details Reviewed   PTRx Ther Proc 18 Supine Elbow Extension   PTRx Ther Proc 18 - Details Reviewed   Skilled Intervention Reduced intensity   Patient Response/Progress Was in a lot of pain after 30 reps of exercises, so resting and reducing intensity   Plan   Home program See PTRX   Updates to plan of care Switching to every other week   Plan for next session Returning in 1 month.  Gave him the reps and progressions.  Next session try progpressing, progress him to overhead press, bench press, single arm bent over row, D1/D2 with theraband.  Discuss if a d/c and return if needed or 2) schedule 1 more to confirm it's going well then discharge   Total Session Time   Timed Code Treatment Minutes 35   Total Treatment Time (sum of timed and untimed services) 35         DISCHARGE  Reason for Discharge: Keven did quite well with physical therapy.  On the date of our final therapy session Keven still had some slight ROM deficit, some slight strength deficit and didn't formally transition to functional based therapy exercises like pushing pulling, and pressing. However, overall he has a functional amount of shoulder motion, is able to self-progress his exercises for continued progress with strengthening and will be able to independently manage on his own with a successful rotator cuff repair.    Equipment Issued: None    Discharge Plan: Patient to  continue home program.    Referring Provider:  Rebecca Meza

## 2024-08-10 ENCOUNTER — HEALTH MAINTENANCE LETTER (OUTPATIENT)
Age: 54
End: 2024-08-10

## 2024-08-27 ENCOUNTER — PATIENT OUTREACH (OUTPATIENT)
Dept: CARE COORDINATION | Facility: CLINIC | Age: 54
End: 2024-08-27
Payer: COMMERCIAL

## 2024-09-04 ENCOUNTER — OFFICE VISIT (OUTPATIENT)
Dept: URGENT CARE | Facility: URGENT CARE | Age: 54
End: 2024-09-04
Payer: COMMERCIAL

## 2024-09-04 VITALS
DIASTOLIC BLOOD PRESSURE: 77 MMHG | WEIGHT: 240.2 LBS | RESPIRATION RATE: 16 BRPM | HEART RATE: 84 BPM | OXYGEN SATURATION: 97 % | BODY MASS INDEX: 33.52 KG/M2 | SYSTOLIC BLOOD PRESSURE: 122 MMHG | TEMPERATURE: 97.8 F

## 2024-09-04 DIAGNOSIS — H60.332 ACUTE SWIMMER'S EAR OF LEFT SIDE: ICD-10-CM

## 2024-09-04 DIAGNOSIS — H69.92 DYSFUNCTION OF LEFT EUSTACHIAN TUBE: Primary | ICD-10-CM

## 2024-09-04 PROCEDURE — 99214 OFFICE O/P EST MOD 30 MIN: CPT | Performed by: STUDENT IN AN ORGANIZED HEALTH CARE EDUCATION/TRAINING PROGRAM

## 2024-09-04 RX ORDER — OFLOXACIN 3 MG/ML
5 SOLUTION AURICULAR (OTIC) DAILY
Qty: 10 ML | Refills: 0 | Status: SHIPPED | OUTPATIENT
Start: 2024-09-04 | End: 2024-09-14

## 2024-09-04 RX ORDER — FLUTICASONE PROPIONATE 50 MCG
1 SPRAY, SUSPENSION (ML) NASAL DAILY
Qty: 16 G | Refills: 1 | Status: SHIPPED | OUTPATIENT
Start: 2024-09-04

## 2024-09-04 RX ORDER — PREDNISONE 20 MG/1
TABLET ORAL
Qty: 20 TABLET | Refills: 0 | Status: SHIPPED | OUTPATIENT
Start: 2024-09-04

## 2024-09-04 NOTE — PROGRESS NOTES
Assessment & Plan     Dysfunction of left eustachian tube  Patient had rupture of left eardrum that he believes was from noise exposure at the tractor pull.  He was treated on 8/16/24 with ofloxacin drops and cefdinir x 10 days.  He is still having clear drainage from the ear and reduced hearing in the left ear.  The eardrum appears intact from what I can see.  There is a small cartilage appearing piece at approximately 2:00 with a tiny pinkish-red sore just below that is not draining.  There is some moderate amount of clear to whitish mucoid drainage encircling the mid to distal canal.  There is no odor to the drainage.  There is excoriation and areas of erythema along the ear canal without significant swelling of the ear canal.  There is no purulent effusion of the middle ear.  I suspect that the rupture has healed but may still have some permeability of the thin, fragile tympanic membrane allowing some seepage of clear fluid into the ear canal.  I advised treating with prednisone tapering over the course of 12 days to help treat inflammation so that the eustachian tube can drain.  Start Flonase once daily and restart ofloxacin drops to treat for the excoriation and mucoid drainage of the ear canal.  Follow-up if symptoms persist or worsen.  - predniSONE (DELTASONE) 20 MG tablet  Dispense: 20 tablet; Refill: 0  - fluticasone (FLONASE) 50 MCG/ACT nasal spray  Dispense: 16 g; Refill: 1    Acute swimmer's ear of left side  - ofloxacin (FLOXIN) 0.3 % otic solution  Dispense: 10 mL; Refill: 0       30 minutes spent on the date of the encounter doing chart review, patient visit, and documentation     No follow-ups on file.    CHUY Woo [facility] CARE [place]    Felecia Baca is a 53 year old adult who presents to clinic today for the following health issues:  Chief Complaint   Patient presents with    Ear Drainage     Left ear.          9/4/2024     5:51 PM   Additional  Questions   Roomed by NELSON REED   Accompanied by SELF     HPI          Review of Systems  Constitutional, HEENT, cardiovascular, pulmonary, gi and gu systems are negative, except as otherwise noted.      Objective    /77   Pulse 84   Temp 97.8  F (36.6  C) (Tympanic)   Resp 16   Wt 109 kg (240 lb 3.2 oz)   SpO2 97%   BMI 33.52 kg/m    Physical Exam   GENERAL: alert and no distress  EYES: Eyes grossly normal to inspection, PERRL and conjunctivae and sclerae normal  HENT: normal cephalic/atraumatic, right ear: normal: no effusions, no erythema, normal landmarks, left ear: eardrum appears intact from what I can see.  There is a small cartilage appearing piece at approximately 2:00 with a tiny pinkish-red sore just below that is not draining.  There is some moderate amount of clear to whitish mucoid drainage encircling the mid to distal canal.  There is no odor to the drainage.  There is excoriation and areas of erythema along the ear canal without significant swelling of the ear canal, and oral mucous membranes moist  MS: no gross musculoskeletal defects noted, no edema  SKIN: no suspicious lesions or rashes  NEURO: Normal strength and tone, mentation intact and speech normal

## 2024-09-10 ENCOUNTER — PATIENT OUTREACH (OUTPATIENT)
Dept: CARE COORDINATION | Facility: CLINIC | Age: 54
End: 2024-09-10
Payer: COMMERCIAL

## 2024-10-04 ENCOUNTER — OFFICE VISIT (OUTPATIENT)
Dept: FAMILY MEDICINE | Facility: CLINIC | Age: 54
End: 2024-10-04
Payer: COMMERCIAL

## 2024-10-04 ENCOUNTER — TELEPHONE (OUTPATIENT)
Dept: ORTHOPEDICS | Facility: CLINIC | Age: 54
End: 2024-10-04

## 2024-10-04 ENCOUNTER — TELEPHONE (OUTPATIENT)
Dept: OTOLARYNGOLOGY | Facility: CLINIC | Age: 54
End: 2024-10-04

## 2024-10-04 VITALS
OXYGEN SATURATION: 97 % | RESPIRATION RATE: 16 BRPM | TEMPERATURE: 97.4 F | HEIGHT: 71 IN | WEIGHT: 235.8 LBS | BODY MASS INDEX: 33.01 KG/M2 | HEART RATE: 74 BPM | SYSTOLIC BLOOD PRESSURE: 130 MMHG | DIASTOLIC BLOOD PRESSURE: 82 MMHG

## 2024-10-04 DIAGNOSIS — Z13.220 ENCOUNTER FOR LIPID SCREENING FOR CARDIOVASCULAR DISEASE: ICD-10-CM

## 2024-10-04 DIAGNOSIS — Z13.6 ENCOUNTER FOR LIPID SCREENING FOR CARDIOVASCULAR DISEASE: ICD-10-CM

## 2024-10-04 DIAGNOSIS — E03.9 ACQUIRED HYPOTHYROIDISM: ICD-10-CM

## 2024-10-04 DIAGNOSIS — H60.62 CHRONIC OTITIS EXTERNA OF LEFT EAR, UNSPECIFIED TYPE: ICD-10-CM

## 2024-10-04 DIAGNOSIS — R79.89 ELEVATED LFTS: ICD-10-CM

## 2024-10-04 DIAGNOSIS — I10 BENIGN ESSENTIAL HYPERTENSION: ICD-10-CM

## 2024-10-04 DIAGNOSIS — M17.0 PRIMARY OSTEOARTHRITIS OF BOTH KNEES: Primary | ICD-10-CM

## 2024-10-04 DIAGNOSIS — Z00.00 WELL ADULT EXAM: Primary | ICD-10-CM

## 2024-10-04 DIAGNOSIS — H72.92 RUPTURED EARDRUM, LEFT: ICD-10-CM

## 2024-10-04 DIAGNOSIS — Z12.5 SCREENING FOR PROSTATE CANCER: ICD-10-CM

## 2024-10-04 DIAGNOSIS — E11.9 TYPE 2 DIABETES MELLITUS WITHOUT COMPLICATION, WITHOUT LONG-TERM CURRENT USE OF INSULIN (H): ICD-10-CM

## 2024-10-04 LAB
ALBUMIN SERPL BCG-MCNC: 4.5 G/DL (ref 3.5–5.2)
ALP SERPL-CCNC: 97 U/L (ref 40–150)
ALT SERPL W P-5'-P-CCNC: 52 U/L (ref 0–70)
ANION GAP SERPL CALCULATED.3IONS-SCNC: 11 MMOL/L (ref 7–15)
AST SERPL W P-5'-P-CCNC: 26 U/L (ref 0–45)
BILIRUB DIRECT SERPL-MCNC: <0.2 MG/DL (ref 0–0.3)
BILIRUB SERPL-MCNC: 0.4 MG/DL
BUN SERPL-MCNC: 16.1 MG/DL (ref 6–20)
CALCIUM SERPL-MCNC: 10.2 MG/DL (ref 8.8–10.4)
CHLORIDE SERPL-SCNC: 99 MMOL/L (ref 98–107)
CHOLEST SERPL-MCNC: 198 MG/DL
CREAT SERPL-MCNC: 1.09 MG/DL (ref 0.51–1.17)
CREAT UR-MCNC: 265 MG/DL
EGFRCR SERPLBLD CKD-EPI 2021: 81 ML/MIN/1.73M2
EST. AVERAGE GLUCOSE BLD GHB EST-MCNC: 229 MG/DL
FASTING STATUS PATIENT QL REPORTED: YES
FASTING STATUS PATIENT QL REPORTED: YES
GLUCOSE SERPL-MCNC: 273 MG/DL (ref 70–99)
HBA1C MFR BLD: 9.6 % (ref 0–5.6)
HCO3 SERPL-SCNC: 26 MMOL/L (ref 22–29)
HDLC SERPL-MCNC: 35 MG/DL
LDLC SERPL CALC-MCNC: 119 MG/DL
MICROALBUMIN UR-MCNC: 194 MG/L
MICROALBUMIN/CREAT UR: 73.21 MG/G CR (ref 0–25)
NONHDLC SERPL-MCNC: 163 MG/DL
POTASSIUM SERPL-SCNC: 4.8 MMOL/L (ref 3.4–5.3)
PROT SERPL-MCNC: 7.7 G/DL (ref 6.4–8.3)
PSA SERPL DL<=0.01 NG/ML-MCNC: 1.35 NG/ML (ref 0–3.5)
SODIUM SERPL-SCNC: 136 MMOL/L (ref 135–145)
T4 FREE SERPL-MCNC: 1.45 NG/DL (ref 0.9–1.7)
TRIGL SERPL-MCNC: 218 MG/DL
TSH SERPL DL<=0.005 MIU/L-ACNC: 12.5 UIU/ML (ref 0.3–4.2)

## 2024-10-04 PROCEDURE — 82248 BILIRUBIN DIRECT: CPT | Performed by: FAMILY MEDICINE

## 2024-10-04 PROCEDURE — 99396 PREV VISIT EST AGE 40-64: CPT | Performed by: FAMILY MEDICINE

## 2024-10-04 PROCEDURE — 84439 ASSAY OF FREE THYROXINE: CPT | Performed by: FAMILY MEDICINE

## 2024-10-04 PROCEDURE — 80061 LIPID PANEL: CPT | Performed by: FAMILY MEDICINE

## 2024-10-04 PROCEDURE — G0103 PSA SCREENING: HCPCS | Performed by: FAMILY MEDICINE

## 2024-10-04 PROCEDURE — 36415 COLL VENOUS BLD VENIPUNCTURE: CPT | Performed by: FAMILY MEDICINE

## 2024-10-04 PROCEDURE — 99214 OFFICE O/P EST MOD 30 MIN: CPT | Mod: 25 | Performed by: FAMILY MEDICINE

## 2024-10-04 PROCEDURE — 82570 ASSAY OF URINE CREATININE: CPT | Performed by: FAMILY MEDICINE

## 2024-10-04 PROCEDURE — 80053 COMPREHEN METABOLIC PANEL: CPT | Performed by: FAMILY MEDICINE

## 2024-10-04 PROCEDURE — 84443 ASSAY THYROID STIM HORMONE: CPT | Performed by: FAMILY MEDICINE

## 2024-10-04 PROCEDURE — 82043 UR ALBUMIN QUANTITATIVE: CPT | Performed by: FAMILY MEDICINE

## 2024-10-04 PROCEDURE — 83036 HEMOGLOBIN GLYCOSYLATED A1C: CPT | Performed by: FAMILY MEDICINE

## 2024-10-04 RX ORDER — LEVOTHYROXINE SODIUM 25 UG/1
TABLET ORAL
Qty: 90 TABLET | Refills: 3 | Status: SHIPPED | OUTPATIENT
Start: 2024-10-04

## 2024-10-04 RX ORDER — NEOMYCIN SULFATE, POLYMYXIN B SULFATE AND HYDROCORTISONE 10; 3.5; 1 MG/ML; MG/ML; [USP'U]/ML
3 SUSPENSION/ DROPS AURICULAR (OTIC) 3 TIMES DAILY
Qty: 10 ML | Refills: 0 | Status: SHIPPED | OUTPATIENT
Start: 2024-10-04 | End: 2024-10-11

## 2024-10-04 RX ORDER — LEVOTHYROXINE SODIUM 150 UG/1
150 TABLET ORAL DAILY
Qty: 90 TABLET | Refills: 3 | Status: SHIPPED | OUTPATIENT
Start: 2024-10-04

## 2024-10-04 SDOH — HEALTH STABILITY: PHYSICAL HEALTH: ON AVERAGE, HOW MANY DAYS PER WEEK DO YOU ENGAGE IN MODERATE TO STRENUOUS EXERCISE (LIKE A BRISK WALK)?: 7 DAYS

## 2024-10-04 SDOH — HEALTH STABILITY: PHYSICAL HEALTH: ON AVERAGE, HOW MANY MINUTES DO YOU ENGAGE IN EXERCISE AT THIS LEVEL?: 60 MIN

## 2024-10-04 ASSESSMENT — SOCIAL DETERMINANTS OF HEALTH (SDOH): HOW OFTEN DO YOU GET TOGETHER WITH FRIENDS OR RELATIVES?: TWICE A WEEK

## 2024-10-04 NOTE — TELEPHONE ENCOUNTER
Patient scheduled for appointment on 10/18/24 @ [facility] for discussion of viscosupplementation injection vs steroid injection of bilateral knee.        SynviscOne injection last completed 11/29/23 in right knee, no prior left knee injections.  Patient reports relief for 6+ months    Patient has failed 3 month trial of Pharmacologic Approach (e.g., topical NSAIDs, oral NSAIDs with or without oral proton pump inhibitors, MUSE-2 inhibitors, topical capsaicin, acetaminophen, tramadol, duloxetine, etc.):  Yes     Patient has completed 3 month trial of Non-Pharmacologic treatments (i.e., physical, psychosocial, or mind-body approach (e.g., exercise-land based or aquatic, physical therapy, luis miguel chi, yoga, weight management, cognitive behavioral therapy, knee brace or cane, etc).  No    Has patient had prior reaction to Synvisc/SynviscOne or any alternative HA product?: No    Prior authorization referral for SynviscOne injection pended.    Please advise    Sesar Kemp ATC

## 2024-10-04 NOTE — PROGRESS NOTES
"Preventive Care Visit  [facility] GABI Hemphill MD, Family Medicine  Oct 4, 2024      Assessment & Plan       ICD-10-CM    1. Well adult exam  Z00.00       2. Type 2 diabetes mellitus without complication, without long-term current use of insulin (H)  E11.9 HEMOGLOBIN A1C     Albumin Random Urine Quantitative with Creat Ratio     Comprehensive metabolic panel     HEMOGLOBIN A1C     Albumin Random Urine Quantitative with Creat Ratio     Comprehensive metabolic panel      3. Benign essential hypertension  I10       4. Encounter for lipid screening for cardiovascular disease  Z13.220 Lipid panel reflex to direct LDL Fasting    Z13.6 Lipid panel reflex to direct LDL Fasting      5. Screening for prostate cancer  Z12.5 Prostate Specific Antigen Screen     Prostate Specific Antigen Screen      6. Acquired hypothyroidism  E03.9 TSH with free T4 reflex     levothyroxine (SYNTHROID/LEVOTHROID) 150 MCG tablet     levothyroxine (SYNTHROID/LEVOTHROID) 25 MCG tablet     TSH with free T4 reflex      7. Ruptured eardrum, left  H72.92 Adult ENT  Referral     neomycin-polymyxin-hydrocortisone (CORTISPORIN) 3.5-21166-7 otic suspension      8. Chronic otitis externa of left ear, unspecified type  H60.62 neomycin-polymyxin-hydrocortisone (CORTISPORIN) 3.5-12304-4 otic suspension      9. Elevated LFTs  R79.89 Bilirubin direct     CANCELED: Hepatic panel (Albumin, ALT, AST, Bili, Alk Phos, TP)            Patient has been advised of split billing requirements and indicates understanding: Yes        BMI  Estimated body mass index is 32.91 kg/m  as calculated from the following:    Height as of this encounter: 1.803 m (5' 10.98\").    Weight as of this encounter: 107 kg (235 lb 12.8 oz).   Weight management plan: Discussed healthy diet and exercise guidelines    Counseling  Appropriate preventive services were addressed with this patient via screening, questionnaire, or discussion as appropriate for fall " prevention, nutrition, physical activity, Tobacco-use cessation, social engagement, weight loss and cognition.  Checklist reviewing preventive services available has been given to the patient.  Reviewed patient's diet, addressing concerns and/or questions.   Keven is at risk for psychosocial distress and has been provided with information to reduce risk.       There are no Patient Instructions on file for this visit.    Subjective   Keven is a 53 year old, presenting for the following:  RECHECK (Ear) and Physical        10/4/2024     9:24 AM   Additional Questions   Roomed by Oksana   Accompanied by none        Health Care Directive  Patient does not have a Health Care Directive or Living Will: Discussed advance care planning with patient; however, patient declined at this time.    History of Present Illness       Reason for visit:  Blown eardrum and general check up    Keven eats 2-3 servings of fruits and vegetables daily.Keven consumes 2 sweetened beverage(s) daily.Keven exercises with enough effort to increase Keven's heart rate 10 to 19 minutes per day.  Keven exercises with enough effort to increase Keven's heart rate 7 days per week.   Keven is taking medications regularly.      Wt Readings from Last 4 Encounters:   10/04/24 107 kg (235 lb 12.8 oz)   09/04/24 109 kg (240 lb 3.2 oz)   05/28/24 109.9 kg (242 lb 3.2 oz)   03/21/24 110.4 kg (243 lb 6.4 oz)       Continuous left ear drainage x 3 months.  Serous drainage  Pain has subsided  Unable to hear from left ear  Started hearing his pulse in left ear  Abx ear drops x 2                10/4/2024   General Health   How would you rate your overall physical health? Good   Feel stress (tense, anxious, or unable to sleep) Only a little      (!) STRESS CONCERN      10/4/2024   Nutrition   Three or more servings of calcium each day? Yes   Diet: Regular (no restrictions)   How many servings of fruit and vegetables per day? (!) 2-3   How many sweetened  beverages each day? 0-1            10/4/2024   Exercise   Days per week of moderate/strenous exercise 7 days   Average minutes spent exercising at this level 60 min            10/4/2024   Social Factors   Frequency of gathering with friends or relatives Twice a week   Worry food won't last until get money to buy more Patient declined   Food not last or not have enough money for food? Patient declined   Do you have housing? (Housing is defined as stable permanent housing and does not include staying ouside in a car, in a tent, in an abandoned building, in an overnight shelter, or couch-surfing.) Patient declined   Are you worried about losing your housing? Patient declined   Lack of transportation? Patient declined   Unable to get utilities (heat,electricity)? Patient declined            10/4/2024   Fall Risk   Fallen 2 or more times in the past year? No   Trouble with walking or balance? No             10/4/2024   Dental   Dentist two times every year? Yes            10/4/2024   TB Screening   Were you born outside of the [place]? No              Today's PHQ-2 Score:       1/15/2024    11:15 AM   PHQ-2 ( 1999 [employer])   Q1: Little interest or pleasure in doing things 0   Q2: Feeling down, depressed or hopeless 0   PHQ-2 Score 0         10/4/2024   Substance Use   Alcohol more than 3/day or more than 7/wk No   Do you use any other substances recreationally? (!) CANNABIS PRODUCTS        Social History     Tobacco Use    Smoking status: Former     Types: Cigarettes    Smokeless tobacco: Never    Tobacco comments:     Quit tobacco in 2007   Vaping Use    Vaping status: Never Used   Substance Use Topics    Alcohol use: Yes     Alcohol/week: 3.0 - 4.0 standard drinks of alcohol     Types: 3 - 4 Standard drinks or equivalent per week     Comment: 3-4 drinks aweek    Drug use: Yes     Types: Marijuana     Comment: 2x/ week             10/4/2024   One time HIV Screening   Previous HIV test? No          10/4/2024   STI Screening  "  New sexual partner(s) since last STI/HIV test? No      ASCVD Risk   The ASCVD Risk score (Alfie HILL, et al., 2019) failed to calculate for the following reasons:    The patient has a prior MI or stroke diagnosis           Reviewed and updated as needed this visit by Provider                    BP Readings from Last 3 Encounters:   10/04/24 130/82   09/04/24 122/77   05/28/24 115/76    Wt Readings from Last 3 Encounters:   10/04/24 107 kg (235 lb 12.8 oz)   09/04/24 109 kg (240 lb 3.2 oz)   05/28/24 109.9 kg (242 lb 3.2 oz)                      Review of Systems  Constitutional, HEENT, cardiovascular, pulmonary, gi and gu systems are negative, except as otherwise noted.     Objective    Exam  /82   Pulse 74   Temp 97.4  F (36.3  C) (Temporal)   Resp 16   Ht 1.803 m (5' 10.98\")   Wt 107 kg (235 lb 12.8 oz)   SpO2 97%   BMI 32.91 kg/m     Estimated body mass index is 32.91 kg/m  as calculated from the following:    Height as of this encounter: 1.803 m (5' 10.98\").    Weight as of this encounter: 107 kg (235 lb 12.8 oz).    Physical Exam  Vitals reviewed.   Constitutional:       Appearance: Normal appearance. Keven is not ill-appearing.   HENT:      Head: Normocephalic.      Right Ear: Ear canal normal.      Ears:      Comments: Left ear:  Drainage in left ear canal, no surgical TM graft visualized as in right ear, no obvious TM perforation  Right ear: TM graft in place     Nose: Nose normal.   Eyes:      Extraocular Movements: Extraocular movements intact.   Cardiovascular:      Rate and Rhythm: Normal rate and regular rhythm.      Heart sounds: Normal heart sounds. No murmur heard.  Pulmonary:      Effort: Pulmonary effort is normal. No respiratory distress.      Breath sounds: Normal breath sounds. No wheezing or rales.   Abdominal:      Palpations: Abdomen is soft.   Musculoskeletal:         General: Normal range of motion.      Cervical back: Normal range of motion and neck supple. "   Skin:     General: Skin is warm and dry.      Findings: No lesion.   Neurological:      Mental Status: Keven is alert and oriented to person, place, and time.   Psychiatric:         Mood and Affect: Mood normal.         Behavior: Behavior normal.         Thought Content: Thought content normal.         Judgment: Judgment normal.               Signed Electronically by: Wilfredo Hemphill MD

## 2024-10-04 NOTE — TELEPHONE ENCOUNTER
This encounter is being sent to inform the clinic that this patient has a referral from Wilfredo Dela Cruz MD  for the diagnoses of RUPTURED EAR DRUM and has requested that this patient be seen within 1-2 WEEKS and/or with ANY PROVIDER.  Based on the availability of our provider(s), we are unable to accommodate this request.    Were all sites offered this patient?  Yes    Does scheduling algorithm request to schedule next available?  Patient has been scheduled for the first available opening with DR NUGENT on 11/5.  We have informed the patient that the clinic will review their referral and reach out if a sooner appointment is medically necessary.     OPEN TO ALL CLINICS

## 2024-10-04 NOTE — TELEPHONE ENCOUNTER
Patient has been scheduled with audio in [place] 10/7 and with Benjamin Muñiz CNP in [place] on 10/8. Patient aware.    Jaky Craven RN on 10/4/2024 at 2:12 PM

## 2024-10-07 ENCOUNTER — OFFICE VISIT (OUTPATIENT)
Dept: AUDIOLOGY | Facility: CLINIC | Age: 54
End: 2024-10-07
Payer: COMMERCIAL

## 2024-10-07 DIAGNOSIS — H90.0 CONDUCTIVE HEARING LOSS, BILATERAL: Primary | ICD-10-CM

## 2024-10-07 PROCEDURE — 92557 COMPREHENSIVE HEARING TEST: CPT | Performed by: AUDIOLOGIST

## 2024-10-07 NOTE — PROGRESS NOTES
"ENT Consultation    Andrew Atkinson who is a 53 year old adult seen in consultation at the request of Dr. Wilfredo Dela Cruz .      History of Present Illness - Andrew Atkinson is a 53 year old adult presents for evaluation of his ears.  Apparently in his 20s may have had bilateral tympanoplasty's for \"weak drops\".  Probably had some atelectasis at the time.  Did not have tubes as far as he can remember.  He was doing fine until July end of July when he started developing pressure in his left ear initially on the right with discharge mostly on the left.  Discharge was clear and then became slightly bloody.  He was on couple courses of antibiotics and drops and even received oral steroid.  However felt that still has muffled hearing in both ears.  Left drains from time to time mostly clear.  No pain no discomfort noted.  No tinnitus no vertigo no dizziness.        BP Readings from Last 1 Encounters:   10/08/24 130/80       BP noted to be well controlled today in office.     Andrew IS NOT a smoker/uses chewing tobacco.      Past Medical History -   Past Medical History:   Diagnosis Date    Medial meniscus tear     TIFFANY (obstructive sleep apnea) 3/2/2023       Current Medications -   Current Outpatient Medications:     fluticasone (FLONASE) 50 MCG/ACT nasal spray, Spray 1 spray into both nostrils daily., Disp: 16 g, Rfl: 1    levothyroxine (SYNTHROID/LEVOTHROID) 150 MCG tablet, Take 1 tablet (150 mcg) by mouth daily. With 12.5mg tab, Disp: 90 tablet, Rfl: 3    levothyroxine (SYNTHROID/LEVOTHROID) 25 MCG tablet, TAKE 1/2 TABLET BY MOUTH ONCE DAILY WITH 150MCG TABLET, Disp: 90 tablet, Rfl: 3    Magnesium Oxide POWD, Take 1 Scoop by mouth every morning., Disp: , Rfl:     multivitamin w/minerals (THERA-VIT-M) tablet, Take 1 tablet by mouth every morning, Disp: , Rfl:     neomycin-polymyxin-hydrocortisone (CORTISPORIN) 3.5-47400-4 otic suspension, Place 3 drops Into the left ear 3 times daily for 7 days., Disp: 10 mL, Rfl: " 0    pravastatin (PRAVACHOL) 40 MG tablet, Take 1 tablet (40 mg) by mouth daily, Disp: 90 tablet, Rfl: 2    UNABLE TO FIND, Take 1 tablet by mouth every morning. MEDICATION NAME: Organic Mushrooms, Lions fauzia turkey tail, Disp: , Rfl:     aspirin 81 MG EC tablet, Take 1 tablet (81 mg) by mouth daily (Patient not taking: Reported on 10/8/2024), Disp: 30 tablet, Rfl: 0    diltiazem ER (CARDIAZEM LA) 120 MG 24 hr tablet, Take 1 tablet (120 mg) by mouth daily (Patient not taking: Reported on 10/8/2024), Disp: 90 tablet, Rfl: 3    fish oil-omega-3 fatty acids 1000 MG capsule, Take 2 g by mouth every morning (Patient not taking: Reported on 5/28/2024), Disp: , Rfl:     predniSONE (DELTASONE) 20 MG tablet, Take 3 tabs by mouth daily x 3 days, then 2 tabs daily x 3 days, then 1 tab daily x 3 days, then 1/2 tab daily x 3 days. (Patient not taking: Reported on 10/4/2024), Disp: 20 tablet, Rfl: 0    Current Facility-Administered Medications:     hylan (SYNVISC ONE) injection 48 mg, 48 mg, , , Robert Regan DO, 48 mg at 11/29/23 0930    Allergies -   Allergies   Allergen Reactions    Morphine Hives     States tolerates Oxy fine       Social History -   Social History     Socioeconomic History    Marital status: Single   Tobacco Use    Smoking status: Former     Types: Cigarettes    Smokeless tobacco: Never    Tobacco comments:     Quit tobacco in 2007   Vaping Use    Vaping status: Never Used   Substance and Sexual Activity    Alcohol use: Yes     Alcohol/week: 3.0 - 4.0 standard drinks of alcohol     Types: 3 - 4 Standard drinks or equivalent per week     Comment: 3-4 drinks aweek    Drug use: Yes     Types: Marijuana     Comment: 2x/ week    Sexual activity: Yes     Partners: Female     Social Determinants of Health     Financial Resource Strain: Unknown (10/4/2024)    Financial Resource Strain     Within the past 12 months, have you or your family members you live with been unable to get utilities (heat,  electricity) when it was really needed?: Patient declined   Food Insecurity: Unknown (10/4/2024)    Food Insecurity     Within the past 12 months, did you worry that your food would run out before you got money to buy more?: Patient declined     Within the past 12 months, did the food you bought just not last and you didn t have money to get more?: Patient declined   Transportation Needs: Unknown (10/4/2024)    Transportation Needs     Within the past 12 months, has lack of transportation kept you from medical appointments, getting your medicines, non-medical meetings or appointments, work, or from getting things that you need?: Patient declined   Physical Activity: Sufficiently Active (10/4/2024)    Exercise Vital Sign     Days of Exercise per Week: 7 days     Minutes of Exercise per Session: 60 min   Stress: No Stress Concern Present (10/4/2024)    [employer] [employer] - Occupational Stress Questionnaire     Feeling of Stress : Only a little   Social Connections: Unknown (10/4/2024)    Social Connection and Isolation Panel [NHANES]     Frequency of Social Gatherings with Friends and Family: Twice a week   Interpersonal Safety: Low Risk  (10/4/2024)    Interpersonal Safety     Do you feel physically and emotionally safe where you currently live?: Yes     Within the past 12 months, have you been hit, slapped, kicked or otherwise physically hurt by someone?: No     Within the past 12 months, have you been humiliated or emotionally abused in other ways by your partner or ex-partner?: No   Housing Stability: Unknown (10/4/2024)    Housing Stability     Do you have housing? : Patient declined     Are you worried about losing your housing?: Patient declined       Family History -   Family History   Problem Relation Age of Onset    Unknown/Adopted No family hx of        Review of Systems - As per HPI and PMHx, otherwise review of system review of the head and neck negative. Otherwise 10+ review of  "system is negative    Physical Exam  /80   Temp 97.7  F (36.5  C) (Temporal)   Ht 1.803 m (5' 10.98\")   Wt 106.7 kg (235 lb 4.8 oz)   BMI 32.84 kg/m    BMI: Body mass index is 32.84 kg/m .    General - The patient is well nourished and well developed, and appears to have good nutritional status.  Alert and oriented to person and place, answers questions and cooperates with examination appropriately.    SKIN - No suspicious lesions or rashes.  Respiration - No respiratory distress.  Head and Face - Normocephalic and atraumatic, with no gross asymmetry noted of the contour of the facial features.  The facial nerve is intact, with strong symmetric movements.    Voice and Breathing - The patient was breathing comfortably without the use of accessory muscles. The patients voice was clear and strong, and had appropriate pitch and quality.    Ears - Bilateral pinna and EACs with normal appearing overlying skin.  Left tympanic membrane appears to be somewhat \"wet\" with clear drainage.  We do not see obvious perforation and there is mobility to insufflation.  He had there is discharge anteriorly in the drum.  Tympanic membrane slightly thickened.  Canal otherwise is noninflamed clear.  On the right side we see is very small pinhole anterior superior perforation.  No active drainage.  Some debris and some thickening noted in the posterior aspect of the drum possibly result of previous tympanoplasty.  However we do not see any active drainage.  Ear canal appears to be clear and dry.  Eyes - Extraocular movements intact.  Sclera were not icteric or injected, conjunctiva were pink and moist.    Mouth - Examination of the oral cavity showed pink, healthy oral mucosa. No lesions or ulcerations noted.  The tongue was mobile and midline, and the dentition were in good condition.      Throat - The walls of the oropharynx were smooth, pink, moist, symmetric, and had no lesions or ulcerations.  The tonsillar pillars and soft " palate were symmetric.  The uvula was midline on elevation.    Neck - Normal midline excursion of the laryngotracheal complex during swallowing.  Full range of motion on passive movement.  Palpation of the occipital, submental, submandibular, internal jugular chain, and supraclavicular nodes did not demonstrate any abnormal lymph nodes or masses.  The carotid pulse was palpable bilaterally.  Palpation of the thyroid was soft and smooth, with no nodules or goiter appreciated.  The trachea was mobile and midline.    Nose - External contour is symmetric, no gross deflection or scars.  Nasal mucosa is pink and moist with no abnormal mucus.  The septum was midline and non-obstructive, turbinates of normal size and position.  No polyps, masses, or purulence noted on examination.    Neuro - Nonfocal neuro exam is normal, CN 2 through 12 intact, normal gait and muscle tone.      Performed in clinic today:  Audiologic Studies - An audiogram and tympanogram were performed today as part of the evaluation and personally reviewed. The tympanogram shows Type did not test due to drainage curves on the right and Type did not test due to drainage curves on the left, with did not test canal volumes and middle ear pressures.  The audiogram showed mild conductive loss on the right and mild conductive loss on the left.    Word recognition score 100% bilaterally.    A/P - Andrew Atkinson is a 53 year old adult with bilateral otologic process worse in the left than the right with some active drainage on the left.  Certainly this could be a middle ear space process.  Could be chronic otitis media.  At this point we would like to get a CT of temporal bones since this is ongoing process for the last several months.  Will start him on Ciprodex drops.  Will see patient back in 3 weeks.      Shaheen David MD

## 2024-10-07 NOTE — PROGRESS NOTES
AUDIOLOGY REPORT:    Patient was referred to [facility] from ENT by Benjamin Muñiz PA-C for a hearing examination prior to ENT visit tomorrow.  He reports daily drainage from his left ear and decreased hearing in that ear.  He also reports a history of ear surgery when he was younger that he describes as cartiledge placed behind his eardrum in each ear.  He notes a TM perforation in the left ear in July.     Testing:    Otoscopy:   Otoscopic exam indicates drainage  in the left ear  and moist TM in the right ear    Tympanograms/reflexes: Did not test due to drainage.       Thresholds:   Pure Tone Thresholds assessed using [facility] audiometry with good  reliability from 250-8000 Hz bilaterally using insert earphones and circumaural headphones     RIGHT:  mild  conductive hearing loss    LEFT:    mild  conductive hearing loss    Speech Reception Threshold:    RIGHT: 30 dB HL    LEFT:   30 dB HL  Speech Reception Thresholds are in good agreement with pure tone thresholds    Word Recognition Score:     RIGHT: 100% at 70 dB HL using NU-6 recorded word list.    LEFT:   100% at 70 dB HL using NU-6 recorded word list.    Discussed results with the patient.     Patient will  return to ENT for follow up.     Berto Toro.   Doctor of Audiology  License #[id number]

## 2024-10-08 ENCOUNTER — OFFICE VISIT (OUTPATIENT)
Dept: OTOLARYNGOLOGY | Facility: CLINIC | Age: 54
End: 2024-10-08
Attending: FAMILY MEDICINE
Payer: COMMERCIAL

## 2024-10-08 VITALS
DIASTOLIC BLOOD PRESSURE: 80 MMHG | HEIGHT: 71 IN | WEIGHT: 235.3 LBS | SYSTOLIC BLOOD PRESSURE: 130 MMHG | TEMPERATURE: 97.7 F | BODY MASS INDEX: 32.94 KG/M2

## 2024-10-08 DIAGNOSIS — H65.493 CHRONIC NONSUPPURATIVE OTITIS MEDIA, BILATERAL: ICD-10-CM

## 2024-10-08 DIAGNOSIS — H72.91 PERFORATION OF TYMPANIC MEMBRANE, RIGHT: Primary | ICD-10-CM

## 2024-10-08 PROCEDURE — 99203 OFFICE O/P NEW LOW 30 MIN: CPT | Performed by: OTOLARYNGOLOGY

## 2024-10-08 RX ORDER — CIPROFLOXACIN AND DEXAMETHASONE 3; 1 MG/ML; MG/ML
4 SUSPENSION/ DROPS AURICULAR (OTIC) 2 TIMES DAILY
Qty: 7.5 ML | Refills: 0 | Status: SHIPPED | OUTPATIENT
Start: 2024-10-08 | End: 2024-10-16

## 2024-10-08 NOTE — LETTER
"10/8/2024      Andrew Atkinson  [address] [facility] [address]      Dear Colleague,    Thank you for referring your patient, Andrew Atkinson, to the [facility]. Please see a copy of my visit note below.    ENT Consultation    Andrew Atkinson who is a 53 year old adult seen in consultation at the request of Dr. Wilfredo Dela Cruz .      History of Present Illness - Andrew Atkinson is a 53 year old adult presents for evaluation of his ears.  Apparently in his 20s may have had bilateral tympanoplasty's for \"weak drops\".  Probably had some atelectasis at the time.  Did not have tubes as far as he can remember.  He was doing fine until July end of July when he started developing pressure in his left ear initially on the right with discharge mostly on the left.  Discharge was clear and then became slightly bloody.  He was on couple courses of antibiotics and drops and even received oral steroid.  However felt that still has muffled hearing in both ears.  Left drains from time to time mostly clear.  No pain no discomfort noted.  No tinnitus no vertigo no dizziness.        BP Readings from Last 1 Encounters:   10/08/24 130/80       BP noted to be well controlled today in office.     Andrew IS NOT a smoker/uses chewing tobacco.      Past Medical History -   Past Medical History:   Diagnosis Date     Medial meniscus tear      TIFFANY (obstructive sleep apnea) 3/2/2023       Current Medications -   Current Outpatient Medications:      fluticasone (FLONASE) 50 MCG/ACT nasal spray, Spray 1 spray into both nostrils daily., Disp: 16 g, Rfl: 1     levothyroxine (SYNTHROID/LEVOTHROID) 150 MCG tablet, Take 1 tablet (150 mcg) by mouth daily. With 12.5mg tab, Disp: 90 tablet, Rfl: 3     levothyroxine (SYNTHROID/LEVOTHROID) 25 MCG tablet, TAKE 1/2 TABLET BY MOUTH ONCE DAILY WITH 150MCG TABLET, Disp: 90 tablet, Rfl: 3     Magnesium Oxide POWD, Take 1 Scoop by mouth every morning., Disp: , Rfl:      " multivitamin w/minerals (THERA-VIT-M) tablet, Take 1 tablet by mouth every morning, Disp: , Rfl:      neomycin-polymyxin-hydrocortisone (CORTISPORIN) 3.5-81543-5 otic suspension, Place 3 drops Into the left ear 3 times daily for 7 days., Disp: 10 mL, Rfl: 0     pravastatin (PRAVACHOL) 40 MG tablet, Take 1 tablet (40 mg) by mouth daily, Disp: 90 tablet, Rfl: 2     UNABLE TO FIND, Take 1 tablet by mouth every morning. MEDICATION NAME: Organic Mushrooms, Lions fauzia turkey tail, Disp: , Rfl:      aspirin 81 MG EC tablet, Take 1 tablet (81 mg) by mouth daily (Patient not taking: Reported on 10/8/2024), Disp: 30 tablet, Rfl: 0     diltiazem ER (CARDIAZEM LA) 120 MG 24 hr tablet, Take 1 tablet (120 mg) by mouth daily (Patient not taking: Reported on 10/8/2024), Disp: 90 tablet, Rfl: 3     fish oil-omega-3 fatty acids 1000 MG capsule, Take 2 g by mouth every morning (Patient not taking: Reported on 5/28/2024), Disp: , Rfl:      predniSONE (DELTASONE) 20 MG tablet, Take 3 tabs by mouth daily x 3 days, then 2 tabs daily x 3 days, then 1 tab daily x 3 days, then 1/2 tab daily x 3 days. (Patient not taking: Reported on 10/4/2024), Disp: 20 tablet, Rfl: 0    Current Facility-Administered Medications:      hylan (SYNVISC ONE) injection 48 mg, 48 mg, , , Robert Regan DO, 48 mg at 11/29/23 0930    Allergies -   Allergies   Allergen Reactions     Morphine Hives     States tolerates Oxy fine       Social History -   Social History     Socioeconomic History     Marital status: Single   Tobacco Use     Smoking status: Former     Types: Cigarettes     Smokeless tobacco: Never     Tobacco comments:     Quit tobacco in 2007   Vaping Use     Vaping status: Never Used   Substance and Sexual Activity     Alcohol use: Yes     Alcohol/week: 3.0 - 4.0 standard drinks of alcohol     Types: 3 - 4 Standard drinks or equivalent per week     Comment: 3-4 drinks aweek     Drug use: Yes     Types: Marijuana     Comment: 2x/ week     Sexual  activity: Yes     Partners: Female     Social Determinants of Health     Financial Resource Strain: Unknown (10/4/2024)    Financial Resource Strain      Within the past 12 months, have you or your family members you live with been unable to get utilities (heat, electricity) when it was really needed?: Patient declined   Food Insecurity: Unknown (10/4/2024)    Food Insecurity      Within the past 12 months, did you worry that your food would run out before you got money to buy more?: Patient declined      Within the past 12 months, did the food you bought just not last and you didn t have money to get more?: Patient declined   Transportation Needs: Unknown (10/4/2024)    Transportation Needs      Within the past 12 months, has lack of transportation kept you from medical appointments, getting your medicines, non-medical meetings or appointments, work, or from getting things that you need?: Patient declined   Physical Activity: Sufficiently Active (10/4/2024)    Exercise Vital Sign      Days of Exercise per Week: 7 days      Minutes of Exercise per Session: 60 min   Stress: No Stress Concern Present (10/4/2024)    [employer] - Occupational Stress Questionnaire      Feeling of Stress : Only a little   Social Connections: Unknown (10/4/2024)    Social Connection and Isolation Panel [NHANES]      Frequency of Social Gatherings with Friends and Family: Twice a week   Interpersonal Safety: Low Risk  (10/4/2024)    Interpersonal Safety      Do you feel physically and emotionally safe where you currently live?: Yes      Within the past 12 months, have you been hit, slapped, kicked or otherwise physically hurt by someone?: No      Within the past 12 months, have you been humiliated or emotionally abused in other ways by your partner or ex-partner?: No   Housing Stability: Unknown (10/4/2024)    Housing Stability      Do you have housing? : Patient declined      Are you worried about losing your  "housing?: Patient declined       Family History -   Family History   Problem Relation Age of Onset     Unknown/Adopted No family hx of        Review of Systems - As per HPI and PMHx, otherwise review of system review of the head and neck negative. Otherwise 10+ review of system is negative    Physical Exam  /80   Temp 97.7  F (36.5  C) (Temporal)   Ht 1.803 m (5' 10.98\")   Wt 106.7 kg (235 lb 4.8 oz)   BMI 32.84 kg/m    BMI: Body mass index is 32.84 kg/m .    General - The patient is well nourished and well developed, and appears to have good nutritional status.  Alert and oriented to person and place, answers questions and cooperates with examination appropriately.    SKIN - No suspicious lesions or rashes.  Respiration - No respiratory distress.  Head and Face - Normocephalic and atraumatic, with no gross asymmetry noted of the contour of the facial features.  The facial nerve is intact, with strong symmetric movements.    Voice and Breathing - The patient was breathing comfortably without the use of accessory muscles. The patients voice was clear and strong, and had appropriate pitch and quality.    Ears - Bilateral pinna and EACs with normal appearing overlying skin.  Left tympanic membrane appears to be somewhat \"wet\" with clear drainage.  We do not see obvious perforation and there is mobility to insufflation.  He had there is discharge anteriorly in the drum.  Tympanic membrane slightly thickened.  Canal otherwise is noninflamed clear.  On the right side we see is very small pinhole anterior superior perforation.  No active drainage.  Some debris and some thickening noted in the posterior aspect of the drum possibly result of previous tympanoplasty.  However we do not see any active drainage.  Ear canal appears to be clear and dry.  Eyes - Extraocular movements intact.  Sclera were not icteric or injected, conjunctiva were pink and moist.    Mouth - Examination of the oral cavity showed pink, " healthy oral mucosa. No lesions or ulcerations noted.  The tongue was mobile and midline, and the dentition were in good condition.      Throat - The walls of the oropharynx were smooth, pink, moist, symmetric, and had no lesions or ulcerations.  The tonsillar pillars and soft palate were symmetric.  The uvula was midline on elevation.    Neck - Normal midline excursion of the laryngotracheal complex during swallowing.  Full range of motion on passive movement.  Palpation of the occipital, submental, submandibular, internal jugular chain, and supraclavicular nodes did not demonstrate any abnormal lymph nodes or masses.  The carotid pulse was palpable bilaterally.  Palpation of the thyroid was soft and smooth, with no nodules or goiter appreciated.  The trachea was mobile and midline.    Nose - External contour is symmetric, no gross deflection or scars.  Nasal mucosa is pink and moist with no abnormal mucus.  The septum was midline and non-obstructive, turbinates of normal size and position.  No polyps, masses, or purulence noted on examination.    Neuro - Nonfocal neuro exam is normal, CN 2 through 12 intact, normal gait and muscle tone.      Performed in clinic today:  Audiologic Studies - An audiogram and tympanogram were performed today as part of the evaluation and personally reviewed. The tympanogram shows Type did not test due to drainage curves on the right and Type did not test due to drainage curves on the left, with did not test canal volumes and middle ear pressures.  The audiogram showed mild conductive loss on the right and mild conductive loss on the left.    Word recognition score 100% bilaterally.    A/P - Andrew Atkinson is a 53 year old adult with bilateral otologic process worse in the left than the right with some active drainage on the left.  Certainly this could be a middle ear space process.  Could be chronic otitis media.  At this point we would like to get a CT of temporal bones since this  is ongoing process for the last several months.  Will start him on Ciprodex drops.  Will see patient back in 3 weeks.      Shaheen David MD       Again, thank you for allowing me to participate in the care of your patient.        Sincerely,        Shaheen David MD, MD

## 2024-10-15 ENCOUNTER — VIRTUAL VISIT (OUTPATIENT)
Dept: FAMILY MEDICINE | Facility: CLINIC | Age: 54
End: 2024-10-15
Payer: COMMERCIAL

## 2024-10-15 ENCOUNTER — HOSPITAL ENCOUNTER (OUTPATIENT)
Dept: CT IMAGING | Facility: CLINIC | Age: 54
Discharge: HOME | End: 2024-10-15
Attending: OTOLARYNGOLOGY | Admitting: OTOLARYNGOLOGY
Payer: COMMERCIAL

## 2024-10-15 DIAGNOSIS — E11.9 TYPE 2 DIABETES MELLITUS WITHOUT COMPLICATION, WITHOUT LONG-TERM CURRENT USE OF INSULIN (H): Primary | ICD-10-CM

## 2024-10-15 DIAGNOSIS — H65.493 CHRONIC NONSUPPURATIVE OTITIS MEDIA, BILATERAL: ICD-10-CM

## 2024-10-15 PROCEDURE — 99443 PR PHYSICIAN TELEPHONE EVALUATION 21-30 MIN: CPT | Mod: 93 | Performed by: FAMILY MEDICINE

## 2024-10-15 PROCEDURE — 70480 CT ORBIT/EAR/FOSSA W/O DYE: CPT

## 2024-10-15 RX ORDER — KETOROLAC TROMETHAMINE 30 MG/ML
1 INJECTION, SOLUTION INTRAMUSCULAR; INTRAVENOUS ONCE
Qty: 1 EACH | Refills: 0 | Status: SHIPPED | OUTPATIENT
Start: 2024-10-15 | End: 2024-10-15

## 2024-10-15 RX ORDER — HYDROCHLOROTHIAZIDE 12.5 MG/1
CAPSULE ORAL
Qty: 6 EACH | Refills: 3 | Status: SHIPPED | OUTPATIENT
Start: 2024-10-15

## 2024-10-15 RX ORDER — LANCETS
EACH MISCELLANEOUS
Qty: 100 EACH | Refills: 6 | Status: SHIPPED | OUTPATIENT
Start: 2024-10-15 | End: 2024-10-23

## 2024-10-15 NOTE — PROGRESS NOTES
Keven is a 53 year old who is being evaluated via a billable telephone visit.    What phone number would you like to be contacted at? [phone]  How would you like to obtain your AVS? Mona  Originating Location (pt. Location): Home    Distant Location (provider location):  On-site    Assessment & Plan       ICD-10-CM    1. Type 2 diabetes mellitus without complication, without long-term current use of insulin (H)  E11.9 Continuous Glucose  (FREESTYLE NICOLE 3 READER) CAMRON     Continuous Glucose Sensor (FREESTYLE NICOLE 3 PLUS SENSOR) MISC     blood glucose monitoring (NO BRAND SPECIFIED) meter device kit     blood glucose (NO BRAND SPECIFIED) test strip     thin (NO BRAND SPECIFIED) lancets     Hemoglobin A1c     Adult Diabetes Education  Referral          Stop pravastatin, implement lifestyle changes, recheck A1c in 3 months.  Discussed importance of monitoring      The longitudinal plan of care for the diagnosis(es)/condition(s) as documented were addressed during this visit. Due to the added complexity in care, I will continue to support Keven in the subsequent management and with ongoing continuity of care.     There are no Patient Instructions on file for this visit.    Subjective   Keven is a 53 year old, presenting for the following health issues:  Patient Request (Discuss treatment plan for blood sugar)      10/15/2024     8:57 AM   Additional Questions   Roomed by Oksana   Accompanied by jose francisco         10/15/2024     8:57 AM   Patient Reported Additional Medications   Patient reports taking the following new medications none     History of Present Illness       Reason for visit:  Discuss blood suagr treatment plan    Keven eats 2-3 servings of fruits and vegetables daily.Keven consumes 2 sweetened beverage(s) daily.Keven exercises with enough effort to increase Keven's heart rate 10 to 19 minutes per day.  Keven exercises with enough effort to increase Keven's heart rate 7 days  per week.   Keven is taking medications regularly.     Patient presents for diabetes follow-up visit.  A1c greater than 9  Patient attributes this to pravastatin.  He feels his blood sugar has been elevated since he started pravastatin a few years ago.  Patient like to stop pravastatin, implement lifestyle changes, and recheck his A1c in 3 months.            Review of Systems  Constitutional, HEENT, cardiovascular, pulmonary, gi and gu systems are negative, except as otherwise noted.      Objective           Vitals:  No vitals were obtained today due to virtual visit.    Physical Exam   General: Alert and no distress //Respiratory: No audible wheeze, cough, or shortness of breath // Psychiatric:  Appropriate affect, tone, and pace of words            Phone call duration: 40 minutes  Signed Electronically by: Wilfredo Hemphill MD

## 2024-10-16 ENCOUNTER — TELEPHONE (OUTPATIENT)
Dept: OTOLARYNGOLOGY | Facility: CLINIC | Age: 54
End: 2024-10-16
Payer: COMMERCIAL

## 2024-10-16 DIAGNOSIS — H72.91 PERFORATION OF TYMPANIC MEMBRANE, RIGHT: ICD-10-CM

## 2024-10-16 DIAGNOSIS — H65.493 CHRONIC NONSUPPURATIVE OTITIS MEDIA, BILATERAL: ICD-10-CM

## 2024-10-16 DIAGNOSIS — H92.12 DRAINAGE FROM LEFT EAR: Primary | ICD-10-CM

## 2024-10-16 RX ORDER — CIPROFLOXACIN AND DEXAMETHASONE 3; 1 MG/ML; MG/ML
4 SUSPENSION/ DROPS AURICULAR (OTIC) 2 TIMES DAILY
Qty: 7.5 ML | Refills: 0 | Status: SHIPPED | OUTPATIENT
Start: 2024-10-16

## 2024-10-16 NOTE — TELEPHONE ENCOUNTER
Spoke with patient. During 10/8/24 Dr. David ordered a CT to assess temporal bones and potentially chronic Otitis Media. CT completed yesterday.    Patient reports exacerbation of symptoms over the last 3-4 days. Increased swelling/itching/tenderness to internal and external ear. Increased pain to left upper jaw and mastoid bone. Reports the ear drops burn, but still using. Clear drainage unchanged since last office visit. Hearing to left ear the same if not slightly more muffled since 10/8/24 office visit.  No fevers.     I will review with Dr. David and respond to patient yet this afternoon.    Latricia Lipscomb RN on 10/16/2024 at 11:45 AM

## 2024-10-16 NOTE — TELEPHONE ENCOUNTER
[facility] Call Center    Phone Message    May a detailed message be left on voicemail: yes     Reason for Call: Other: Pt is calling in regards to his ear. He says his ear is very swollen, to the point of not being able to get ear plugs in them. It's very painful, sore, tender to the touch, also has headache, ear is still actively draining while laying down or reclined. Please call pt back in regards to symptoms     Action Taken: Other: [facility]    Travel Screening: Not Applicable     Date of Service:

## 2024-10-16 NOTE — TELEPHONE ENCOUNTER
Dr. David reviewed the CT results. He would like patient evaluated at the [facility] E[facility] within the next 2 weeks and started on augmentin 875 BID x 10 days since he is still having drainage.    Patient informed and agreeable to this plan. He reports being almost out of ciprodex drops. Orders placed per Dr. David.    Latricia Lipscomb RN on 10/16/2024 at 1:18 PM

## 2024-10-16 NOTE — TELEPHONE ENCOUNTER
[facility] Call Center    Phone Message    May a detailed message be left on voicemail: yes     Reason for Call: Other: Pt is upset that he has to wait until December to be seen at the [facility] [place]. Pt is on the wait list. Pt wants to discuss this with someone. Please call to discuss. Thank you     Action Taken: Message routed to:  [facility] [place]    Travel Screening: Not Applicable     Date of Service:

## 2024-10-16 NOTE — TELEPHONE ENCOUNTER
"  \"Dr. David reviewed the CT results. He would like patient evaluated at the [facility] within the next 2 weeks and started on augmentin 875 BID x 10 days since he is still having drainage.\"        Appears there is already a chart review being completed through [facility] to get patient worked in sooner.     Patient does want to know if Dr. David could put CT results into layman's terms in order for the patient to get a better understanding of what is going on. Patient is concerned he will lose his hearing. Routing to Dr. David to advise.     Katy Mccord RN on 10/16/2024 at 2:40 PM    "

## 2024-10-16 NOTE — TELEPHONE ENCOUNTER
This encounter is being sent to inform the clinic that this patient has a referral from Shaheen David MD  for the diagnoses of Chronic nonsuppurative otitis media, bilateral [H65.493]; Perforation of tympanic membrane, right [H72.91]; Drainage from left ear [H92.12]  and has requested that this patient be seen within 1-2 WEEKS and/or with ANY PROVIDER AT THE [facility].  Based on the availability of our provider(s), we are unable to accommodate this request.    Were all sites offered this patient?  Yes    Does scheduling algorithm request to schedule next available?  Patient has been scheduled for the first available opening with DR WYNN on 12/5.  We have informed the patient that the clinic will review their referral and reach out if a sooner appointment is medically necessary.

## 2024-10-16 NOTE — TELEPHONE ENCOUNTER
FUTURE VISIT INFORMATION      FUTURE VISIT INFORMATION:  Date: 12/5/24  Time: 8:30 AM  Location: [facility] - ENT  REFERRAL INFORMATION:  Referring provider:  Shaheen David MD   Referring providers clinic:   ENT   Reason for visit/diagnosis:  Chronic nonsuppurative otitis media, bilateral [H65.493]; Perforation of tympanic membrane, right [H72.91]; Drainage from left ear [H92.12]  REF BY Shaheen David MD  RECS IN [facility]  CONF LOC  SCHED W/PT     RECORDS REQUESTED FROM      Clinic name Comments Records Status Imaging Status    ENT  10/16/24 telephone referral   10/8/24 Shaheen Alfred MD  [facility]    [facility]  10/7/24 SELINA- Yumiko Fuller   10/7/24 audiogram [facility]    [facility]   [facility]  1/28/2010 RIGHT TYMPANOPLASTY WITH FASCIA GRAFT with Grant Huggins MD  CE

## 2024-10-17 NOTE — TELEPHONE ENCOUNTER
Sent  msg with provider recommendations. Pt scheduled with Dr Cordero on  12/5.    Ladi Garcia LPN

## 2024-10-18 ENCOUNTER — ANCILLARY PROCEDURE (OUTPATIENT)
Dept: GENERAL RADIOLOGY | Facility: CLINIC | Age: 54
End: 2024-10-18
Attending: FAMILY MEDICINE
Payer: COMMERCIAL

## 2024-10-18 ENCOUNTER — OFFICE VISIT (OUTPATIENT)
Dept: ORTHOPEDICS | Facility: CLINIC | Age: 54
End: 2024-10-18
Payer: COMMERCIAL

## 2024-10-18 VITALS — WEIGHT: 235 LBS | BODY MASS INDEX: 32.9 KG/M2 | HEIGHT: 71 IN

## 2024-10-18 DIAGNOSIS — M25.562 BILATERAL CHRONIC KNEE PAIN: Primary | ICD-10-CM

## 2024-10-18 DIAGNOSIS — M17.0 PRIMARY OSTEOARTHRITIS OF BOTH KNEES: ICD-10-CM

## 2024-10-18 DIAGNOSIS — M25.562 LEFT KNEE PAIN: ICD-10-CM

## 2024-10-18 DIAGNOSIS — M25.561 BILATERAL CHRONIC KNEE PAIN: Primary | ICD-10-CM

## 2024-10-18 DIAGNOSIS — M25.571 ACUTE RIGHT ANKLE PAIN: ICD-10-CM

## 2024-10-18 DIAGNOSIS — S93.491A SPRAIN OF ANTERIOR TALOFIBULAR LIGAMENT OF RIGHT ANKLE, INITIAL ENCOUNTER: ICD-10-CM

## 2024-10-18 DIAGNOSIS — G89.29 BILATERAL CHRONIC KNEE PAIN: Primary | ICD-10-CM

## 2024-10-18 DIAGNOSIS — M25.571 RIGHT ANKLE PAIN: ICD-10-CM

## 2024-10-18 PROCEDURE — 99214 OFFICE O/P EST MOD 30 MIN: CPT | Performed by: FAMILY MEDICINE

## 2024-10-18 PROCEDURE — 73610 X-RAY EXAM OF ANKLE: CPT | Mod: TC | Performed by: RADIOLOGY

## 2024-10-18 PROCEDURE — 73562 X-RAY EXAM OF KNEE 3: CPT | Mod: TC | Performed by: RADIOLOGY

## 2024-10-18 NOTE — PROGRESS NOTES
Andrew Atkinson  :  1970  DOS: 10/18/2024  MRN: [id number]    Sports Medicine Clinic Visit    PCP: Wilfredo Dela Cruz    Andrew Atkinson is a 53 year old adult who is seen as a self referral presenting with right ankle pain    Injury: Patient describes injury as 1.5 weeks, twisted while walking.  Pain located over right lateral ankle, nonradiating.  Additional Features:  Positive: point tenderness.  Symptoms are better with Rest.  Symptoms are worse with: walking, increased activity.  Other evaluation and/or treatments so far consists of: elevation, ice, rest.  Prior imaging: Right knee MRI completed 10/11/23, 22, no images of left knee or right ankle.  Prior History of related problems: chronic ankle and knee pain     Social History:  business owner    Interim History - 2024  Since last visit on 23 patient has persisting bilateral knee pain.  States that the Synvisc injection performed in the right knee on 23 did not provide any relief.  Notes lateral left knee pain that can occasionally radiate to the lower leg.  No interim injury.   Also had a recent ankle injury, history of many sprains in his ankles when he was younger.  Swelling has improved, still point tenderness and some pain with activity.  Compensating has increased left knee and lower leg pain.    Review of Systems  Musculoskeletal: as above  Remainder of review of systems is negative including constitutional, CV, pulmonary, GI, Skin and Neurologic except as noted in HPI or medical history.    Past Medical History:   Diagnosis Date    Medial meniscus tear     TIFFANY (obstructive sleep apnea) 3/2/2023     Past Surgical History:   Procedure Laterality Date    APPENDECTOMY      ARTHROSCOPY KNEE WITH MEDIAL MENISCECTOMY Left 2017    Procedure: ARTHROSCOPY KNEE WITH MEDIAL MENISCECTOMY;  Arthroscopic partial medial menisectomy,left knee;  Surgeon: Grant Muñoz MD;  Location: [facility] OR    ARTHROSCOPY  "SHOULDER ROTATOR CUFF REPAIR, SUBACROMIAL DECOMP, DIST CLAVICLE RESECTN, BICEP TENOTOMY Right 2/13/2024    Procedure: ARTHROSCOPY, SHOULDER, WITH ROTATOR CUFF REPAIR, SUBACROMIAL SPACE DECOMPRESSION, AND biceps tenodesis;  Surgeon: Rebecca Meza MD;  Location: [facility] OR    COLONOSCOPY N/A 07/18/2023    Procedure: Colonoscopy with polypectomy;  Surgeon: Wilfredo Molina MD;  Location: [facility]    CV CORONARY ANGIOGRAM N/A 02/15/2020    Procedure: Coronary Angiogram;  Surgeon: Rinku Boyce MD;  Location:  [facility]    LAPAROSCOPIC HERNIORRHAPHY UMBILICAL N/A 12/08/2020    Procedure: umbilical hernia repair;  Surgeon: Miguel Ángel Arnold DO;  Location:  OR    ORTHOPEDIC SURGERY      3 low back proceudres     Family History   Problem Relation Age of Onset    Unknown/Adopted No family hx of        Objective  Ht 1.803 m (5' 10.98\")   Wt 106.6 kg (235 lb)   BMI 32.79 kg/m      General: healthy, alert and in no distress    HEENT: no scleral icterus or conjunctival erythema   Skin: no suspicious lesions or rash. No jaundice.   CV: regular rhythm by palpation, 2+ distal pulses, no pedal edema    Resp: normal respiratory effort without conversational dyspnea   Psych: normal mood and affect    Gait: mildly antalgic, appropriate coordination and balance   Neuro: normal light touch sensory exam of the extremities. Motor strength as noted below     Right Ankle/Foot Exam:    Inspection:       no visible ecchymosis       edema noted mild lateral ankle       Normal DP artery pulse       Normal PT artery pulse    Foot inspection:        Normal/high arch at rest, functional overpronation mildly with ambulation, bunions noted b/l, nontender    ROM:        full ROM with dorsiflexion, plantarflexion, inversion and eversion    Tender:       lateral ankle ligaments       tibialis posterior tendon, mildly posterior to medial malleolus    Non-Tender:       remainder of foot and ankle    Skin:       well perfused       " capillary refill less than 2 seconds    Special Tests:       neg (-) anterior drawer        neg (-) talar tilt        neg (-) external rotation testing     Gait:       normal    Proprioception:        deferred    Bilateral Knee exam    ROM:        Flexion 140 degrees       Extension 0 degrees       Range of motion limited by mild pain in terminal flexion    Inspection:       no visible ecchymosis        effusion noted trace    Skin:       no visible deformities       well perfused       capillary refill brisk    Patellar Motion:        Lateral tilt noted in patella       Crepitus noted in the patellofemoral joint    Tender:        lateral patellar border mild left       medial joint line b/l       Anterior tibialis muscle left    Non Tender:         remainder of knee area        medial patellar border        lateral joint line        along MCL        distal IT Band        infrapatellar tendon        tibial tubercle       pes anserine bursa    Special Tests:        Mildly painful right>left medial Lenny       neg (-) Lachmans       neg (-) varus at 0 deg and 30 deg       neg (-) valgus at 0 deg and 30 deg      Radiology:  XR Knee Standing AP [place] Bilat Lat Left    Narrative    EXAM: XR KNEE STANDING AP [place] BILAT LAT LEFT  DATE/TIME: 10/18/2024 8:20 AM    INDICATION: Left knee pain  COMPARISON: No direct comparison available.      Impression    IMPRESSION: Mild narrowing of the medial compartment of the right  knee. Left knee joint spaces are preserved and normally aligned. No  acute fracture or left knee joint effusion.    RAYMON WILLIAM DO         SYSTEM ID:  [id number]   XR Ankle RT G/E 3 vw    Narrative    EXAM: XR ANKLE RIGHT G/E 3 VIEWS  DATE/TIME: 10/18/2024 8:20 AM    INDICATION: Right ankle pain  COMPARISON: None available.       Impression    IMPRESSION: Normal joint spaces and alignment. Intact ankle mortise.  No acute fracture.       RAYMON WILLIAM DO         SYSTEM ID:  [id number]        Assessment:  1. Bilateral chronic knee pain    2. Acute right ankle pain    3. Primary osteoarthritis of both knees    4. Sprain of anterior talofibular ligament of right ankle, initial encounter        Plan:  Discussed the assessment with the patient.  Follow up: prn based on short term progress  Acute sprain of the right ankle, no instability noted, hx of many similar sprains  Tri-dhiraj ankle brace provided, reviewed proprioception and stability goals  PT available, declined for now  Reviewed likely impact of ankle injury on increased contralateral knee and lower leg pain, lower leg pain is tight/overused anterior tibialis muscle  XR images independently visualized and reviewed with patient today in clinic  Signs of chronic sprains on XR in dorsal, medial, lateral ankle, small achilles enthesophyte, midfoot DJD  Consider OTC supportive inserts, could offer custom orthotics as well given functional pes planus and developing bunions  No clear acute findings, ankle mortise congruent  Reviewed options for knees, last round of visco on the right was not helpful, reviewed option to try on the left  Reviewed options for CSI vs PRP trial as well, declined for now  Orthopedic surgery referral option reviewed  Home handouts provided and supportive care reviewed  All questions were answered today  Contact us with additional questions or concerns  Signs and sx of concern reviewed      Robert Regan DO, NICOLAS  Sports Medicine Physician  [facility]      Time spent in chart review, one-on-one evaluation, discussion with patient regarding: nature of problem, clinical course, prior treatments, therapeutic options, shared-decision making, potential procedures and referrals, and charting related to the visit: 36 minutes.  If applicable, time does not include time spent performing any procedure.

## 2024-10-18 NOTE — LETTER
10/18/2024      Andrew Atkinson  [address] [address]      Dear Colleague,    Thank you for referring your patient, Andrew Atkinson, to the [facility]. Please see a copy of my visit note below.    Andrew Atkinson  :  1970  DOS: 10/18/2024  MRN: [id number]    Sports Medicine Clinic Visit    PCP: Wilfredo Dela Cruz    Andrew Atkinson is a 53 year old adult who is seen as a self referral presenting with right ankle pain    Injury: Patient describes injury as 1.5 weeks, twisted while walking.  Pain located over right lateral ankle, nonradiating.  Additional Features:  Positive: point tenderness.  Symptoms are better with Rest.  Symptoms are worse with: walking, increased activity.  Other evaluation and/or treatments so far consists of: elevation, ice, rest.  Prior imaging: Right knee MRI completed 10/11/23, 22, no images of left knee or right ankle.  Prior History of related problems: chronic ankle and knee pain     Social History:  business owner    Interim History - 2024  Since last visit on 23 patient has persisting bilateral knee pain.  States that the Synvisc injection performed in the right knee on 23 did not provide any relief.  Notes lateral left knee pain that can occasionally radiate to the lower leg.  No interim injury.   Also had a recent ankle injury, history of many sprains in his ankles when he was younger.  Swelling has improved, still point tenderness and some pain with activity.  Compensating has increased left knee and lower leg pain.    Review of Systems  Musculoskeletal: as above  Remainder of review of systems is negative including constitutional, CV, pulmonary, GI, Skin and Neurologic except as noted in HPI or medical history.    Past Medical History:   Diagnosis Date     Medial meniscus tear      TIFFANY (obstructive sleep apnea) 3/2/2023     Past Surgical History:   Procedure Laterality  "Date     APPENDECTOMY       ARTHROSCOPY KNEE WITH MEDIAL MENISCECTOMY Left 05/12/2017    Procedure: ARTHROSCOPY KNEE WITH MEDIAL MENISCECTOMY;  Arthroscopic partial medial menisectomy,left knee;  Surgeon: Grant Muñoz MD;  Location: [facility] OR     ARTHROSCOPY SHOULDER ROTATOR CUFF REPAIR, SUBACROMIAL DECOMP, DIST CLAVICLE RESECTN, BICEP TENOTOMY Right 2/13/2024    Procedure: ARTHROSCOPY, SHOULDER, WITH ROTATOR CUFF REPAIR, SUBACROMIAL SPACE DECOMPRESSION, AND biceps tenodesis;  Surgeon: Rebecca Meza MD;  [facility]: [facility]     COLONOSCOPY N/A 07/18/2023    Procedure: Colonoscopy with polypectomy;  Surgeon: Wilfredo Molina MD;  Location:  GI     CV CORONARY ANGIOGRAM N/A 02/15/2020    Procedure: Coronary Angiogram;  Surgeon: Rinku Boyce MD;  Location:  [facility]     LAPAROSCOPIC HERNIORRHAPHY UMBILICAL N/A 12/08/2020    Procedure: umbilical hernia repair;  Surgeon: Miguel Ángel Arnold DO;  Location:  OR     ORTHOPEDIC SURGERY      3 low back proceudres     Family History   Problem Relation Age of Onset     Unknown/Adopted No family hx of        Objective  Ht 1.803 m (5' 10.98\")   Wt 106.6 kg (235 lb)   BMI 32.79 kg/m      General: healthy, alert and in no distress    HEENT: no scleral icterus or conjunctival erythema   Skin: no suspicious lesions or rash. No jaundice.   CV: regular rhythm by palpation, 2+ distal pulses, no pedal edema    Resp: normal respiratory effort without conversational dyspnea   Psych: normal mood and affect    Gait: mildly antalgic, appropriate coordination and balance   Neuro: normal light touch sensory exam of the extremities. Motor strength as noted below     Right Ankle/Foot Exam:    Inspection:       no visible ecchymosis       edema noted mild lateral ankle       Normal DP artery pulse       Normal PT artery pulse    Foot inspection:        Normal/high arch at rest, functional overpronation mildly with ambulation, bunions noted b/l, nontender    ROM: "        full ROM with dorsiflexion, plantarflexion, inversion and eversion    Tender:       lateral ankle ligaments       tibialis posterior tendon, mildly posterior to medial malleolus    Non-Tender:       remainder of foot and ankle    Skin:       well perfused       capillary refill less than 2 seconds    Special Tests:       neg (-) anterior drawer        neg (-) talar tilt        neg (-) external rotation testing     Gait:       normal    Proprioception:        deferred    Bilateral Knee exam    ROM:        Flexion 140 degrees       Extension 0 degrees       Range of motion limited by mild pain in terminal flexion    Inspection:       no visible ecchymosis        effusion noted trace    Skin:       no visible deformities       well perfused       capillary refill brisk    Patellar Motion:        Lateral tilt noted in patella       Crepitus noted in the patellofemoral joint    Tender:        lateral patellar border mild left       medial joint line b/l       Anterior tibialis muscle left    Non Tender:         remainder of knee area        medial patellar border        lateral joint line        along MCL        distal IT Band        infrapatellar tendon        tibial tubercle       pes anserine bursa    Special Tests:        Mildly painful right>left medial Lenny       neg (-) Lachmans       neg (-) varus at 0 deg and 30 deg       neg (-) valgus at 0 deg and 30 deg      Radiology:  XR Knee Standing AP [place] Bilat Lat Left    Narrative    EXAM: XR KNEE STANDING AP [place] BILAT LAT LEFT  DATE/TIME: 10/18/2024 8:20 AM    INDICATION: Left knee pain  COMPARISON: No direct comparison available.      Impression    IMPRESSION: Mild narrowing of the medial compartment of the right  knee. Left knee joint spaces are preserved and normally aligned. No  acute fracture or left knee joint effusion.    RAYMON WILLIAM DO         SYSTEM ID:  [id number]   XR Ankle RT G/E 3 vw    Narrative    EXAM: XR ANKLE RIGHT G/E 3  VIEWS  DATE/TIME: 10/18/2024 8:20 AM    INDICATION: Right ankle pain  COMPARISON: None available.       Impression    IMPRESSION: Normal joint spaces and alignment. Intact ankle mortise.  No acute fracture.       RAYMON WILLIAM DO         SYSTEM ID:  [id number]       Assessment:  1. Bilateral chronic knee pain    2. Acute right ankle pain    3. Primary osteoarthritis of both knees    4. Sprain of anterior talofibular ligament of right ankle, initial encounter        Plan:  Discussed the assessment with the patient.  Follow up: prn based on short term progress  Acute sprain of the right ankle, no instability noted, hx of many similar sprains  Tri-dhiraj ankle brace provided, reviewed proprioception and stability goals  PT available, declined for now  Reviewed likely impact of ankle injury on increased contralateral knee and lower leg pain, lower leg pain is tight/overused anterior tibialis muscle  XR images independently visualized and reviewed with patient today in clinic  Signs of chronic sprains on XR in dorsal, medial, lateral ankle, small achilles enthesophyte, midfoot DJD  Consider OTC supportive inserts, could offer custom orthotics as well given functional pes planus and developing bunions  No clear acute findings, ankle mortise congruent  Reviewed options for knees, last round of visco on the right was not helpful, reviewed option to try on the left  Reviewed options for CSI vs PRP trial as well, declined for now  Orthopedic surgery referral option reviewed  Home handouts provided and supportive care reviewed  All questions were answered today  Contact us with additional questions or concerns  Signs and sx of concern reviewed      Robert Regan DO, NICOLAS  Sports Medicine Physician  [facility]oped[facility] and [facility] Medicine      Time spent in chart review, one-on-one evaluation, discussion with patient regarding: nature of problem, clinical course, prior treatments, therapeutic options, shared-decision  making, potential procedures and referrals, and charting related to the visit: 36 minutes.  If applicable, time does not include time spent performing any procedure.              Again, thank you for allowing me to participate in the care of your patient.        Sincerely,        Robert Regan, DO

## 2024-10-23 NOTE — TELEPHONE ENCOUNTER
Ear is feeling much better. No longer weeping. Hearing has improved. Patient still needs direction on his CT results and plan.    Scheduled for appointment tomorrow in [place].    Latricia Lipscomb RN on 10/23/2024 at 9:15 AM

## 2024-10-23 NOTE — TELEPHONE ENCOUNTER
Dr. David spoke with Dr. Cordero. Dr. David now has a few additional interventions for the patient and would like to see in clinic to discuss.     Writer connecting with [facility] staff to request approval to schedule at 11:15 am tomorrow.    Latricia Lipscomb RN on 10/23/2024 at 8:33 AM     details…

## 2024-10-23 NOTE — PROGRESS NOTES
History of Present Illness - Andrew Atkinson is a 53 year old adult presenting in clinic today for a recheck on Patient presents with:  RECHECK  Otitis Media  Ear Problem: Chronic nonsuppurative otitis media, bilateral [H65.493]; Perforation of tympanic membrane, right [H72.91]; Drainage from left ear  Patient with history of severe otitis media and mastoiditis involving his left ear with continuous drainage in spite of aggressive medical therapy presents for follow-up.  He previously had right tympanoplasty and his right ear has not been bothering him.  Apparently his had a lot of ear infections as a child.  Now after multiple course of drops and aggressive antibiotics he actually says that he feels much better.  Right ear still hears better than the left but he is not sure whether prior to infection there was no difference within the ears.  No pain no significant pressure noted on the left now.  Patient was scheduled to see Dr. Viviana Cordero Otologist in the [facility] early December.    BP Readings from Last 1 Encounters:   10/08/24 130/80       BP noted to be well controlled today in office.     Andrew IS NOT a smoker/uses chewing tobacco.  Andrew already quit      Past Medical History -   Past Medical History:   Diagnosis Date    Medial meniscus tear     TIFFANY (obstructive sleep apnea) 3/2/2023       Current Medications -   Current Outpatient Medications:     amoxicillin-clavulanate (AUGMENTIN) 875-125 MG tablet, Take 1 tablet by mouth 2 times daily for 20 days., Disp: 40 tablet, Rfl: 0    aspirin 81 MG EC tablet, Take 1 tablet (81 mg) by mouth daily (Patient not taking: Reported on 10/8/2024), Disp: 30 tablet, Rfl: 0    blood glucose (NO BRAND SPECIFIED) lancets standard, Use to test blood sugar 1 times daily or as directed.  Any brand that is covered., Disp: 50 Lancet, Rfl: 1    blood glucose (NO BRAND SPECIFIED) test strip, Freestyle Precision Test strips to use with Cristian 3 . Use to test  blood sugar 1 times daily or as directed., Disp: 50 strip, Rfl: 1    ciprofloxacin-dexAMETHasone (CIPRODEX) 0.3-0.1 % otic suspension, Place 4 drops into both ears 2 times daily., Disp: 7.5 mL, Rfl: 0    Continuous Glucose Sensor (FREESTYLE NICOLE 3 PLUS SENSOR) MISC, Use 1 sensor every 15 days. Use to read blood sugars per 's instructions., Disp: 6 each, Rfl: 3    diltiazem ER (CARDIAZEM LA) 120 MG 24 hr tablet, Take 1 tablet (120 mg) by mouth daily (Patient not taking: Reported on 10/8/2024), Disp: 90 tablet, Rfl: 3    fish oil-omega-3 fatty acids 1000 MG capsule, Take 2 g by mouth every morning (Patient not taking: Reported on 5/28/2024), Disp: , Rfl:     fluticasone (FLONASE) 50 MCG/ACT nasal spray, Spray 1 spray into both nostrils daily., Disp: 16 g, Rfl: 1    Lancet Devices (LANCET DEVICE WITH EJECTOR) MISC, 1 Device daily. Generic lancet device, Disp: 1 each, Rfl: 0    levothyroxine (SYNTHROID/LEVOTHROID) 150 MCG tablet, Take 1 tablet (150 mcg) by mouth daily. With 12.5mg tab, Disp: 90 tablet, Rfl: 3    levothyroxine (SYNTHROID/LEVOTHROID) 25 MCG tablet, TAKE 1/2 TABLET BY MOUTH ONCE DAILY WITH 150MCG TABLET, Disp: 90 tablet, Rfl: 3    Magnesium Oxide POWD, Take 1 Scoop by mouth every morning., Disp: , Rfl:     multivitamin w/minerals (THERA-VIT-M) tablet, Take 1 tablet by mouth every morning, Disp: , Rfl:     pravastatin (PRAVACHOL) 40 MG tablet, Take 1 tablet (40 mg) by mouth daily, Disp: 90 tablet, Rfl: 2    predniSONE (DELTASONE) 20 MG tablet, Take 3 tabs by mouth daily x 3 days, then 2 tabs daily x 3 days, then 1 tab daily x 3 days, then 1/2 tab daily x 3 days. (Patient not taking: Reported on 10/4/2024), Disp: 20 tablet, Rfl: 0    UNABLE TO FIND, Take 1 tablet by mouth every morning. MEDICATION NAME: Organic Mushrooms, Lions fauzia turkey tail, Disp: , Rfl:     Allergies -   Allergies   Allergen Reactions    Morphine Hives     States tolerates Oxy fine       Social History -   Social History      Socioeconomic History    Marital status: Single   Tobacco Use    Smoking status: Former     Types: Cigarettes    Smokeless tobacco: Never    Tobacco comments:     Quit tobacco in 2007   Vaping Use    Vaping status: Never Used   Substance and Sexual Activity    Alcohol use: Yes     Alcohol/week: 3.0 - 4.0 standard drinks of alcohol     Types: 3 - 4 Standard drinks or equivalent per week     Comment: 3-4 drinks aweek    Drug use: Yes     Types: Marijuana     Comment: 2x/ week    Sexual activity: Yes     Partners: Female     Social Drivers of Health     Financial Resource Strain: Unknown (10/4/2024)    Financial Resource Strain     Within the past 12 months, have you or your family members you live with been unable to get utilities (heat, electricity) when it was really needed?: Patient declined   Food Insecurity: Unknown (10/4/2024)    Food Insecurity     Within the past 12 months, did you worry that your food would run out before you got money to buy more?: Patient declined     Within the past 12 months, did the food you bought just not last and you didn t have money to get more?: Patient declined   Transportation Needs: Unknown (10/4/2024)    Transportation Needs     Within the past 12 months, has lack of transportation kept you from medical appointments, getting your medicines, non-medical meetings or appointments, work, or from getting things that you need?: Patient declined   Physical Activity: Sufficiently Active (10/4/2024)    Exercise Vital Sign     Days of Exercise per Week: 7 days     Minutes of Exercise per Session: 60 min   Stress: No Stress Concern Present (10/4/2024)    [employer] - Occupational Stress Questionnaire     Feeling of Stress : Only a little   Social Connections: Unknown (10/4/2024)    Social Connection and Isolation Panel [NHANES]     Frequency of Social Gatherings with Friends and Family: Twice a week   Interpersonal Safety: Low Risk  (10/4/2024)     Interpersonal Safety     Do you feel physically and emotionally safe where you currently live?: Yes     Within the past 12 months, have you been hit, slapped, kicked or otherwise physically hurt by someone?: No     Within the past 12 months, have you been humiliated or emotionally abused in other ways by your partner or ex-partner?: No   Housing Stability: Unknown (10/4/2024)    Housing Stability     Do you have housing? : Patient declined     Are you worried about losing your housing?: Patient declined       Family History -   Family History   Problem Relation Age of Onset    Unknown/Adopted No family hx of        Review of Systems - As per HPI and PMHx, otherwise review of system review of the head and neck negative. Otherwise 10+ review of system is negative    Physical Exam  There were no vitals taken for this visit.  BMI: There is no height or weight on file to calculate BMI.    General - The patient is well nourished and well developed, and appears to have good nutritional status.  Alert and oriented to person and place, answers questions and cooperates with examination appropriately.    SKIN - No suspicious lesions or rashes.  Respiration - No respiratory distress.  Head and Face - Normocephalic and atraumatic, with no gross asymmetry noted of the contour of the facial features.  The facial nerve is intact, with strong symmetric movements.    Voice and Breathing - The patient was breathing comfortably without the use of accessory muscles. The patients voice was clear and strong, and had appropriate pitch and quality.    Ears - Bilateral pinna and EACs with normal appearing overlying skin.  Right tympanic membrane is very thickened possibly due to previous tympanoplasty.  On the left tympanic membrane is somewhat retracted but relatively clear.  Both canals are quite clear and dry.  No fluid or purulence was seen in the external canal or the middle ear.   No postauricular tenderness noted on either side.  Eyes  - Extraocular movements intact.  Sclera were not icteric or injected, conjunctiva were pink and moist.      Neck - Normal midline excursion of the laryngotracheal complex during swallowing.  Full range of motion on passive movement.  Palpation of the occipital, submental, submandibular, internal jugular chain, and supraclavicular nodes did not demonstrate any abnormal lymph nodes or masses.  The carotid pulse was palpable bilaterally.  Palpation of the thyroid was soft and smooth, with no nodules or goiter appreciated.  The trachea was mobile and midline.    Nose - External contour is symmetric, no gross deflection or scars.  Nasal mucosa is pink and moist with no abnormal mucus.  The septum was midline and non-obstructive, turbinates of normal size and position.  No polyps, masses, or purulence noted on examination.    Neuro - Nonfocal neuro exam is normal, CN 2 through 12 intact, normal gait and muscle tone.    We discussed patient's CT scan with him at length today.  Performed in clinic today:  Tympanograms were performed today that shows a high volume flat tympanogram on the right likely consistent with possible small microperforation not easily visible on exam.  On the left tympanic membrane shows type C tympanogram with a relatively decent peak.  Tuning fork testing at 512 Hz.  Valdez lateralizes to the right side and underwent a testing AC greater than BC bilaterally.    A/P - Andrew Atkinson is a 53 year old adult Patient presents with:  RECHECK  Otitis Media  Ear Problem: Chronic nonsuppurative otitis media, bilateral [H65.493]; Perforation of tympanic membrane, right [H72.91]; Drainage from left ear    Patient with a significant clinical resolution of severe otitis media mastoiditis on the left.    Andrew should follow up in 1 month.      At Andrew next appointment they will need a hearing test.  I suspect he might have more of a sensorineural loss involving his left ear.  In the meantime we will she will  decide whether he needs to follow-up at the [facility] afterwards.      Shaheen David MD

## 2024-10-24 ENCOUNTER — OFFICE VISIT (OUTPATIENT)
Dept: OTOLARYNGOLOGY | Facility: CLINIC | Age: 54
End: 2024-10-24
Payer: COMMERCIAL

## 2024-10-24 ENCOUNTER — OFFICE VISIT (OUTPATIENT)
Dept: AUDIOLOGY | Facility: CLINIC | Age: 54
End: 2024-10-24
Payer: COMMERCIAL

## 2024-10-24 VITALS
WEIGHT: 232 LBS | DIASTOLIC BLOOD PRESSURE: 78 MMHG | HEART RATE: 63 BPM | BODY MASS INDEX: 32.38 KG/M2 | SYSTOLIC BLOOD PRESSURE: 126 MMHG

## 2024-10-24 DIAGNOSIS — H72.91 PERFORATION OF RIGHT TYMPANIC MEMBRANE: Primary | ICD-10-CM

## 2024-10-24 DIAGNOSIS — H74.12 CHRONIC ADHESIVE OTITIS MEDIA, LEFT: Primary | ICD-10-CM

## 2024-10-24 PROCEDURE — 99213 OFFICE O/P EST LOW 20 MIN: CPT | Performed by: OTOLARYNGOLOGY

## 2024-10-24 PROCEDURE — 92567 TYMPANOMETRY: CPT | Performed by: AUDIOLOGIST

## 2024-10-24 NOTE — PROGRESS NOTES
AUDIOLOGY REPORT:    Patient was referred from ENT by Dr. David for tympanograms, he complains of bilateral middle ear infections..       Testing:     Otoscopy:              Otoscopic exam indicates ears are clear of cerumen bilaterally      Tympanograms:               RIGHT: Large volume consistent with tympanic membrane perforation               LEFT:   negative pressure         Discussed results with the patient.      Patient was returned to ENT for follow up.      Trey Spear MA, CCC-A  Licensed Audiologist #[id number]  10/24/2024

## 2024-10-24 NOTE — LETTER
10/24/2024      Andrew Atkinson  [address] [address]      Dear Colleague,    Thank you for referring your patient, Andrew Atkinson, to the [facility]. Please see a copy of my visit note below.    History of Present Illness - Andrew Atkinson is a 53 year old adult presenting in clinic today for a recheck on Patient presents with:  RECHECK  Otitis Media  Ear Problem: Chronic nonsuppurative otitis media, bilateral [H65.493]; Perforation of tympanic membrane, right [H72.91]; Drainage from left ear  Patient with history of severe otitis media and mastoiditis involving his left ear with continuous drainage in spite of aggressive medical therapy presents for follow-up.  He previously had right tympanoplasty and his right ear has not been bothering him.  Apparently his had a lot of ear infections as a child.  Now after multiple course of drops and aggressive antibiotics he actually says that he feels much better.  Right ear still hears better than the left but he is not sure whether prior to infection there was no difference within the ears.  No pain no significant pressure noted on the left now.  Patient was scheduled to see Dr. Viviana Cordero Otologist in the [facility] early December.    BP Readings from Last 1 Encounters:   10/08/24 130/80       BP noted to be well controlled today in office.     Andrew IS NOT a smoker/uses chewing tobacco.  Andrew already quit      Past Medical History -   Past Medical History:   Diagnosis Date     Medial meniscus tear      TIFFANY (obstructive sleep apnea) 3/2/2023       Current Medications -   Current Outpatient Medications:      amoxicillin-clavulanate (AUGMENTIN) 875-125 MG tablet, Take 1 tablet by mouth 2 times daily for 20 days., Disp: 40 tablet, Rfl: 0     aspirin 81 MG EC tablet, Take 1 tablet (81 mg) by mouth daily (Patient not taking: Reported on 10/8/2024), Disp: 30 tablet, Rfl: 0     blood glucose (NO BRAND SPECIFIED) lancets  standard, Use to test blood sugar 1 times daily or as directed.  Any brand that is covered., Disp: 50 Lancet, Rfl: 1     blood glucose (NO BRAND SPECIFIED) test strip, Freestyle Precision Test strips to use with Cristian 3 . Use to test blood sugar 1 times daily or as directed., Disp: 50 strip, Rfl: 1     ciprofloxacin-dexAMETHasone (CIPRODEX) 0.3-0.1 % otic suspension, Place 4 drops into both ears 2 times daily., Disp: 7.5 mL, Rfl: 0     Continuous Glucose Sensor (FREESTYLE CRISTIAN 3 PLUS SENSOR) MISC, Use 1 sensor every 15 days. Use to read blood sugars per 's instructions., Disp: 6 each, Rfl: 3     diltiazem ER (CARDIAZEM LA) 120 MG 24 hr tablet, Take 1 tablet (120 mg) by mouth daily (Patient not taking: Reported on 10/8/2024), Disp: 90 tablet, Rfl: 3     fish oil-omega-3 fatty acids 1000 MG capsule, Take 2 g by mouth every morning (Patient not taking: Reported on 5/28/2024), Disp: , Rfl:      fluticasone (FLONASE) 50 MCG/ACT nasal spray, Spray 1 spray into both nostrils daily., Disp: 16 g, Rfl: 1     Lancet Devices (LANCET DEVICE WITH EJECTOR) MISC, 1 Device daily. Generic lancet device, Disp: 1 each, Rfl: 0     levothyroxine (SYNTHROID/LEVOTHROID) 150 MCG tablet, Take 1 tablet (150 mcg) by mouth daily. With 12.5mg tab, Disp: 90 tablet, Rfl: 3     levothyroxine (SYNTHROID/LEVOTHROID) 25 MCG tablet, TAKE 1/2 TABLET BY MOUTH ONCE DAILY WITH 150MCG TABLET, Disp: 90 tablet, Rfl: 3     Magnesium Oxide POWD, Take 1 Scoop by mouth every morning., Disp: , Rfl:      multivitamin w/minerals (THERA-VIT-M) tablet, Take 1 tablet by mouth every morning, Disp: , Rfl:      pravastatin (PRAVACHOL) 40 MG tablet, Take 1 tablet (40 mg) by mouth daily, Disp: 90 tablet, Rfl: 2     predniSONE (DELTASONE) 20 MG tablet, Take 3 tabs by mouth daily x 3 days, then 2 tabs daily x 3 days, then 1 tab daily x 3 days, then 1/2 tab daily x 3 days. (Patient not taking: Reported on 10/4/2024), Disp: 20 tablet, Rfl: 0     UNABLE TO  FIND, Take 1 tablet by mouth every morning. MEDICATION NAME: Organic Mushrooms, Lions fauzia turkey tail, Disp: , Rfl:     Allergies -   Allergies   Allergen Reactions     Morphine Hives     States tolerates Oxy fine       Social History -   Social History     Socioeconomic History     Marital status: Single   Tobacco Use     Smoking status: Former     Types: Cigarettes     Smokeless tobacco: Never     Tobacco comments:     Quit tobacco in 2007   Vaping Use     Vaping status: Never Used   Substance and Sexual Activity     Alcohol use: Yes     Alcohol/week: 3.0 - 4.0 standard drinks of alcohol     Types: 3 - 4 Standard drinks or equivalent per week     Comment: 3-4 drinks aweek     Drug use: Yes     Types: Marijuana     Comment: 2x/ week     Sexual activity: Yes     Partners: Female     Social Drivers of Health     Financial Resource Strain: Unknown (10/4/2024)    Financial Resource Strain      Within the past 12 months, have you or your family members you live with been unable to get utilities (heat, electricity) when it was really needed?: Patient declined   Food Insecurity: Unknown (10/4/2024)    Food Insecurity      Within the past 12 months, did you worry that your food would run out before you got money to buy more?: Patient declined      Within the past 12 months, did the food you bought just not last and you didn t have money to get more?: Patient declined   Transportation Needs: Unknown (10/4/2024)    Transportation Needs      Within the past 12 months, has lack of transportation kept you from medical appointments, getting your medicines, non-medical meetings or appointments, work, or from getting things that you need?: Patient declined   Physical Activity: Sufficiently Active (10/4/2024)    Exercise Vital Sign      Days of Exercise per Week: 7 days      Minutes of Exercise per Session: 60 min   Stress: No Stress Concern Present (10/4/2024)    [employer] - Occupational Stress  Questionnaire      Feeling of Stress : Only a little   Social Connections: Unknown (10/4/2024)    Social Connection and Isolation Panel [NHANES]      Frequency of Social Gatherings with Friends and Family: Twice a week   Interpersonal Safety: Low Risk  (10/4/2024)    Interpersonal Safety      Do you feel physically and emotionally safe where you currently live?: Yes      Within the past 12 months, have you been hit, slapped, kicked or otherwise physically hurt by someone?: No      Within the past 12 months, have you been humiliated or emotionally abused in other ways by your partner or ex-partner?: No   Housing Stability: Unknown (10/4/2024)    Housing Stability      Do you have housing? : Patient declined      Are you worried about losing your housing?: Patient declined       Family History -   Family History   Problem Relation Age of Onset     Unknown/Adopted No family hx of        Review of Systems - As per HPI and PMHx, otherwise review of system review of the head and neck negative. Otherwise 10+ review of system is negative    Physical Exam  There were no vitals taken for this visit.  BMI: There is no height or weight on file to calculate BMI.    General - The patient is well nourished and well developed, and appears to have good nutritional status.  Alert and oriented to person and place, answers questions and cooperates with examination appropriately.    SKIN - No suspicious lesions or rashes.  Respiration - No respiratory distress.  Head and Face - Normocephalic and atraumatic, with no gross asymmetry noted of the contour of the facial features.  The facial nerve is intact, with strong symmetric movements.    Voice and Breathing - The patient was breathing comfortably without the use of accessory muscles. The patients voice was clear and strong, and had appropriate pitch and quality.    Ears - Bilateral pinna and EACs with normal appearing overlying skin.  Right tympanic membrane is very thickened possibly  due to previous tympanoplasty.  On the left tympanic membrane is somewhat retracted but relatively clear.  Both canals are quite clear and dry.  No fluid or purulence was seen in the external canal or the middle ear.   No postauricular tenderness noted on either side.  Eyes - Extraocular movements intact.  Sclera were not icteric or injected, conjunctiva were pink and moist.      Neck - Normal midline excursion of the laryngotracheal complex during swallowing.  Full range of motion on passive movement.  Palpation of the occipital, submental, submandibular, internal jugular chain, and supraclavicular nodes did not demonstrate any abnormal lymph nodes or masses.  The carotid pulse was palpable bilaterally.  Palpation of the thyroid was soft and smooth, with no nodules or goiter appreciated.  The trachea was mobile and midline.    Nose - External contour is symmetric, no gross deflection or scars.  Nasal mucosa is pink and moist with no abnormal mucus.  The septum was midline and non-obstructive, turbinates of normal size and position.  No polyps, masses, or purulence noted on examination.    Neuro - Nonfocal neuro exam is normal, CN 2 through 12 intact, normal gait and muscle tone.    We discussed patient's CT scan with him at length today.  Performed in clinic today:  Tympanograms were performed today that shows a high volume flat tympanogram on the right likely consistent with possible small microperforation not easily visible on exam.  On the left tympanic membrane shows type C tympanogram with a relatively decent peak.  Tuning fork testing at 512 Hz.  Valdez lateralizes to the right side and underwent a testing AC greater than BC bilaterally.    A/P - Andrew Atkinson is a 53 year old adult Patient presents with:  RECHECK  Otitis Media  Ear Problem: Chronic nonsuppurative otitis media, bilateral [H65.493]; Perforation of tympanic membrane, right [H72.91]; Drainage from left ear    Patient with a significant clinical  resolution of severe otitis media mastoiditis on the left.    Andrew should follow up in 1 month.      At Andrew next appointment they will need a hearing test.  I suspect he might have more of a sensorineural loss involving his left ear.  In the meantime we will she will decide whether he needs to follow-up at the [facility] afterwards.      Shaheen David MD          Again, thank you for allowing me to participate in the care of your patient.        Sincerely,        Shaheen David MD, MD

## 2024-11-20 ENCOUNTER — MYC REFILL (OUTPATIENT)
Dept: CARDIOLOGY | Facility: CLINIC | Age: 54
End: 2024-11-20

## 2024-11-20 ENCOUNTER — VIRTUAL VISIT (OUTPATIENT)
Dept: EDUCATION SERVICES | Facility: OTHER | Age: 54
End: 2024-11-20
Payer: COMMERCIAL

## 2024-11-20 DIAGNOSIS — I42.2 HYPERTROPHIC CARDIOMYOPATHY (H): ICD-10-CM

## 2024-11-20 DIAGNOSIS — E11.9 TYPE 2 DIABETES MELLITUS WITHOUT COMPLICATION, WITHOUT LONG-TERM CURRENT USE OF INSULIN (H): ICD-10-CM

## 2024-11-20 NOTE — PROGRESS NOTES
Diabetes Self-Management Education & Support    Presents for: Follow-up for DM2    Type of service:  Video Visit    If the video visit is dropped, the video visit invitation should be resent by: Text to cell phone: [phone]    Originating Location (pt. Location): Home  Distant Location (provider location): [facility]  Mode of Communication:  Video Conference via [employer]    Video Start Time:  755am  Video End Time (time video stopped): 828am    How would patient like to obtain AVS? Mail a copy      ASSESSMENT:  Keven is seen today for diabetes education follow up.  He has been working hard on diet to control his BS.  He does not want to take medications.  He stopped his pravastatin as he thought it was contributing to his elevations in Bgs.    Keven reports his weight was 225# today. (EPIC weight says 232#).  He states his provider wants him down to 200# so he is going to continue to work on this.    Glucose 128 now on reader    Keven reads me the following averages from his reader:  Daily average 7 days 118    Time in target  7 days 98%  30 days 96%    Has had a couple lows just under 70 but this was after large spikes in SG.    He hasn't had time to change to his Cristian 3 john yet but still plans to do so.    He has lost quite a few sensors early from bumping it or it falling off.  Discussed products he can purchase from [employer] such as skin tac wipes, skin /grif /simpatch.  He wrote these names down.  He can also call [employer] for replacements if they fall off before the 14 days.  Phone # provided.  Told him to save box with lot # for reference.    Keven reports he still has cravings but his go to snack is keto flat bread with  PB, honey and banana.  Eating out is still a challenge sometimes but he doesn't do this often.    Feels more energy, getting more done.  Is happy with his progress.    Freestyle precision sofi test strips are not working with the reader.  We tried  over video today together. When he puts the test strip in, it says E7 and one of the reasons was it does not recognize the test strip.  Test strips ordered were precision, not precision sofi.  He states the pharmacy called [employer] and they said they would work.  I gave him the phone number to [employer] so he can call himself.  Website says precision strips are compatible.  I  also resent the rx to pharmacy.    Ongoing plan is to continue healthy diet choices and exercise.  Always monitor BS- maybe CGM once a month or every 3 months (they are $$) and do fingersticks 1-3 times a week.  Never stop watching.  See PCP in late Jan/Feb with recheck A1C.  Diabetes Ed in 1 year.    Patient's most recent   Lab Results   Component Value Date    A1C 9.6 10/04/2024    A1C 5.9 03/10/2020     is not meeting goal of <7.0        PLAN    GREAT JOB BIANCA!!!  Keep it up!!  I think you need Freestyle Precision test strips for the reader, not Precision Sofi   You can purchase products from [employer] to help keep your sensor in place such as skin tac wipes, skin /grif /simpatch.    You can call [employer] for sensor replacements if they fall off before the 14 days.  Save the box with lot # for reference.  See your provider in late Jan/Feb and have A1C rechecked.  You should be at goal of <7  See diabetes ed in 1 year or sooner if needed.    Topics to cover at upcoming visits: Healthy Eating, Being Active, Monitoring, Taking Medication, Problem Solving, Reducing Risks, and Healthy Coping- review all at 1 year fu    Follow-up: 1 year    See Care Plan for co-developed, patient-state behavior change goals.  AVS provided for patient today.    Education Materials Provided:  No new materials provided today      SUBJECTIVE/OBJECTIVE:  Presents for: Follow-up  Accompanied by: Self  Diabetes education in the past 24mo: (Patient-Rptd) Yes  Focus of Visit: Monitoring  Diabetes type: (Patient-Rptd) Type 2  Disease course: Stable  How confident are you  "filling out medical forms by yourself:: (Patient-Rptd) Somewhat  Other concerns:: Glasses  Cultural Influences/Ethnic Background:  Not  or       Diabetes Symptoms & Complications:  Diabetes Related Symptoms: (Patient-Rptd) None  Weight trend: (Patient-Rptd) Decreasing  Symptom course: Stable  Disease course: Stable  Complications assessed today?: No    Patient Problem List and Family Medical History reviewed for relevant medical history, current medical status, and diabetes risk factors.    Vitals:  There were no vitals taken for this visit.  Estimated body mass index is 32.38 kg/m  as calculated from the following:    Height as of 10/18/24: 1.803 m (5' 10.98\").    Weight as of 10/24/24: 105.2 kg (232 lb).   Last 3 BP:   BP Readings from Last 3 Encounters:   10/24/24 126/78   10/08/24 130/80   10/04/24 130/82       History   Smoking Status    Former    Types: Cigarettes   Smokeless Tobacco    Never       Labs:  Lab Results   Component Value Date    A1C 9.6 10/04/2024    A1C 5.9 03/10/2020     Lab Results   Component Value Date     10/04/2024     02/13/2024     03/02/2023     02/15/2020     Lab Results   Component Value Date     10/04/2024    LDL 78 02/14/2020     HDL Cholesterol   Date Value Ref Range Status   02/14/2020 26 (L) >39 mg/dL Final     Direct Measure HDL   Date Value Ref Range Status   10/04/2024 35 (L) >=40 mg/dL Final   ]  GFR Estimate   Date Value Ref Range Status   10/04/2024 81 >60 mL/min/1.73m2 Final     Comment:     The generation of the estimated GFR is currently based on binary male or female sex. If the electronic health record information indicates another gender identity or if Legal Sex is recorded as \"Unknown\", GFR estimates are not automatically calculated, and application of GFR equations or a direct GFR measurement should be considered according to the individual's appropriate clinical context.  eGFR calculated using 2021 CKD-EPI equation. " "  11/30/2020 >90 >60 mL/min/[1.73_m2] Final     Comment:     Non  GFR Calc  Starting 12/18/2018, serum creatinine based estimated GFR (eGFR) will be   calculated using the Chronic Kidney Disease Epidemiology Collaboration   (CKD-EPI) equation.       GFR Estimate If Black   Date Value Ref Range Status   11/30/2020 >90 >60 mL/min/[1.73_m2] Final     Comment:      GFR Calc  Starting 12/18/2018, serum creatinine based estimated GFR (eGFR) will be   calculated using the Chronic Kidney Disease Epidemiology Collaboration   (CKD-EPI) equation.       Lab Results   Component Value Date    CR 1.09 10/04/2024    CR 0.96 11/30/2020     No results found for: \"MICROALBUMIN\"    Healthy Eating:  Healthy Eating Assessed Today: Yes  Cultural/Holiness diet restrictions?: (Patient-Rptd) No  Meal planning/habits: Avoiding sweets, Low carb  Who cooks/prepares meals for you?: Self  Who purchases food in  your home?: Self  How many times a week on average do you eat food made away from home (restaurant/take-out)?: 2  Meals include: Breakfast, Lunch, Dinner  Breakfast: 10am: breakfast burrito or eggs/sausage/keto bread- 2 slices  Dinner: salad with meat, avocado OR pizza ranch salad and chicken  Snacks: keto flat bread with peanut butter, honey and banana  Beverages: Water, Diet soda, Alcohol  Has patient met with a dietitian in the past?: No    Being Active:  Being Active Assessed Today: No  Exercise:: Yes  Days per week of moderate to strenuous exercise (like a brisk walk): 5  On average, minutes per day of exercise at this level: 30  How intense was your typical exercise? : Moderate (like brisk walking)  Exercise Minutes per Week: 150  Barrier to exercise: (Patient-Rptd) None    Monitoring:  Monitoring Assessed Today: Yes  Did patient bring glucose meter to appointment? : No  Blood Glucose Meter: CGM, FreeStyle  Times checking blood sugar at home (number): (Patient-Rptd) 5+  Times checking blood sugar at " home (per): (Patient-Rptd) Day  Blood glucose trend: No change        Taking Medications:      Taking Medication Assessed Today: Yes  Current Treatments: Diet    Problem Solving:  Problem Solving Assessed Today: Yes  Is the patient at risk for hypoglycemia?: No  Is the patient at risk for DKA?: No  Does patient have severe weather/disaster plan for diabetes management?: Not Needed  Does patient have sick day plan for diabetes management?: Not Needed              Reducing Risks:  Reducing Risks Assessed Today: No  Diabetes Risks: Age over 45 years (unknown family history)  CAD Risks: Diabetes Mellitus, Male sex  Has dilated eye exam at least once a year?: Yes  Sees dentist every 6 months?: Yes  Feet checked by healthcare provider in the last year?: Yes    Healthy Coping:  Healthy Coping Assessed Today: No  Emotional response to diabetes: Ready to learn  Informal Support system:: (Patient-Rptd) Friends  Stage of change: ACTION (Actively working towards change)  Patient Activation Measure Survey Score:       No data to display                  Care Plan and Education Provided:  Monitoring: Blood glucose versus Continuous Glucose Monitoring, Frequency of monitoring, Individual glucose targets, and Log and interpret results    Brenda Overton, PharmD, [facility], [facility] and [facility]      Time Spent: 32 minutes  Encounter Type: Individual    Any diabetes medication dose changes were made via the CDE Protocol per the patient's referring provider. A copy of this encounter was shared with the provider.

## 2024-11-20 NOTE — PATIENT INSTRUCTIONS
GREAT JOB BIANCA!!!  Keep it up!!  I think you need Freestyle Precision test strips for the reader, not Precision Yrn   You can purchase products from [employer] to help keep your sensor in place such as skin tac wipes, skin /grif /simpatch.    You can call [employer] for sensor replacements if they fall off before the 14 days.  Save the box with lot # for reference.  See your provider in late Jan/Feb and have A1C rechecked.  You should be at goal of <7  See diabetes ed in 1 year or sooner if needed.    Brenda Overton, PharmD, [facility], [facility] and [facility]    [facility] for the [facility] Area:  For Your Diabetes Education and Nutrition Appointments Call:  [phone]   For Diabetes Education or Nutrition Related Questions:   Phone: [phone]  Send [employer] Message   If you need a medication refill please contact your pharmacy. Please allow 3 business days for your refills to be completed.

## 2024-11-20 NOTE — LETTER
11/20/2024         RE: Andrew Atkinson  [address] [address]        Dear Colleague,    Thank you for referring your patient, Andrew Atkinson, to the [facility]. Please see a copy of my visit note below.    Diabetes Self-Management Education & Support    Presents for: Follow-up for DM2    Type of service:  Video Visit    If the video visit is dropped, the video visit invitation should be resent by: Text to cell phone: [phone]    Originating Location (pt. Location): Home  Distant Location (provider location): [facility]  Mode of Communication:  Video Conference via [employer]    Video Start Time:  755am  Video End Time (time video stopped): 828am    How would patient like to obtain AVS? Mail a copy      ASSESSMENT:  Keven is seen today for diabetes education follow up.  He has been working hard on diet to control his BS.  He does not want to take medications.  He stopped his pravastatin as he thought it was contributing to his elevations in Bgs.    Keven reports his weight was 225# today. (EPIC weight says 232#).  He states his provider wants him down to 200# so he is going to continue to work on this.    Glucose 128 now on reader    Keven reads me the following averages from his reader:  Daily average 7 days 118    Time in target  7 days 98%  30 days 96%    Has had a couple lows just under 70 but this was after large spikes in SG.    He hasn't had time to change to his Cristian 3 john yet but still plans to do so.    He has lost quite a few sensors early from bumping it or it falling off.  Discussed products he can purchase from [employer] such as skin tac wipes, skin /grif /simpatch.  He wrote these names down.  He can also call [employer] for replacements if they fall off before the 14 days.  Phone # provided.  Told him to save box with lot # for reference.    Keven reports he still has cravings but his go to snack is keto flat bread  with  PB, honey and banana.  Eating out is still a challenge sometimes but he doesn't do this often.    Feels more energy, getting more done.  Is happy with his progress.    Freestyle precision sofi test strips are not working with the reader.  We tried over video today together. When he puts the test strip in, it says E7 and one of the reasons was it does not recognize the test strip.  Test strips ordered were precision, not precision sofi.  He states the pharmacy called [employer] and they said they would work.  I gave him the phone number to [employer] so he can call himself.  Website says precision strips are compatible.  I  also resent the rx to pharmacy.    Ongoing plan is to continue healthy diet choices and exercise.  Always monitor BS- maybe CGM once a month or every 3 months (they are $$) and do fingersticks 1-3 times a week.  Never stop watching.  See PCP in late Jan/Feb with recheck A1C.  Diabetes Ed in 1 year.    Patient's most recent   Lab Results   Component Value Date    A1C 9.6 10/04/2024    A1C 5.9 03/10/2020     is not meeting goal of <7.0        PLAN    GREAT JOB BIANCA!!!  Keep it up!!  I think you need Freestyle Precision test strips for the reader, not Precision Sofi   You can purchase products from [employer] to help keep your sensor in place such as skin tac wipes, skin /grif /simpatch.    You can call [employer] for sensor replacements if they fall off before the 14 days.  Save the box with lot # for reference.  See your provider in late Jan/Feb and have A1C rechecked.  You should be at goal of <7  See diabetes ed in 1 year or sooner if needed.    Topics to cover at upcoming visits: Healthy Eating, Being Active, Monitoring, Taking Medication, Problem Solving, Reducing Risks, and Healthy Coping- review all at 1 year fu    Follow-up: 1 year    See Care Plan for co-developed, patient-state behavior change goals.  AVS provided for patient today.    Education Materials Provided:  No new materials provided  "today      SUBJECTIVE/OBJECTIVE:  Presents for: Follow-up  Accompanied by: Self  Diabetes education in the past 24mo: (Patient-Rptd) Yes  Focus of Visit: Monitoring  Diabetes type: (Patient-Rptd) Type 2  Disease course: Stable  How confident are you filling out medical forms by yourself:: (Patient-Rptd) Somewhat  Other concerns:: Glasses  Cultural Influences/Ethnic Background:  Not  or       Diabetes Symptoms & Complications:  Diabetes Related Symptoms: (Patient-Rptd) None  Weight trend: (Patient-Rptd) Decreasing  Symptom course: Stable  Disease course: Stable  Complications assessed today?: No    Patient Problem List and Family Medical History reviewed for relevant medical history, current medical status, and diabetes risk factors.    Vitals:  There were no vitals taken for this visit.  Estimated body mass index is 32.38 kg/m  as calculated from the following:    Height as of 10/18/24: 1.803 m (5' 10.98\").    Weight as of 10/24/24: 105.2 kg (232 lb).   Last 3 BP:   BP Readings from Last 3 Encounters:   10/24/24 126/78   10/08/24 130/80   10/04/24 130/82       History   Smoking Status     Former     Types: Cigarettes   Smokeless Tobacco     Never       Labs:  Lab Results   Component Value Date    A1C 9.6 10/04/2024    A1C 5.9 03/10/2020     Lab Results   Component Value Date     10/04/2024     02/13/2024     03/02/2023     02/15/2020     Lab Results   Component Value Date     10/04/2024    LDL 78 02/14/2020     HDL Cholesterol   Date Value Ref Range Status   02/14/2020 26 (L) >39 mg/dL Final     Direct Measure HDL   Date Value Ref Range Status   10/04/2024 35 (L) >=40 mg/dL Final   ]  GFR Estimate   Date Value Ref Range Status   10/04/2024 81 >60 mL/min/1.73m2 Final     Comment:     The generation of the estimated GFR is currently based on binary male or female sex. If the electronic health record information indicates another gender identity or if Legal Sex is " "recorded as \"Unknown\", GFR estimates are not automatically calculated, and application of GFR equations or a direct GFR measurement should be considered according to the individual's appropriate clinical context.  eGFR calculated using 2021 CKD-EPI equation.   11/30/2020 >90 >60 mL/min/[1.73_m2] Final     Comment:     Non  GFR Calc  Starting 12/18/2018, serum creatinine based estimated GFR (eGFR) will be   calculated using the Chronic Kidney Disease Epidemiology Collaboration   (CKD-EPI) equation.       GFR Estimate If Black   Date Value Ref Range Status   11/30/2020 >90 >60 mL/min/[1.73_m2] Final     Comment:      GFR Calc  Starting 12/18/2018, serum creatinine based estimated GFR (eGFR) will be   calculated using the Chronic Kidney Disease Epidemiology Collaboration   (CKD-EPI) equation.       Lab Results   Component Value Date    CR 1.09 10/04/2024    CR 0.96 11/30/2020     No results found for: \"MICROALBUMIN\"    Healthy Eating:  Healthy Eating Assessed Today: Yes  Cultural/Mandaeism diet restrictions?: (Patient-Rptd) No  Meal planning/habits: Avoiding sweets, Low carb  Who cooks/prepares meals for you?: Self  Who purchases food in  your home?: Self  How many times a week on average do you eat food made away from home (restaurant/take-out)?: 2  Meals include: Breakfast, Lunch, Dinner  Breakfast: 10am: breakfast burrito or eggs/sausage/keto bread- 2 slices  Dinner: salad with meat, avocado OR pizza ranch salad and chicken  Snacks: keto flat bread with peanut butter, honey and banana  Beverages: Water, Diet soda, Alcohol  Has patient met with a dietitian in the past?: No    Being Active:  Being Active Assessed Today: No  Exercise:: Yes  Days per week of moderate to strenuous exercise (like a brisk walk): 5  On average, minutes per day of exercise at this level: 30  How intense was your typical exercise? : Moderate (like brisk walking)  Exercise Minutes per Week: 150  Barrier to " exercise: (Patient-Rptd) None    Monitoring:  Monitoring Assessed Today: Yes  Did patient bring glucose meter to appointment? : No  Blood Glucose Meter: CGM, FreeStyle  Times checking blood sugar at home (number): (Patient-Rptd) 5+  Times checking blood sugar at home (per): (Patient-Rptd) Day  Blood glucose trend: No change        Taking Medications:      Taking Medication Assessed Today: Yes  Current Treatments: Diet    Problem Solving:  Problem Solving Assessed Today: Yes  Is the patient at risk for hypoglycemia?: No  Is the patient at risk for DKA?: No  Does patient have severe weather/disaster plan for diabetes management?: Not Needed  Does patient have sick day plan for diabetes management?: Not Needed              Reducing Risks:  Reducing Risks Assessed Today: No  Diabetes Risks: Age over 45 years (unknown family history)  CAD Risks: Diabetes Mellitus, Male sex  Has dilated eye exam at least once a year?: Yes  Sees dentist every 6 months?: Yes  Feet checked by healthcare provider in the last year?: Yes    Healthy Coping:  Healthy Coping Assessed Today: No  Emotional response to diabetes: Ready to learn  Informal Support system:: (Patient-Rptd) Friends  Stage of change: ACTION (Actively working towards change)  Patient Activation Measure Survey Score:       No data to display                  Care Plan and Education Provided:  Monitoring: Blood glucose versus Continuous Glucose Monitoring, Frequency of monitoring, Individual glucose targets, and Log and interpret results    Brenda Overton, PharmD, [facility], [facility]      Time Spent: 32 minutes  Encounter Type: Individual    Any diabetes medication dose changes were made via the CDE Protocol per the patient's referring provider. A copy of this encounter was shared with the provider.

## 2024-11-21 RX ORDER — DILTIAZEM HYDROCHLORIDE EXTENDED-RELEASE TABLETS 120 MG/1
120 TABLET, EXTENDED RELEASE ORAL DAILY
Qty: 90 TABLET | Refills: 1 | Status: SHIPPED | OUTPATIENT
Start: 2024-11-21

## 2024-12-05 ENCOUNTER — PRE VISIT (OUTPATIENT)
Dept: OTOLARYNGOLOGY | Facility: CLINIC | Age: 54
End: 2024-12-05

## 2024-12-12 ENCOUNTER — OFFICE VISIT (OUTPATIENT)
Dept: CARDIOLOGY | Facility: CLINIC | Age: 54
End: 2024-12-12
Payer: COMMERCIAL

## 2024-12-12 VITALS
HEART RATE: 57 BPM | OXYGEN SATURATION: 97 % | WEIGHT: 229.8 LBS | BODY MASS INDEX: 32.07 KG/M2 | DIASTOLIC BLOOD PRESSURE: 76 MMHG | SYSTOLIC BLOOD PRESSURE: 114 MMHG

## 2024-12-12 DIAGNOSIS — I21.4 NSTEMI (NON-ST ELEVATED MYOCARDIAL INFARCTION) (H): ICD-10-CM

## 2024-12-12 DIAGNOSIS — R00.1 BRADYCARDIA: ICD-10-CM

## 2024-12-12 DIAGNOSIS — E78.5 HYPERLIPIDEMIA LDL GOAL <100: ICD-10-CM

## 2024-12-12 DIAGNOSIS — I42.2 HYPERTROPHIC CARDIOMYOPATHY (H): Primary | ICD-10-CM

## 2024-12-12 DIAGNOSIS — I10 BENIGN ESSENTIAL HYPERTENSION: ICD-10-CM

## 2024-12-12 DIAGNOSIS — I50.32 CHRONIC DIASTOLIC HEART FAILURE (H): ICD-10-CM

## 2024-12-12 DIAGNOSIS — I47.29 NSVT (NONSUSTAINED VENTRICULAR TACHYCARDIA) (H): ICD-10-CM

## 2024-12-12 DIAGNOSIS — G47.33 OSA (OBSTRUCTIVE SLEEP APNEA): ICD-10-CM

## 2024-12-12 ASSESSMENT — PAIN SCALES - GENERAL: PAINLEVEL_OUTOF10: NO PAIN (0)

## 2024-12-12 NOTE — NURSING NOTE
To Do:    Pt self stopped statin- believed it was causing higher blood sugars  Test claim for psk9i- pt would like to research prior to agreeing to    Return to clinic in one year with echo and 14 day sergeyo prior    Halley Luu RN

## 2024-12-12 NOTE — PATIENT INSTRUCTIONS
Take your medicines every day, as directed     Changes made today:  START psk9i        Cardiology Care Coordinators:      Radha MODI RN     Cardiology Rooming staff:  Falguni CALVILLO    Phone  [phone]      Fax [phone]    To Contact us     During Business Hours:  [phone]     If you are needing refills please contact your pharmacy.     For urgent after hour care please call the [facility] at [phone] or the [facility] at [phone] and ask to speak to the cardiologist on call.            HOW TO CHECK YOUR BLOOD PRESSURE AT HOME:     Avoid eating, smoking, and exercising for at least 30 minutes before taking a reading.     Be sure you have taken your BP medication at least 2-3 hours before you check it.      Sit quietly for 10 minutes before a reading.      Sit in a chair with your feet flat on the floor. Rest your  arm on a table so that the arm cuff is at the same level as your heart.     Remain still during the reading.  Record your blood pressure and pulse in a log and bring to your next appointment.       Use [employer] allows you to communicate directly with your heart team through secure messaging.  [employer] can be accessed any time on your phone, computer, or tablet.  If you need assistance, we'd be happy to help!             Keep your Heart Appointments:     Return to clinic in one year with an echo and 14 day zio prior

## 2024-12-12 NOTE — NURSING NOTE
"Chief Complaint   Patient presents with    Follow Up     Return General Cardiology for annual follow up       Initial /76 (BP Location: Right arm, Patient Position: Sitting, Cuff Size: Adult Large)   Pulse 57   Wt 104.2 kg (229 lb 12.8 oz)   SpO2 97%   BMI 32.07 kg/m   Estimated body mass index is 32.07 kg/m  as calculated from the following:    Height as of 10/18/24: 1.803 m (5' 10.98\").    Weight as of this encounter: 104.2 kg (229 lb 12.8 oz).  BP completed using cuff size: large    Medication refills needed?: no      Falguni Swain, EMT  "

## 2024-12-12 NOTE — PROGRESS NOTES
Chief complaint: HCM    HPI:   Andrew Atkinson is a 54 year old adult with history of HTN and HL who presents for follow up of HCM    Initial visit:  He has previously been followed by my partners Ashish Larson and Bishnu.  He established care with Dr. Larson (4/29/20 note reviewed) and subsequently with Dr. Goetz for follow-up after an illness 2/2020.  At that time, he had been visiting [place] and attended an oxygen parlor, after which he developed viral upper respiratory symptoms and dyspnea.  He reported the symptoms to his primary care physician obtained troponin, which was elevated at 0.2.  He was sent to the emergency department where he was found to have diffuse T wave inversions on ECG.  Coronary angiogram was performed and showed normal coronary arteries.  He was discharged home with a working diagnosis of myocarditis and advised to have a cardiac MRI.  Dr. Goetz ordered a cardiac MRI 5/3/22 for follow up of what had been suspected to be viral myocarditis (see below), which revealed HCM prompting referral. He was last seen by Dr. Goetz 5/12/22 (notes reviewed.)    Prior to his illness 2/2020 he had been relatively active, but his illness and also back pain and subsequent surgery have limited his activity. He had back surgery in March which limited his activity, but he has been walking his dog without difficulty. He reports limited exercise capacity.    Of note, he has been observed repeatedly stopping breathing while sleeping and also snoring loudly and reports fatigue and daytime sleepiness.    He denies any chest pain, dyspnea at rest or exertional dyspnea above baseline, PND, orthopnea, peripheral edema, palpitations, lightheadedness or syncope.     His family history is not well-known to him as his parents gave him up when he was a young child, but he does know that his mother has HCM (but she is unwilling to share any further information about this.) He has 2 sisters (who both live in [place]) and  1 brother; to the best of his knowledge, none of them has HCM. He has 2 sons, ages 18 and 20, both well. No known family history of sudden death.     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 His is a never-smoker.    12/08/22:  He reports feeling well since his last visit. He is active doing yardwork on his large property, taking his dogs to the dog park every other day, and working on cars but does not follow a formal exercise program.     12/14/23:  Since his last visit, he was scheduled for a shoulder surgery which was canceled due to asymptomatic bradycardia with reported HR of 35 prior to induction during a block. Keven states that he had been retching and vomiting and cold sweating. Surgery was canceled due to asymptomatic sinus bradycardia in the setting of a nerve block and emesis.     He was also briefly admitted to [facility] with dyspnea/orthopnea and received several doses of 20 mg IV furosemide. With improvement of symptoms. He also feels CPAP has improved his sleep and breathing at night. He was started on furosemide 20 mg daily. He is tolerating this well. Diltiazem was also reduced to 90 mg daily (of the 12h form, for unclear reasons.) Home weights have been stable and symptoms have not recurred.     12/12/24:  Since his last visit, he stopped his pravastatin as we felt it was worsening his DM. His glucose control has improved; notably, he also increased his vegetable intake and decreased his starch intake.     He denies any chest pain, dyspnea at rest or with exertion, palpitations, PND, orthopnea, peripheral edema, lightheadedness, or syncope.     PAST MEDICAL HISTORY:  HTN  HL  TIFFANY on NIPPV  Hypothyroidism    CURRENT MEDICATIONS:  Current Outpatient Medications   Medication Sig Dispense Refill    aspirin 81 MG EC tablet Take 1 tablet (81 mg) by mouth daily (Patient not taking: Reported on 10/8/2024) 30 tablet 0    blood glucose (NO BRAND SPECIFIED) lancets  standard Use to test blood sugar 1 times daily or as directed.  Any brand that is covered. 50 Lancet 1    blood glucose (NO BRAND SPECIFIED) test strip Freestyle Precision Test strips to use with Cristian 3 . Use to test blood sugar 1 times daily or as directed. 50 strip 1    ciprofloxacin-dexAMETHasone (CIPRODEX) 0.3-0.1 % otic suspension Place 4 drops into both ears 2 times daily. 7.5 mL 0    Continuous Glucose Sensor (FREESTYLE CRISTIAN 3 PLUS SENSOR) MISC Use 1 sensor every 15 days. Use to read blood sugars per 's instructions. 6 each 3    diltiazem ER (CARDIAZEM LA) 120 MG 24 hr tablet Take 1 tablet (120 mg) by mouth daily. 90 tablet 1    fish oil-omega-3 fatty acids 1000 MG capsule Take 2 g by mouth every morning (Patient not taking: Reported on 5/28/2024)      fluticasone (FLONASE) 50 MCG/ACT nasal spray Spray 1 spray into both nostrils daily. 16 g 1    Lancet Devices (LANCET DEVICE WITH EJECTOR) MISC 1 Device daily. Generic lancet device 1 each 0    levothyroxine (SYNTHROID/LEVOTHROID) 150 MCG tablet Take 1 tablet (150 mcg) by mouth daily. With 12.5mg tab 90 tablet 3    levothyroxine (SYNTHROID/LEVOTHROID) 25 MCG tablet TAKE 1/2 TABLET BY MOUTH ONCE DAILY WITH 150MCG TABLET 90 tablet 3    Magnesium Oxide POWD Take 1 Scoop by mouth every morning.      multivitamin w/minerals (THERA-VIT-M) tablet Take 1 tablet by mouth every morning      pravastatin (PRAVACHOL) 40 MG tablet Take 1 tablet (40 mg) by mouth daily (Patient not taking: Reported on 10/24/2024) 90 tablet 2    predniSONE (DELTASONE) 20 MG tablet Take 3 tabs by mouth daily x 3 days, then 2 tabs daily x 3 days, then 1 tab daily x 3 days, then 1/2 tab daily x 3 days. (Patient not taking: Reported on 10/4/2024) 20 tablet 0    UNABLE TO FIND Take 1 tablet by mouth every morning. MEDICATION NAME: Organic Mushrooms, Lions fauzia turkey tail         ALLERGIES:     Allergies   Allergen Reactions    Morphine Hives     States tolerates Oxy fine        FAMILY HISTORY:  His family history is not well-known to him as his parents gave him up when he was a young child, but he does know that his mother has HCM (but she is unwilling to share any further information about this.) He has 2 sisters (who both live in [place]) and 1 brother; to the best of his knowledge, none of them has HCM. He has 2 sons, ages 18 and 20, both well. No known family history of sudden death.       SOCIAL HISTORY:  Social History     Tobacco Use    Smoking status: Former     Types: Cigarettes    Smokeless tobacco: Never    Tobacco comments:     Quit tobacco in 2007   Vaping Use    Vaping status: Never Used   Substance Use Topics    Alcohol use: Yes     Alcohol/week: 3.0 - 4.0 standard drinks of alcohol     Types: 3 - 4 Standard drinks or equivalent per week     Comment: 3-4 drinks aweek    Drug use: Yes     Types: Marijuana     Comment: 2x/ week       ROS:   A comprehensive 14 point review of systems is negative other than as mentioned in HPI.    Exam:  /76 (BP Location: Right arm, Patient Position: Sitting, Cuff Size: Adult Large)   Pulse 57   Wt 104.2 kg (229 lb 12.8 oz)   SpO2 97%   BMI 32.07 kg/m    GENERAL APPEARANCE: healthy, alert and no distress  EYES: no icterus, no xanthelasmas  ENT: normal palate, mucosa moist, no central cyanosis  NECK: JVP not elevated  RESPIRATORY: lungs clear to auscultation - no rales, rhonchi or wheezes, no use of accessory muscles, no retractions, respirations are unlabored, normal respiratory rate  CARDIOVASCULAR: regular rhythm, normal S1 with physiologic split S2, no S3 or S4 and no murmur, click or rub.  GI: soft, non tender, bowel sounds normal,no abdominal bruits  EXTREMITIES: no edema, no bruits  NEURO: alert and oriented to person/place/time, normal speech, gait and affect  VASC: Radial, dorsalis pedis and posterior tibialis pulses 2+ bilaterally.  SKIN: no ecchymoses, no rashes.  PSYCH: cooperative, affect appropriate.      Labs:  Reviewed.   Genetic testing 8/23/22: Andrew does NOT carry a mutation in the 30 genes analyzed    Testing/Procedures:  CPX/exercise echocardiogram (HCM protocol) 6/20/22:  Exercise 17:49 on a Modified Rolando protocol  MVO2 27.2 mL/kg/min (81% predicted, class I, 7.8 METS) VE/VCO2 slope 31.88 RER 1.07  Blunted HR and hypertensive BP response  No detectable resting or Valsalva LVOT gradient. Minimal latent LVOT obstruction, maximal post-exercise LVOT gradient 31 mmHg.     CMR 5/3/22:  HCM with asymmetric septal hypertrophy predominantly involving the basal-mid inferoseptum (max thickness 2.1 cm mid inferoseptum); there is also apical hypertrophy. Minimal patchy midmyocardial LGE consistent with HCM, LGE burden<5%. Minimal JEET without septal contact.     I personally visualized and interpreted:  14-day ZioPatch 6/23/22-7/7/22:   4 asymptomatic runs of nonsustained VT, longest 13 beats.   Other findings--baseline sinus rhythm (average VR 80 bpm, range ) with rare (<1%) PACs and PVCs and 4 asymptomatic nonsustained runs of SVT (in addition to VT noted above.)    TTE 11/16/23 (my read):   Known HCM with prominent apical hypertrophy and hyperdynamic LV function, LVEF=65-70%. No significant valvular abnormaliites. No resting LVOT obstruction.  No significant change from baseline images on 6/20/22 stres echocardiogram or 6/3/22 CMR.    Holter monitor 11/15/23:  No significant arrhythmias, specifically no nonsustained/sustained VT and no atrial fibrillation. Average VR 69 bpm, range 50-99.    Assessment and Plan:   #Preoperative evaluation for shoulder surgery  #Asymptomatic sinus bradycardia during nerve block and vomiting  This episode was almost certainly vagally-mediated and does not merit any further workup. Furthermore, asymptomatic hemodynamically-stable sinus bradycardia does not represent a cardiac contraindication to shoulder surgery. Lastly, diltiazem has been decreased and so baseline HR should  be slightly higher in any case (though once again this is immaterial for asymptomatic sinus bradycardia.)   -Holter monitor shows normal average HR and no concerning bradyarrhythmias or tachyarrhythmias   -no further cardiac workup or changes to therapy indicated prior to surgery   -OK to proceed with shoulder surgery as planned from cardiac perspective    -would hold loop diuretic 1 day prior to surgery and resume postoperatively   -recommend pre-treatment with anti-emetics to minimize likelihood of vagal episodes related to nausea/vomiting    #Hypertrophic cardiomyopathy cardiomyopathy with minimal latent LVOT obstruction (post-exercise LVOT gradient 31 mmHg)  #Nonsustained VT  The patient was counseled at length regarding the nature of hypertrophic cardiomyopathy including its genetic nature, its natural history, and potential complications.     We discussed the potential complications of HCM, including outflow obstruction, arrhythmias, and heart failure.  He has minimal latent LVOT obstruction (PG 31 mmHg post-exercise) without significant symptoms and excellent exercise capacity on CPX. The patient was counseled regarding behavioral interventions to avoid worsening outflow obstruction, in particular avoiding dehydration and minimizing diuretics and peripheral vasodilators. Low-dose beta blocker was started.     Keven has only 1 risk factor for SCD, nonsustained VT (4 runs of up to 13 beats on 14-day ZioPatch 6/23/22-7/7/22)-- this is at most a class IIb indication for primary prevention ICD. Will continue surveillance. Otherwise, he has only mild LGE burden on his cardiac MRI (<5%). Maximal LV wall thickness is 2.1 cm. He also has no known family history of SCD (though his FHx is not well ascertained.) He has no history of unexplained syncope. LVEF is normal. He has no LV aneurysm. walker Baca's 6/2022 CPX is excellent and I suspect his extremely mild functional limitation is largely related to  musculoskeletal limitations and deconditioning. We discussed the activity recommendations for hypertrophic cardiomyopathy, and in particular that moderate intensity aerobic exercise is encouraged and that only the highest intensity competitive sports with start-stop activity (e.g. basketball and ice hockey) are felt to be contraindicated.      Genetic testing was done 8/2022 and was negative. I have counseled Keven that in the absence of an identified mutation that would allow cascade testing of his family members, that all first-degree relatives should undergo clinical screening.    -continue diltiazem  mg daily  -declines CPX/stress echocardiogram for reasons of cost  -seen by Dr. Kaufmna 8/31/22: at most a class IIb indication for ICD, will continue surveillance for now  -Genetic testing 8/23/22: Andrew does NOT carry a mutation in the 30 genes analyzed  -14-day ZioPatch now and in 1 year  -TTE in 1 year  -follow up in 1 year    #Chronic HFpEF due to HCM, newly-diagnosed 11/2023:  -continue furosemide 20 mg daily  -BMP today      #HTN, controlled: continue present therapy  #HL, uncontrolled:   #Statin intolerance  -would start PCSK9i (or bempedoic acid) to prevent CAD/MI/ASCVD  -he wants to wait until after his next A1c; I did tell him that I would favor starting lipid medication irrespective of his A1c given macrovascular atherosclerotic risk associated with DM independing of glucose control    #TIFFANY on NIPPV: continue NIPPV. He has been adherent with this.     #Chronically-elevated troponin: Discussed that this is common in HCM and at his baseline level of troponin without ischemic symptoms does not merit further workup.      The patient states understanding and is agreeable with plan.     Federico Lomeli MD  Cardiology

## 2025-01-25 ENCOUNTER — HEALTH MAINTENANCE LETTER (OUTPATIENT)
Age: 55
End: 2025-01-25

## 2025-02-10 ENCOUNTER — OFFICE VISIT (OUTPATIENT)
Dept: FAMILY MEDICINE | Facility: CLINIC | Age: 55
End: 2025-02-10
Payer: COMMERCIAL

## 2025-02-10 VITALS
DIASTOLIC BLOOD PRESSURE: 83 MMHG | HEIGHT: 71 IN | OXYGEN SATURATION: 97 % | SYSTOLIC BLOOD PRESSURE: 129 MMHG | HEART RATE: 51 BPM | BODY MASS INDEX: 32.65 KG/M2 | WEIGHT: 233.2 LBS | TEMPERATURE: 98.1 F | RESPIRATION RATE: 16 BRPM

## 2025-02-10 DIAGNOSIS — E03.9 ACQUIRED HYPOTHYROIDISM: ICD-10-CM

## 2025-02-10 DIAGNOSIS — E11.9 TYPE 2 DIABETES MELLITUS WITHOUT COMPLICATION, WITHOUT LONG-TERM CURRENT USE OF INSULIN (H): Primary | ICD-10-CM

## 2025-02-10 DIAGNOSIS — I47.29 NSVT (NONSUSTAINED VENTRICULAR TACHYCARDIA) (H): ICD-10-CM

## 2025-02-10 DIAGNOSIS — I11.0 HYPERTENSIVE HEART DISEASE WITH HEART FAILURE (H): ICD-10-CM

## 2025-02-10 LAB
ERYTHROCYTE [DISTWIDTH] IN BLOOD BY AUTOMATED COUNT: 14.5 % (ref 10–15)
EST. AVERAGE GLUCOSE BLD GHB EST-MCNC: 128 MG/DL
HBA1C MFR BLD: 6.1 % (ref 0–5.6)
HCT VFR BLD AUTO: 47.4 % (ref 35–53)
HGB BLD-MCNC: 15.7 G/DL (ref 11.7–17.7)
MCH RBC QN AUTO: 28 PG (ref 26.5–33)
MCHC RBC AUTO-ENTMCNC: 33.1 G/DL (ref 31.5–36.5)
MCV RBC AUTO: 85 FL (ref 78–100)
PLATELET # BLD AUTO: 250 10E3/UL (ref 150–450)
RBC # BLD AUTO: 5.6 10E6/UL (ref 3.8–5.9)
T4 FREE SERPL-MCNC: 1.31 NG/DL (ref 0.9–1.7)
TSH SERPL DL<=0.005 MIU/L-ACNC: 8.54 UIU/ML (ref 0.3–4.2)
WBC # BLD AUTO: 6 10E3/UL (ref 4–11)

## 2025-02-10 PROCEDURE — 84443 ASSAY THYROID STIM HORMONE: CPT | Performed by: FAMILY MEDICINE

## 2025-02-10 PROCEDURE — 83036 HEMOGLOBIN GLYCOSYLATED A1C: CPT | Performed by: FAMILY MEDICINE

## 2025-02-10 PROCEDURE — 99417 PROLNG OP E/M EACH 15 MIN: CPT | Performed by: FAMILY MEDICINE

## 2025-02-10 PROCEDURE — 84439 ASSAY OF FREE THYROXINE: CPT | Performed by: FAMILY MEDICINE

## 2025-02-10 PROCEDURE — 36415 COLL VENOUS BLD VENIPUNCTURE: CPT | Performed by: FAMILY MEDICINE

## 2025-02-10 PROCEDURE — G2211 COMPLEX E/M VISIT ADD ON: HCPCS | Performed by: FAMILY MEDICINE

## 2025-02-10 PROCEDURE — 85027 COMPLETE CBC AUTOMATED: CPT | Performed by: FAMILY MEDICINE

## 2025-02-10 PROCEDURE — 99215 OFFICE O/P EST HI 40 MIN: CPT | Performed by: FAMILY MEDICINE

## 2025-02-10 NOTE — PROGRESS NOTES
Assessment & Plan       ICD-10-CM    1. Diabetes mellitus, type 2 (H)  E11.9       2. Type 2 diabetes mellitus without complication, without long-term current use of insulin (H)  E11.9 CBC with Platelets     Hemoglobin A1c     TSH with free T4 reflex     Hemoglobin A1c     CBC with Platelets     TSH with free T4 reflex     CANCELED: Hemoglobin A1c      3. Acquired hypothyroidism  E03.9 TSH with free T4 reflex     TSH with free T4 reflex      4. Hypertensive heart disease with heart failure (H)  I11.0       5. NSVT (nonsustained ventricular tachycardia) (H)  I47.29           Hypertensive heart disease with heart failure: Well-controlled with current therapy, stable, followed by cardiology  NSVT: Well-controlled with current therapy, followed by cardiology    A total of 60 minutes spent face-to-face with greater than 50% of the time spent in counseling and coordinating cares of the issues above     The longitudinal plan of care for the diagnosis(es)/condition(s) as documented were addressed during this visit. Due to the added complexity in care, I will continue to support Keven in the subsequent management and with ongoing continuity of care.     There are no Patient Instructions on file for this visit.    Subjective   Keven is a 54 year old, presenting for the following health issues:  Diabetes      2/10/2025     9:27 AM   Additional Questions   Roomed by Oksana   Accompanied by none     Via the Health Maintenance questionnaire, the patient has reported the following services have been completed -Eye Exam: [facility] 2024-03-30, this information has been sent to the abstraction team.  History of Present Illness       Diabetes:   Keven presents for follow up of diabetes.   Keven is checking home blood glucose with a continuous glucose monitor.   Keven checks blood glucose before and after meals.  Blood glucose is sometimes over 200 and never under 70. Keven is aware of hypoglycemia symptoms including shakiness and  weakness.    Keven has no concerns regarding Keven's diabetes at this time.   Keven is not experiencing numbness or burning in feet, excessive thirst, blurry vision, weight changes or redness, sores or blisters on feet. The patient has had a diabetic eye exam in the last 12 months. Eye exam performed on 03\2024 roughly not quite sure. Location of last eye exam [facility].        Keven eats 2-3 servings of fruits and vegetables daily.Keven consumes 0 sweetened beverage(s) daily.Keven exercises with enough effort to increase Keven's heart rate 10 to 19 minutes per day.  Keven exercises with enough effort to increase Keven's heart rate 5 days per week.   Keven is taking medications regularly.       History of diabetes  Recently has been well-controlled with diet changes  Patient is using the freestyle narda to monitor sugar spikes  Decrease processed carbohydrate intake  Staying active  Of note patient did stop his statin as he felt that his hyperglycemia has been associated with simvastatin use.  Patient did see cardiology recently, who recommended either restarting the statin or PSK9i      Wt Readings from Last 4 Encounters:   02/10/25 105.8 kg (233 lb 3.2 oz)   12/12/24 104.2 kg (229 lb 12.8 oz)   10/24/24 105.2 kg (232 lb)   10/18/24 106.6 kg (235 lb)     Hemoglobin A1C   Date Value Ref Range Status   10/04/2024 9.6 (H) 0.0 - 5.6 % Final     Comment:     Normal <5.7%   Prediabetes 5.7-6.4%    Diabetes 6.5% or higher     Note: Adopted from ADA consensus guidelines.   03/21/2024 6.8 (H) 0.0 - 5.6 % Final     Comment:     Normal <5.7%   Prediabetes 5.7-6.4%    Diabetes 6.5% or higher     Note: Adopted from ADA consensus guidelines.   11/17/2023 6.7 (H) <5.7 % Final     Comment:     Normal <5.7%   Prediabetes 5.7-6.4%    Diabetes 6.5% or higher     Note: Adopted from ADA consensus guidelines.   03/10/2020 5.9 (H) 0 - 5.6 % Final     Comment:     Normal <5.7% Prediabetes 5.7-6.4%  Diabetes 6.5% or higher - adopted  "from ADA   consensus guidelines.     02/14/2020 6.4 (H) 0 - 5.6 % Final     Comment:     Normal <5.7% Prediabetes 5.7-6.4%  Diabetes 6.5% or higher - adopted from ADA   consensus guidelines.                   Review of Systems  Constitutional, HEENT, cardiovascular, pulmonary, gi and gu systems are negative, except as otherwise noted.      Objective    /83   Pulse 51   Temp 98.1  F (36.7  C) (Temporal)   Resp 16   Ht 1.803 m (5' 10.98\")   Wt 105.8 kg (233 lb 3.2 oz)   SpO2 97%   BMI 32.54 kg/m    Body mass index is 32.54 kg/m .  Physical Exam  Constitutional:       General: Keven is not in acute distress.     Appearance: Normal appearance. Keven is well-developed. Keven is not ill-appearing.   HENT:      Head: Normocephalic and atraumatic.      Right Ear: External ear normal.      Left Ear: External ear normal.      Nose: Nose normal.   Eyes:      General: No scleral icterus.     Extraocular Movements: Extraocular movements intact.      Conjunctiva/sclera: Conjunctivae normal.   Cardiovascular:      Rate and Rhythm: Normal rate.   Pulmonary:      Effort: Pulmonary effort is normal.   Musculoskeletal:      Cervical back: Normal range of motion and neck supple.   Skin:     General: Skin is warm and dry.   Neurological:      Mental Status: Keven is alert and oriented to person, place, and time.   Psychiatric:         Behavior: Behavior normal.         Thought Content: Thought content normal.         Judgment: Judgment normal.                    Signed Electronically by: Wilfredo Hemphill MD    "

## 2025-02-27 ENCOUNTER — OFFICE VISIT (OUTPATIENT)
Dept: URGENT CARE | Facility: URGENT CARE | Age: 55
End: 2025-02-27
Payer: COMMERCIAL

## 2025-02-27 VITALS
RESPIRATION RATE: 16 BRPM | WEIGHT: 229.4 LBS | SYSTOLIC BLOOD PRESSURE: 125 MMHG | OXYGEN SATURATION: 97 % | TEMPERATURE: 98 F | HEART RATE: 54 BPM | DIASTOLIC BLOOD PRESSURE: 74 MMHG | BODY MASS INDEX: 32.01 KG/M2

## 2025-02-27 DIAGNOSIS — H66.92 ACUTE LEFT OTITIS MEDIA: Primary | ICD-10-CM

## 2025-02-27 DIAGNOSIS — H66.3X2 CHRONIC SUPPURATIVE OTITIS MEDIA OF LEFT EAR, UNSPECIFIED OTITIS MEDIA LOCATION: ICD-10-CM

## 2025-02-27 DIAGNOSIS — H60.392 INFECTIVE OTITIS EXTERNA, LEFT: ICD-10-CM

## 2025-02-27 RX ORDER — OFLOXACIN 3 MG/ML
10 SOLUTION AURICULAR (OTIC) DAILY
Qty: 5 ML | Refills: 0 | Status: SHIPPED | OUTPATIENT
Start: 2025-02-27 | End: 2025-03-06

## 2025-02-27 NOTE — PROGRESS NOTES
Assessment & Plan     Acute left otitis media    - amoxicillin-clavulanate (AUGMENTIN) 875-125 MG tablet  Dispense: 14 tablet; Refill: 0    Infective otitis externa, left    - ofloxacin (FLOXIN) 0.3 % otic solution  Dispense: 5 mL; Refill: 0    Chronic suppurative otitis media of left ear, unspecified otitis media location       Acute on chronic otitis media with otitis externa- prescriptions sent to pharmacy for Augmentin twice daily for 7 days and ofloxacin daily for 7 days. Warm pack to ear.     Follow-up with PCP if symptoms persist for 7 days, and sooner if symptoms worsen or new symptoms develop.     Discussed red flag symptoms which warrant immediate visit in emergency room    All questions were answered and patient verbalized understanding. AVS reviewed with patient.     Kelsea Jones, DNP, APRN, CNP 2/27/2025 4:45 PM  [facility]     Keven is a 54 year old adult who presents to clinic today for the following health issues:  Chief Complaint   Patient presents with    Ear Problem     Possible ear infection in left ear, clear drainage         2/27/2025     4:31 PM   Additional Questions   Roomed by Jackie   Accompanied by Self         Patient presents for evaluation of left ear pain for the past week. Associated symptoms: clear drainage. Denies fever, cough, congestion, sore throat. Pain is moderate. Symptoms have been stable.      Ofloxacin has worked well in the past. He has a history of chronic otitis media. Was evaluated by ENT 10/24/24 and was advised to follow up in a month and he called them today and wasn't able to schedule until May 2025. Has had ear surgeries: tubes and skin taken from tragus and put around TM.     He has a history of diabetes. Last hemoglobin A1c on 2/10/25 was 6.1.     Problem list, Medication list, Allergies, and Medical history reviewed in EPIC.    ROS:  Review of systems negative except for noted above        Objective    /74    Pulse 54   Temp 98  F (36.7  C) (Oral)   Resp 16   Wt 104.1 kg (229 lb 6.4 oz)   SpO2 97%   BMI 32.01 kg/m    Physical Exam  Constitutional:       General: Keven is not in acute distress.     Appearance: Keven is not toxic-appearing or diaphoretic.   HENT:      Head: Normocephalic and atraumatic.      Comments: Mild swelling and moderate erythema left canal      Right Ear: Tympanic membrane, ear canal and external ear normal.      Left Ear: Tympanic membrane is erythematous.      Ears:      Comments: Mild swelling and moderate erythema left canal      Nose: Nose normal.      Mouth/Throat:      Mouth: Mucous membranes are moist.      Pharynx: Oropharynx is clear. No oropharyngeal exudate or posterior oropharyngeal erythema.   Neurological:      Mental Status: Keven is alert.

## 2025-05-07 ENCOUNTER — TELEPHONE (OUTPATIENT)
Dept: OTOLARYNGOLOGY | Facility: CLINIC | Age: 55
End: 2025-05-07
Payer: COMMERCIAL

## 2025-05-07 NOTE — TELEPHONE ENCOUNTER
Patient scheduled for follow-up with audio and Dr. David for July 2025 due to ear infection and decreased hearing on the left. Reports getting a stuffy nose (believes due to allergies), then left ear gets infected (symptoms were muffled hearing). Patient has had a few of these episodes in the last year. He scheduled the upcoming appointment 4/30/25 when these symptoms were present. Today, he reports they have resolved.    Last seen 10/24/24 with instructions to follow-up in 1 month. Patient didn't follow up because his symptoms resolved.    Considering current bout of symptoms have resolved, Ok to wait until July for follow-up appointment.    Latricia Lipscomb RN on 5/7/2025 at 2:13 PM

## 2025-05-24 ENCOUNTER — HEALTH MAINTENANCE LETTER (OUTPATIENT)
Age: 55
End: 2025-05-24

## 2025-05-28 DIAGNOSIS — I42.2 HYPERTROPHIC CARDIOMYOPATHY (H): ICD-10-CM

## 2025-05-28 DIAGNOSIS — I10 BENIGN ESSENTIAL HYPERTENSION: ICD-10-CM

## 2025-05-28 DIAGNOSIS — I11.0 HYPERTENSIVE HEART DISEASE WITH HEART FAILURE (H): Primary | ICD-10-CM

## 2025-05-29 RX ORDER — DILTIAZEM HYDROCHLORIDE EXTENDED-RELEASE TABLETS 120 MG/1
120 TABLET, EXTENDED RELEASE ORAL DAILY
Qty: 90 TABLET | Refills: 1 | Status: SHIPPED | OUTPATIENT
Start: 2025-05-29

## 2025-05-29 NOTE — TELEPHONE ENCOUNTER
Last Written Prescription:  diltiazem ER (CARDIAZEM LA) 120 MG 24 hr tablet 90 tablet 1 11/21/2024     ----------------------  Last Visit Date: 12/12/24  Future Visit Date: 12/24/25  ----------------------    Refill decision: Medication refilled per  Medication Refill in Ambulatory Care  policy.       Request from pharmacy:  Requested Prescriptions   Pending Prescriptions Disp Refills    diltiazem ER (CARDIAZEM LA) 120 MG 24 hr tablet 90 tablet 1     Sig: Take 1 tablet (120 mg) by mouth daily.       Calcium Channel Blockers Protocol  Passed - 5/29/2025 11:56 AM        Passed - Most recent blood pressure under 140/90 in past 12 months     BP Readings from Last 3 Encounters:   02/27/25 125/74   02/10/25 129/83   12/12/24 114/76       No data recorded            Passed - Medication is active on med list and the sig matches. RN to manually verify dose and sig if red X/fail.     If the protocol passes (green check), you do not need to verify med dose and sig.    A prescription matches if they are the same clinical intention.    For Example: once daily and every morning are the same.    The protocol can not identify upper and lower case letters as matching and will fail.     For Example: Take 1 tablet (50 mg) by mouth daily     TAKE 1 TABLET (50 MG) BY MOUTH DAILY    For all fails (red x), verify dose and sig.    If the refill does match what is on file, the RN can still proceed to approve the refill request.       If they do not match, route to the appropriate provider.             Passed - Medication is indicated for associated diagnosis        Passed - GFR is on file in the past 12 months and most recent GFR is normal        Passed - Recent (12 month) or future (90 days) visit with authorizing provider's specialty (provided they have been seen in the past 15 months)     The patient must have completed an in-person or virtual visit within the past 12 months or has a future visit scheduled within the next 90 days with  the authorizing provider s specialty.  Urgent care and e-visits do not qualify as an office visit for this protocol.          Passed - Patient is age 18 or older

## 2025-06-08 PROBLEM — R06.00 DYSPNEA: Status: RESOLVED | Noted: 2023-11-17 | Resolved: 2025-06-08

## 2025-06-08 PROBLEM — I21.4 NSTEMI (NON-ST ELEVATED MYOCARDIAL INFARCTION) (H): Status: RESOLVED | Noted: 2023-11-16 | Resolved: 2025-06-08

## 2025-06-08 PROBLEM — Z98.890 S/P RIGHT ROTATOR CUFF REPAIR: Status: RESOLVED | Noted: 2024-02-22 | Resolved: 2025-06-08

## 2025-06-08 PROBLEM — I25.10 CAD (CORONARY ARTERY DISEASE): Status: ACTIVE | Noted: 2025-06-08

## 2025-06-08 PROBLEM — B33.22 ACUTE VIRAL MYOCARDITIS: Status: RESOLVED | Noted: 2020-02-14 | Resolved: 2025-06-08

## 2025-07-03 ENCOUNTER — MYC REFILL (OUTPATIENT)
Dept: FAMILY MEDICINE | Facility: CLINIC | Age: 55
End: 2025-07-03
Payer: COMMERCIAL

## 2025-07-03 DIAGNOSIS — E03.9 ACQUIRED HYPOTHYROIDISM: ICD-10-CM

## 2025-07-07 RX ORDER — LEVOTHYROXINE SODIUM 150 UG/1
150 TABLET ORAL DAILY
Qty: 90 TABLET | Refills: 3 | Status: SHIPPED | OUTPATIENT
Start: 2025-07-07

## 2025-07-10 NOTE — PROGRESS NOTES
History of Present Illness - Andrew Atkinson is a 54 year old adult presenting in clinic today for a recheck on Patient presents with:  Follow Up: Hearing issues    Patient previously seen for significant conductive hearing loss mostly due to significant tympanosclerosis and had presence of tympanic perforation involving his right ear.  Presents in follow-up.  He has had prior ear surgeries prior tympanoplasty's.  Currently has not had any drainage in the ears have been clear and dry.    Present Symptoms include: hearing loss and they are   getting worse .  Andrew denies otolgia and tinnitis.      BP Readings from Last 1 Encounters:   07/14/25 116/72       BP noted to be well controlled today in office.     Andrew IS NOT a smoker/uses chewing tobacco.     Past Medical History -   Past Medical History:   Diagnosis Date    CAD (coronary artery disease) 06/08/2025    Medial meniscus tear     TIFFANY (obstructive sleep apnea) 03/02/2023       Current Medications -   Current Outpatient Medications:     aspirin 81 MG EC tablet, Take 1 tablet (81 mg) by mouth daily, Disp: 30 tablet, Rfl: 0    Barberry-Oreg Grape-Goldenseal (BERBERINE COMPLEX PO), Take by mouth., Disp: , Rfl:     Continuous Glucose Sensor (FREESTYLE NICOLE 3 PLUS SENSOR) MISC, Use 1 sensor every 15 days. Use to read blood sugars per 's instructions., Disp: 6 each, Rfl: 3    diltiazem ER (CARDIAZEM LA) 120 MG 24 hr tablet, Take 1 tablet (120 mg) by mouth daily., Disp: 90 tablet, Rfl: 1    levothyroxine (SYNTHROID/LEVOTHROID) 150 MCG tablet, Take 1 tablet (150 mcg) by mouth daily. With 12.5mg tab, Disp: 90 tablet, Rfl: 3    levothyroxine (SYNTHROID/LEVOTHROID) 25 MCG tablet, TAKE 1/2 TABLET BY MOUTH ONCE DAILY WITH 150MCG TABLET, Disp: 90 tablet, Rfl: 3    Magnesium Oxide POWD, Take 1 Scoop by mouth every morning., Disp: , Rfl:     multivitamin w/minerals (THERA-VIT-M) tablet, Take 1 tablet by mouth every morning, Disp: , Rfl:     blood glucose (NO  BRAND SPECIFIED) lancets standard, Use to test blood sugar 1 times daily or as directed.  Any brand that is covered. (Patient not taking: Reported on 7/14/2025), Disp: 50 Lancet, Rfl: 1    ciprofloxacin-dexAMETHasone (CIPRODEX) 0.3-0.1 % otic suspension, Place 4 drops into both ears 2 times daily., Disp: 7.5 mL, Rfl: 0    fish oil-omega-3 fatty acids 1000 MG capsule, Take 2 g by mouth every morning (Patient not taking: Reported on 5/28/2024), Disp: , Rfl:     predniSONE (DELTASONE) 20 MG tablet, Take 3 tabs by mouth daily x 3 days, then 2 tabs daily x 3 days, then 1 tab daily x 3 days, then 1/2 tab daily x 3 days. (Patient not taking: Reported on 10/4/2024), Disp: 20 tablet, Rfl: 0    UNABLE TO FIND, Take 1 tablet by mouth every morning. MEDICATION NAME: Organic Mushrooms, Lions fauzia turkey tail, Disp: , Rfl:     Allergies -   Allergies   Allergen Reactions    Morphine Hives     States tolerates Oxy fine       Social History -   Social History     Socioeconomic History    Marital status: Single   Tobacco Use    Smoking status: Former     Types: Cigarettes    Smokeless tobacco: Never    Tobacco comments:     Quit tobacco in 2007   Vaping Use    Vaping status: Never Used   Substance and Sexual Activity    Alcohol use: Yes     Alcohol/week: 3.0 - 4.0 standard drinks of alcohol     Types: 3 - 4 Standard drinks or equivalent per week     Comment: 3-4 drinks aweek    Drug use: Yes     Types: Marijuana     Comment: 2x/ week    Sexual activity: Yes     Partners: Female     Social Drivers of Health     Financial Resource Strain: Unknown (10/4/2024)    Financial Resource Strain     Within the past 12 months, have you or your family members you live with been unable to get utilities (heat, electricity) when it was really needed?: Patient declined   Food Insecurity: Unknown (10/4/2024)    Food Insecurity     Within the past 12 months, did you worry that your food would run out before you got money to buy more?: Patient declined  "    Within the past 12 months, did the food you bought just not last and you didn t have money to get more?: Patient declined   Transportation Needs: Unknown (10/4/2024)    Transportation Needs     Within the past 12 months, has lack of transportation kept you from medical appointments, getting your medicines, non-medical meetings or appointments, work, or from getting things that you need?: Patient declined   Physical Activity: Sufficiently Active (10/4/2024)    Exercise Vital Sign     Days of Exercise per Week: 7 days     Minutes of Exercise per Session: 60 min   Stress: No Stress Concern Present (10/4/2024)    [employer] - Occupational Stress Questionnaire     Feeling of Stress : Only a little   Social Connections: Unknown (10/4/2024)    Social Connection and Isolation Panel [NHANES]     Frequency of Social Gatherings with Friends and Family: Twice a week   Interpersonal Safety: Low Risk  (10/4/2024)    Interpersonal Safety     Do you feel physically and emotionally safe where you currently live?: Yes     Within the past 12 months, have you been hit, slapped, kicked or otherwise physically hurt by someone?: No     Within the past 12 months, have you been humiliated or emotionally abused in other ways by your partner or ex-partner?: No   Housing Stability: Unknown (10/4/2024)    Housing Stability     Do you have housing? : Patient declined     Are you worried about losing your housing?: Patient declined       Family History -   Family History   Problem Relation Age of Onset    Unknown/Adopted No family hx of        Review of Systems - As per HPI and PMHx, otherwise review of system review of the head and neck negative. Otherwise 10+ review of system is negative    Physical Exam  /72   Temp 97.7  F (36.5  C) (Temporal)   Ht 1.802 m (5' 10.95\")   Wt 103.9 kg (229 lb)   BMI 31.98 kg/m    BMI: Body mass index is 31.98 kg/m .    General - The patient is well nourished and well " developed, and appears to have good nutritional status.  Alert and oriented to person and place, answers questions and cooperates with examination appropriately.    SKIN - No suspicious lesions or rashes.  Respiration - No respiratory distress.  Head and Face - Normocephalic and atraumatic, with no gross asymmetry noted of the contour of the facial features.  The facial nerve is intact, with strong symmetric movements.    Voice and Breathing - The patient was breathing comfortably without the use of accessory muscles. The patients voice was clear and strong, and had appropriate pitch and quality.    Ears - Bilateral pinna and EACs with normal appearing overlying skin.  Left tympanic membrane appears to be clear with areas of retraction and tympanosclerosis.  Ear canal is  clear and dry.  On the right side we see a small perhaps about 10% anterior tympanic perforation with areas of tympanosclerosis and retraction.  Ear canal is clear and dry.  Eyes - Extraocular movements intact.  Sclera were not icteric or injected, conjunctiva were pink and moist.      Neck - Normal midline excursion of the laryngotracheal complex during swallowing.  Full range of motion on passive movement.  Palpation of the occipital, submental, submandibular, internal jugular chain, and supraclavicular nodes did not demonstrate any abnormal lymph nodes or masses.  The carotid pulse was palpable bilaterally.  Palpation of the thyroid was soft and smooth, with no nodules or goiter appreciated.  The trachea was mobile and midline.    Nose - External contour is symmetric, no gross deflection or scars.  Nasal mucosa is pink and moist with no abnormal mucus.  The septum was midline and non-obstructive, turbinates of normal size and position.  No polyps, masses, or purulence noted on examination.    Neuro - Nonfocal neuro exam is normal, CN 2 through 12 intact, normal gait and muscle tone.      Performed in clinic today:  Audiologic Studies - An  audiogram and tympanogram were performed today as part of the evaluation and personally reviewed. The tympanogram shows Type B curves on the right and Type A very shallow curves on the left, with normal on the left large in the right canal volumes and middle ear pressures.  The audiogram showed mild conductive loss on the right and borderline mild conductive loss on the left.        A/P - Andrew Atkinson is a 54 year old adult Patient presents with:  Follow Up: Hearing issues    Patient with bilateral conductive hearing loss right slightly worse than the left.  As compared to his prior audiogram in October 2020 for significant improvement in both ears especially the left.  He still feels that his hearing is not sufficient especially in environments with background noise.  We advised him to look into hearing aid technologies discussed over-the-counter hearing aids versus more custom aids.  He will pursue hearing aids.    Andrew should follow up in 1 year.      At Andrew next appointment they will need a hearing test.      Shaheen David MD

## 2025-07-14 ENCOUNTER — OFFICE VISIT (OUTPATIENT)
Dept: OTOLARYNGOLOGY | Facility: CLINIC | Age: 55
End: 2025-07-14
Payer: COMMERCIAL

## 2025-07-14 ENCOUNTER — OFFICE VISIT (OUTPATIENT)
Dept: AUDIOLOGY | Facility: CLINIC | Age: 55
End: 2025-07-14
Payer: COMMERCIAL

## 2025-07-14 VITALS
SYSTOLIC BLOOD PRESSURE: 116 MMHG | TEMPERATURE: 97.7 F | WEIGHT: 229 LBS | BODY MASS INDEX: 32.06 KG/M2 | HEIGHT: 71 IN | DIASTOLIC BLOOD PRESSURE: 72 MMHG

## 2025-07-14 DIAGNOSIS — H90.6 MIXED CONDUCTIVE AND SENSORINEURAL HEARING LOSS OF BOTH EARS: Primary | ICD-10-CM

## 2025-07-14 DIAGNOSIS — H72.91 PERFORATION OF TYMPANIC MEMBRANE, RIGHT: ICD-10-CM

## 2025-07-14 PROCEDURE — 92557 COMPREHENSIVE HEARING TEST: CPT | Performed by: AUDIOLOGIST

## 2025-07-14 PROCEDURE — 92567 TYMPANOMETRY: CPT | Performed by: AUDIOLOGIST

## 2025-07-14 PROCEDURE — 3074F SYST BP LT 130 MM HG: CPT | Performed by: OTOLARYNGOLOGY

## 2025-07-14 PROCEDURE — 99213 OFFICE O/P EST LOW 20 MIN: CPT | Performed by: OTOLARYNGOLOGY

## 2025-07-14 PROCEDURE — 1126F AMNT PAIN NOTED NONE PRSNT: CPT | Performed by: OTOLARYNGOLOGY

## 2025-07-14 PROCEDURE — 3078F DIAST BP <80 MM HG: CPT | Performed by: OTOLARYNGOLOGY

## 2025-07-14 ASSESSMENT — PAIN SCALES - GENERAL: PAINLEVEL_OUTOF10: NO PAIN (0)

## 2025-07-14 NOTE — LETTER
7/14/2025      Andrew Atkinson  [address] [address]      Dear Colleague,    Thank you for referring your patient, Andrew Atkinson, to the [facility]. Please see a copy of my visit note below.    History of Present Illness - Andrew Atkinson is a 54 year old adult presenting in clinic today for a recheck on Patient presents with:  Follow Up: Hearing issues    Patient previously seen for significant conductive hearing loss mostly due to significant tympanosclerosis and had presence of tympanic perforation involving his right ear.  Presents in follow-up.  He has had prior ear surgeries prior tympanoplasty's.  Currently has not had any drainage in the ears have been clear and dry.    Present Symptoms include: hearing loss and they are   getting worse .  Andrew denies otolgia and tinnitis.      BP Readings from Last 1 Encounters:   07/14/25 116/72       BP noted to be well controlled today in office.     Andrew IS NOT a smoker/uses chewing tobacco.     Past Medical History -   Past Medical History:   Diagnosis Date     CAD (coronary artery disease) 06/08/2025     Medial meniscus tear      TIFFANY (obstructive sleep apnea) 03/02/2023       Current Medications -   Current Outpatient Medications:      aspirin 81 MG EC tablet, Take 1 tablet (81 mg) by mouth daily, Disp: 30 tablet, Rfl: 0     Barberry-Oreg Grape-Goldenseal (BERBERINE COMPLEX PO), Take by mouth., Disp: , Rfl:      Continuous Glucose Sensor (FREESTYLE NICOLE 3 PLUS SENSOR) MISC, Use 1 sensor every 15 days. Use to read blood sugars per 's instructions., Disp: 6 each, Rfl: 3     diltiazem ER (CARDIAZEM LA) 120 MG 24 hr tablet, Take 1 tablet (120 mg) by mouth daily., Disp: 90 tablet, Rfl: 1     levothyroxine (SYNTHROID/LEVOTHROID) 150 MCG tablet, Take 1 tablet (150 mcg) by mouth daily. With 12.5mg tab, Disp: 90 tablet, Rfl: 3     levothyroxine (SYNTHROID/LEVOTHROID) 25 MCG tablet, TAKE 1/2 TABLET BY MOUTH ONCE  DAILY WITH 150MCG TABLET, Disp: 90 tablet, Rfl: 3     Magnesium Oxide POWD, Take 1 Scoop by mouth every morning., Disp: , Rfl:      multivitamin w/minerals (THERA-VIT-M) tablet, Take 1 tablet by mouth every morning, Disp: , Rfl:      blood glucose (NO BRAND SPECIFIED) lancets standard, Use to test blood sugar 1 times daily or as directed.  Any brand that is covered. (Patient not taking: Reported on 7/14/2025), Disp: 50 Lancet, Rfl: 1     ciprofloxacin-dexAMETHasone (CIPRODEX) 0.3-0.1 % otic suspension, Place 4 drops into both ears 2 times daily., Disp: 7.5 mL, Rfl: 0     fish oil-omega-3 fatty acids 1000 MG capsule, Take 2 g by mouth every morning (Patient not taking: Reported on 5/28/2024), Disp: , Rfl:      predniSONE (DELTASONE) 20 MG tablet, Take 3 tabs by mouth daily x 3 days, then 2 tabs daily x 3 days, then 1 tab daily x 3 days, then 1/2 tab daily x 3 days. (Patient not taking: Reported on 10/4/2024), Disp: 20 tablet, Rfl: 0     UNABLE TO FIND, Take 1 tablet by mouth every morning. MEDICATION NAME: Organic Mushrooms, Lions fauzia turkey tail, Disp: , Rfl:     Allergies -   Allergies   Allergen Reactions     Morphine Hives     States tolerates Oxy fine       Social History -   Social History     Socioeconomic History     Marital status: Single   Tobacco Use     Smoking status: Former     Types: Cigarettes     Smokeless tobacco: Never     Tobacco comments:     Quit tobacco in 2007   Vaping Use     Vaping status: Never Used   Substance and Sexual Activity     Alcohol use: Yes     Alcohol/week: 3.0 - 4.0 standard drinks of alcohol     Types: 3 - 4 Standard drinks or equivalent per week     Comment: 3-4 drinks aweek     Drug use: Yes     Types: Marijuana     Comment: 2x/ week     Sexual activity: Yes     Partners: Female     Social Drivers of Health     Financial Resource Strain: Unknown (10/4/2024)    Financial Resource Strain      Within the past 12 months, have you or your family members you live with been unable  to get utilities (heat, electricity) when it was really needed?: Patient declined   Food Insecurity: Unknown (10/4/2024)    Food Insecurity      Within the past 12 months, did you worry that your food would run out before you got money to buy more?: Patient declined      Within the past 12 months, did the food you bought just not last and you didn t have money to get more?: Patient declined   Transportation Needs: Unknown (10/4/2024)    Transportation Needs      Within the past 12 months, has lack of transportation kept you from medical appointments, getting your medicines, non-medical meetings or appointments, work, or from getting things that you need?: Patient declined   Physical Activity: Sufficiently Active (10/4/2024)    Exercise Vital Sign      Days of Exercise per Week: 7 days      Minutes of Exercise per Session: 60 min   Stress: No Stress Concern Present (10/4/2024)    [employer] - Occupational Stress Questionnaire      Feeling of Stress : Only a little   Social Connections: Unknown (10/4/2024)    Social Connection and Isolation Panel [NHANES]      Frequency of Social Gatherings with Friends and Family: Twice a week   Interpersonal Safety: Low Risk  (10/4/2024)    Interpersonal Safety      Do you feel physically and emotionally safe where you currently live?: Yes      Within the past 12 months, have you been hit, slapped, kicked or otherwise physically hurt by someone?: No      Within the past 12 months, have you been humiliated or emotionally abused in other ways by your partner or ex-partner?: No   Housing Stability: Unknown (10/4/2024)    Housing Stability      Do you have housing? : Patient declined      Are you worried about losing your housing?: Patient declined       Family History -   Family History   Problem Relation Age of Onset     Unknown/Adopted No family hx of        Review of Systems - As per HPI and PMHx, otherwise review of system review of the head and neck  "negative. Otherwise 10+ review of system is negative    Physical Exam  /72   Temp 97.7  F (36.5  C) (Temporal)   Ht 1.802 m (5' 10.95\")   Wt 103.9 kg (229 lb)   BMI 31.98 kg/m    BMI: Body mass index is 31.98 kg/m .    General - The patient is well nourished and well developed, and appears to have good nutritional status.  Alert and oriented to person and place, answers questions and cooperates with examination appropriately.    SKIN - No suspicious lesions or rashes.  Respiration - No respiratory distress.  Head and Face - Normocephalic and atraumatic, with no gross asymmetry noted of the contour of the facial features.  The facial nerve is intact, with strong symmetric movements.    Voice and Breathing - The patient was breathing comfortably without the use of accessory muscles. The patients voice was clear and strong, and had appropriate pitch and quality.    Ears - Bilateral pinna and EACs with normal appearing overlying skin.  Left tympanic membrane appears to be clear with areas of retraction and tympanosclerosis.  Ear canal is  clear and dry.  On the right side we see a small perhaps about 10% anterior tympanic perforation with areas of tympanosclerosis and retraction.  Ear canal is clear and dry.  Eyes - Extraocular movements intact.  Sclera were not icteric or injected, conjunctiva were pink and moist.      Neck - Normal midline excursion of the laryngotracheal complex during swallowing.  Full range of motion on passive movement.  Palpation of the occipital, submental, submandibular, internal jugular chain, and supraclavicular nodes did not demonstrate any abnormal lymph nodes or masses.  The carotid pulse was palpable bilaterally.  Palpation of the thyroid was soft and smooth, with no nodules or goiter appreciated.  The trachea was mobile and midline.    Nose - External contour is symmetric, no gross deflection or scars.  Nasal mucosa is pink and moist with no abnormal mucus.  The septum was " midline and non-obstructive, turbinates of normal size and position.  No polyps, masses, or purulence noted on examination.    Neuro - Nonfocal neuro exam is normal, CN 2 through 12 intact, normal gait and muscle tone.      Performed in clinic today:  Audiologic Studies - An audiogram and tympanogram were performed today as part of the evaluation and personally reviewed. The tympanogram shows Type B curves on the right and Type A very shallow curves on the left, with normal on the left large in the right canal volumes and middle ear pressures.  The audiogram showed mild conductive loss on the right and borderline mild conductive loss on the left.        A/P - Andrew Atkinson is a 54 year old adult Patient presents with:  Follow Up: Hearing issues    Patient with bilateral conductive hearing loss right slightly worse than the left.  As compared to his prior audiogram in October 2020 for significant improvement in both ears especially the left.  He still feels that his hearing is not sufficient especially in environments with background noise.  We advised him to look into hearing aid technologies discussed over-the-counter hearing aids versus more custom aids.  He will pursue hearing aids.    Andrew should follow up in 1 year.      At Andrew next appointment they will need a hearing test.      Shaheen David MD         Again, thank you for allowing me to participate in the care of your patient.        Sincerely,        Shaheen David MD, MD    Electronically signed

## 2025-07-14 NOTE — PROGRESS NOTES
AUDIOLOGY REPORT     SUMMARY: Audiology visit completed. See audiogram for results.     RECOMMENDATIONS: Follow-up with ENT    Cresencio Hernandez Licensed Audiologist #[id number]

## 2025-08-10 PROBLEM — K42.0 UMBILICAL HERNIA WITH OBSTRUCTION: Status: RESOLVED | Noted: 2020-10-09 | Resolved: 2025-08-10

## 2025-09-04 ENCOUNTER — PATIENT OUTREACH (OUTPATIENT)
Dept: CARE COORDINATION | Facility: CLINIC | Age: 55
End: 2025-09-04
Payer: COMMERCIAL

## (undated) DEVICE — DRAPE MAYO STAND 23X54 8337

## (undated) DEVICE — SU SECURESEAL SKIN ADHESIVE 0.5ML CHSS-05

## (undated) DEVICE — PACK SHOULDER CUSTOM ASC SOP15ASUMA

## (undated) DEVICE — ENDO TROCAR FIRST ENTRY KII FIOS Z-THRD 12X100MM CTF73

## (undated) DEVICE — CATH ANGIO SUPERTORQUE PLUS JL4 6FRX100CM 533620

## (undated) DEVICE — SU MONOCRYL 3-0 PS-1 27" Y936H

## (undated) DEVICE — GLOVE PROTEXIS W/NEU-THERA 8.0  2D73TE80

## (undated) DEVICE — ENDO SCOPE WARMER TM500

## (undated) DEVICE — LINEN ORTHO PACK 5446

## (undated) DEVICE — 0.035IN X 260CM, EMERALD DIAGNOSTIC GUIDEWIRE, FIXED-CORE PTFE COATED, STANDARD, 3MM EXCHANGE J-TIP (EA/1)

## (undated) DEVICE — SOL WATER IRRIG 1000ML BOTTLE 2F7114

## (undated) DEVICE — Device

## (undated) DEVICE — SYR 10ML LL W/O NDL

## (undated) DEVICE — BLADE KNIFE SURG 11 371111

## (undated) DEVICE — TUBING SYSTEM ARTHREX PATIENT REDEUCE AR-6421

## (undated) DEVICE — BUR ARTHREX COOLCUT EXCALIBUR 4.0MMX13CM AR-8400EX

## (undated) DEVICE — DRSG ABDOMINAL 07 1/2X8" 7197D

## (undated) DEVICE — PACK MINOR SBA15MIFSE

## (undated) DEVICE — SUTURE PASSER SU CAPTURE SYS S&N FIRSTPASS 22-4038

## (undated) DEVICE — ENDO TROCAR FIRST ENTRY KII FIOS Z-THRD 11X100MM CTF33

## (undated) DEVICE — PAD FLOOR SURGSAFE 30X40" 83030

## (undated) DEVICE — DECANTER TRANSFER DEVICE 2008S

## (undated) DEVICE — BLADE DYONICS INCISOR PLUS ELITE 3.5MM 72200095

## (undated) DEVICE — DRAPE STERI TOWEL LG 1010

## (undated) DEVICE — TUBING SUCTION 6"X3/16" N56A

## (undated) DEVICE — NDL 25GA 1.5" 305127

## (undated) DEVICE — GLOVE BIOGEL PI MICRO SZ 6.5 48565

## (undated) DEVICE — DEVICE SUTURE GRASPER TROCAR CLOSURE 14GA PMITCSG

## (undated) DEVICE — GLOVE PROTEXIS W/NEU-THERA 8.5  2D73TE85

## (undated) DEVICE — IMM KIT SHOULDER TMAX MASK FACE 7210559

## (undated) DEVICE — ABLATOR ARTHREX APOLLO RF MP90 ASPIRATING 90DEG AR-9811

## (undated) DEVICE — GLOVE PROTEXIS POWDER FREE 8.0 ORTHOPEDIC 2D73ET80

## (undated) DEVICE — SOL WATER IRRIG 1000ML BOTTLE 07139-09

## (undated) DEVICE — SU VICRYL 4-0 P-3 18" UND  J494H

## (undated) DEVICE — SOL NACL 0.9% IRRIG 3000ML BAG 2B7477

## (undated) DEVICE — KIT HAND CONTROL ACIST 014644 AR-P54

## (undated) DEVICE — FASTENER CATH BALLOON CLAMPX2 STATLOCK 0684-00-493

## (undated) DEVICE — PREP CHLORAPREP 26ML TINTED ORANGE  260815

## (undated) DEVICE — SU PDS II 0 CT-2 27" Z334H

## (undated) DEVICE — DEVICE FIXATION SECURESTRAP TACKER 5MM ABSORB STRAP25

## (undated) DEVICE — DRAPE STERI U 1015

## (undated) DEVICE — DRAPE IOBAN INCISE 23X17" 6650EZ

## (undated) DEVICE — DRSG STERI STRIP 1/2X4" R1547

## (undated) DEVICE — TUBING SYSTEM ARTHREX PUMP REDEUCE AR-6411

## (undated) DEVICE — SLEEVE TR BAND RADIAL COMPRESSION DEVICE 24CM TRB24-REG

## (undated) DEVICE — ENDO TROCAR FIRST ENTRY KII FIOS Z-THRD 05X100MM CTF03

## (undated) DEVICE — TUBING SUCTION MEDI-VAC 1/4"X20' N620A

## (undated) DEVICE — BNDG ELASTIC 6"X5YDS UNSTERILE 6611-60

## (undated) DEVICE — ARTHROSCOPIC CANNULA TWIST-IN PURPLE 7MMX7CM AR-6570

## (undated) DEVICE — IMM KIT SHOULDER STABILIZATION 7210573

## (undated) DEVICE — SUCTION CANISTER MEDIVAC LINER 1500ML W/LID 65651-515

## (undated) DEVICE — DRSG STERI STRIP 1/4X3" R1541

## (undated) DEVICE — MANIFOLD KIT ANGIO AUTOMATED 014613

## (undated) DEVICE — TUBING PRESSURE 30"

## (undated) DEVICE — NDL SPINAL 18GA 3.5" 405184

## (undated) DEVICE — GLOVE BIOGEL PI MICRO INDICATOR UNDERGLOVE SZ 6.5 48965

## (undated) DEVICE — SOL NACL 0.9% IRRIG 3000ML BAG 07972-08

## (undated) DEVICE — STRAP STIRRUP W/SLIP 30187-030

## (undated) DEVICE — KIT ENDO TURNOVER/PROCEDURE CARRY-ON 101822

## (undated) DEVICE — SUCTION MANIFOLD NEPTUNE 2 SYS 4 PORT 0702-020-000

## (undated) DEVICE — DRAPE LAPAROSCOPIC ABDOMINAL ASTOUND 106X124" LF 9438

## (undated) DEVICE — PACK ARTHROSCOPY KNEE SOP15AKFSM

## (undated) DEVICE — TUBING INSUFFLATION W/FILTER CPC TO LUER 620-030-301

## (undated) DEVICE — DRAPE ARTHROSCOPY SHOULDER BEACHCHAIR 29369

## (undated) DEVICE — PACK HEART LEFT CUSTOM

## (undated) DEVICE — GLOVE PROTEXIS BLUE W/NEU-THERA 8.0  2D73EB80

## (undated) DEVICE — MANIFOLD NEPTUNE 4 PORT 700-20

## (undated) DEVICE — CATH ANGIO SUPERTORQUE PLUS JR4 6FRX100CM 533621

## (undated) DEVICE — SHTH INTRO 0.021IN ID 6FR DIA

## (undated) DEVICE — ENDO TROCAR SLEEVE KII Z-THREADED 05X100MM CTS02

## (undated) DEVICE — ANTIFOG SOLUTION W/FOAM PAD CF-1001

## (undated) DEVICE — CAST PADDING 6" STERILE CS9046

## (undated) DEVICE — ADHESIVE SWIFTSET 0.8ML OCTYL SS6

## (undated) RX ORDER — PROPOFOL 10 MG/ML
INJECTION, EMULSION INTRAVENOUS
Status: DISPENSED
Start: 2024-02-13

## (undated) RX ORDER — ACETAMINOPHEN 325 MG/1
TABLET ORAL
Status: DISPENSED
Start: 2023-11-14

## (undated) RX ORDER — FENTANYL CITRATE 50 UG/ML
INJECTION, SOLUTION INTRAMUSCULAR; INTRAVENOUS
Status: DISPENSED
Start: 2020-02-15

## (undated) RX ORDER — ONDANSETRON 2 MG/ML
INJECTION INTRAMUSCULAR; INTRAVENOUS
Status: DISPENSED
Start: 2020-12-08

## (undated) RX ORDER — BUPIVACAINE HYDROCHLORIDE AND EPINEPHRINE 5; 5 MG/ML; UG/ML
INJECTION, SOLUTION EPIDURAL; INTRACAUDAL; PERINEURAL
Status: DISPENSED
Start: 2023-11-15

## (undated) RX ORDER — PROPOFOL 10 MG/ML
INJECTION, EMULSION INTRAVENOUS
Status: DISPENSED
Start: 2023-07-18

## (undated) RX ORDER — NICARDIPINE HYDROCHLORIDE 2.5 MG/ML
INJECTION INTRAVENOUS
Status: DISPENSED
Start: 2020-02-15

## (undated) RX ORDER — OXYCODONE HYDROCHLORIDE 5 MG/1
TABLET ORAL
Status: DISPENSED
Start: 2020-12-08

## (undated) RX ORDER — NITROGLYCERIN 5 MG/ML
VIAL (ML) INTRAVENOUS
Status: DISPENSED
Start: 2020-02-15

## (undated) RX ORDER — PROPOFOL 10 MG/ML
INJECTION, EMULSION INTRAVENOUS
Status: DISPENSED
Start: 2020-12-08

## (undated) RX ORDER — DEXAMETHASONE SODIUM PHOSPHATE 4 MG/ML
INJECTION, SOLUTION INTRA-ARTICULAR; INTRALESIONAL; INTRAMUSCULAR; INTRAVENOUS; SOFT TISSUE
Status: DISPENSED
Start: 2020-12-08

## (undated) RX ORDER — FENTANYL CITRATE 50 UG/ML
INJECTION, SOLUTION INTRAMUSCULAR; INTRAVENOUS
Status: DISPENSED
Start: 2024-02-13

## (undated) RX ORDER — LIDOCAINE HYDROCHLORIDE 10 MG/ML
INJECTION, SOLUTION EPIDURAL; INFILTRATION; INTRACAUDAL; PERINEURAL
Status: DISPENSED
Start: 2023-07-18

## (undated) RX ORDER — FENTANYL CITRATE-0.9 % NACL/PF 10 MCG/ML
PLASTIC BAG, INJECTION (ML) INTRAVENOUS
Status: DISPENSED
Start: 2024-02-13

## (undated) RX ORDER — FENTANYL CITRATE 50 UG/ML
INJECTION, SOLUTION INTRAMUSCULAR; INTRAVENOUS
Status: DISPENSED
Start: 2023-11-14

## (undated) RX ORDER — ACETAMINOPHEN 325 MG/1
TABLET ORAL
Status: DISPENSED
Start: 2020-12-08

## (undated) RX ORDER — LIDOCAINE HYDROCHLORIDE 20 MG/ML
INJECTION, SOLUTION INFILTRATION; PERINEURAL
Status: DISPENSED
Start: 2020-12-08

## (undated) RX ORDER — LIDOCAINE HYDROCHLORIDE 10 MG/ML
INJECTION, SOLUTION EPIDURAL; INFILTRATION; INTRACAUDAL; PERINEURAL
Status: DISPENSED
Start: 2020-02-15

## (undated) RX ORDER — SIMETHICONE 40MG/0.6ML
SUSPENSION, DROPS(FINAL DOSAGE FORM)(ML) ORAL
Status: DISPENSED
Start: 2023-11-16

## (undated) RX ORDER — DEXAMETHASONE SODIUM PHOSPHATE 4 MG/ML
INJECTION, SOLUTION INTRA-ARTICULAR; INTRALESIONAL; INTRAMUSCULAR; INTRAVENOUS; SOFT TISSUE
Status: DISPENSED
Start: 2024-02-13

## (undated) RX ORDER — FENTANYL CITRATE 50 UG/ML
INJECTION, SOLUTION INTRAMUSCULAR; INTRAVENOUS
Status: DISPENSED
Start: 2020-12-08

## (undated) RX ORDER — ACETAMINOPHEN 325 MG/1
TABLET ORAL
Status: DISPENSED
Start: 2024-02-13

## (undated) RX ORDER — BUPIVACAINE HYDROCHLORIDE AND EPINEPHRINE 2.5; 5 MG/ML; UG/ML
INJECTION, SOLUTION INFILTRATION; PERINEURAL
Status: DISPENSED
Start: 2020-12-08

## (undated) RX ORDER — TRANEXAMIC ACID 100 MG/ML
INJECTION, SOLUTION INTRAVENOUS
Status: DISPENSED
Start: 2024-02-13

## (undated) RX ORDER — PROPOFOL 10 MG/ML
INJECTION, EMULSION INTRAVENOUS
Status: DISPENSED
Start: 2023-11-15

## (undated) RX ORDER — CEFAZOLIN SODIUM 2 G/50ML
SOLUTION INTRAVENOUS
Status: DISPENSED
Start: 2023-11-14

## (undated) RX ORDER — ONDANSETRON 2 MG/ML
INJECTION INTRAMUSCULAR; INTRAVENOUS
Status: DISPENSED
Start: 2024-02-13

## (undated) RX ORDER — OXYCODONE HCL 5 MG/5 ML
SOLUTION, ORAL ORAL
Status: DISPENSED
Start: 2020-12-08

## (undated) RX ORDER — HEPARIN SODIUM 1000 [USP'U]/ML
INJECTION, SOLUTION INTRAVENOUS; SUBCUTANEOUS
Status: DISPENSED
Start: 2020-02-15

## (undated) RX ORDER — PROPOFOL 10 MG/ML
INJECTION, EMULSION INTRAVENOUS
Status: DISPENSED
Start: 2023-11-14

## (undated) RX ORDER — CEFAZOLIN SODIUM 2 G/50ML
SOLUTION INTRAVENOUS
Status: DISPENSED
Start: 2024-02-13

## (undated) RX ORDER — KETOROLAC TROMETHAMINE 30 MG/ML
INJECTION, SOLUTION INTRAMUSCULAR; INTRAVENOUS
Status: DISPENSED
Start: 2020-12-08

## (undated) RX ORDER — CEFAZOLIN SODIUM 2 G/100ML
INJECTION, SOLUTION INTRAVENOUS
Status: DISPENSED
Start: 2020-12-08